# Patient Record
Sex: FEMALE | Race: ASIAN | NOT HISPANIC OR LATINO | Employment: OTHER | URBAN - METROPOLITAN AREA
[De-identification: names, ages, dates, MRNs, and addresses within clinical notes are randomized per-mention and may not be internally consistent; named-entity substitution may affect disease eponyms.]

---

## 2017-04-12 ENCOUNTER — APPOINTMENT (OUTPATIENT)
Dept: GENERAL RADIOLOGY | Facility: CLINIC | Age: 77
End: 2017-04-12
Attending: EMERGENCY MEDICINE
Payer: MEDICARE

## 2017-04-12 ENCOUNTER — HOSPITAL ENCOUNTER (OUTPATIENT)
Facility: CLINIC | Age: 77
Discharge: HOME | End: 2017-04-13
Attending: EMERGENCY MEDICINE | Admitting: INTERNAL MEDICINE
Payer: MEDICARE

## 2017-04-12 ENCOUNTER — APPOINTMENT (OUTPATIENT)
Dept: CT IMAGING | Facility: CLINIC | Age: 77
End: 2017-04-12
Attending: EMERGENCY MEDICINE
Payer: MEDICARE

## 2017-04-12 DIAGNOSIS — K56.609 SBO (SMALL BOWEL OBSTRUCTION) (H): ICD-10-CM

## 2017-04-12 LAB
ALBUMIN SERPL-MCNC: 4 G/DL (ref 3.4–5)
ALBUMIN UR-MCNC: NEGATIVE MG/DL
ALP SERPL-CCNC: 78 U/L (ref 40–150)
ALT SERPL W P-5'-P-CCNC: 31 U/L (ref 0–50)
ANION GAP SERPL CALCULATED.3IONS-SCNC: 7 MMOL/L (ref 3–14)
APPEARANCE UR: CLEAR
AST SERPL W P-5'-P-CCNC: 24 U/L (ref 0–45)
BASOPHILS # BLD AUTO: 0.1 10E9/L (ref 0–0.2)
BASOPHILS NFR BLD AUTO: 1.3 %
BILIRUB SERPL-MCNC: 0.2 MG/DL (ref 0.2–1.3)
BILIRUB UR QL STRIP: NEGATIVE
BUN SERPL-MCNC: 13 MG/DL (ref 7–30)
CALCIUM SERPL-MCNC: 9.1 MG/DL (ref 8.5–10.1)
CHLORIDE SERPL-SCNC: 102 MMOL/L (ref 94–109)
CO2 SERPL-SCNC: 30 MMOL/L (ref 20–32)
COLOR UR AUTO: ABNORMAL
CREAT SERPL-MCNC: 0.82 MG/DL (ref 0.52–1.04)
DIFFERENTIAL METHOD BLD: NORMAL
EOSINOPHIL # BLD AUTO: 0.2 10E9/L (ref 0–0.7)
EOSINOPHIL NFR BLD AUTO: 3.4 %
ERYTHROCYTE [DISTWIDTH] IN BLOOD BY AUTOMATED COUNT: 12.4 % (ref 10–15)
GFR SERPL CREATININE-BSD FRML MDRD: 67 ML/MIN/1.7M2
GLUCOSE SERPL-MCNC: 118 MG/DL (ref 70–99)
GLUCOSE UR STRIP-MCNC: NEGATIVE MG/DL
HCT VFR BLD AUTO: 38.6 % (ref 35–47)
HGB BLD-MCNC: 13.1 G/DL (ref 11.7–15.7)
HGB UR QL STRIP: ABNORMAL
IMM GRANULOCYTES # BLD: 0 10E9/L (ref 0–0.4)
IMM GRANULOCYTES NFR BLD: 0.2 %
KETONES UR STRIP-MCNC: NEGATIVE MG/DL
LACTATE BLD-SCNC: 1.2 MMOL/L (ref 0.7–2.1)
LEUKOCYTE ESTERASE UR QL STRIP: NEGATIVE
LIPASE SERPL-CCNC: 105 U/L (ref 73–393)
LYMPHOCYTES # BLD AUTO: 2.5 10E9/L (ref 0.8–5.3)
LYMPHOCYTES NFR BLD AUTO: 39.1 %
MCH RBC QN AUTO: 30.7 PG (ref 26.5–33)
MCHC RBC AUTO-ENTMCNC: 33.9 G/DL (ref 31.5–36.5)
MCV RBC AUTO: 90 FL (ref 78–100)
MONOCYTES # BLD AUTO: 0.4 10E9/L (ref 0–1.3)
MONOCYTES NFR BLD AUTO: 6.7 %
NEUTROPHILS # BLD AUTO: 3.1 10E9/L (ref 1.6–8.3)
NEUTROPHILS NFR BLD AUTO: 49.3 %
NITRATE UR QL: NEGATIVE
PH UR STRIP: 7.5 PH (ref 5–7)
PLATELET # BLD AUTO: 293 10E9/L (ref 150–450)
POTASSIUM SERPL-SCNC: 3.7 MMOL/L (ref 3.4–5.3)
PROT SERPL-MCNC: 8.5 G/DL (ref 6.8–8.8)
RBC # BLD AUTO: 4.27 10E12/L (ref 3.8–5.2)
RBC #/AREA URNS AUTO: 3 /HPF (ref 0–2)
SODIUM SERPL-SCNC: 139 MMOL/L (ref 133–144)
SP GR UR STRIP: 1 (ref 1–1.03)
URN SPEC COLLECT METH UR: ABNORMAL
UROBILINOGEN UR STRIP-MCNC: NORMAL MG/DL (ref 0–2)
WBC # BLD AUTO: 6.3 10E9/L (ref 4–11)
WBC #/AREA URNS AUTO: 2 /HPF (ref 0–2)

## 2017-04-12 PROCEDURE — 80053 COMPREHEN METABOLIC PANEL: CPT | Performed by: EMERGENCY MEDICINE

## 2017-04-12 PROCEDURE — 96374 THER/PROPH/DIAG INJ IV PUSH: CPT

## 2017-04-12 PROCEDURE — 83690 ASSAY OF LIPASE: CPT | Performed by: EMERGENCY MEDICINE

## 2017-04-12 PROCEDURE — 25000128 H RX IP 250 OP 636: Performed by: EMERGENCY MEDICINE

## 2017-04-12 PROCEDURE — 12000000 ZZH R&B MED SURG/OB

## 2017-04-12 PROCEDURE — 25000132 ZZH RX MED GY IP 250 OP 250 PS 637: Mod: GY | Performed by: EMERGENCY MEDICINE

## 2017-04-12 PROCEDURE — A9270 NON-COVERED ITEM OR SERVICE: HCPCS | Mod: GY | Performed by: EMERGENCY MEDICINE

## 2017-04-12 PROCEDURE — 74177 CT ABD & PELVIS W/CONTRAST: CPT

## 2017-04-12 PROCEDURE — 99204 OFFICE O/P NEW MOD 45 MIN: CPT | Performed by: SURGERY

## 2017-04-12 PROCEDURE — 96375 TX/PRO/DX INJ NEW DRUG ADDON: CPT

## 2017-04-12 PROCEDURE — 85025 COMPLETE CBC W/AUTO DIFF WBC: CPT | Performed by: EMERGENCY MEDICINE

## 2017-04-12 PROCEDURE — 25000125 ZZHC RX 250: Performed by: EMERGENCY MEDICINE

## 2017-04-12 PROCEDURE — 99285 EMERGENCY DEPT VISIT HI MDM: CPT | Mod: 25

## 2017-04-12 PROCEDURE — 25500064 ZZH RX 255 OP 636: Performed by: EMERGENCY MEDICINE

## 2017-04-12 PROCEDURE — 25800025 ZZH RX 258: Performed by: INTERNAL MEDICINE

## 2017-04-12 PROCEDURE — 74020 XR ABDOMEN 2 VW: CPT

## 2017-04-12 PROCEDURE — 99223 1ST HOSP IP/OBS HIGH 75: CPT | Performed by: INTERNAL MEDICINE

## 2017-04-12 PROCEDURE — 83605 ASSAY OF LACTIC ACID: CPT | Performed by: EMERGENCY MEDICINE

## 2017-04-12 PROCEDURE — 25000125 ZZHC RX 250: Performed by: INTERNAL MEDICINE

## 2017-04-12 PROCEDURE — 81001 URINALYSIS AUTO W/SCOPE: CPT | Performed by: EMERGENCY MEDICINE

## 2017-04-12 RX ORDER — NALOXONE HYDROCHLORIDE 0.4 MG/ML
.1-.4 INJECTION, SOLUTION INTRAMUSCULAR; INTRAVENOUS; SUBCUTANEOUS
Status: DISCONTINUED | OUTPATIENT
Start: 2017-04-12 | End: 2017-04-13 | Stop reason: HOSPADM

## 2017-04-12 RX ORDER — PROCHLORPERAZINE 25 MG
12.5 SUPPOSITORY, RECTAL RECTAL EVERY 12 HOURS PRN
Status: DISCONTINUED | OUTPATIENT
Start: 2017-04-12 | End: 2017-04-13 | Stop reason: HOSPADM

## 2017-04-12 RX ORDER — LABETALOL HYDROCHLORIDE 5 MG/ML
10 INJECTION, SOLUTION INTRAVENOUS EVERY 6 HOURS PRN
Status: DISCONTINUED | OUTPATIENT
Start: 2017-04-12 | End: 2017-04-13 | Stop reason: HOSPADM

## 2017-04-12 RX ORDER — ENALAPRILAT 1.25 MG/ML
1.25 INJECTION INTRAVENOUS EVERY 6 HOURS
Status: DISCONTINUED | OUTPATIENT
Start: 2017-04-12 | End: 2017-04-13

## 2017-04-12 RX ORDER — DEXTROSE MONOHYDRATE, SODIUM CHLORIDE, AND POTASSIUM CHLORIDE 50; 1.49; 4.5 G/1000ML; G/1000ML; G/1000ML
INJECTION, SOLUTION INTRAVENOUS CONTINUOUS
Status: DISCONTINUED | OUTPATIENT
Start: 2017-04-12 | End: 2017-04-13

## 2017-04-12 RX ORDER — SODIUM CHLORIDE 9 MG/ML
INJECTION, SOLUTION INTRAVENOUS ONCE
Status: COMPLETED | OUTPATIENT
Start: 2017-04-12 | End: 2017-04-12

## 2017-04-12 RX ORDER — CYANOCOBALAMIN 1000 UG/ML
1 INJECTION, SOLUTION INTRAMUSCULAR; SUBCUTANEOUS
Status: ON HOLD | COMMUNITY
End: 2017-04-12

## 2017-04-12 RX ORDER — PROCHLORPERAZINE MALEATE 5 MG
5 TABLET ORAL EVERY 6 HOURS PRN
Status: DISCONTINUED | OUTPATIENT
Start: 2017-04-12 | End: 2017-04-13 | Stop reason: HOSPADM

## 2017-04-12 RX ORDER — MORPHINE SULFATE 2 MG/ML
2-4 INJECTION, SOLUTION INTRAMUSCULAR; INTRAVENOUS
Status: DISCONTINUED | OUTPATIENT
Start: 2017-04-12 | End: 2017-04-13 | Stop reason: HOSPADM

## 2017-04-12 RX ORDER — IOPAMIDOL 755 MG/ML
68 INJECTION, SOLUTION INTRAVASCULAR ONCE
Status: COMPLETED | OUTPATIENT
Start: 2017-04-12 | End: 2017-04-12

## 2017-04-12 RX ORDER — ONDANSETRON 4 MG/1
4 TABLET, ORALLY DISINTEGRATING ORAL EVERY 6 HOURS PRN
Status: DISCONTINUED | OUTPATIENT
Start: 2017-04-12 | End: 2017-04-13 | Stop reason: HOSPADM

## 2017-04-12 RX ORDER — HYDROMORPHONE HYDROCHLORIDE 1 MG/ML
0.2 INJECTION, SOLUTION INTRAMUSCULAR; INTRAVENOUS; SUBCUTANEOUS ONCE
Status: COMPLETED | OUTPATIENT
Start: 2017-04-12 | End: 2017-04-12

## 2017-04-12 RX ORDER — HYDRALAZINE HYDROCHLORIDE 20 MG/ML
10 INJECTION INTRAMUSCULAR; INTRAVENOUS EVERY 6 HOURS PRN
Status: DISCONTINUED | OUTPATIENT
Start: 2017-04-12 | End: 2017-04-13 | Stop reason: HOSPADM

## 2017-04-12 RX ORDER — OXYCODONE HYDROCHLORIDE 5 MG/1
5 TABLET ORAL ONCE
Status: COMPLETED | OUTPATIENT
Start: 2017-04-12 | End: 2017-04-12

## 2017-04-12 RX ORDER — ONDANSETRON 2 MG/ML
4 INJECTION INTRAMUSCULAR; INTRAVENOUS EVERY 6 HOURS PRN
Status: DISCONTINUED | OUTPATIENT
Start: 2017-04-12 | End: 2017-04-13 | Stop reason: HOSPADM

## 2017-04-12 RX ORDER — MORPHINE SULFATE 2 MG/ML
2 INJECTION, SOLUTION INTRAMUSCULAR; INTRAVENOUS ONCE
Status: COMPLETED | OUTPATIENT
Start: 2017-04-12 | End: 2017-04-12

## 2017-04-12 RX ORDER — LANOLIN ALCOHOL/MO/W.PET/CERES
1000 CREAM (GRAM) TOPICAL DAILY PRN
COMMUNITY
End: 2018-08-11

## 2017-04-12 RX ADMIN — SODIUM CHLORIDE 60 ML: 9 INJECTION, SOLUTION INTRAVENOUS at 04:04

## 2017-04-12 RX ADMIN — MORPHINE SULFATE 2 MG: 2 INJECTION, SOLUTION INTRAMUSCULAR; INTRAVENOUS at 03:38

## 2017-04-12 RX ADMIN — IOPAMIDOL 68 ML: 755 INJECTION, SOLUTION INTRAVENOUS at 04:04

## 2017-04-12 RX ADMIN — ENALAPRILAT 1.25 MG: 1.25 INJECTION INTRAVENOUS at 17:07

## 2017-04-12 RX ADMIN — OXYCODONE HYDROCHLORIDE 5 MG: 5 TABLET ORAL at 02:31

## 2017-04-12 RX ADMIN — ENALAPRILAT 1.25 MG: 1.25 INJECTION INTRAVENOUS at 06:15

## 2017-04-12 RX ADMIN — ENALAPRILAT 1.25 MG: 1.25 INJECTION INTRAVENOUS at 23:51

## 2017-04-12 RX ADMIN — HYDROMORPHONE HYDROCHLORIDE 0.2 MG: 1 INJECTION, SOLUTION INTRAMUSCULAR; INTRAVENOUS; SUBCUTANEOUS at 01:03

## 2017-04-12 RX ADMIN — LIDOCAINE HYDROCHLORIDE 30 ML: 20 SOLUTION ORAL; TOPICAL at 01:58

## 2017-04-12 RX ADMIN — ENALAPRILAT 1.25 MG: 1.25 INJECTION INTRAVENOUS at 12:40

## 2017-04-12 RX ADMIN — POTASSIUM CHLORIDE, DEXTROSE MONOHYDRATE AND SODIUM CHLORIDE: 150; 5; 450 INJECTION, SOLUTION INTRAVENOUS at 06:14

## 2017-04-12 RX ADMIN — POTASSIUM CHLORIDE, DEXTROSE MONOHYDRATE AND SODIUM CHLORIDE: 150; 5; 450 INJECTION, SOLUTION INTRAVENOUS at 16:59

## 2017-04-12 RX ADMIN — SODIUM CHLORIDE 125 ML/HR: 9 INJECTION, SOLUTION INTRAVENOUS at 01:02

## 2017-04-12 ASSESSMENT — ENCOUNTER SYMPTOMS
NAUSEA: 0
ABDOMINAL PAIN: 1
DIARRHEA: 0
CHILLS: 0
VOMITING: 0
FEVER: 0

## 2017-04-12 NOTE — PLAN OF CARE
Problem: Goal Outcome Summary  Goal: Goal Outcome Summary  Outcome: No Change  VSS. A/O. Pt denies pain. Intermittent nausea, declines medicine. Abd soft/non tender. BS+ Passing gas. NPO. Surgery following. Conservative management at this point.  IVF. Up indep in room/halls. D/C pending progress.

## 2017-04-12 NOTE — ED NOTES
[facility]  ED Nurse Handoff Report    ED Chief complaint: Abdominal Pain (began having mid abdominal pain around 2100.  denies n/v)      ED Diagnosis:   Final diagnoses:   SBO (small bowel obstruction) (H)       Code Status: Full Code    Allergies: No Known Allergies    Activity level - Baseline/Home:  Independent    Activity Level - Current:   Independent     Needed?: No    Isolation: No  Infection: Not Applicable    Bariatric?: No    Vital Signs:   Vitals:    04/12/17 0315 04/12/17 0330 04/12/17 0344 04/12/17 0345   BP: (!) 171/95 (!) 188/98 161/84    Resp: 18  18    Temp:       TempSrc:       SpO2: 94%  96% 97%   Height:           Cardiac Rhythm: ,        Pain level: 2/10    Is this patient confused?: No    Patient Report: Initial Complaint: The Pt presented to the ED with c/o left sided abdominal pain. She reports that she has had a history of SBO in the past. She reports that she had pizza with cabbage last night at 1930 hrs and at 2100, began to have abdominal pain. She denies nausea or diarrhea Her pain has been intermittent and she has been treated with Dilaudid, IV, Morphine IV, Roxicodone and a GI Cocktail.   Focused Assessment: The Pt reports that se has had better control with the Morphine  Tests Performed: XRAY, Abdominal CT and lab work  Abnormal Results: small SBO  Treatments provided: pain medications and IV fluids    Family Comments:  is at bedside.    OBS brochure/video discussed/provided to patient: N/A    ED Medications:   Medications   0.9% sodium chloride infusion (125 mL/hr Intravenous New Bag 4/12/17 0102)   HYDROmorphone (PF) (DILAUDID) injection 0.2 mg (0.2 mg Intravenous Given 4/12/17 0103)   lidocaine (XYLOCAINE) 2 % 15 mL, alum & mag hydroxide-simethicone (MYLANTA ES/MAALOX  ES) 15 mL GI Cocktail (30 mLs Oral Given 4/12/17 0158)   oxyCODONE (ROXICODONE) IR tablet 5 mg (5 mg Oral Given 4/12/17 0231)   iohexol (OMNIPAQUE) solution 25 mL (25 mLs Oral  Given 4/12/17 0339)   morphine (PF) injection 2 mg (2 mg Intravenous Given 4/12/17 0338)   iopamidol (ISOVUE-370) solution 68 mL (68 mLs Intravenous Given 4/12/17 0404)   sodium chloride 0.9 % for CT scan flush dose 60 mL (60 mLs Intravenous Given 4/12/17 0404)       Drips infusing?:  Yes      ED NURSE PHONE NUMBER: [phone]

## 2017-04-12 NOTE — ED PROVIDER NOTES
"  History     Chief Complaint:  Abdominal Pain    HPI   Francois Ly is a 77 year old female with a history of hypertension, cholelithiasis, one small bowel obstruction, and pancreatic mass who presents with spouse to the emergency department today for evaluation of abdominal pain. Mr. Ly reports at 21:00, after eating meat pizza with cabbage at 19:30, she began to develop localized sharp mid-abdominal pain. Prior to onset, patient felt fine. Of note, patient states she hasn't eaten processed meat for a long time until tonight. She took chewable Maalox with no improvement in symptoms prompting visit. Here, patient rates the severity of pain as 8/10 at its worse. The patient denies any fever, chills, nausea, vomiting, diarrhea, or changes in bowel or bladder habits.    Allergies:  NKDA     Medications:    Cyanocobalamin (vitamin b12) 1000 mcg/ml injection  Krill oil po  Losartan (cozaar) 100 mg tablet  Chlorthalidone (hygroton) 25 mg tablet  Aspirin ec 81 mg tablet  Lidocaine (lidoderm) 5 % patch  Lidocaine (xylocaine) 5 % ointment  Order for dme  Omega-3 fatty acids (omega-3 fish oil po)  Multiple vitamin (multivitamins po)    Past Medical History:    Benign essential hypertension   Blunt trauma of rib, initial encounter   Gallstones   Hypertension   Pancreatic mass   Slipped intervertebral disc   Cholelithaisis  SBO    Past Surgical History:    Hysterectomy  Orthopedic surgery    Family History:    History reviewed. No pertinent family history.    Social History:  Marital Status:   [2]  Tobacco: Former Smoker  Alcohol: Negative  Presents with spouse  PCP: Luis Feliciano    Review of Systems   Constitutional: Negative for chills and fever.   Gastrointestinal: Positive for abdominal pain. Negative for diarrhea, nausea and vomiting.   All other systems reviewed and are negative.    Physical Exam   First Vitals:  BP: (!) 224/100  Heart Rate: 79  Temp: 97.8  F (36.6  C)  Height: 157.5 cm (5' 2\")  SpO2: 97 %  "   Physical Exam  Nursing note and vitals reviewed.  Constitutional:  Appears well-developed and well-nourished. At times, she appears    uncomfortale and grimaces in pain.   HENT:   Head:   No evidence of facial or scalp trauma.  Nose:    Nose normal.   Mouth/Throat:  Mucosa is moist.  Eyes:    Conjunctivae are normal.      Pupils are equal, round, and reactive to light.      Right eye exhibits no discharge. Left eye exhibits no discharge.      No scleral icterus.   Cardiovascular:  Normal rate, regular rhythm.      Normal heart sounds and intact distal pulses.       No murmur heard.  Pulmonary/Chest:  Effort normal and breath sounds normal. No respiratory distress.     No wheezes. No rales. No chest wall tenderness. No stridor.   Abdominal:   She has some moderate epigastric tenderness with palpation.      No rebound or guarding. Negative Sotomayor's sign.  No right upper    quadrant pain.   Musculoskeletal:  Normal range of motion.      No edema and no tenderness.                                       Neck supple, no midline cervical tenderness.   Neurological:   Alert and oriented to person, place, and year.      No cranial nerve deficit.      Exhibits normal muscle tone. Coordination normal.      GCS eye subscore is 4. GCS verbal subscore is 5.      GCS motor subscore is 6.   Skin:    Skin is warm and dry. No rash noted. No diaphoresis.      No erythema. No pallor.   Psychiatric:   Behavior is normal. Judgment and thought content normal.     Emergency Department Course   Imaging:  Radiographic findings were communicated with the patient and family who voiced understanding of the findings.  Abdomen XR, per radiology:     Nonspecific bowel gas pattern. Moderate stool in the right colon. No evidence of bowel obstruction. No free intraperitoneal air. Surgical changes in the lower lumbar spine.  Preliminary  reading per radiology.     CT Abdomen Pelvis with contrast:  1. Mechanical small bowel obstruction. One of the  dilated small bowel loops is thick-walled consistent with inflammation or edema.  2. Findings are probably due to small bowel obstruction with an associated edematous loop. Cause of obstruction is unclear but several of these loops appear adhesed to the anterior abdominal wall. Less likely findings could be due to ileitis with secondary obstruction.  3. Small amount of fluid and stranding in the adjacent small bowel mesentery.  4. Cholelithiasis.  5. Sigmoid diverticulosis.  Final reading per radiology.     Laboratory:  CMP: Glucose 118 o/w WNL. (Creatinine 0.82)  Lipase: 105  CBC:  WBC 6.3, HGB 13.1, , otherwise WNL  Lactic acid: 1.2    UA: Blood trace, pH 7.5. WBC/HPF 2, RBC/HPF 3.     Interventions:  01:02 Normal saline 125mL/hr IV   01:03 Dilaudid 0.2 mg IV  01:58 Xylocaine 2% 15mL, Mylanta 15mL PO  02:31 Oxycodone 5 mg Oral  03:38 Morphine, 2 mg, IV  03:39 Iohexol 140 mg/mL solution, 25 mL, PO    Emergency Department Course:  Nursing notes and vitals reviewed.    00:35  I performed an exam of the patient as documented above.     Blood drawn. This was sent to the lab for further testing, results above.    01:51 Recheck patient.    02:21 Recheck patient.     The patient was taken for x-ray, see imaging results above.     03:29 Recheck patient.     Blood drawn. This was sent to the lab for further testing, results above.    The patient received the intervention(s) above.    The patient was taken for CT scan, see imaging results above.     04:37 Recheck patient. Findings and plan explained to the Patient and spouse who consents to admission.     04:42 Discussed the patient with Dr. Cabrera, who will admit the patient to a medical bed for further monitoring, evaluation, and treatment.  Impression & Plan    Medical Decision Making:  Francois Ly is a 77 year old female comes in with periumbilical up to epigastric tenderness. She was not markedly distended. She has a history of SBO. She had eaten meat on a pizza  along with cabbage and something she hadn't eaten in a long time. She started about 3 hours after eating. Labs came back with a normal CBC with a white count of 6.3, normal CMP, and lipase. Her glucose was 118. Her urine is unremarkable. I got a flat upright abdominal x-ray and that was normal. I tried a GI cocktail, which didn't do anything. She was given 0.2 of Dilaudid and had brief temporary relief, but he pain came back. Lactic acid was obtained just to be sure that she didn't have evidence of ischemic bowel and this was normal. She is still having a lot of pain despite Dilaudid and GI cocktail so I got a CT scan of the abdomen and pelvis and it shows gall stones, which are asymptomatic, but she has a small mechanical bowel obstruction. She will be continued on IV fluids. I will admit her to Dr. Cabrera for bowel arrest. Morphine 2 mg was given and this is the best for her pain so far. She is comfortable at this point. There has been minimal nausea and vomiting. I don't fel she ends an NG tube at this time.     Diagnosis:    ICD-10-CM    1. SBO (small bowel obstruction) (H) K56.69        Disposition:  Admitted to Dr. Cabrera, bowel arrest, morphine as needed for pain and IV fluids. Sugery consultation if not improving over the next 24 hours.      Scribe Disclosure:  I, Caty Pastrana, am serving as a scribe at 12:35 AM on 4/12/2017 to document services personally performed by Antonietta Shahid MD, based on my observations and the provider's statements to me.  Caty Pastrana  4/12/2017    EMERGENCY DEPARTMENT       Antonietta Shahid MD  04/12/17 0457

## 2017-04-12 NOTE — PHARMACY-ADMISSION MEDICATION HISTORY
Admission medication history interview status for the 4/12/2017  admission is complete. See EPIC admission navigator for prior to admission medications     Medication history source reliability:Good    Actions taken by pharmacist (provider contacted, etc): interview with patient     Additional medication history information not noted on PTA med list :None    Medication reconciliation/reorder completed by provider prior to medication history? Yes    Time spent in this activity: 15 min    Prior to Admission medications    Medication Sig Last Dose Taking? Auth Provider   KRILL OIL PO Take 500 mg by mouth daily  4/11/2017 at Unknown time Yes Reported, Patient   cyanocobalamin (VITAMIN  B-12) 1000 MCG tablet Take 1,000 mcg by mouth daily as needed (leg cramps) prn Yes Unknown, Entered By History   VITAMIN D, CHOLECALCIFEROL, PO Take 1 tablet by mouth daily as needed (leg cramps) prn Yes Unknown, Entered By History   NONFORMULARY Pain cream from [employer] 30mg/g. Pt uses on legs for pain prn  Yes Unknown, Entered By History   losartan (COZAAR) 100 MG tablet Take 0.5 tablets (50 mg) by mouth 2 times daily 4/11/2017 at pm Yes Luis Feliciano MD   chlorthalidone (HYGROTON) 25 MG tablet Take 0.5 tablets (12.5 mg) by mouth daily 4/11/2017 at am Yes Luis Feliciano MD   aspirin EC 81 MG tablet Take 1 tablet (81 mg) by mouth daily 4/11/2017 at am Yes Lesli Barreto MD   Multiple Vitamin (MULTIVITAMINS PO) Take 1 tablet by mouth daily as needed  prn Yes Reported, Patient   order for DME Equipment being ordered: Luis Becerril MD

## 2017-04-12 NOTE — PROGRESS NOTES
Patient admitted this AM by Dr. Cabrera for SBO.  Pain is controlled.  Discussed management with patient.  No new concerns.    Luis Miguel Gonzalez MD  [facility] Physician

## 2017-04-12 NOTE — PLAN OF CARE
Problem: Goal Outcome Summary  Goal: Goal Outcome Summary  Outcome: No Change  Patient arrived around 0540, NPO, Alert & oriented, forgetful, independent, VSS on RA except BP elevated 157/92, scheduled Vasotec given,denies abdominal pain, lung sounds clear, IVF at 100 ml/hr.

## 2017-04-12 NOTE — CONSULTS
[facility]  General Surgery Consultation         Lalito Strange MD    Francois Ly MRN# [id number]   YOB: 1940 Age: 77 year old      Date of Admission:  4/12/2017  Date of Consult: 4/12/2017         Assessment and Plan:   Pleasant 77-year-old female with history of previous abdominal surgery and now presents with recurrent bowel traction.  Last hospitalization for bowel obstruction was approximately 10 years ago which resolved without operative management.  Patient feels considerably better than upon presentation last night.  Recommend conservative management for now and if she is still showing signs of obstruction tonight, would recommend Gastrografin challenge with abdominal x-rays in the morning.  Would continue to keep the patient n.p.o. at this point.  Surgical co-morbities include hypertension, multiple previous abdominal surgeries.            Code Status:   Full Code         Primary Care Physician:   Luis Feliciano [phone]         Requesting Physician:      Deborah         Chief Complaint:   Abdominal pain and distention    History is obtained from the patient         History of Present Illness:   Francois Ly is a 77 year old female who presented with abdominal pain and distention since eating dinner last night.  Pain was fairly sudden onset.  She had a bowel movement yesterday evening which was somewhat limited but otherwise normal.  Patient has a history of multiple abdominal surgeries originally done for what sounds like a perforated uterus related to a IUD placement.  She has had a hysterectomy.  She's been admitted for bowel obstruction before but has never required surgery.  No history of previous hernia repairs.  She presented to the emergency department or her labs were relatively unremarkable and a CT scan suggested distal small bowel obstruction with a probable transition point being a thickened loop of bowel anteriorly located in the mid ileum.            Past Medical History:   Francois Ly  has a past medical history of Benign essential hypertension (10/12/2016); Blunt trauma of rib, initial encounter (11/25/2016); Gallstones; Hypertension; Pancreatic mass (8/17/2016); and Slipped intervertebral disc.         Past Surgical History:   Francois Ly  has a past surgical history that includes GYN surgery and orthopedic surgery.         Home Medications:     Prior to Admission medications    Medication Sig Last Dose Taking? Auth Provider   KRILL OIL PO Take 500 mg by mouth daily  4/11/2017 at Unknown time Yes Reported, Patient   cyanocobalamin (VITAMIN  B-12) 1000 MCG tablet Take 1,000 mcg by mouth daily as needed (leg cramps) prn Yes Unknown, Entered By History   VITAMIN D, CHOLECALCIFEROL, PO Take 1 tablet by mouth daily as needed (leg cramps) prn Yes Unknown, Entered By History   NONFORMULARY Pain cream from [employer] 30mg/g. Pt uses on legs for pain prn  Yes Unknown, Entered By History   losartan (COZAAR) 100 MG tablet Take 0.5 tablets (50 mg) by mouth 2 times daily 4/11/2017 at pm Yes Luis Feliciano MD   chlorthalidone (HYGROTON) 25 MG tablet Take 0.5 tablets (12.5 mg) by mouth daily 4/11/2017 at am Yes Luis Feliciano MD   aspirin EC 81 MG tablet Take 1 tablet (81 mg) by mouth daily 4/11/2017 at am Yes Lesli Barreto MD   Multiple Vitamin (MULTIVITAMINS PO) Take 1 tablet by mouth daily as needed  prn Yes Reported, Patient   order for DME Equipment being ordered: nicki strap   Luis Feliciano MD            Current Medications:           enalaprilat  1.25 mg Intravenous Q6H     naloxone, ondansetron **OR** ondansetron, prochlorperazine **OR** prochlorperazine **OR** prochlorperazine, morphine, labetalol, hydrALAZINE         Allergies:   No Known Allergies         Social History:   Francois Ly  reports that she has quit smoking. She has quit using smokeless tobacco. She reports that she drinks alcohol. She reports that she does not use illicit drugs.           "Family History:   Francois Ly family history is not on file.          Review of Systems:   The 10 point Review of Systems is negative other than noted in the HPI.  Nausea but no emesis.  No fevers or chills.  No bloody stools.           Physical Exam:   Blood pressure 136/67, temperature 97.7  F (36.5  C), temperature source Oral, resp. rate 16, height 1.575 m (5' 2.01\"), weight 61.8 kg (136 lb 3.9 oz), SpO2 96 %.  136 lbs 3.91 oz    Healthy-appearing female in no distress.  Patient has a pleasant affect and communicates well.   Pupils equal round and reactive to light.   No cervical lymphadenopathy or thyromegaly.   Lung fields clear, breathing comfortably.   Heart normal sinus rhythm.  No murmurs rubs or gallops.  Abdomen soft, nontender, nondistended.  Well-healed lower midline scar.  No palpable hernias  Skin warm, dry.  No obvious rashes or lesions.           Data:   All new lab and imaging data was reviewed, including labs and personal review of the CT scan images.  Recent Labs   Lab Test  04/12/17 0030  10/01/16   2230   WBC  6.3  6.3   HGB  13.1  12.0   MCV  90  90   PLT  293  277      Recent Labs   Lab Test  04/12/17 0030  10/24/16   1045   NA  139  137   POTASSIUM  3.7  3.8   CHLORIDE  102  102   CO2  30  29   BUN  13  15   CR  0.82  0.74   ANIONGAP  7  6   ARIN  9.1  8.8   GLC  118*  99              "

## 2017-04-12 NOTE — IP AVS SNAPSHOT
[facility] [address] [facility]    [address] [place] [address]    Phone:  [phone]                                       After Visit Summary   4/12/2017    Francois Ly    MRN: [id number]           After Visit Summary Signature Page     I have received my discharge instructions, and my questions have been answered. I have discussed any challenges I see with this plan with the nurse or doctor.    ..........................................................................................................................................  Patient/Patient Representative Signature      ..........................................................................................................................................  Patient Representative Print Name and Relationship to Patient    ..................................................               ................................................  Date                                            Time    ..........................................................................................................................................  Reviewed by Signature/Title    ...................................................              ..............................................  Date                                                            Time

## 2017-04-12 NOTE — IP AVS SNAPSHOT
MRN:[id number]                      After Visit Summary   4/12/2017    Francois Ly    MRN: [id number]           Thank you!     Thank you for choosing [facility] for your care. Our goal is always to provide you with excellent care. Hearing back from our patients is one way we can continue to improve our services. Please take a few minutes to complete the written survey that you may receive in the mail after you visit with us. Thank you!        Patient Information     Date Of Birth          1940        About your hospital stay     You were admitted on:  April 12, 2017 You last received care in the:  [place] [address] [facility]    You were discharged on:  April 13, 2017        Reason for your hospital stay       Small bowel obstruction                  Who to Call     For medical emergencies, please call 911.  For non-urgent questions about your medical care, please call your primary care provider or clinic, [phone]          Attending Provider     Provider Specialty    Antonietta Shahid MD Emergency Medicine    [facility], Mick Mckeon MD Internal Medicine    [facility], Luis Miguel JACKSON MD Internal Medicine       Primary Care Provider Office Phone # Fax #    Luis Feliciano [phone]       [address] [address]        After Care Instructions     Activity       Your activity upon discharge: activity as tolerated            Diet       Follow this diet upon discharge: Orders Placed This Encounter      Mechanical/Dental Soft Diet                    Follow-up Appointments     Follow Up and recommended labs and tests       Follow up with primary care provider, Luis Feliciano, within 7 days for hospital follow- up.                  Your next 10 appointments already scheduled     Apr 18, 2017 10:00 AM CDT   Office Visit with Luis Feliciano MD   [facility]    [address] [place] [address]  "[id number] [phone]           Bring a current list of meds and any records pertaining to this visit.  For Physicals, please bring immunization records and any forms needing to be filled out.  Please arrive 10 minutes early to complete paperwork.              Pending Results     No orders found for last 3 day(s).            Statement of Approval     Ordered          17 1011  I have reviewed and agree with all the recommendations and orders detailed in this document.  EFFECTIVE NOW     Approved and electronically signed by:  Luis Miguel Gonzalez MD             Admission Information     Date & Time Provider Department Dept. Phone    2017 Luis Miguel Gonzalez MD [place] [address] [facility] [phone]      Your Vitals Were     Blood Pressure Temperature Respirations Height Weight Pulse Oximetry    156/67 (BP Location: Right arm) 97.8  F (36.6  C) (Oral) 16 1.575 m (5' 2.01\") 61.6 kg (135 lb 12.9 oz) 96%    BMI (Body Mass Index)                   24.83 kg/m2           [employer] Information     [employer] lets you send messages to your doctor, view your test results, renew your prescriptions, schedule appointments and more. To sign up, go to [email or web address] . Click on \"Log in\" on the left side of the screen, which will take you to the Welcome page. Then click on \"Sign up Now\" on the right side of the page.     You will be asked to enter the access code listed below, as well as some personal information. Please follow the directions to create your username and password.     Your access code is: [email or web address]  Expires: 2017 11:43 AM     Your access code will  in 90 days. If you need help or a new code, please call your [facility] clinic or [phone].        Care EveryWhere ID     This is your Care EveryWhere ID. This could be used by other organizations to access your [facility] medical records  [id number]           Review of your medicines      CONTINUE these medicines which have NOT " CHANGED        Dose / Directions    aspirin EC 81 MG EC tablet   Used for:  ACS (acute coronary syndrome) (H)        Dose:  81 mg   Take 1 tablet (81 mg) by mouth daily   Refills:  0       chlorthalidone 25 MG tablet   Commonly known as:  HYGROTON   Used for:  Benign essential hypertension        Dose:  12.5 mg   Take 0.5 tablets (12.5 mg) by mouth daily   Quantity:  45 tablet   Refills:  3       cyanocobalamin 1000 MCG tablet   Commonly known as:  vitamin  B-12        Dose:  1000 mcg   Take 1,000 mcg by mouth daily as needed (leg cramps)   Refills:  0       KRILL OIL PO        Dose:  500 mg   Take 500 mg by mouth daily   Refills:  0       losartan 100 MG tablet   Commonly known as:  COZAAR   Used for:  Benign essential hypertension        Dose:  50 mg   Take 0.5 tablets (50 mg) by mouth 2 times daily   Quantity:  90 tablet   Refills:  3       MULTIVITAMINS PO        Dose:  1 tablet   Take 1 tablet by mouth daily as needed   Refills:  0       NONFORMULARY        Pain cream from [employer] 30mg/g. Pt uses on legs for pain prn   Refills:  0       order for DME   Used for:  Blunt trauma of rib, initial encounter        Equipment being ordered: rib strap   Quantity:  1 Device   Refills:  0       VITAMIN D (CHOLECALCIFEROL) PO        Dose:  1 tablet   Take 1 tablet by mouth daily as needed (leg cramps)   Refills:  0                Protect others around you: Learn how to safely use, store and throw away your medicines at www.disposemymeds.org.             Medication List: This is a list of all your medications and when to take them. Check marks below indicate your daily home schedule. Keep this list as a reference.      Medications           Morning Afternoon Evening Bedtime As Needed    aspirin EC 81 MG EC tablet   Take 1 tablet (81 mg) by mouth daily   Next Dose Due:  Resume                                   chlorthalidone 25 MG tablet   Commonly known as:  HYGROTON   Take 0.5 tablets (12.5 mg) by mouth daily   Last time  this was given:  12.5 mg on 4/13/2017 11:42 AM   Next Dose Due:  Due tomorrow                                   cyanocobalamin 1000 MCG tablet   Commonly known as:  vitamin  B-12   Take 1,000 mcg by mouth daily as needed (leg cramps)                                   KRILL OIL PO   Take 500 mg by mouth daily   Next Dose Due:  Resume                                   losartan 100 MG tablet   Commonly known as:  COZAAR   Take 0.5 tablets (50 mg) by mouth 2 times daily   Next Dose Due:  Due tonight                                      MULTIVITAMINS PO   Take 1 tablet by mouth daily as needed                                   NONFORMULARY   Pain cream from [employer] 30mg/g. Pt uses on legs for pain prn   Next Dose Due:  Resume                                   order for DME   Equipment being ordered: rib strap                                VITAMIN D (CHOLECALCIFEROL) PO   Take 1 tablet by mouth daily as needed (leg cramps)

## 2017-04-12 NOTE — H&P
PRIMARY CARE PHYSICIAN:  Luis Feliciano MD      CHIEF COMPLAINT:  Abdominal pain.      HISTORY OF PRESENT ILLNESS:  Francois Ly is a 77-year-old female patient with history noted below, including hypertension, who presents with the above acute complaints.  The patient was in her usual state of health up until last evening on 04/11, when she developed her symptoms.  Earlier at dinner, she had meat pizza and cabbage.  Typically she does not eat processed meat.  Her pain was in the mid abdominal region.  It was sharp.  She has no fevers or chills.  No diarrhea or bloody stools.  No nausea or vomiting.  Given her symptomatology, she came to [facility] for further evaluation.      The patient was seen in the ER by Dr. Shahid.  Initially, vital signs showed temperature of 97.8 degrees, heart rate 70, blood pressure 224/100, respiratory rate 16, O2 saturation 97% on room air.  Laboratory evaluation was fairly unremarkable.  White count was normal.  Lactic acid was normal.  Urinalysis did not suggest infection.  She had abdominal films which did not show any acute findings, but there was moderate stool in the right colon.  She was given a GI cocktail with little effect.  She was also given Dilaudid, subsequently oxycodone and then IV morphine, which seemed to help the most.      She went on to have a CT scan of her abdomen which showed a mechanical small bowel obstruction with one of the dilated small bowel loops thick walled, consistent with inflammation or edema.  It was also noted that several loops appeared to be adhesed to the anterior abdominal wall, and less likely that findings could be due to ileitis with secondary obstruction.  There is also a small amount fluid and stranding in adjacent small bowel mesentery.  Cholelithiasis and sigmoid diverticulosis were noted as well.  Overall, given these findings, request for admission was made.      The patient denies any chest pain.  No shortness of breath.      PAST  MEDICAL HISTORY:   1.  Hypertension.   2.  Dyslipidemia.   3.  Spine/disc disease.  She had lumbar spine surgery in 2015.   4.  Cholelithiasis.   5.  Pancreatic cyst.  This was diagnosed five years ago and monitored with serial imaging at the [facility].   6.  History of type II non-ST elevation myocardial infarction in 10/2016, with stress test at that time which was negative for ischemia.   7.  History of small bowel obstruction.   8.  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      PAST SURGICAL HISTORY:   1.  Status post hysterectomy and bilateral salpingo-oophorectomy.   2.  Status post bilateral shoulder surgeries.   3.  Status post cataract surgery.   4.  Status post bunion surgery.   5.  Status post multiple abdominal surgeries in the 1960s related to IUD uterine perforation, and another surgery related to this in the 1990s.   6.  Status post lumbar spine surgery in 07/2015.      ALLERGIES:  No known drug allergies.      HOME MEDICATIONS:   1.  Aspirin 81 mg a day.   2.  Chlorthalidone 12.5 mg a day.   3.  Vitamin B12 at 1000 mcg every 30 days.   4.  Krill oil.   4.  Lidocaine 5% patch, 1-3 patches p.r.n.   5.  Lidocaine 5% ointment topically as needed.   6.  Losartan 100 mg b.i.d.   7.  Losartan 50 mg b.i.d.   8.  Multivitamin.   9.  Omega 3 fatty acids 1 tablet a day.      SOCIAL HISTORY:  The patient does not smoke.  Occasional alcohol use.  She is .  She has four children.  She is retired.  Used to have a degree in art as well as business, and did work as a  for [employer].  The patient is of [place] descent.      FAMILY HISTORY:  Reviewed and not felt to be contributory.      REVIEW OF SYSTEMS:  As noted in HPI, otherwise 10-point systems negative.      PHYSICAL EXAMINATION:   VITAL SIGNS:  Temperature 97.8 degrees, heart rate 79, blood pressure 161/84, respiratory rate 18, O2  saturation 97% on room air.   GENERAL:  Alert and oriented, pleasant 77-year-old female patient sitting in bed.  She is conversant and friendly.   HEENT:  Pupils equally round, reactive.  No scleral icterus or conjunctival injection.  Oropharynx reveals no gross erythema or exudate.   NECK:  No bruits, JVD or adenopathy.   HEART:  Regular rate and rhythm, without murmurs, rubs or gallops.   LUNGS:  Grossly clear without crackles or wheezes.   ABDOMEN:  There is mild distention.  Positive bowel sounds.  Some areas of high pitched or tympanitic type sounds.  There is tenderness, but no rebound or guarding.  No femoral bruits.   EXTREMITIES:  Trace edema.   NEUROLOGIC:  No gross focal motor or sensory deficits.      LABS:  BMP is normal.  LFTs are normal.  Lipase is normal.  CBC was normal.  Urinalysis did not suggest infection.      Abdominal x-rays showed nonspecific bowel gas pattern with moderate stool in the right colon, evidence of bowel obstruction, and no free intraperitoneal air.      CT scan of the abdomen and pelvis with contrast showed signs of a mechanical small bowel obstruction, with one of the dilated small bowel loops thick-walled, consistent with inflammation or edema; findings probably due to small bowel obstruction with associated edematous loops, with cause of obstruction unclear as to the role, as these loops appear adhesed to the anterior abdominal wall, and it is less likely that the findings could be due to ileitis with secondary obstruction.  Small amount of fluid and stranding in the adjacent small bowel mesentery, sigmoid colon.  Cholelithiasis, sigmoid diverticulosis.      ASSESSMENT AND PLAN:  This is a 77-year-old female patient with history including hypertension, dyslipidemia, as well as history of multiple abdominal surgeries, who presents with abdominal pain, and found with evidence of small bowel obstruction.     1.  Small bowel obstruction:  CT as above.  Presently the patient is  afebrile, and has normal white count and normal lactic acid.  She is not nauseated and has not vomited.  Plan is conservative management for now with bowel rest, keeping the patient n.p.o.  We will order maintenance IV fluids.  P.r.n. IV morphine and p.r.n. antiemetics will be ordered.  Encouraged ambulation.  If symptoms worsen or she starts developing nausea and vomiting, next step would be NG tube placement.  We will consult Surgery.  I appreciate their help.      2.  Hypertensive urgency:  Suspect due to pain.  Prior to admission regimen consists of chlorthalidone 12.5 mg a day, and losartan 50 mg b.i.d.  Blood pressures were in the 220s/100 initially, and have come down with pain medications.  At this time, we will order IV enalapril at 1.25 mg every 6 hours.  We will order p.r.n. IV labetalol, and p.r.n. IV hydralazine.  Hold chlorthalidone and losartan for now.    3.  Prophylaxis:  Pneumoboots, ambulation.    CODE STATUS:  FULL CODE.         STACY ROMERO JR., MD             D: 2017 05:17   T: 2017 07:01   MT: [id number]#[id number]      Name:     FRANCISCO GARCÍA   MRN:      [id number]        Account:      [id number]   :      1940           Admitted:     [id number]      Document: [id number]       cc: Luis Feliciano MD

## 2017-04-13 VITALS
SYSTOLIC BLOOD PRESSURE: 156 MMHG | HEIGHT: 62 IN | BODY MASS INDEX: 24.99 KG/M2 | TEMPERATURE: 97.8 F | RESPIRATION RATE: 16 BRPM | WEIGHT: 135.8 LBS | OXYGEN SATURATION: 96 % | DIASTOLIC BLOOD PRESSURE: 67 MMHG

## 2017-04-13 PROBLEM — K56.609 SMALL BOWEL OBSTRUCTION (H): Status: ACTIVE | Noted: 2017-04-13

## 2017-04-13 LAB
ANION GAP SERPL CALCULATED.3IONS-SCNC: 6 MMOL/L (ref 3–14)
BASOPHILS # BLD AUTO: 0 10E9/L (ref 0–0.2)
BASOPHILS NFR BLD AUTO: 0.7 %
BUN SERPL-MCNC: 6 MG/DL (ref 7–30)
CALCIUM SERPL-MCNC: 8.2 MG/DL (ref 8.5–10.1)
CHLORIDE SERPL-SCNC: 108 MMOL/L (ref 94–109)
CO2 SERPL-SCNC: 28 MMOL/L (ref 20–32)
CREAT SERPL-MCNC: 0.68 MG/DL (ref 0.52–1.04)
DIFFERENTIAL METHOD BLD: ABNORMAL
EOSINOPHIL # BLD AUTO: 0.2 10E9/L (ref 0–0.7)
EOSINOPHIL NFR BLD AUTO: 4.8 %
ERYTHROCYTE [DISTWIDTH] IN BLOOD BY AUTOMATED COUNT: 12.6 % (ref 10–15)
GFR SERPL CREATININE-BSD FRML MDRD: 84 ML/MIN/1.7M2
GLUCOSE SERPL-MCNC: 125 MG/DL (ref 70–99)
HCT VFR BLD AUTO: 33.5 % (ref 35–47)
HGB BLD-MCNC: 11.1 G/DL (ref 11.7–15.7)
IMM GRANULOCYTES # BLD: 0 10E9/L (ref 0–0.4)
IMM GRANULOCYTES NFR BLD: 0 %
LYMPHOCYTES # BLD AUTO: 1.8 10E9/L (ref 0.8–5.3)
LYMPHOCYTES NFR BLD AUTO: 40 %
MCH RBC QN AUTO: 30.6 PG (ref 26.5–33)
MCHC RBC AUTO-ENTMCNC: 33.1 G/DL (ref 31.5–36.5)
MCV RBC AUTO: 92 FL (ref 78–100)
MONOCYTES # BLD AUTO: 0.2 10E9/L (ref 0–1.3)
MONOCYTES NFR BLD AUTO: 5 %
NEUTROPHILS # BLD AUTO: 2.2 10E9/L (ref 1.6–8.3)
NEUTROPHILS NFR BLD AUTO: 49.5 %
NRBC # BLD AUTO: 0 10*3/UL
NRBC BLD AUTO-RTO: 0 /100
PLATELET # BLD AUTO: 224 10E9/L (ref 150–450)
POTASSIUM SERPL-SCNC: 3.9 MMOL/L (ref 3.4–5.3)
RBC # BLD AUTO: 3.63 10E12/L (ref 3.8–5.2)
SODIUM SERPL-SCNC: 142 MMOL/L (ref 133–144)
WBC # BLD AUTO: 4.4 10E9/L (ref 4–11)

## 2017-04-13 PROCEDURE — 99217 ZZC OBSERVATION CARE DISCHARGE: CPT | Performed by: INTERNAL MEDICINE

## 2017-04-13 PROCEDURE — 80048 BASIC METABOLIC PNL TOTAL CA: CPT | Performed by: INTERNAL MEDICINE

## 2017-04-13 PROCEDURE — A9270 NON-COVERED ITEM OR SERVICE: HCPCS | Mod: GY | Performed by: INTERNAL MEDICINE

## 2017-04-13 PROCEDURE — 36415 COLL VENOUS BLD VENIPUNCTURE: CPT | Performed by: INTERNAL MEDICINE

## 2017-04-13 PROCEDURE — 25000132 ZZH RX MED GY IP 250 OP 250 PS 637: Mod: GY | Performed by: INTERNAL MEDICINE

## 2017-04-13 PROCEDURE — G0378 HOSPITAL OBSERVATION PER HR: HCPCS

## 2017-04-13 PROCEDURE — 85025 COMPLETE CBC W/AUTO DIFF WBC: CPT | Performed by: INTERNAL MEDICINE

## 2017-04-13 PROCEDURE — 25000125 ZZHC RX 250: Performed by: INTERNAL MEDICINE

## 2017-04-13 PROCEDURE — 25800025 ZZH RX 258: Performed by: INTERNAL MEDICINE

## 2017-04-13 RX ADMIN — ENALAPRILAT 1.25 MG: 1.25 INJECTION INTRAVENOUS at 05:53

## 2017-04-13 RX ADMIN — CHLORTHALIDONE 12.5 MG: 25 TABLET ORAL at 11:42

## 2017-04-13 RX ADMIN — POTASSIUM CHLORIDE, DEXTROSE MONOHYDRATE AND SODIUM CHLORIDE: 150; 5; 450 INJECTION, SOLUTION INTRAVENOUS at 02:42

## 2017-04-13 NOTE — DISCHARGE SUMMARY
"[facility]    Discharge Summary  Hospitalist    Date of Admission:  4/12/2017  Date of Discharge:  4/13/2017  Discharging Provider: Luis Miguel Gonzalez MD  Date of Service: 04/13/17    Discharge Diagnoses   Partial SBO (small bowel obstruction)    History of Present Illness   This is a 77-year-old female patient with history including hypertension, dyslipidemia, as well as history of multiple abdominal surgeries, who presents with abdominal pain, and found with evidence of small bowel obstruction.     Please refer to [facility] for further details.    Hospital Course   The following problems were addressed during his hospitalization.     Partial small bowel obstruction   CT showing mechanical obstruction.  Primary symptoms were abdominal pain and nausea.  Fortunately her pain improvement with conservative management with bowel rest, pain control and IV fluids.  General surgery was consulted.  The evening of admission she had 2 soft normal caliber stools with resolution of her abdominal pain.  Recommended soft low fiber diet in the short term, smaller more frequent meals, and chewing her food well.    Hypertensive urgency:  Suspect due to pain.  Prior to admission regimen consists of chlorthalidone 12.5 mg a day, and losartan 50 mg b.i.d.  Blood pressures were in the 220s/100 initially, and have come down with pain medications.  Resume chlorthalidone and losartan for discharge.        Luis Miguel Gonzalez MD  [facility]    Unresulted Labs Ordered in the Past 30 Days of this Admission     No orders found for last 61 day(s).          Code Status   Full Code       Primary Care Physician   Luis Feliciano    Physical Exam   /67 (BP Location: Right arm)  Temp 97.8  F (36.6  C) (Oral)  Resp 16  Ht 1.575 m (5' 2.01\")  Wt 61.6 kg (135 lb 12.9 oz)  SpO2 96%  BMI 24.83 kg/m2  Constitutional: NAD, awake  HEENT: EOMI   Cardiovascular: S1, S2, regular rhythm  Respiratory: CTAB, no crackles  Gastrointestinal: Soft, " NT  Neurologic: No focal deficits    Discharge Disposition   Discharged to home  Condition at discharge: Stable    Consultations This Hospital Stay   SURGERY GENERAL IP CONSULT    Time Spent on this Encounter   ILuis Miguel, personally saw the patient today and spent greater than 30 minutes discharging this patient.    Discharge Orders     Reason for your hospital stay   Small bowel obstruction     Follow Up and recommended labs and tests   Follow up with primary care provider, Luis Feliciano, within 7 days for hospital follow- up.     Activity   Your activity upon discharge: activity as tolerated     Full Code     Diet   Follow this diet upon discharge: Orders Placed This Encounter     Mechanical/Dental Soft Diet         Discharge Medications   Current Discharge Medication List      CONTINUE these medications which have NOT CHANGED    Details   KRILL OIL PO Take 500 mg by mouth daily       cyanocobalamin (VITAMIN  B-12) 1000 MCG tablet Take 1,000 mcg by mouth daily as needed (leg cramps)      VITAMIN D, CHOLECALCIFEROL, PO Take 1 tablet by mouth daily as needed (leg cramps)      NONFORMULARY Pain cream from [employer] 30mg/g. Pt uses on legs for pain prn      losartan (COZAAR) 100 MG tablet Take 0.5 tablets (50 mg) by mouth 2 times daily  Qty: 90 tablet, Refills: 3    Associated Diagnoses: Benign essential hypertension      chlorthalidone (HYGROTON) 25 MG tablet Take 0.5 tablets (12.5 mg) by mouth daily  Qty: 45 tablet, Refills: 3    Associated Diagnoses: Benign essential hypertension      aspirin EC 81 MG tablet Take 1 tablet (81 mg) by mouth daily    Associated Diagnoses: ACS (acute coronary syndrome) (H)      Multiple Vitamin (MULTIVITAMINS PO) Take 1 tablet by mouth daily as needed       order for DME Equipment being ordered: rib strap  Qty: 1 Device, Refills: 0    Associated Diagnoses: Blunt trauma of rib, initial encounter           Allergies   No Known Allergies  Data   Most Recent 3 CBC's:  Recent Labs   Lab Test   04/13/17   0740  04/12/17   0030  10/01/16   2230   WBC  4.4  6.3  6.3   HGB  11.1*  13.1  12.0   MCV  92  90  90   PLT  224  293  277      Most Recent 3 BMP's:  Recent Labs   Lab Test  04/13/17   0740  04/12/17   0030  10/24/16   1045   NA  142  139  137   POTASSIUM  3.9  3.7  3.8   CHLORIDE  108  102  102   CO2  28  30  29   BUN  6*  13  15   CR  0.68  0.82  0.74   ANIONGAP  6  7  6   ARIN  8.2*  9.1  8.8   GLC  125*  118*  99     Most Recent 2 LFT's:  Recent Labs   Lab Test  04/12/17   0030  08/15/16   1642   AST  24  18   ALT  31  19   ALKPHOS  78  72   BILITOTAL  0.2  0.3     Most Recent INR's and Anticoagulation Dosing History:  Anticoagulation Dose History     There is no flowsheet data to display.        Most Recent 3 Troponin's:  Recent Labs   Lab Test  10/03/16   0602  10/03/16   0015  10/02/16   1822   TROPI  0.065*  0.061*  0.058*     Most Recent Cholesterol Panel:  Recent Labs   Lab Test  04/07/16   0335   CHOL  236*   LDL  147*   HDL  52   TRIG  183*     Most Recent 6 Bacteria Isolates From Any Culture (See EPIC Reports for Culture Details):No lab results found.  Most Recent TSH, T4 and A1c Labs:  Recent Labs   Lab Test  10/01/12   0406  08/09/09   1048   TSH  4.36   --    A1C   --   6.0     Results for orders placed or performed during the hospital encounter of 04/12/17   Abdomen XR, 2 vw, flat and upright    Narrative    XR ABDOMEN 2 VW  4/12/2017 2:38 AM     INDICATION: Abdominal pain, history of small bowel obstruction.    COMPARISON: CT dated 10/1/2016.      Impression    IMPRESSION: Nonspecific bowel gas pattern. Moderate stool in the right  colon. No evidence of bowel obstruction. No free intraperitoneal air.  Surgical changes in the lower lumbar spine.    WILLA MOROCHO MD   CT Abdomen Pelvis w Contrast    Narrative    CT ABDOMEN PELVIS W CONTRAST  4/12/2017 4:05 AM     HISTORY: Epigastric pain.    TECHNIQUE: Volumetric acquisition through abdomen and pelvis with I.V.  contrast. 68 mL  Isovue-370. Radiation dose for this scan was reduced  using automated exposure control, adjustment of the mA and/or kV  according to patient size, or iterative reconstruction technique.    COMPARISON: 10/2/2016.    FINDINGS: Stable liver cysts. Cholelithiasis. Negative pancreas. Trace  fluid adjacent to the upper aspect of the spleen. The spleen is  otherwise negative. Adrenal glands and kidneys demonstrate no  worrisome findings. Small stable left adrenal nodule. Tiny probable  cyst left midkidney, too small to characterize.    Multiple proximal and mid ileal loops are fluid filled and mildly  dilated. Mild adjacent fluid and stranding in the mesentery. At least  one of these loops is mildly thick-walled consistent with inflammation  or edema. Several loops abut the anterior abdominal wall and appear  somewhat tethered suggesting adhesions. Sigmoid diverticulosis. Uterus  is absent. No fluid collections, free air or other acute findings.  Postoperative changes lumbar spine.      Impression    IMPRESSION:  1. Mechanical small bowel obstruction. One of the dilated small bowel  loops is thick-walled consistent with inflammation or edema.  2. Findings are probably due to small bowel obstruction with an  associated edematous loop. Cause of obstruction is unclear but several  of these loops appear adhesed to the anterior abdominal wall. Less  likely findings could be due to ileitis with secondary obstruction.  3. Small amount of fluid and stranding in the adjacent small bowel  mesentery.  4. Cholelithiasis.  5. Sigmoid diverticulosis.    WILLA MOROCHO MD

## 2017-04-13 NOTE — PLAN OF CARE
Problem: Goal Outcome Summary  Goal: Goal Outcome Summary  Outcome: Improving  Pt AOx4. VSS on RA, BP slightly elevated on Vasotec q6h. Reporting slight intermittent mid-abdominal pain. Denies nausea. No PRNs required this shift. Pt reports having BM this shift. Abdomen soft, non-tender. IVF at 100 ml/hr. Up IND in room. NPO with ice chips--tolerating ice chips this shift. Will continue to monitor.

## 2017-04-13 NOTE — PLAN OF CARE
Problem: Goal Outcome Summary  Goal: Goal Outcome Summary  Outcome: Improving  vss, AOx4. Up independent. NPO. Large Bowel movement tonight. Intermittent mild pain, declines intervention. Intermittent mild nausea, declines intervention.

## 2017-04-13 NOTE — PLAN OF CARE
Problem: Goal Outcome Summary  Goal: Goal Outcome Summary  Outcome: Adequate for Discharge Date Met:  04/13/17  VSS. Pt denies pain or nausea. BS+ passing gas. Advanced to mechanical soft diet, tolerated well. Indep around nurses station. D/C to home with . D/C instructions, medications, and follow up reviewed with patient. Voiced understanding. No new medications. Pt and all belongings brought to door 6.

## 2017-04-13 NOTE — UTILIZATION REVIEW
[facility]   Admission Status; Secondary Review Determination     Admission Date: 4/12/2017 12:16 AM  Date of Review: 4/13/2017     Under the authority of the Utilization Management Committee, the utilization review process indicated a secondary review on the above patient.  The review outcome is based on review of the medical records, discussions with staff, and applying clinical experience noted on the date of the review.       (x) Observation Status Appropriate - This patient does not meet hospital inpatient criteria and is placed in observation status.    RATIONALE FOR DETERMINATION     77 year old female with history of prior small bowel obstructions presented shortly after midnight on 4/12/2017 with abdominal pain without nausea vomiting and CT scan was consistent with mechanical small bowel obstruction.  No NG tube.  The patient was significantly improved when seen later that morning by surgery.  After one midnight, the patient is improved and is being discharged.  A register to observation order has been placed by the attending physician.  Given that the patient was improving early in her course and has required only one midnight, I concur with observation care.       The information on this document is developed by the utilization review team in order for the business office to ensure compliance.  This only denotes the appropriateness of proper admission status and does not reflect the quality of care rendered.         The definitions of Inpatient Status and Observation Status used in making the determination above are those provided in the CMS Coverage Manual, Chapter 1 and Chapter 6, section 70.4.      Sincerely,     Pedro Dangelo MD    Physician Advisor - Utilization Review  [facility].

## 2017-04-13 NOTE — PROGRESS NOTES
Medicare patient with 1 midnight stay (initiation of cares on 4/12 right after midnight), with partial SBO, resolved with conservative cares, likely discharging today once diet is tolerated.  Level of service consistent with observation and order placed.  Will have [facility] contacted regarding Code 44.    Luis Miguel Gonzalez MD  [facility]

## 2017-04-14 ENCOUNTER — TELEPHONE (OUTPATIENT)
Dept: FAMILY MEDICINE | Facility: CLINIC | Age: 77
End: 2017-04-14

## 2017-04-18 ENCOUNTER — OFFICE VISIT (OUTPATIENT)
Dept: FAMILY MEDICINE | Facility: CLINIC | Age: 77
End: 2017-04-18
Payer: MEDICARE

## 2017-04-18 VITALS
TEMPERATURE: 97.9 F | HEIGHT: 62 IN | BODY MASS INDEX: 24.44 KG/M2 | RESPIRATION RATE: 16 BRPM | OXYGEN SATURATION: 99 % | HEART RATE: 56 BPM | WEIGHT: 132.8 LBS | DIASTOLIC BLOOD PRESSURE: 78 MMHG | SYSTOLIC BLOOD PRESSURE: 142 MMHG

## 2017-04-18 DIAGNOSIS — K56.609 SBO (SMALL BOWEL OBSTRUCTION) (H): Primary | ICD-10-CM

## 2017-04-18 PROCEDURE — 99213 OFFICE O/P EST LOW 20 MIN: CPT | Performed by: FAMILY MEDICINE

## 2017-04-18 NOTE — MR AVS SNAPSHOT
"              After Visit Summary   2017    Francois Ly    MRN: [id number]           Patient Information     Date Of Birth          1940        Visit Information        Provider Department      2017 10:00 AM Luis Feliciano MD [facility]        Today's Diagnoses     SBO (small bowel obstruction) (H)    -  1       Follow-ups after your visit        Who to contact     If you have questions or need follow up information about today's clinic visit or your schedule please contact [facility] directly at [phone].  Normal or non-critical lab and imaging results will be communicated to you by [employer], letter or phone within 4 business days after the clinic has received the results. If you do not hear from us within 7 days, please contact the clinic through [employer] or phone. If you have a critical or abnormal lab result, we will notify you by phone as soon as possible.  Submit refill requests through [employer] or call your pharmacy and they will forward the refill request to us. Please allow 3 business days for your refill to be completed.          Additional Information About Your Visit        MyChart Information     [employer] lets you send messages to your doctor, view your test results, renew your prescriptions, schedule appointments and more. To sign up, go to [email or web address] . Click on \"Log in\" on the left side of the screen, which will take you to the Welcome page. Then click on \"Sign up Now\" on the right side of the page.     You will be asked to enter the access code listed below, as well as some personal information. Please follow the directions to create your username and password.     Your access code is: [email or web address]  Expires: 2017 11:43 AM     Your access code will  in 90 days. If you need help or a new code, please call your [facility] or [phone].        Care EveryWhere ID     This is your Care EveryWhere ID. This could be used by " "other organizations to access your [place] medical records  [id number]        Your Vitals Were     Pulse Temperature Respirations Height Pulse Oximetry BMI (Body Mass Index)    56 97.9  F (36.6  C) (Oral) 16 5' 2\" (1.575 m) 99% 24.29 kg/m2       Blood Pressure from Last 3 Encounters:   04/18/17 142/78   04/13/17 156/67   12/06/16 130/82    Weight from Last 3 Encounters:   04/18/17 132 lb 12.8 oz (60.2 kg)   04/13/17 135 lb 12.9 oz (61.6 kg)   12/06/16 134 lb (60.8 kg)              Today, you had the following     No orders found for display       Primary Care Provider Office Phone # Fax #    Luis Feliciano [phone]       [facility] [id number] [place] [id number]        Thank you!     Thank you for choosing [facility]  for your care. Our goal is always to provide you with excellent care. Hearing back from our patients is one way we can continue to improve our services. Please take a few minutes to complete the written survey that you may receive in the mail after your visit with us. Thank you!             Your Updated Medication List - Protect others around you: Learn how to safely use, store and throw away your medicines at www.disposemymeds.org.          This list is accurate as of: 4/18/17 10:45 AM.  Always use your most recent med list.                   Brand Name Dispense Instructions for use    aspirin EC 81 MG EC tablet      Take 1 tablet (81 mg) by mouth daily       chlorthalidone 25 MG tablet    HYGROTON    45 tablet    Take 0.5 tablets (12.5 mg) by mouth daily       cyanocobalamin 1000 MCG tablet    vitamin  B-12     Take 1,000 mcg by mouth daily as needed (leg cramps)       KRILL OIL PO      Take 500 mg by mouth daily       losartan 100 MG tablet    COZAAR    90 tablet    Take 0.5 tablets (50 mg) by mouth 2 times daily       MULTIVITAMINS PO      Take 1 tablet by mouth daily as needed       NONFORMULARY      Pain cream from [place] 30mg/g. Pt uses on " legs for pain prn       order for DME     1 Device    Equipment being ordered: rib strap       VITAMIN D (CHOLECALCIFEROL) PO      Take 1 tablet by mouth daily as needed (leg cramps)

## 2017-04-18 NOTE — PROGRESS NOTES
"  SUBJECTIVE:                                                    Francois Ly is a 77 year old female who presents to clinic today for the following health issues:          Hospital Follow-up Visit:    Hospital/Nursing Home/IP Rehab Facility: [facility]  Date of Admission: 4/12/17  Date of Discharge: 4/13/17  Reason(s) for Admission: abdominal pain             Problems taking medications regularly:  None       Medication changes since discharge: None       Problems adhering to non-medication therapy:  None    Summary of hospitalization:  [facility] discharge summary reviewed  Diagnostic Tests/Treatments reviewed.  Follow up needed: none  Other Healthcare Providers Involved in Patient s Care:         None  Update since discharge: improved. Denies any pain or nausea at this time.    Post Discharge Medication Reconciliation: discharge medications reconciled, continue medications without change.  Plan of care communicated with patient     Coding guidelines for this visit:  Type of Medical   Decision Making Face-to-Face Visit       within 7 Days of discharge Face-to-Face Visit        within 14 days of discharge   Moderate Complexity [id number]   High Complexity [id number]                Problem list and histories reviewed & adjusted, as indicated.  Additional history: as documented    Patient Active Problem List   Diagnosis     Back pain     Chest pain     Pancreatic mass     Hypertensive urgency     Benign essential hypertension     Blunt trauma of rib, initial encounter     SBO (small bowel obstruction) (H)     Small bowel obstruction (H)     Past Surgical History:   Procedure Laterality Date     GYN SURGERY      hysterectomy     ORTHOPEDIC SURGERY      Slipped disc with chronic back pain       Social History   Substance Use Topics     Smoking status: Former Smoker     Smokeless tobacco: Former User      Comment: Former \"social\" smoker, quit in 1978.     Alcohol use 0.0 oz/week     0 " "Standard drinks or equivalent per week      Comment: occ wine     Family History   Problem Relation Age of Onset     Family History Negative Other          Current Outpatient Prescriptions   Medication Sig Dispense Refill     KRILL OIL PO Take 500 mg by mouth daily        cyanocobalamin (VITAMIN  B-12) 1000 MCG tablet Take 1,000 mcg by mouth daily as needed (leg cramps)       VITAMIN D, CHOLECALCIFEROL, PO Take 1 tablet by mouth daily as needed (leg cramps)       NONFORMULARY Pain cream from [place] 30mg/g. Pt uses on legs for pain prn       losartan (COZAAR) 100 MG tablet Take 0.5 tablets (50 mg) by mouth 2 times daily 90 tablet 3     order for DME Equipment being ordered: rib strap 1 Device 0     chlorthalidone (HYGROTON) 25 MG tablet Take 0.5 tablets (12.5 mg) by mouth daily 45 tablet 3     aspirin EC 81 MG tablet Take 1 tablet (81 mg) by mouth daily       Multiple Vitamin (MULTIVITAMINS PO) Take 1 tablet by mouth daily as needed        No Known Allergies    Reviewed and updated as needed this visit by clinical staff       Reviewed and updated as needed this visit by Provider         ROS:  C: NEGATIVE for fever, chills, change in weight  E/M: NEGATIVE for ear, mouth and throat problems  R: NEGATIVE for significant cough or SOB  CV: NEGATIVE for chest pain, palpitations or peripheral edema    OBJECTIVE:                                                    /78 (BP Location: Right arm, Patient Position: Chair, Cuff Size: Adult Regular)  Pulse 56  Temp 97.9  F (36.6  C) (Oral)  Resp 16  Ht 5' 2\" (1.575 m)  Wt 132 lb 12.8 oz (60.2 kg)  SpO2 99%  BMI 24.29 kg/m2  Body mass index is 24.29 kg/(m^2).  GENERAL: healthy, alert and no distress  NECK: no adenopathy, no asymmetry, masses, or scars and thyroid normal to palpation  RESP: lungs clear to auscultation - no rales, rhonchi or wheezes  CV: regular rate and rhythm, normal S1 S2, no S3 or S4, no murmur, click or rub, no peripheral edema and peripheral pulses " strong  ABDOMEN: soft, nontender, no hepatosplenomegaly, no masses and bowel sounds normal  MS: no gross musculoskeletal defects noted, no edema         ASSESSMENT/PLAN:                                                            1. SBO (small bowel obstruction) (H)  Symptoms resolved, pt to go back on regular diet gradually.      BP is well controlled. (less than 150/90 given her age)      Luis Feliciano MD  [facility]

## 2017-04-18 NOTE — NURSING NOTE
"Chief Complaint   Patient presents with     Hospital F/U       Initial /78 (BP Location: Right arm, Patient Position: Chair, Cuff Size: Adult Regular)  Pulse 56  Temp 97.9  F (36.6  C) (Oral)  Resp 16  Ht 5' 2\" (1.575 m)  Wt 132 lb 12.8 oz (60.2 kg)  SpO2 99%  BMI 24.29 kg/m2 Estimated body mass index is 24.29 kg/(m^2) as calculated from the following:    Height as of this encounter: 5' 2\" (1.575 m).    Weight as of this encounter: 132 lb 12.8 oz (60.2 kg).  Medication Reconciliation: complete   "

## 2017-05-19 ENCOUNTER — OFFICE VISIT (OUTPATIENT)
Dept: FAMILY MEDICINE | Facility: CLINIC | Age: 77
End: 2017-05-19
Payer: MEDICARE

## 2017-05-19 VITALS
HEIGHT: 62 IN | RESPIRATION RATE: 16 BRPM | BODY MASS INDEX: 24.29 KG/M2 | SYSTOLIC BLOOD PRESSURE: 136 MMHG | DIASTOLIC BLOOD PRESSURE: 76 MMHG | WEIGHT: 132 LBS | TEMPERATURE: 97.7 F | HEART RATE: 62 BPM | OXYGEN SATURATION: 97 %

## 2017-05-19 DIAGNOSIS — Z11.59 NEED FOR HEPATITIS B SCREENING TEST: ICD-10-CM

## 2017-05-19 DIAGNOSIS — Z11.59 NEED FOR HEPATITIS C SCREENING TEST: ICD-10-CM

## 2017-05-19 DIAGNOSIS — M79.661 PAIN IN BOTH LOWER LEGS: Primary | ICD-10-CM

## 2017-05-19 DIAGNOSIS — I73.9 PERIPHERAL VASCULAR DISEASE (H): ICD-10-CM

## 2017-05-19 DIAGNOSIS — M79.662 PAIN IN BOTH LOWER LEGS: Primary | ICD-10-CM

## 2017-05-19 PROCEDURE — 86704 HEP B CORE ANTIBODY TOTAL: CPT | Performed by: FAMILY MEDICINE

## 2017-05-19 PROCEDURE — 87340 HEPATITIS B SURFACE AG IA: CPT | Performed by: FAMILY MEDICINE

## 2017-05-19 PROCEDURE — 99214 OFFICE O/P EST MOD 30 MIN: CPT | Performed by: FAMILY MEDICINE

## 2017-05-19 PROCEDURE — 86706 HEP B SURFACE ANTIBODY: CPT | Performed by: FAMILY MEDICINE

## 2017-05-19 PROCEDURE — 86803 HEPATITIS C AB TEST: CPT | Performed by: FAMILY MEDICINE

## 2017-05-19 PROCEDURE — 36415 COLL VENOUS BLD VENIPUNCTURE: CPT | Performed by: FAMILY MEDICINE

## 2017-05-19 NOTE — LETTER
[facility]  [address] [place], [address]  [phone]      May 22, 2017    Francois Ly  [address] [address] [facility] [address]          Dear Francois,    All labs in regards to hepatitis are all negative.  Enclosed is a copy of the results.  It was a pleasure to see you at your last appointment.    If you have any questions or concerns, please call myself or my nurse at [phone].          Sincerely,    Luis Feliciano MD    Results for orders placed or performed in visit on 05/19/17   Hepatitis B Surface Antibody   Result Value Ref Range    Hepatitis B Surface Antibody 0.22 <8.00 m[IU]/mL   Hepatitis B surface antigen   Result Value Ref Range    Hep B Surface Agn Nonreactive NR   Hepatitis B core antibody   Result Value Ref Range    Hepatitis B Core Dianelys Nonreactive NR   **Hepatitis C Screen Reflex to RNA FUTURE anytime   Result Value Ref Range    Hepatitis C Antibody  NR     Nonreactive   Assay performance characteristics have not been established for newborns,   infants, and children       AK

## 2017-05-19 NOTE — PROGRESS NOTES
"  SUBJECTIVE:                                                    Francois Ly is a 77 year old female who presents to clinic today for the following health issues:      Joint Pain     Onset: 10 days    Description:   Location: left arm and shoulder  Character: Dull ache    Intensity: mild, moderate    Progression of Symptoms: better    Accompanying Signs & Symptoms:  Other symptoms: bruising   History:   Previous similar pain: no       Precipitating factors:   Trauma or overuse: YES- fell    Alleviating factors:  Improved by: nothing       Therapies Tried and outcome: ice.      Since her spinal stenosis in the back, she has been having pain when she walks more than 2 miles, or she is bending down.  The pain goes down to the legs on both sides, varies from side to another.  She has been using Naprosyn, with significant improvement.    Problem list and histories reviewed & adjusted, as indicated.  Additional history: as documented    Patient Active Problem List   Diagnosis     Back pain     Chest pain     Pancreatic mass     Hypertensive urgency     Benign essential hypertension     Blunt trauma of rib, initial encounter     SBO (small bowel obstruction) (H)     Small bowel obstruction (H)     Past Surgical History:   Procedure Laterality Date     GYN SURGERY      hysterectomy     ORTHOPEDIC SURGERY      Slipped disc with chronic back pain       Social History   Substance Use Topics     Smoking status: Former Smoker     Smokeless tobacco: Former User      Comment: Former \"social\" smoker, quit in 1978.     Alcohol use 0.0 oz/week     0 Standard drinks or equivalent per week      Comment: occ wine     Family History   Problem Relation Age of Onset     Family History Negative Other          Current Outpatient Prescriptions   Medication Sig Dispense Refill     KRILL OIL PO Take 500 mg by mouth daily        cyanocobalamin (VITAMIN  B-12) 1000 MCG tablet Take 1,000 mcg by mouth daily as needed (leg cramps)       VITAMIN " "D, CHOLECALCIFEROL, PO Take 1 tablet by mouth daily as needed (leg cramps)       NONFORMULARY Pain cream from [place] 30mg/g. Pt uses on legs for pain prn       losartan (COZAAR) 100 MG tablet Take 0.5 tablets (50 mg) by mouth 2 times daily 90 tablet 3     order for DME Equipment being ordered: rib strap 1 Device 0     chlorthalidone (HYGROTON) 25 MG tablet Take 0.5 tablets (12.5 mg) by mouth daily 45 tablet 3     aspirin EC 81 MG tablet Take 1 tablet (81 mg) by mouth daily       Multiple Vitamin (MULTIVITAMINS PO) Take 1 tablet by mouth daily as needed        No Known Allergies    Reviewed and updated as needed this visit by clinical staff  Tobacco  Allergies  Fam Hx  Soc Hx      Reviewed and updated as needed this visit by Provider         ROS:  C: NEGATIVE for fever, chills, change in weight  CV: NEGATIVE for chest pain, palpitations or peripheral edema    OBJECTIVE:                                                    /76 (BP Location: Right arm, Patient Position: Chair, Cuff Size: Adult Regular)  Pulse 62  Temp 97.7  F (36.5  C) (Oral)  Resp 16  Ht 5' 2\" (1.575 m)  Wt 132 lb (59.9 kg)  SpO2 97%  BMI 24.14 kg/m2  Body mass index is 24.14 kg/(m^2).  GENERAL: healthy, alert and no distress  MS: there is bruising on the Lt upper arm, and mild tenderness, normal ROM of the shoulder, normal distal pulse.  Patient appears to be in no pain, no antalgic gait noted.   Lumbosacral spine area reveals no local tenderness or mass.    ROM of the LS spine showed normal.extension nl flexionnl   Straight leg raise is negative at 60 degrees on bilateral.  L4 :toe walk nl patellar reflux nl medial foot sensation no  L5: dorsiflexion of the big toe nl,mid foot sensation nl  S1: heel walk nl, Yunier reflex nl, lateral sensation of the foot nl     Peripheral pulses are hard to  palpable.            ASSESSMENT/PLAN:                                                            1. Need for hepatitis C screening test  Pt said " that she was exposed to dirty needles that used to be used in the hospitals when she was young.  Check   - **Hepatitis C Screen Reflex to RNA FUTURE anytime; Future  - **Hepatitis C Screen Reflex to RNA FUTURE anytime    2. Pain in both lower legs  Given the absence of peripheral pulse (hard to palpate) will check below test  - US SAULO Doppler No Exercise; Future  Follow up with the restuls.    3. Peripheral vascular disease (H)   As above  - US SAULO Doppler No Exercise; Future    4. Need for hepatitis B screening test  Check.  - Hepatitis B Surface Antibody  - Hepatitis B surface antigen  - Hepatitis B core antibody        Luis Feliciano MD  [facility]

## 2017-05-19 NOTE — NURSING NOTE
"Chief Complaint   Patient presents with     Pain     left arm and shoulder     Blood Draw     hepatitis C       Initial /76 (BP Location: Right arm, Patient Position: Chair, Cuff Size: Adult Regular)  Pulse 62  Temp 97.7  F (36.5  C) (Oral)  Resp 16  Ht 5' 2\" (1.575 m)  Wt 132 lb (59.9 kg)  SpO2 97%  BMI 24.14 kg/m2 Estimated body mass index is 24.14 kg/(m^2) as calculated from the following:    Height as of this encounter: 5' 2\" (1.575 m).    Weight as of this encounter: 132 lb (59.9 kg).  Medication Reconciliation: complete   Madeline Valadez, ZAIDA      "

## 2017-05-22 LAB
HBV CORE AB SERPL QL IA: NONREACTIVE
HBV SURFACE AB SERPL IA-ACNC: 0.22 M[IU]/ML
HBV SURFACE AG SERPL QL IA: NONREACTIVE
HCV AB SERPL QL IA: NORMAL

## 2017-06-14 ENCOUNTER — TELEPHONE (OUTPATIENT)
Dept: FAMILY MEDICINE | Facility: CLINIC | Age: 77
End: 2017-06-14

## 2017-06-14 ENCOUNTER — HOSPITAL ENCOUNTER (OUTPATIENT)
Dept: ULTRASOUND IMAGING | Facility: CLINIC | Age: 77
Discharge: HOME | End: 2017-06-14
Attending: FAMILY MEDICINE | Admitting: FAMILY MEDICINE
Payer: MEDICARE

## 2017-06-14 DIAGNOSIS — I73.9 PERIPHERAL VASCULAR DISEASE (H): ICD-10-CM

## 2017-06-14 DIAGNOSIS — M79.661 PAIN IN BOTH LOWER LEGS: ICD-10-CM

## 2017-06-14 DIAGNOSIS — M79.662 PAIN IN BOTH LOWER LEGS: ICD-10-CM

## 2017-06-14 PROCEDURE — 93922 UPR/L XTREMITY ART 2 LEVELS: CPT

## 2017-06-14 NOTE — TELEPHONE ENCOUNTER
Pt needs a follow up on her US test results.  Luis Feliciano MD  [facility]  [phone]

## 2017-06-19 ENCOUNTER — OFFICE VISIT (OUTPATIENT)
Dept: FAMILY MEDICINE | Facility: CLINIC | Age: 77
End: 2017-06-19
Payer: MEDICARE

## 2017-06-19 VITALS
SYSTOLIC BLOOD PRESSURE: 124 MMHG | DIASTOLIC BLOOD PRESSURE: 70 MMHG | HEART RATE: 60 BPM | WEIGHT: 134 LBS | BODY MASS INDEX: 24.66 KG/M2 | TEMPERATURE: 98 F | RESPIRATION RATE: 12 BRPM | HEIGHT: 62 IN | OXYGEN SATURATION: 98 %

## 2017-06-19 DIAGNOSIS — Z23 NEED FOR TDAP VACCINATION: ICD-10-CM

## 2017-06-19 DIAGNOSIS — I73.9 PAD (PERIPHERAL ARTERY DISEASE) (H): Primary | ICD-10-CM

## 2017-06-19 PROCEDURE — 90471 IMMUNIZATION ADMIN: CPT | Performed by: FAMILY MEDICINE

## 2017-06-19 PROCEDURE — 90715 TDAP VACCINE 7 YRS/> IM: CPT | Performed by: FAMILY MEDICINE

## 2017-06-19 PROCEDURE — 99214 OFFICE O/P EST MOD 30 MIN: CPT | Mod: 25 | Performed by: FAMILY MEDICINE

## 2017-06-19 RX ORDER — SIMVASTATIN 40 MG
40 TABLET ORAL AT BEDTIME
Qty: 90 TABLET | Refills: 3 | Status: SHIPPED | OUTPATIENT
Start: 2017-06-19 | End: 2017-08-21

## 2017-06-19 RX ORDER — ATORVASTATIN CALCIUM 40 MG/1
40 TABLET, FILM COATED ORAL DAILY
Qty: 90 TABLET | Refills: 3 | Status: SHIPPED | OUTPATIENT
Start: 2017-06-19 | End: 2017-06-19

## 2017-06-19 NOTE — PROGRESS NOTES
"  SUBJECTIVE:                                                    Francois Ly is a 77 year old female who presents to clinic today for the following health issues:      U/S results      Vascular Disease Follow-up:  Peripheral Vascular Disease (PVD)      Chest pain or pressure, left side neck or arm pain: No    Shortness of breath/increased sweats/nausea with exertion: No    Pain in calves walking Yes about 1 mile .    Worsened or new symptoms since last visit: No    Nitroglycerin use: no    Daily aspirin use: Yes       Amount of exercise or physical activity: 6-7 days/week for an average of 45-60 minutes    Problems taking medications regularly: No    Medication side effects: none    Diet: regular (no restrictions)       Problem list and histories reviewed & adjusted, as indicated.  Additional history: as documented    Patient Active Problem List   Diagnosis     Back pain     Chest pain     Pancreatic mass     Hypertensive urgency     Benign essential hypertension     Blunt trauma of rib, initial encounter     SBO (small bowel obstruction) (H)     Small bowel obstruction (H)     PAD (peripheral artery disease) (H)     Past Surgical History:   Procedure Laterality Date     GYN SURGERY      hysterectomy     ORTHOPEDIC SURGERY      Slipped disc with chronic back pain       Social History   Substance Use Topics     Smoking status: Former Smoker     Smokeless tobacco: Former User      Comment: Former \"social\" smoker, quit in 1978.     Alcohol use 0.0 oz/week     0 Standard drinks or equivalent per week      Comment: occ wine     Family History   Problem Relation Age of Onset     Family History Negative Other          Current Outpatient Prescriptions   Medication Sig Dispense Refill     simvastatin (ZOCOR) 40 MG tablet Take 1 tablet (40 mg) by mouth At Bedtime 90 tablet 3     KRILL OIL PO Take 500 mg by mouth daily        cyanocobalamin (VITAMIN  B-12) 1000 MCG tablet Take 1,000 mcg by mouth daily as needed (leg cramps) " "      VITAMIN D, CHOLECALCIFEROL, PO Take 1 tablet by mouth daily as needed (leg cramps)       NONFORMULARY Pain cream from [place] 30mg/g. Pt uses on legs for pain prn       losartan (COZAAR) 100 MG tablet Take 0.5 tablets (50 mg) by mouth 2 times daily 90 tablet 3     order for DME Equipment being ordered: rib strap 1 Device 0     chlorthalidone (HYGROTON) 25 MG tablet Take 0.5 tablets (12.5 mg) by mouth daily 45 tablet 3     aspirin EC 81 MG tablet Take 1 tablet (81 mg) by mouth daily       Multiple Vitamin (MULTIVITAMINS PO) Take 1 tablet by mouth daily as needed        No Known Allergies    Reviewed and updated as needed this visit by clinical staff  Tobacco  Allergies  Meds  Med Hx  Surg Hx  Fam Hx  Soc Hx      Reviewed and updated as needed this visit by Provider         ROS:  C: NEGATIVE for fever, chills, change in weight  R: NEGATIVE for significant cough or SOB  CV: NEGATIVE for chest pain, palpitations or peripheral edema    OBJECTIVE:                                                    /70 (BP Location: Right arm, Patient Position: Chair, Cuff Size: Adult Regular)  Pulse 60  Temp 98  F (36.7  C) (Oral)  Resp 12  Ht 5' 2\" (1.575 m)  Wt 134 lb (60.8 kg)  SpO2 98%  BMI 24.51 kg/m2  Body mass index is 24.51 kg/(m^2).  GENERAL: healthy, alert and no distress         ASSESSMENT/PLAN:                                                            1. PAD (peripheral artery disease) (H)  Discussed the disease in details, will start on   - simvastatin (ZOCOR) 40 MG tablet; Take 1 tablet (40 mg) by mouth At Bedtime  Dispense: 90 tablet; Refill: 3  Continue on ASA, and encrouage her daily walking.  Also recommend to switch ARB to enalapril but pt declined at this time.    2. Need for Tdap vaccination    - TDAP VACCINE (ADACEL)    FUTURE APPOINTMENTS:       - Follow-up visit in 2 months.    Luis Feliciano MD  [facility]"

## 2017-06-19 NOTE — NURSING NOTE
"Chief Complaint   Patient presents with     Results     U/S results     Consult     back pain, Left side, hard to lay down will be seeing back specialist , w/leg cramps       Initial Pulse 60  Temp 98  F (36.7  C) (Oral)  Resp 12  Ht 5' 2\" (1.575 m)  Wt 134 lb (60.8 kg)  SpO2 98%  BMI 24.51 kg/m2 Estimated body mass index is 24.51 kg/(m^2) as calculated from the following:    Height as of this encounter: 5' 2\" (1.575 m).    Weight as of this encounter: 134 lb (60.8 kg).  Medication Reconciliation: complete   Antonietta Boland, ZAIDA      "

## 2017-06-19 NOTE — MR AVS SNAPSHOT
"              After Visit Summary   2017    Francois Ly    MRN: [id number]           Patient Information     Date Of Birth          1940        Visit Information        Provider Department      2017 11:00 AM Luis Feliciano MD [facility]        Today's Diagnoses     PAD (peripheral artery disease) (H)    -  1       Follow-ups after your visit        Who to contact     If you have questions or need follow up information about today's clinic visit or your schedule please contact [facility] directly at [phone].  Normal or non-critical lab and imaging results will be communicated to you by [employer], letter or phone within 4 business days after the clinic has received the results. If you do not hear from us within 7 days, please contact the clinic through [employer] or phone. If you have a critical or abnormal lab result, we will notify you by phone as soon as possible.  Submit refill requests through [employer] or call your pharmacy and they will forward the refill request to us. Please allow 3 business days for your refill to be completed.          Additional Information About Your Visit        MyChart Information     [employer]ag lets you send messages to your doctor, view your test results, renew your prescriptions, schedule appointments and more. To sign up, go to [email or web address] . Click on \"Log in\" on the left side of the screen, which will take you to the Welcome page. Then click on \"Sign up Now\" on the right side of the page.     You will be asked to enter the access code listed below, as well as some personal information. Please follow the directions to create your username and password.     Your access code is: [email or web address]  Expires: 2017 11:43 AM     Your access code will  in 90 days. If you need help or a new code, please call your [facility] or [phone].        Care EveryWhere ID     This is your Care EveryWhere ID. This could be used by " "other organizations to access your [facility] medical records  [id number]        Your Vitals Were     Pulse Temperature Respirations Height Pulse Oximetry BMI (Body Mass Index)    60 98  F (36.7  C) (Oral) 12 5' 2\" (1.575 m) 98% 24.51 kg/m2       Blood Pressure from Last 3 Encounters:   06/19/17 150/70   05/19/17 136/76   04/18/17 142/78    Weight from Last 3 Encounters:   06/19/17 134 lb (60.8 kg)   05/19/17 132 lb (59.9 kg)   04/18/17 132 lb 12.8 oz (60.2 kg)              Today, you had the following     No orders found for display         Today's Medication Changes          These changes are accurate as of: 6/19/17 11:43 AM.  If you have any questions, ask your nurse or doctor.               Start taking these medicines.        Dose/Directions    simvastatin 40 MG tablet   Commonly known as:  ZOCOR   Used for:  PAD (peripheral artery disease) (H)   Started by:  Luis Feliciano MD        Dose:  40 mg   Take 1 tablet (40 mg) by mouth At Bedtime   Quantity:  90 tablet   Refills:  3            Where to get your medicines      These medications were sent to [facility] [place] [id number]     Phone:  [phone]     simvastatin 40 MG tablet                Primary Care Provider Office Phone # Fax #    Luis Feliciano [phone]       [address] [address]        Thank you!     Thank you for choosing [facility]  for your care. Our goal is always to provide you with excellent care. Hearing back from our patients is one way we can continue to improve our services. Please take a few minutes to complete the written survey that you may receive in the mail after your visit with us. Thank you!             Your Updated Medication List - Protect others around you: Learn how to safely use, store and throw away your medicines at www.disposemymeds.org.          This list is accurate as of: " 6/19/17 11:43 AM.  Always use your most recent med list.                   Brand Name Dispense Instructions for use    aspirin EC 81 MG EC tablet      Take 1 tablet (81 mg) by mouth daily       chlorthalidone 25 MG tablet    HYGROTON    45 tablet    Take 0.5 tablets (12.5 mg) by mouth daily       cyanocobalamin 1000 MCG tablet    vitamin  B-12     Take 1,000 mcg by mouth daily as needed (leg cramps)       KRILL OIL PO      Take 500 mg by mouth daily       losartan 100 MG tablet    COZAAR    90 tablet    Take 0.5 tablets (50 mg) by mouth 2 times daily       MULTIVITAMINS PO      Take 1 tablet by mouth daily as needed       NONFORMULARY      Pain cream from [place] 30mg/g. Pt uses on legs for pain prn       order for DME     1 Device    Equipment being ordered: rib strap       simvastatin 40 MG tablet    ZOCOR    90 tablet    Take 1 tablet (40 mg) by mouth At Bedtime       VITAMIN D (CHOLECALCIFEROL) PO      Take 1 tablet by mouth daily as needed (leg cramps)

## 2017-06-23 ENCOUNTER — TELEPHONE (OUTPATIENT)
Dept: FAMILY MEDICINE | Facility: CLINIC | Age: 77
End: 2017-06-23

## 2017-06-23 NOTE — TELEPHONE ENCOUNTER
Patient walked in requesting a cd of her [place]  A cd was burned for her so the patient would not have to drive to [place].  Report and CD were given to Patient.  Antonietta Alamo  [facility]  [phone]  Fax [phone]

## 2017-07-26 ENCOUNTER — TELEPHONE (OUTPATIENT)
Dept: FAMILY MEDICINE | Facility: CLINIC | Age: 77
End: 2017-07-26

## 2017-07-27 NOTE — TELEPHONE ENCOUNTER
Spouse Sahil calls [facility] on file to report pt with pain in ankles and shins, worse in AM - wonders if statin SE?   Pt informed per Dr. Feliciano to hold rosuvastatin for 2 weeks and call with update.   Message handled by Nurse Triage with [employer] - provider name: Luis Feliciano MD.  J Carlos Valdovinos RN

## 2017-08-18 NOTE — TELEPHONE ENCOUNTER
calls with update, [facility] on file, pt feeling better off rosuvastatin, upon review pt was given SIMVASTATIN NOT ROSUVASTATIN,  THOUGHT [facility] WAS GOING TO ORDER ROSUVASTATIN, ALSO LIPIDS OVER ONE YEAR OLD, [facility] PLEASE ADVISE IF YOU WANT NEW MED AND WHEN LIPIDS NEED TO BE RECHECKED, inform  Sahil when final    Recent Labs   Lab Test  04/07/16   0335   CHOL  236*   HDL  52   LDL  147*   TRIG  183*     Lab Results   Component Value Date    AST 24 04/12/2017     Lab Results   Component Value Date    ALT 31 04/12/2017   Adia Clinton, RN, BSN  Message handled by Nurse Triage .

## 2017-08-19 NOTE — TELEPHONE ENCOUNTER
Pt out to pt  Spoke to  again to review symptoms  States they had gone over all that with Dr Feliciano and nurse which is why she was told to stop taking the medication  Has appt on Monday due to hip pain, added note on appt to review chol varun Varela RN, BS  Clinical Nurse Triage.

## 2017-08-19 NOTE — TELEPHONE ENCOUNTER
So, did she have symptoms with simvastatin? If so, what symptoms?  I recommend that she stays on cholesterol medicine frdedie with her history of Peripheral vascular diseases.  Luis Feliciano MD  [facility]  [phone]

## 2017-08-21 ENCOUNTER — OFFICE VISIT (OUTPATIENT)
Dept: FAMILY MEDICINE | Facility: CLINIC | Age: 77
End: 2017-08-21
Payer: MEDICARE

## 2017-08-21 ENCOUNTER — RADIANT APPOINTMENT (OUTPATIENT)
Dept: GENERAL RADIOLOGY | Facility: CLINIC | Age: 77
End: 2017-08-21
Attending: FAMILY MEDICINE
Payer: MEDICARE

## 2017-08-21 VITALS
RESPIRATION RATE: 16 BRPM | BODY MASS INDEX: 24.66 KG/M2 | WEIGHT: 134 LBS | HEART RATE: 60 BPM | DIASTOLIC BLOOD PRESSURE: 70 MMHG | OXYGEN SATURATION: 100 % | TEMPERATURE: 97.6 F | HEIGHT: 62 IN | SYSTOLIC BLOOD PRESSURE: 158 MMHG

## 2017-08-21 DIAGNOSIS — I73.9 PAD (PERIPHERAL ARTERY DISEASE) (H): ICD-10-CM

## 2017-08-21 DIAGNOSIS — R79.9 ABNORMAL FINDING OF BLOOD CHEMISTRY: ICD-10-CM

## 2017-08-21 DIAGNOSIS — M25.552 HIP PAIN, LEFT: ICD-10-CM

## 2017-08-21 DIAGNOSIS — M25.552 HIP PAIN, LEFT: Primary | ICD-10-CM

## 2017-08-21 LAB
CHOLEST SERPL-MCNC: 218 MG/DL
HDLC SERPL-MCNC: 54 MG/DL
LDLC SERPL CALC-MCNC: 120 MG/DL
NONHDLC SERPL-MCNC: 164 MG/DL
TRIGL SERPL-MCNC: 222 MG/DL

## 2017-08-21 PROCEDURE — 80061 LIPID PANEL: CPT | Performed by: FAMILY MEDICINE

## 2017-08-21 PROCEDURE — 99214 OFFICE O/P EST MOD 30 MIN: CPT | Performed by: FAMILY MEDICINE

## 2017-08-21 PROCEDURE — 73502 X-RAY EXAM HIP UNI 2-3 VIEWS: CPT

## 2017-08-21 PROCEDURE — 36415 COLL VENOUS BLD VENIPUNCTURE: CPT | Performed by: FAMILY MEDICINE

## 2017-08-21 RX ORDER — ATORVASTATIN CALCIUM 40 MG/1
40 TABLET, FILM COATED ORAL DAILY
Qty: 90 TABLET | Refills: 3 | Status: ON HOLD | OUTPATIENT
Start: 2017-08-21 | End: 2017-10-31 | Stop reason: SINTOL

## 2017-08-21 NOTE — PROGRESS NOTES
"  SUBJECTIVE:   Francois Ly is a 77 year old female who presents to clinic today for the following health issues:      F/u - left hip pain    Musculoskeletal problem/pain      Duration: 6 months ago.    Description  Location: Lt hip.    Intensity:  moderate, severe    Accompanying signs and symptoms: radiation of pain to upper thigh.    History  Previous similar problem: no   Previous evaluation:  none    Precipitating or alleviating factors:  Trauma or overuse: pt fell down about 6 months ago, and hurt her hip, but her pain did not improve since.  Aggravating factors include: physical activity, or riding the car (stopping while driving)    Therapies tried and outcome: rest/inactivity        Problem list and histories reviewed & adjusted, as indicated.  Additional history: also she has been having joint pain related to the use of the cholesterol medications.    Patient Active Problem List   Diagnosis     Back pain     Chest pain     Pancreatic mass     Hypertensive urgency     Benign essential hypertension     Blunt trauma of rib, initial encounter     SBO (small bowel obstruction) (H)     Small bowel obstruction (H)     PAD (peripheral artery disease) (H)     Past Surgical History:   Procedure Laterality Date     GYN SURGERY      hysterectomy     ORTHOPEDIC SURGERY      Slipped disc with chronic back pain       Social History   Substance Use Topics     Smoking status: Former Smoker     Smokeless tobacco: Former User      Comment: Former \"social\" smoker, quit in 1978.     Alcohol use 0.0 oz/week     0 Standard drinks or equivalent per week      Comment: occ wine     Family History   Problem Relation Age of Onset     Family History Negative Other          Current Outpatient Prescriptions   Medication Sig Dispense Refill     KRILL OIL PO Take 500 mg by mouth daily        cyanocobalamin (VITAMIN  B-12) 1000 MCG tablet Take 1,000 mcg by mouth daily as needed (leg cramps)       VITAMIN D, CHOLECALCIFEROL, PO Take 1 " "tablet by mouth daily as needed (leg cramps)       NONFORMULARY Pain cream from [place] 30mg/g. Pt uses on legs for pain prn       losartan (COZAAR) 100 MG tablet Take 0.5 tablets (50 mg) by mouth 2 times daily 90 tablet 3     order for DME Equipment being ordered: rib strap 1 Device 0     chlorthalidone (HYGROTON) 25 MG tablet Take 0.5 tablets (12.5 mg) by mouth daily 45 tablet 3     aspirin EC 81 MG tablet Take 1 tablet (81 mg) by mouth daily       Multiple Vitamin (MULTIVITAMINS PO) Take 1 tablet by mouth daily as needed        No Known Allergies      Reviewed and updated as needed this visit by clinical staffTobacco  Allergies  Med Hx  Surg Hx  Fam Hx  Soc Hx      Reviewed and updated as needed this visit by Provider         ROS:  C: NEGATIVE for fever, chills, change in weight  NEURO: NEGATIVE for weakness, dizziness or paresthesias    OBJECTIVE:     /70 (BP Location: Right arm, Patient Position: Chair, Cuff Size: Adult Regular)  Pulse 60  Temp 97.6  F (36.4  C) (Oral)  Resp 16  Ht 5' 2\" (1.575 m)  Wt 134 lb (60.8 kg)  SpO2 100%  BMI 24.51 kg/m2  Body mass index is 24.51 kg/(m^2).  Hip exam :   Gait :normal ( no trendelenburg gait), no leg length discrepancy   ROM : internal rotation nl external rotation nl  MARCELLA test - FADIR test -  Rolling test negative  Nael test negative  SI joint compression positive on the Lt side.  Noemi's sign (Piriformis muscle) negative  Palpation : no tenderness on the trochanteric bursa,no palpation of the SI joint, no joint tenderness.  Motor :normal muscle strength in the lower extremities.  Sensation : intact.          ASSESSMENT/PLAN:             1. Hip pain, left  I think it is mostly related to her back pain, will evaluate Xray of the hip, R/O abnormalities, showing no fracture or lesion, mild arthritic changes.  Pt is following up with [facility] for her back problem, advised to call and make a follow up appointment.  - XR Hip Right 2-3 Views; Future  - XR Hip " Left 2-3 Views; Future    2. PAD (peripheral artery disease) (H)  Restart on   - atorvastatin (LIPITOR) 40 MG tablet; Take 1 tablet (40 mg) by mouth daily  Dispense: 90 tablet; Refill: 3  Call if any side effects.  - Lipid panel reflex to direct LDL    3. Abnormal finding of blood chemistry     - Lipid panel reflex to direct LDL        Luis Feliciano MD  [facility]

## 2017-08-21 NOTE — MR AVS SNAPSHOT
After Visit Summary   8/21/2017    Francois Ly    MRN: [id number]           Patient Information     Date Of Birth          1940        Visit Information        Provider Department      8/21/2017 11:45 AM Luis Feliciano MD [facility]        Today's Diagnoses     Hip pain, left    -  1    PAD (peripheral artery disease) (H)           Follow-ups after your visit        Your next 10 appointments already scheduled     Aug 21, 2017 12:10 PM CDT   XR HIP RIGHT G/E 2 VIEWS with CRXR1   [facility])    [id number] [place] [address]   [phone]           Please bring a list of your current medicines to your exam. (Include vitamins, minerals and over-thecounter medicines.) Leave your valuables at home.  Tell your doctor if there is a chance you may be pregnant.  You do not need to do anything special for this exam.            Aug 21, 2017 12:15 PM CDT   XR HIP LEFT G/E 2 VIEWS with CRXR1   [facility]    [address] [facility] [address]   [phone]           Please bring a list of your current medicines to your exam. (Include vitamins, minerals and over-thecounter medicines.) Leave your valuables at home.  Tell your doctor if there is a chance you may be pregnant.  You do not need to do anything special for this exam.              Who to contact     If you have questions or need follow up information about today's clinic visit or your schedule please contact [facility] directly at [phone].  Normal or non-critical lab and imaging results will be communicated to you by MyChart, letter or phone within 4 business days after the clinic has received the results. If you do not hear from us within 7 days, please contact the clinic through MyChart or phone. If you have a critical or abnormal lab result, we will notify you by phone  "as soon as possible.  Submit refill requests through [employer] or call your pharmacy and they will forward the refill request to us. Please allow 3 business days for your refill to be completed.          Additional Information About Your Visit        [employer] Information     [employer] lets you send messages to your doctor, view your test results, renew your prescriptions, schedule appointments and more. To sign up, go to [email or web address] . Click on \"Log in\" on the left side of the screen, which will take you to the Welcome page. Then click on \"Sign up Now\" on the right side of the page.     You will be asked to enter the access code listed below, as well as some personal information. Please follow the directions to create your username and password.     Your access code is: [email or web address]  Expires: 2017 12:09 PM     Your access code will  in 90 days. If you need help or a new code, please call your [facility] clinic or [phone].        Care EveryWhere ID     This is your Care EveryWhere ID. This could be used by other organizations to access your [facility] medical records  [id number]        Your Vitals Were     Pulse Temperature Respirations Height Pulse Oximetry BMI (Body Mass Index)    60 97.6  F (36.4  C) (Oral) 16 5' 2\" (1.575 m) 100% 24.51 kg/m2       Blood Pressure from Last 3 Encounters:   17 158/70   17 124/70   17 136/76    Weight from Last 3 Encounters:   17 134 lb (60.8 kg)   17 134 lb (60.8 kg)   17 132 lb (59.9 kg)                 Today's Medication Changes          These changes are accurate as of: 17 12:09 PM.  If you have any questions, ask your nurse or doctor.               Start taking these medicines.        Dose/Directions    atorvastatin 40 MG tablet   Commonly known as:  LIPITOR   Used for:  PAD (peripheral artery disease) (H)   Started by:  Luis Feliciano MD        Dose:  40 mg   Take 1 tablet (40 mg) by mouth daily   Quantity:  90 tablet "   Refills:  3            Where to get your medicines      These medications were sent to [facility], [place] - [address]  [address] [facility] [address]     Phone:  [phone]     atorvastatin 40 MG tablet                Primary Care Provider Office Phone # Fax #    Luis Feliciano [phone] [address] [facility] [address]        Equal Access to Services     [facility] : Hadii aad ku hadasho Soomaali, waaxda luqadaha, qaybta kaalmada adeegyada, waxay idiin hayaan adeeg khiveth soto . So [facility] [phone].    ATENCIÓN: Si marissa burr, tiene a huertas disposición servicios gratuitos de asistencia lingüística. [facility] [phone].    We comply with applicable federal civil rights laws and [place] laws. We do not discriminate on the basis of race, color, national origin, age, disability sex, sexual orientation or gender identity.            Thank you!     Thank you for choosing [facility]  for your care. Our goal is always to provide you with excellent care. Hearing back from our patients is one way we can continue to improve our services. Please take a few minutes to complete the written survey that you may receive in the mail after your visit with us. Thank you!             Your Updated Medication List - Protect others around you: Learn how to safely use, store and throw away your medicines at www.disposemymeds.org.          This list is accurate as of: 8/21/17 12:09 PM.  Always use your most recent med list.                   Brand Name Dispense Instructions for use Diagnosis    aspirin EC 81 MG EC tablet      Take 1 tablet (81 mg) by mouth daily    ACS (acute coronary syndrome) (H)       atorvastatin 40 MG tablet    LIPITOR    90 tablet    Take 1 tablet (40 mg) by mouth daily    PAD (peripheral artery disease) (H)       chlorthalidone 25 MG tablet    HYGROTON    45 tablet    Take 0.5 tablets (12.5 mg) by mouth daily     Benign essential hypertension       cyanocobalamin 1000 MCG tablet    vitamin  B-12     Take 1,000 mcg by mouth daily as needed (leg cramps)        KRILL OIL PO      Take 500 mg by mouth daily        losartan 100 MG tablet    COZAAR    90 tablet    Take 0.5 tablets (50 mg) by mouth 2 times daily    Benign essential hypertension       MULTIVITAMINS PO      Take 1 tablet by mouth daily as needed        NONFORMULARY      Pain cream from [facility] 30mg/g. Pt uses on legs for pain prn        order for DME     1 Device    Equipment being ordered: rib strap    Blunt trauma of rib, initial encounter       VITAMIN D (CHOLECALCIFEROL) PO      Take 1 tablet by mouth daily as needed (leg cramps)

## 2017-08-21 NOTE — LETTER
Francois ALICIA Aliyah  [address] [facility] [address]        August 22, 2017          Dear Ms.Aliyah,    We are writing to inform you of your test results.    Your cholesterol is better than before, keep up the good work!  Both of your  Xrays of your  hips are within normal range for your age.      Resulted Orders   Lipid panel reflex to direct LDL   Result Value Ref Range    Cholesterol 218 (H) <200 mg/dL      Comment:      Desirable:       <200 mg/dl    Triglycerides 222 (H) <150 mg/dL      Comment:      Borderline high:  150-199 mg/dl  High:             200-499 mg/dl  Very high:       >499 mg/dl  Fasting specimen      HDL Cholesterol 54 >49 mg/dL    LDL Cholesterol Calculated 120 (H) <100 mg/dL      Comment:      Above desirable:  100-129 mg/dl  Borderline High:  130-159 mg/dL  High:             160-189 mg/dL  Very high:       >189 mg/dl      Non HDL Cholesterol 164 (H) <130 mg/dL      Comment:      Above Desirable:  130-159 mg/dl  Borderline high:  160-189 mg/dl  High:             190-219 mg/dl  Very high:       >219 mg/dl         If you have any questions or concerns, please call the clinic at the number listed above.       Sincerely,        Luis Feliciano MD

## 2017-08-22 ENCOUNTER — TELEPHONE (OUTPATIENT)
Dept: FAMILY MEDICINE | Facility: CLINIC | Age: 77
End: 2017-08-22

## 2017-08-22 NOTE — TELEPHONE ENCOUNTER
Xray of both hips are pretty normal for age.  I recommend that she contact [facility] for her back pain as I think it is related.  In regards to her cholesterol, it has gotten better, good job!  But I still recommend taking the cholesterol medicine as we discussed.  Follow up with me in 1 week if she still having problem with clearing her throat.

## 2017-08-22 NOTE — TELEPHONE ENCOUNTER
Informed patient of note below. Expressed understanding. No further questions at this time.    Gave patient [facility] number to call and schedule a follow up.    Nataliya MENSAH RN, BSN, PHN  [facility] RN

## 2017-09-13 DIAGNOSIS — I10 BENIGN ESSENTIAL HYPERTENSION: ICD-10-CM

## 2017-09-13 NOTE — TELEPHONE ENCOUNTER
chlorthalidone (HYGROTON) 25 MG tablet    Last Written Prescription Date: 10/12/2016  Last Fill Quantity: 45,06/13/2017 # refills: 3  Last Office Visit with G, P or [facility] prescribing provider: 08/21/2017-Dr Feliciano       Potassium   Date Value Ref Range Status   04/13/2017 3.9 3.4 - 5.3 mmol/L Final     Creatinine   Date Value Ref Range Status   04/13/2017 0.68 0.52 - 1.04 mg/dL Final     BP Readings from Last 3 Encounters:   08/21/17 158/70   06/19/17 124/70   05/19/17 136/76

## 2017-09-15 RX ORDER — CHLORTHALIDONE 25 MG/1
TABLET ORAL
Qty: 45 TABLET | Refills: 3 | Status: SHIPPED | OUTPATIENT
Start: 2017-09-15 | End: 2017-12-19

## 2017-09-15 NOTE — TELEPHONE ENCOUNTER
Routing refill request to provider for review/approval because:  Labs out of range:  BP  Mary Anaya RN, BSN

## 2017-10-06 ENCOUNTER — ALLIED HEALTH/NURSE VISIT (OUTPATIENT)
Dept: NURSING | Facility: CLINIC | Age: 77
End: 2017-10-06
Payer: MEDICARE

## 2017-10-06 DIAGNOSIS — Z23 NEED FOR PROPHYLACTIC VACCINATION AND INOCULATION AGAINST INFLUENZA: Primary | ICD-10-CM

## 2017-10-06 PROCEDURE — 90662 IIV NO PRSV INCREASED AG IM: CPT

## 2017-10-06 PROCEDURE — G0008 ADMIN INFLUENZA VIRUS VAC: HCPCS

## 2017-10-06 PROCEDURE — 99207 ZZC NO CHARGE NURSE ONLY: CPT

## 2017-10-06 NOTE — PROGRESS NOTES
Injectable Influenza Immunization Documentation    1.  Is the person to be vaccinated sick today?   No    2. Does the person to be vaccinated have an allergy to a component   of the vaccine?   No    3. Has the person to be vaccinated ever had a serious reaction   to influenza vaccine in the past?   No    4. Has the person to be vaccinated ever had Guillain-Barré syndrome?   No    Form completed by Ernesto Nunn MA

## 2017-10-06 NOTE — MR AVS SNAPSHOT
"              After Visit Summary   10/6/2017    Francois Ly    MRN: [id number]           Patient Information     Date Of Birth          1940        Visit Information        Provider Department      10/6/2017 3:45 PM CR FLU CLINIC [facility]        Today's Diagnoses     Need for prophylactic vaccination and inoculation against influenza    -  1       Follow-ups after your visit        Who to contact     If you have questions or need follow up information about today's clinic visit or your schedule please contact [facility] directly at [phone].  Normal or non-critical lab and imaging results will be communicated to you by Eons[employer], letter or phone within 4 business days after the clinic has received the results. If you do not hear from us within 7 days, please contact the clinic through [facility] [employer] or phone. If you have a critical or abnormal lab result, we will notify you by phone as soon as possible.  Submit refill requests through [employer] or call your pharmacy and they will forward the refill request to us. Please allow 3 business days for your refill to be completed.          Additional Information About Your Visit        MyChart Information     [employer] lets you send messages to your doctor, view your test results, renew your prescriptions, schedule appointments and more. To sign up, go to [email or web address] . Click on \"Log in\" on the left side of the screen, which will take you to the Welcome page. Then click on \"Sign up Now\" on the right side of the page.     You will be asked to enter the access code listed below, as well as some personal information. Please follow the directions to create your username and password.     Your access code is: [email or web address]  Expires: 2017 12:09 PM     Your access code will  in 90 days. If you need help or a new code, please call your [facility] or [phone].        Care EveryWhere ID     This is your Care " EveryWhere ID. This could be used by other organizations to access your [facility] medical records  [id number]         Blood Pressure from Last 3 Encounters:   08/21/17 158/70   06/19/17 124/70   05/19/17 136/76    Weight from Last 3 Encounters:   08/21/17 134 lb (60.8 kg)   06/19/17 134 lb (60.8 kg)   05/19/17 132 lb (59.9 kg)              We Performed the Following     FLU VACCINE, INCREASED ANTIGEN, PRESV FREE, AGE 65+ [90665]     Vaccine Administration, Initial [76867]        Primary Care Provider Office Phone # Fax #    Luis Feliciano [phone] [address] [facility] [address]        Equal Access to Services     IAM YOON : April capellan Sohumera, waaxda luqadaha, qaybta kaalmada adeegyashayna, giovanni soto . So [facility] [phone].    ATENCIÓN: Si habla español, tiene a huertas disposición servicios gratuitos de asistencia lingüística. Llame al [phone].    We comply with applicable federal civil rights laws and [place] laws. We do not discriminate on the basis of race, color, national origin, age, disability, sex, sexual orientation, or gender identity.            Thank you!     Thank you for choosing [facility]  for your care. Our goal is always to provide you with excellent care. Hearing back from our patients is one way we can continue to improve our services. Please take a few minutes to complete the written survey that you may receive in the mail after your visit with us. Thank you!             Your Updated Medication List - Protect others around you: Learn how to safely use, store and throw away your medicines at www.disposemymeds.org.          This list is accurate as of: 10/6/17  3:49 PM.  Always use your most recent med list.                   Brand Name Dispense Instructions for use Diagnosis    aspirin EC 81 MG EC tablet      Take 1 tablet (81 mg) by mouth daily    ACS (acute coronary syndrome) (H)       atorvastatin 40  MG tablet    LIPITOR    90 tablet    Take 1 tablet (40 mg) by mouth daily    PAD (peripheral artery disease) (H)       chlorthalidone 25 MG tablet    HYGROTON    45 tablet    TAKE ONE-HALF TABLET BY MOUTH ONCE DAILY    Benign essential hypertension       cyanocobalamin 1000 MCG tablet    vitamin  B-12     Take 1,000 mcg by mouth daily as needed (leg cramps)        KRILL OIL PO      Take 500 mg by mouth daily        losartan 100 MG tablet    COZAAR    90 tablet    Take 0.5 tablets (50 mg) by mouth 2 times daily    Benign essential hypertension       MULTIVITAMINS PO      Take 1 tablet by mouth daily as needed        NONFORMULARY      Pain cream from [place] 30mg/g. Pt uses on legs for pain prn        order for DME     1 Device    Equipment being ordered: rib strap    Blunt trauma of rib, initial encounter       VITAMIN D (CHOLECALCIFEROL) PO      Take 1 tablet by mouth daily as needed (leg cramps)

## 2017-10-11 ENCOUNTER — TELEPHONE (OUTPATIENT)
Dept: FAMILY MEDICINE | Facility: CLINIC | Age: 77
End: 2017-10-11

## 2017-10-11 NOTE — TELEPHONE ENCOUNTER
"Clinic Action Needed:  Yes, please contact pt/spouse    Presenting Problem: Spouse calling to find out if patient can have stool studies done without seeing the physician.  Triage declined.  \"She has had intermittent abdominal pain under her diaphragm x 2 weeks.  No fever.  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  Please advise.     Routed to:  ARMAAN Diaz RN/FNA        "

## 2017-10-12 PROCEDURE — 82274 ASSAY TEST FOR BLOOD FECAL: CPT | Performed by: FAMILY MEDICINE

## 2017-10-13 ENCOUNTER — OFFICE VISIT (OUTPATIENT)
Dept: FAMILY MEDICINE | Facility: CLINIC | Age: 77
End: 2017-10-13
Payer: MEDICARE

## 2017-10-13 VITALS
HEIGHT: 62 IN | SYSTOLIC BLOOD PRESSURE: 138 MMHG | OXYGEN SATURATION: 99 % | TEMPERATURE: 98.1 F | RESPIRATION RATE: 16 BRPM | WEIGHT: 133 LBS | BODY MASS INDEX: 24.48 KG/M2 | DIASTOLIC BLOOD PRESSURE: 70 MMHG | HEART RATE: 63 BPM

## 2017-10-13 DIAGNOSIS — K92.1 BLOOD IN STOOL: Primary | ICD-10-CM

## 2017-10-13 DIAGNOSIS — Z12.11 ENCOUNTER FOR SCREENING FOR MALIGNANT NEOPLASM OF COLON: ICD-10-CM

## 2017-10-13 DIAGNOSIS — K62.9 RECTAL LESION: ICD-10-CM

## 2017-10-13 PROCEDURE — 99214 OFFICE O/P EST MOD 30 MIN: CPT | Performed by: FAMILY MEDICINE

## 2017-10-13 NOTE — PROGRESS NOTES
"  SUBJECTIVE:   Francois Ly is a 77 year old female who presents to clinic today for the following health issues:      ABDOMINAL   PAIN     Onset: off and on for a while    Description:   Character: Cramping  Location: left lower quadrant  Radiation: None    Intensity: severe    Progression of Symptoms:  worsening    Accompanying Signs & Symptoms:  Fever/Chills?: no   Gas/Bloating: no   Nausea: no   Vomitting: no   Diarrhea?: no   Constipation:YES, she gets hard stool sometimes.  Dysuria or Hematuria: no    History:   Trauma: no   Previous similar pain: YES   Previous tests done: none    Precipitating factors:   Does the pain change with:     Food: no      BM: no     Urination: no     Alleviating factors:  none    Therapies Tried and outcome: none.    LMP:  not applicable         She gets constipations and it get associated with blood in the stool.  Last colonoscopy was 10 years ago, and was negative.  Pt also was diagnosed with H.pylori, and was treated, and she want to get tested for it.    Problem list and histories reviewed & adjusted, as indicated.  Additional history: as documented    Patient Active Problem List   Diagnosis     Back pain     Chest pain     Pancreatic mass     Hypertensive urgency     Benign essential hypertension     Blunt trauma of rib, initial encounter     SBO (small bowel obstruction)     Small bowel obstruction     PAD (peripheral artery disease) (H)     Past Surgical History:   Procedure Laterality Date     GYN SURGERY      hysterectomy     ORTHOPEDIC SURGERY      Slipped disc with chronic back pain       Social History   Substance Use Topics     Smoking status: Former Smoker     Smokeless tobacco: Former User      Comment: Former \"social\" smoker, quit in 1978.     Alcohol use 0.0 oz/week     0 Standard drinks or equivalent per week      Comment: occ wine     Family History   Problem Relation Age of Onset     Family History Negative Other          Current Outpatient Prescriptions " "  Medication Sig Dispense Refill     chlorthalidone (HYGROTON) 25 MG tablet TAKE ONE-HALF TABLET BY MOUTH ONCE DAILY 45 tablet 3     atorvastatin (LIPITOR) 40 MG tablet Take 1 tablet (40 mg) by mouth daily 90 tablet 3     KRILL OIL PO Take 500 mg by mouth daily        cyanocobalamin (VITAMIN  B-12) 1000 MCG tablet Take 1,000 mcg by mouth daily as needed (leg cramps)       VITAMIN D, CHOLECALCIFEROL, PO Take 1 tablet by mouth daily as needed (leg cramps)       NONFORMULARY Pain cream from [place] 30mg/g. Pt uses on legs for pain prn       losartan (COZAAR) 100 MG tablet Take 0.5 tablets (50 mg) by mouth 2 times daily 90 tablet 3     order for DME Equipment being ordered: rib strap 1 Device 0     aspirin EC 81 MG tablet Take 1 tablet (81 mg) by mouth daily       Multiple Vitamin (MULTIVITAMINS PO) Take 1 tablet by mouth daily as needed        No Known Allergies      Reviewed and updated as needed this visit by clinical staffTobacco  Allergies  Med Hx  Surg Hx  Fam Hx  Soc Hx      Reviewed and updated as needed this visit by Provider         ROS:  C: NEGATIVE for fever, chills, change in weight  CV: NEGATIVE for chest pain, palpitations or peripheral edema    OBJECTIVE:     /70 (BP Location: Right arm, Patient Position: Chair, Cuff Size: Adult Regular)  Pulse 63  Temp 98.1  F (36.7  C) (Oral)  Resp 16  Ht 5' 2\" (1.575 m)  Wt 133 lb (60.3 kg)  SpO2 99%  BMI 24.33 kg/m2  Body mass index is 24.33 kg/(m^2).  GENERAL: healthy, alert and no distress  RESP: lungs clear to auscultation - no rales, rhonchi or wheezes  CV: regular rate and rhythm, normal S1 S2, no S3 or S4, no murmur, click or rub, no peripheral edema and peripheral pulses strong  ABDOMEN: soft, nontender, no hepatosplenomegaly, no masses and bowel sounds normal  RECTAL (female): rectal mass on the 6 to 9 o'clock, soft, fleshy and easily bleeding.        ASSESSMENT/PLAN:             1. Blood in stool  Check for gthe below test.- Fecal colorectal " cancer screen (FIT); Future  - H Pylori antigen, stool; Future    2. Encounter for screening for malignant neoplasm of colon   - Fecal colorectal cancer screen (FIT); Future    3. Rectal lesion  Refer to rectal surgery within 1 week.  - COLORECTAL SURGERY REFERRAL  - COLORECTAL SURGERY REFERRAL        Luis Feliciano MD  [facility]

## 2017-10-13 NOTE — NURSING NOTE
"Chief Complaint   Patient presents with     Abdominal Pain     LLQ pain     Constipation       Initial /70 (BP Location: Right arm, Patient Position: Chair, Cuff Size: Adult Regular)  Pulse 63  Temp 98.1  F (36.7  C) (Oral)  Resp 16  Ht 5' 2\" (1.575 m)  Wt 133 lb (60.3 kg)  SpO2 99%  BMI 24.33 kg/m2 Estimated body mass index is 24.33 kg/(m^2) as calculated from the following:    Height as of this encounter: 5' 2\" (1.575 m).    Weight as of this encounter: 133 lb (60.3 kg).  Medication Reconciliation: complete   Madeline Valadez, ZAIDA      "

## 2017-10-14 DIAGNOSIS — K92.1 BLOOD IN STOOL: ICD-10-CM

## 2017-10-14 PROCEDURE — 87338 HPYLORI STOOL AG IA: CPT | Performed by: FAMILY MEDICINE

## 2017-10-16 DIAGNOSIS — K92.1 BLOOD IN STOOL: ICD-10-CM

## 2017-10-16 DIAGNOSIS — Z12.11 ENCOUNTER FOR SCREENING FOR MALIGNANT NEOPLASM OF COLON: ICD-10-CM

## 2017-10-16 LAB — HEMOCCULT STL QL IA: NEGATIVE

## 2017-10-17 LAB
H PYLORI AG STL QL IA: NORMAL
SPECIMEN SOURCE: NORMAL

## 2017-10-19 ENCOUNTER — TRANSFERRED RECORDS (OUTPATIENT)
Dept: HEALTH INFORMATION MANAGEMENT | Facility: CLINIC | Age: 77
End: 2017-10-19

## 2017-10-31 ENCOUNTER — HOSPITAL ENCOUNTER (OUTPATIENT)
Facility: CLINIC | Age: 77
Discharge: HOME | End: 2017-10-31
Attending: COLON & RECTAL SURGERY | Admitting: COLON & RECTAL SURGERY
Payer: MEDICARE

## 2017-10-31 ENCOUNTER — SURGERY (OUTPATIENT)
Age: 77
End: 2017-10-31

## 2017-10-31 VITALS
OXYGEN SATURATION: 90 % | SYSTOLIC BLOOD PRESSURE: 117 MMHG | DIASTOLIC BLOOD PRESSURE: 67 MMHG | RESPIRATION RATE: 39 BRPM

## 2017-10-31 LAB — COLONOSCOPY: NORMAL

## 2017-10-31 PROCEDURE — 45398 COLONOSCOPY W/BAND LIGATION: CPT | Performed by: COLON & RECTAL SURGERY

## 2017-10-31 PROCEDURE — 88305 TISSUE EXAM BY PATHOLOGIST: CPT | Performed by: COLON & RECTAL SURGERY

## 2017-10-31 PROCEDURE — 45385 COLONOSCOPY W/LESION REMOVAL: CPT | Performed by: COLON & RECTAL SURGERY

## 2017-10-31 PROCEDURE — 88305 TISSUE EXAM BY PATHOLOGIST: CPT | Mod: 26 | Performed by: COLON & RECTAL SURGERY

## 2017-10-31 PROCEDURE — G0500 MOD SEDAT ENDO SERVICE >5YRS: HCPCS | Performed by: COLON & RECTAL SURGERY

## 2017-10-31 PROCEDURE — 25000128 H RX IP 250 OP 636: Performed by: COLON & RECTAL SURGERY

## 2017-10-31 RX ORDER — DIPHENHYDRAMINE HYDROCHLORIDE 50 MG/ML
25 INJECTION INTRAMUSCULAR; INTRAVENOUS EVERY 4 HOURS PRN
Status: DISCONTINUED | OUTPATIENT
Start: 2017-10-31 | End: 2017-10-31 | Stop reason: HOSPADM

## 2017-10-31 RX ORDER — LIDOCAINE 40 MG/G
CREAM TOPICAL
Status: DISCONTINUED | OUTPATIENT
Start: 2017-10-31 | End: 2017-10-31 | Stop reason: HOSPADM

## 2017-10-31 RX ORDER — FLUMAZENIL 0.1 MG/ML
0.2 INJECTION, SOLUTION INTRAVENOUS
Status: DISCONTINUED | OUTPATIENT
Start: 2017-10-31 | End: 2017-10-31 | Stop reason: HOSPADM

## 2017-10-31 RX ORDER — PROCHLORPERAZINE MALEATE 5 MG
5 TABLET ORAL EVERY 6 HOURS PRN
Status: DISCONTINUED | OUTPATIENT
Start: 2017-10-31 | End: 2017-10-31 | Stop reason: HOSPADM

## 2017-10-31 RX ORDER — ONDANSETRON 2 MG/ML
4 INJECTION INTRAMUSCULAR; INTRAVENOUS
Status: DISCONTINUED | OUTPATIENT
Start: 2017-10-31 | End: 2017-10-31 | Stop reason: HOSPADM

## 2017-10-31 RX ORDER — DIPHENHYDRAMINE HCL 25 MG
25 CAPSULE ORAL EVERY 4 HOURS PRN
Status: DISCONTINUED | OUTPATIENT
Start: 2017-10-31 | End: 2017-10-31 | Stop reason: HOSPADM

## 2017-10-31 RX ORDER — FENTANYL CITRATE 50 UG/ML
INJECTION, SOLUTION INTRAMUSCULAR; INTRAVENOUS PRN
Status: DISCONTINUED | OUTPATIENT
Start: 2017-10-31 | End: 2017-10-31 | Stop reason: HOSPADM

## 2017-10-31 RX ORDER — NALOXONE HYDROCHLORIDE 0.4 MG/ML
.1-.4 INJECTION, SOLUTION INTRAMUSCULAR; INTRAVENOUS; SUBCUTANEOUS
Status: DISCONTINUED | OUTPATIENT
Start: 2017-10-31 | End: 2017-10-31 | Stop reason: HOSPADM

## 2017-10-31 RX ORDER — ONDANSETRON 4 MG/1
4 TABLET, ORALLY DISINTEGRATING ORAL EVERY 6 HOURS PRN
Status: DISCONTINUED | OUTPATIENT
Start: 2017-10-31 | End: 2017-10-31 | Stop reason: HOSPADM

## 2017-10-31 RX ORDER — ONDANSETRON 2 MG/ML
4 INJECTION INTRAMUSCULAR; INTRAVENOUS EVERY 6 HOURS PRN
Status: DISCONTINUED | OUTPATIENT
Start: 2017-10-31 | End: 2017-10-31 | Stop reason: HOSPADM

## 2017-10-31 RX ADMIN — MIDAZOLAM HYDROCHLORIDE 1 MG: 1 INJECTION, SOLUTION INTRAMUSCULAR; INTRAVENOUS at 09:35

## 2017-10-31 RX ADMIN — FENTANYL CITRATE 100 MCG: 50 INJECTION, SOLUTION INTRAMUSCULAR; INTRAVENOUS at 09:35

## 2017-10-31 RX ADMIN — FENTANYL CITRATE 50 MCG: 50 INJECTION, SOLUTION INTRAMUSCULAR; INTRAVENOUS at 09:41

## 2017-10-31 RX ADMIN — MIDAZOLAM HYDROCHLORIDE 1 MG: 1 INJECTION, SOLUTION INTRAMUSCULAR; INTRAVENOUS at 09:39

## 2017-10-31 NOTE — BRIEF OP NOTE
[facility] Brief Operative Note    Pre-operative diagnosis: RECTAL BLEEDING   Post-operative diagnosis Diverticulosis, hemorrhoids, colon polyp   Procedure: Procedure(s):  COLONOSCOPY WITH HEMORRHOID BANDING (CONSCIOUS SEDATION) - Wound Class: II-Clean Contaminated   - Wound Class: II-Clean Contaminated   Surgeon(s): Surgeon(s) and Role:     * Harshal Alcantar MD - Primary   Estimated blood loss: * No values recorded between 10/31/2017 12:00 AM and 10/31/2017 10:08 AM *    Specimens:   ID Type Source Tests Collected by Time Destination   A :  Polyp Large Intestine, Sigmoid SURGICAL PATHOLOGY EXAM Harshal Alcantar MD 10/31/2017  9:57 AM       Findings: Banded hemorrhoids.  See note in provation for details

## 2017-10-31 NOTE — H&P
Pre-Endoscopy History and Physical     Francois Ly MRN# [id number]   YOB: 1940 Age: 77 year old     Date of Procedure: 10/31/2017  Primary care provider: Luis Feliciano  Type of Endoscopy: colonoscopy  Reason for Procedure: screening  Type of Anesthesia Anticipated: Moderate Sedation    HPI:    Francois is a 77 year old female who will be undergoing the above procedure.      A history and physical has been performed. The patient's medications and allergies have been reviewed. The risks and benefits of the procedure including the risk of bleeding, perforation, and missed lesions as well as the sedation options and risks were discussed with the patient.  All questions were answered and informed consent was obtained.      No Known Allergies     Current Facility-Administered Medications   Medication     lidocaine 1 % 1 mL     lidocaine (LMX4) cream     sodium chloride (PF) 0.9% PF flush 3 mL     sodium chloride (PF) 0.9% PF flush 3 mL     sodium chloride (PF) 0.9% PF flush 3 mL     ondansetron (ZOFRAN) injection 4 mg       Prescriptions Prior to Admission   Medication Sig Dispense Refill Last Dose     losartan (COZAAR) 100 MG tablet Take 0.5 tablets (50 mg) by mouth 2 times daily 90 tablet 3 10/31/2017     chlorthalidone (HYGROTON) 25 MG tablet TAKE ONE-HALF TABLET BY MOUTH ONCE DAILY 45 tablet 3      KRILL OIL PO Take 500 mg by mouth daily    Taking     cyanocobalamin (VITAMIN  B-12) 1000 MCG tablet Take 1,000 mcg by mouth daily as needed (leg cramps)   Taking     VITAMIN D, CHOLECALCIFEROL, PO Take 1 tablet by mouth daily as needed (leg cramps)   Taking     NONFORMULARY Pain cream from [place] 30mg/g. Pt uses on legs for pain prn   Taking     order for DME Equipment being ordered: rib strap 1 Device 0 Taking     aspirin EC 81 MG tablet Take 1 tablet (81 mg) by mouth daily   10/28/2017     Multiple Vitamin (MULTIVITAMINS PO) Take 1 tablet by mouth daily as needed    Taking       Patient Active Problem List  "  Diagnosis     Back pain     Chest pain     Pancreatic mass     Hypertensive urgency     Benign essential hypertension     Blunt trauma of rib, initial encounter     SBO (small bowel obstruction)     Small bowel obstruction     PAD (peripheral artery disease) (H)        Past Medical History:   Diagnosis Date     Benign essential hypertension 10/12/2016     Blunt trauma of rib, initial encounter 11/25/2016     Gallstones      Hypertension      PAD (peripheral artery disease) (H) 6/19/2017     Pancreatic mass 8/17/2016     Slipped intervertebral disc         Past Surgical History:   Procedure Laterality Date     GYN SURGERY      hysterectomy     ORTHOPEDIC SURGERY      Slipped disc with chronic back pain       Social History   Substance Use Topics     Smoking status: Former Smoker     Smokeless tobacco: Former User      Comment: Former \"social\" smoker, quit in 1978.     Alcohol use 0.0 oz/week     0 Standard drinks or equivalent per week      Comment: occ wine       Family History   Problem Relation Age of Onset     Family History Negative Other          PHYSICAL EXAM:   There were no vitals taken for this visit. Estimated body mass index is 24.33 kg/(m^2) as calculated from the following:    Height as of 10/13/17: 1.575 m (5' 2\").    Weight as of 10/13/17: 60.3 kg (133 lb).   Mental status - alert and oriented  RESP: lungs clear  CV: RRR  AIRWAY EXAM: Mallampatti Class II (visualization of the soft palate, fauces, and uvula)    IMPRESSION   ASA Class 2 - Mild systemic disease      Signed Electronically by: Harshal Alcantar  October 31, 2017    Colorectal Surgery  [phone] (office)  [phone] (pager)  [email or web address]          "

## 2017-11-02 LAB — COPATH REPORT: NORMAL

## 2017-11-07 ENCOUNTER — RECORDS - HEALTHEAST (OUTPATIENT)
Dept: ADMINISTRATIVE | Facility: OTHER | Age: 77
End: 2017-11-07

## 2017-12-17 DIAGNOSIS — I10 BENIGN ESSENTIAL HYPERTENSION: ICD-10-CM

## 2017-12-18 NOTE — TELEPHONE ENCOUNTER
Requested Prescriptions   Pending Prescriptions Disp Refills     losartan (COZAAR) 100 MG tablet [Pharmacy Med Name: LOSARTAN POTASSIUM 100MG TABS] 90 tablet 3    Last Written Prescription Date:  12/19/16  Last Fill Quantity: 90,  # refills: 3   Last Office Visit with [facility], [facility] or [facility] prescribing provider:  10/13/2017   Future Office Visit:    Next 5 appointments (look out 90 days)     Dec 19, 2017  9:45 AM CST   SHORT with Luis Feliciano MD   [facility]    [address] [place] [id number] [phone]                Sig: TAKE ONE-HALF TABLET BY MOUTH TWO TIMES A DAY    Angiotensin-II Receptors Passed    12/17/2017  8:12 AM       Passed - Blood pressure under 140/90 in past 12 months.    BP Readings from Last 3 Encounters:   10/31/17 117/67   10/13/17 138/70   08/21/17 158/70          Passed - Recent or future visit with authorizing provider's specialty    Patient had office visit in the last year or has a visit in the next 30 days with authorizing provider.  See chart review.          Passed - Patient is age 18 or older       Passed - No active pregnancy on record       Passed - Normal serum creatinine on file in past 12 months    Recent Labs   Lab Test  04/13/17   0740   CR  0.68          Passed - Normal serum potassium on file in past 12 months    Recent Labs   Lab Test  04/13/17   0740   POTASSIUM  3.9           Passed - No positive pregnancy test in past 12 months

## 2017-12-19 ENCOUNTER — OFFICE VISIT (OUTPATIENT)
Dept: FAMILY MEDICINE | Facility: CLINIC | Age: 77
End: 2017-12-19
Payer: MEDICARE

## 2017-12-19 VITALS
SYSTOLIC BLOOD PRESSURE: 124 MMHG | DIASTOLIC BLOOD PRESSURE: 62 MMHG | TEMPERATURE: 98.1 F | OXYGEN SATURATION: 99 % | WEIGHT: 135 LBS | RESPIRATION RATE: 16 BRPM | HEIGHT: 62 IN | BODY MASS INDEX: 24.84 KG/M2 | HEART RATE: 58 BPM

## 2017-12-19 DIAGNOSIS — M54.50 BILATERAL LOW BACK PAIN WITHOUT SCIATICA, UNSPECIFIED CHRONICITY: Primary | ICD-10-CM

## 2017-12-19 DIAGNOSIS — I73.9 PAD (PERIPHERAL ARTERY DISEASE) (H): ICD-10-CM

## 2017-12-19 DIAGNOSIS — I10 BENIGN ESSENTIAL HYPERTENSION: ICD-10-CM

## 2017-12-19 PROCEDURE — 80048 BASIC METABOLIC PNL TOTAL CA: CPT | Performed by: FAMILY MEDICINE

## 2017-12-19 PROCEDURE — 36415 COLL VENOUS BLD VENIPUNCTURE: CPT | Performed by: FAMILY MEDICINE

## 2017-12-19 PROCEDURE — 80061 LIPID PANEL: CPT | Performed by: FAMILY MEDICINE

## 2017-12-19 PROCEDURE — 99214 OFFICE O/P EST MOD 30 MIN: CPT | Performed by: FAMILY MEDICINE

## 2017-12-19 RX ORDER — LOSARTAN POTASSIUM 50 MG/1
50 TABLET ORAL DAILY
Qty: 90 TABLET | Refills: 3 | Status: SHIPPED | OUTPATIENT
Start: 2017-12-19 | End: 2019-01-01

## 2017-12-19 RX ORDER — CHLORTHALIDONE 25 MG/1
12.5 TABLET ORAL DAILY
Qty: 45 TABLET | Refills: 3 | Status: SHIPPED | OUTPATIENT
Start: 2017-12-19 | End: 2018-09-24

## 2017-12-19 RX ORDER — LOSARTAN POTASSIUM 50 MG/1
50 TABLET ORAL DAILY
COMMUNITY
End: 2017-12-19

## 2017-12-19 NOTE — MR AVS SNAPSHOT
After Visit Summary   12/19/2017    Francois Ly    MRN: [id number]           Patient Information     Date Of Birth          1940        Visit Information        Provider Department      12/19/2017 9:45 AM Luis Feliciano MD [facility]        Today's Diagnoses     Bilateral low back pain without sciatica, unspecified chronicity    -  1    Benign essential hypertension        PAD (peripheral artery disease) (H)           Follow-ups after your visit        Additional Services     [facility] PT, HAND, AND CHIROPRACTIC REFERRAL       **This order will print in the [facility] Scheduling Office**    Physical Therapy, Hand Therapy and Chiropractic Care are available through:    *[facility]  *[facility]  *[facility]    Call one number to schedule at any of the above locations: [phone].    Your provider has referred you to: Physical Therapy at [facility] or [facility]    Indication/Reason for Referral: low back pain  Onset of Illness: 2 years ago.  Therapy Orders: Evaluate and Treat  Special Programs: None  Special Request: None    Toro Engel      Additional Comments for the Therapist or Chiropractor:     Please be aware that coverage of these services is subject to the terms and limitations of your health insurance plan.  Call member services at your health plan with any benefit or coverage questions.      Please bring the following to your appointment:    *Your personal calendar for scheduling future appointments  *Comfortable clothing                  Who to contact     If you have questions or need follow up information about today's clinic visit or your schedule please contact [facility] directly at [phone].  Normal or non-critical lab and imaging results will be communicated to you by MyChart, letter or phone within 4 business days after the clinic has received the results. If you do not hear from us within 7 days, please  "contact the clinic through [employer] or phone. If you have a critical or abnormal lab result, we will notify you by phone as soon as possible.  Submit refill requests through [employer] or call your pharmacy and they will forward the refill request to us. Please allow 3 business days for your refill to be completed.          Additional Information About Your Visit        [employer] Information     [employer] lets you send messages to your doctor, view your test results, renew your prescriptions, schedule appointments and more. To sign up, go to [email or web address] . Click on \"Log in\" on the left side of the screen, which will take you to the Welcome page. Then click on \"Sign up Now\" on the right side of the page.     You will be asked to enter the access code listed below, as well as some personal information. Please follow the directions to create your username and password.     Your access code is: [email or web address]  Expires: 3/19/2018 10:23 AM     Your access code will  in 90 days. If you need help or a new code, please call your [place] clinic or [phone].        Care EveryWhere ID     This is your Care EveryWhere ID. This could be used by other organizations to access your [facility] [place] medical records  [id number]        Your Vitals Were     Pulse Temperature Respirations Height Pulse Oximetry BMI (Body Mass Index)    58 98.1  F (36.7  C) (Oral) 16 5' 2\" (1.575 m) 99% 24.69 kg/m2       Blood Pressure from Last 3 Encounters:   17 124/62   10/31/17 117/67   10/13/17 138/70    Weight from Last 3 Encounters:   17 135 lb (61.2 kg)   10/13/17 133 lb (60.3 kg)   17 134 lb (60.8 kg)              We Performed the Following     Basic metabolic panel     [facility] PT, HAND, AND CHIROPRACTIC REFERRAL     Lipid panel reflex to direct LDL Fasting          Today's Medication Changes          These changes are accurate as of: 17 10:23 AM.  If you have any questions, ask your nurse or doctor.               These " medicines have changed or have updated prescriptions.        Dose/Directions    chlorthalidone 25 MG tablet   Commonly known as:  HYGROTON   This may have changed:  See the new instructions.   Used for:  Benign essential hypertension   Changed by:  Luis Feliciano MD        Dose:  12.5 mg   Take 0.5 tablets (12.5 mg) by mouth daily   Quantity:  45 tablet   Refills:  3       losartan 50 MG tablet   Commonly known as:  COZAAR   This may have changed:  Another medication with the same name was removed. Continue taking this medication, and follow the directions you see here.   Used for:  Benign essential hypertension   Changed by:  Luis Feliciano MD        Dose:  50 mg   Take 1 tablet (50 mg) by mouth daily   Quantity:  90 tablet   Refills:  3            Where to get your medicines      These medications were sent to [facility] [id number] [place] [address]  [id number] [place] [address]     Phone:  [phone]     chlorthalidone 25 MG tablet    losartan 50 MG tablet                Primary Care Provider Office Phone # Fax #    Luis Feliciano [phone] [address] [facility] [address]        Equal Access to Services     [facility] CenterDAYNA : Hadii viv voss hadasho Sohumera, waaxda luqadaha, qaybta kaalmada adezackary, giovanni soto . So [facility] [place] [phone].    ATENCIÓN: Si habla español, tiene a huertas disposición servicios gratuitos de asistencia lingüística. LlAshtabula General Hospital [phone].    We comply with applicable federal civil rights laws and [place] laws. We do not discriminate on the basis of race, color, national origin, age, disability, sex, sexual orientation, or gender identity.            Thank you!     Thank you for choosing [facility]  for your care. Our goal is always to provide you with excellent care. Hearing back from our patients is one way we can continue to improve our services. Please take a few minutes to complete the  written survey that you may receive in the mail after your visit with us. Thank you!             Your Updated Medication List - Protect others around you: Learn how to safely use, store and throw away your medicines at www.disposemymeds.org.          This list is accurate as of: 12/19/17 10:23 AM.  Always use your most recent med list.                   Brand Name Dispense Instructions for use Diagnosis    aspirin EC 81 MG EC tablet      Take 1 tablet (81 mg) by mouth daily    ACS (acute coronary syndrome) (H)       chlorthalidone 25 MG tablet    HYGROTON    45 tablet    Take 0.5 tablets (12.5 mg) by mouth daily    Benign essential hypertension       cyanocobalamin 1000 MCG tablet    vitamin  B-12     Take 1,000 mcg by mouth daily as needed (leg cramps)        KRILL OIL PO      Take 500 mg by mouth daily        losartan 50 MG tablet    COZAAR    90 tablet    Take 1 tablet (50 mg) by mouth daily    Benign essential hypertension       MULTIVITAMINS PO      Take 1 tablet by mouth daily as needed        NONFORMULARY      Pain cream from [place] 30mg/g. Pt uses on legs for pain prn        order for DME     1 Device    Equipment being ordered: rib strap    Blunt trauma of rib, initial encounter       VITAMIN D (CHOLECALCIFEROL) PO      Take 1 tablet by mouth daily as needed (leg cramps)

## 2017-12-19 NOTE — PROGRESS NOTES
SUBJECTIVE:   Francois Ly is a 77 year old female who presents to clinic today for the following health issues:      Hypertension Follow-up      Outpatient blood pressures are being checked at home.  Results are wnl.    Low Salt Diet: no added salt        Amount of exercise or physical activity: 4-5 days/week for an average of greater than 60 minutes    Problems taking medications regularly: No    Medication side effects: none    Diet: regular (no restrictions)        Back Pain       Duration: few years ago.        Specific cause: none    Description:   Location of pain: low back bilateral  Character of pain: dull ache  Pain radiation:none  New numbness or weakness in legs, not attributed to pain:  no     Intensity: Currently 4/10    History:   Pain interferes with job: Not applicable  History of back problems: previous spinal surgery - 2015  Any previous MRI or X-rays: None  Sees a specialist for back pain:  Pt went o [facility] about 1 year ago, at [facility] she has Xray, but no fracture.  Therapies tried without relief: doing some PT.    Alleviating factors:   Improved by: while inactivity, or sitting or lying down.      Precipitating factors:  Worsened by: walking or standing for long time.    Functional and Psychosocial Screen (Toro STarT Back):                Accompanying Signs & Symptoms:  Risk of Fracture:  Age >64  Risk of Cauda Equina:  None  Risk of Infection:  None  Risk of Cancer:  None  Risk of Ankylosing Spondylitis:  Onset at age <35, male, AND morning back stiffness. no                   Problem list and histories reviewed & adjusted, as indicated.  Additional history: as documented    Patient Active Problem List   Diagnosis     Back pain     Chest pain     Pancreatic mass     Hypertensive urgency     Benign essential hypertension     Blunt trauma of rib, initial encounter     SBO (small bowel obstruction)     Small bowel obstruction     PAD (peripheral artery disease) (H)     Bilateral low back pain  "without sciatica, unspecified chronicity     Past Surgical History:   Procedure Laterality Date     GYN SURGERY      hysterectomy     ORTHOPEDIC SURGERY      Slipped disc with chronic back pain       Social History   Substance Use Topics     Smoking status: Former Smoker     Smokeless tobacco: Former User      Comment: Former \"social\" smoker, quit in 1978.     Alcohol use 0.0 oz/week     0 Standard drinks or equivalent per week      Comment: occ wine     Family History   Problem Relation Age of Onset     Family History Negative Other          Current Outpatient Prescriptions   Medication Sig Dispense Refill     losartan (COZAAR) 50 MG tablet Take 1 tablet (50 mg) by mouth daily 90 tablet 3     chlorthalidone (HYGROTON) 25 MG tablet Take 0.5 tablets (12.5 mg) by mouth daily 45 tablet 3     KRILL OIL PO Take 500 mg by mouth daily        cyanocobalamin (VITAMIN  B-12) 1000 MCG tablet Take 1,000 mcg by mouth daily as needed (leg cramps)       VITAMIN D, CHOLECALCIFEROL, PO Take 1 tablet by mouth daily as needed (leg cramps)       NONFORMULARY Pain cream from [employer] 30mg/g. Pt uses on legs for pain prn       order for DME Equipment being ordered: rib strap 1 Device 0     aspirin EC 81 MG tablet Take 1 tablet (81 mg) by mouth daily       Multiple Vitamin (MULTIVITAMINS PO) Take 1 tablet by mouth daily as needed        [DISCONTINUED] losartan (COZAAR) 50 MG tablet Take 50 mg by mouth daily       [DISCONTINUED] chlorthalidone (HYGROTON) 25 MG tablet TAKE ONE-HALF TABLET BY MOUTH ONCE DAILY 45 tablet 3     [DISCONTINUED] losartan (COZAAR) 100 MG tablet Take 0.5 tablets (50 mg) by mouth 2 times daily 90 tablet 3     No Known Allergies      Reviewed and updated as needed this visit by clinical staffTobacco  Allergies  Meds  Med Hx  Surg Hx  Fam Hx  Soc Hx      Reviewed and updated as needed this visit by Provider         ROS:  C: NEGATIVE for fever, chills, change in weight  R: NEGATIVE for significant cough or SOB  CV: " "NEGATIVE for chest pain, palpitations or peripheral edema    OBJECTIVE:     /62 (BP Location: Right arm, Patient Position: Chair, Cuff Size: Adult Regular)  Pulse 58  Temp 98.1  F (36.7  C) (Oral)  Resp 16  Ht 5' 2\" (1.575 m)  Wt 135 lb (61.2 kg)  SpO2 99%  BMI 24.69 kg/m2  Body mass index is 24.69 kg/(m^2).  GENERAL: healthy, alert and no distress  NECK: no adenopathy, no asymmetry, masses, or scars and thyroid normal to palpation  RESP: lungs clear to auscultation - no rales, rhonchi or wheezes  CV: regular rate and rhythm, normal S1 S2, no S3 or S4, no murmur, click or rub, no peripheral edema  Patient appears to be in no pain, no antalgic gait noted.   Lumbosacral spine area reveals no local tenderness or mass.    ROM of the LS spine showed limited.extension  Limited  Flexion painful   Straight leg raise is negative at 60 degrees on bilateral.  L4 :toe walk nl patellar reflux nl medial foot sensation nl  L5: dorsiflexion of the big toe nl,mid foot sensation nl  S1: heel walk nl, Whitelaw reflex not done, lateral sensation of the foot nl     Peripheral pulses are palpable.           ASSESSMENT/PLAN:             1. Benign essential hypertension  Controlled, renew mediations.   - losartan (COZAAR) 50 MG tablet; Take 1 tablet (50 mg) by mouth daily  Dispense: 90 tablet; Refill: 3  - chlorthalidone (HYGROTON) 25 MG tablet; Take 0.5 tablets (12.5 mg) by mouth daily  Dispense: 45 tablet; Refill: 3  - Basic metabolic panel  - Lipid panel reflex to direct LDL Fasting    2. Bilateral low back pain without sciatica, unspecified chronicity  Her symptoms are consistent with lumbar stenosis, pt had Xray done with [facility] about 1 year ago, no change in symptoms since, will start on   - [facility] REFERRAL    3. PAD (peripheral artery disease) (H)  Pt is off statins at this time due to side effects.   Continue on ASA and walking.           Luis Feliciano MD  [facility]"

## 2017-12-19 NOTE — LETTER
December 20, 2017      Francois Ly  [address]  [facility] [address]        Dear ,    Your cholesterol is worse than previous, and if she wishes to start on cholesterol medicine please let me know.  The rest of the labs are all normal.      Luis Feliciano MD  [facility]  [phone]    Resulted Orders   Basic metabolic panel   Result Value Ref Range    Sodium 139 133 - 144 mmol/L    Potassium 3.6 3.4 - 5.3 mmol/L    Chloride 104 94 - 109 mmol/L    Carbon Dioxide 26 20 - 32 mmol/L    Anion Gap 9 3 - 14 mmol/L    Glucose 105 (H) 70 - 99 mg/dL      Comment:      Fasting specimen    Urea Nitrogen 15 7 - 30 mg/dL    Creatinine 0.64 0.52 - 1.04 mg/dL    GFR Estimate 90 >60 mL/min/1.7m2      Comment:      Non  GFR Calc    GFR Estimate If Black >90 >60 mL/min/1.7m2      Comment:       GFR Calc    Calcium 9.1 8.5 - 10.1 mg/dL   Lipid panel reflex to direct LDL Fasting   Result Value Ref Range    Cholesterol 253 (H) <200 mg/dL      Comment:      Desirable:       <200 mg/dl    Triglycerides 319 (H) <150 mg/dL      Comment:      Borderline high:  150-199 mg/dl  High:             200-499 mg/dl  Very high:       >499 mg/dl  Fasting specimen      HDL Cholesterol 54 >49 mg/dL    LDL Cholesterol Calculated 135 (H) <100 mg/dL      Comment:      Above desirable:  100-129 mg/dl  Borderline High:  130-159 mg/dL  High:             160-189 mg/dL  Very high:       >189 mg/dl      Non HDL Cholesterol 199 (H) <130 mg/dL      Comment:      Above Desirable:  130-159 mg/dl  Borderline high:  160-189 mg/dl  High:             190-219 mg/dl  Very high:       >219 mg/dl         If you have any questions or concerns, please call the clinic at the number listed above.

## 2017-12-20 LAB
ANION GAP SERPL CALCULATED.3IONS-SCNC: 9 MMOL/L (ref 3–14)
BUN SERPL-MCNC: 15 MG/DL (ref 7–30)
CALCIUM SERPL-MCNC: 9.1 MG/DL (ref 8.5–10.1)
CHLORIDE SERPL-SCNC: 104 MMOL/L (ref 94–109)
CHOLEST SERPL-MCNC: 253 MG/DL
CO2 SERPL-SCNC: 26 MMOL/L (ref 20–32)
CREAT SERPL-MCNC: 0.64 MG/DL (ref 0.52–1.04)
GFR SERPL CREATININE-BSD FRML MDRD: 90 ML/MIN/1.7M2
GLUCOSE SERPL-MCNC: 105 MG/DL (ref 70–99)
HDLC SERPL-MCNC: 54 MG/DL
LDLC SERPL CALC-MCNC: 135 MG/DL
NONHDLC SERPL-MCNC: 199 MG/DL
POTASSIUM SERPL-SCNC: 3.6 MMOL/L (ref 3.4–5.3)
SODIUM SERPL-SCNC: 139 MMOL/L (ref 133–144)
TRIGL SERPL-MCNC: 319 MG/DL

## 2017-12-20 RX ORDER — LOSARTAN POTASSIUM 100 MG/1
TABLET ORAL
Qty: 90 TABLET | Refills: 3
Start: 2017-12-20

## 2017-12-20 NOTE — TELEPHONE ENCOUNTER
BP Readings from Last 3 Encounters:   12/19/17 124/62   10/31/17 117/67   10/13/17 138/70     Rx filled yesterday    Taylor Varela RN, BS  Clinical Nurse Triage.

## 2018-01-02 ENCOUNTER — THERAPY VISIT (OUTPATIENT)
Dept: PHYSICAL THERAPY | Facility: CLINIC | Age: 78
End: 2018-01-02
Payer: MEDICARE

## 2018-01-02 DIAGNOSIS — G89.29 CHRONIC BILATERAL LOW BACK PAIN WITHOUT SCIATICA: Primary | ICD-10-CM

## 2018-01-02 DIAGNOSIS — M54.50 CHRONIC BILATERAL LOW BACK PAIN WITHOUT SCIATICA: Primary | ICD-10-CM

## 2018-01-02 PROCEDURE — 97110 THERAPEUTIC EXERCISES: CPT | Mod: GP | Performed by: PHYSICAL THERAPIST

## 2018-01-02 PROCEDURE — G8981 BODY POS CURRENT STATUS: HCPCS | Mod: GP | Performed by: PHYSICAL THERAPIST

## 2018-01-02 PROCEDURE — G8982 BODY POS GOAL STATUS: HCPCS | Mod: GP | Performed by: PHYSICAL THERAPIST

## 2018-01-02 PROCEDURE — 97161 PT EVAL LOW COMPLEX 20 MIN: CPT | Mod: GP | Performed by: PHYSICAL THERAPIST

## 2018-01-02 NOTE — MR AVS SNAPSHOT
"              After Visit Summary   1/2/2018    Francois Ly    MRN: [id number]           Patient Information     Date Of Birth          1940        Visit Information        Provider Department      1/2/2018 3:40 PM Alverto Love, PT [facility]        Today's Diagnoses     Chronic bilateral low back pain without sciatica    -  1       Follow-ups after your visit        Your next 10 appointments already scheduled     Jan 09, 2018  1:00 PM CST   [facility] with Alverto Love PT   [facility] ([facility] [place])    [address]  [facility] [id number] [phone]              Who to contact     If you have questions or need follow up information about today's clinic visit or your schedule please contact [facility] PH[facility] THERAP[facility] directly at [phone].  Normal or non-critical lab and imaging results will be communicated to you by [employer], letter or phone within 4 business days after the clinic has received the results. If you do not hear from us within 7 days, please contact the clinic through [employer] or phone. If you have a critical or abnormal lab result, we will notify you by phone as soon as possible.  Submit refill requests through [employer] or call your pharmacy and they will forward the refill request to us. Please allow 3 business days for your refill to be completed.          Additional Information About Your Visit        MyChart Information     [employer] lets you send messages to your doctor, view your test results, renew your prescriptions, schedule appointments and more. To sign up, go to [email or web address] . Click on \"Log in\" on the left side of the screen, which will take you to the Welcome page. Then click on \"Sign up Now\" on the right side of the page.     You will be asked to enter the access code listed below, as well as some " personal information. Please follow the directions to create your username and password.     Your access code is: [email or web address]  Expires: 3/19/2018 10:23 AM     Your access code will  in 90 days. If you need help or a new code, please call your [facility] clinic or [phone].        Care EveryWhere ID     This is your Care EveryWhere ID. This could be used by other organizations to access your [facility] medical records  [id number]         Blood Pressure from Last 3 Encounters:   17 124/62   10/31/17 117/67   10/13/17 138/70    Weight from Last 3 Encounters:   17 61.2 kg (135 lb)   10/13/17 60.3 kg (133 lb)   17 60.8 kg (134 lb)              We Performed the Following     HC PT EVAL, LOW COMPLEXITY     [facility] CERT REPORT     [facility] INITIAL EVAL REPORT     THERAPEUTIC EXERCISES        Primary Care Provider Office Phone # Fax #    Luis Feliciano [phone] [address] [facility] [address]        Equal Access to Services     [facility] CenterDAY[facility] : Hadii viv ku hadrobby Schmitz, waaxda lumiguelangel, qaybta kaalbettina crespo, giovanni soto . So [facility] [phone].    ATENCIÓN: Si habla español, tiene a huertas disposición servicios gratuitos de asistencia lingüística. LlSelect [facility] [phone].    We comply with applicable federal civil rights laws and [place] laws. We do not discriminate on the basis of race, color, national origin, age, disability, sex, sexual orientation, or gender identity.            Thank you!     Thank you for choosing [facility]  for your care. Our goal is always to provide you with excellent care. Hearing back from our patients is one way we can continue to improve our services. Please take a few minutes to complete the written survey that you may receive in the mail after your visit with us. Thank you!             Your Updated Medication List - Protect others around you: Learn how to safely  use, store and throw away your medicines at www.disposemymeds.org.          This list is accurate as of: 1/2/18  5:35 PM.  Always use your most recent med list.                   Brand Name Dispense Instructions for use Diagnosis    aspirin EC 81 MG EC tablet      Take 1 tablet (81 mg) by mouth daily    ACS (acute coronary syndrome) (H)       chlorthalidone 25 MG tablet    HYGROTON    45 tablet    Take 0.5 tablets (12.5 mg) by mouth daily    Benign essential hypertension       cyanocobalamin 1000 MCG tablet    vitamin  B-12     Take 1,000 mcg by mouth daily as needed (leg cramps)        KRILL OIL PO      Take 500 mg by mouth daily        losartan 50 MG tablet    COZAAR    90 tablet    Take 1 tablet (50 mg) by mouth daily    Benign essential hypertension       MULTIVITAMINS PO      Take 1 tablet by mouth daily as needed        NONFORMULARY      Pain cream from [place] 30mg/g. Pt uses on legs for pain prn        order for DME     1 Device    Equipment being ordered: rib strap    Blunt trauma of rib, initial encounter       STATIN NOT PRESCRIBED (INTENTIONAL)      Please choose reason not prescribed, below    PAD (peripheral artery disease) (H)       VITAMIN D (CHOLECALCIFEROL) PO      Take 1 tablet by mouth daily as needed (leg cramps)

## 2018-01-02 NOTE — PROGRESS NOTES
[facility] Initial Evaluation  Subjective:  Patient is a 77 year old female presenting with rehab back hpi. The history is provided by the patient. No  was used.   Francois Ly is a 77 year old female with a lumbar condition.  Condition occurred with:  Degenerative joint disease.  Condition occurred: for unknown reasons.  This is a chronic condition  PT reports having bilateral LBP with walking that has been present for greater than 2 years after falling on ice.  MS appt on 12/29/17..    Patient reports pain:  Lumbar spine left, lumbar spine right and central lumbar spine.    Pain is described as aching and is intermittent   Associated symptoms:  Loss of strength and loss of motion/stiffness. Pain is worse during the day.  Symptoms are exacerbated by walking and standing and relieved by activity/movement.  Since onset symptoms are unchanged.  Special tests:  X-ray.  Previous treatment includes physical therapy and surgery (Lumbar surgery??? per patient).  There was mild improvement following previous treatment.  General health as reported by patient is fair.  Pertinent medical history includes:  None.  Medical allergies: no.  Other surgeries include:  Orthopedic surgery (LB fusion).  Current medications:  None as reported by the patient.  Current occupation is Retired  .                                    Objective:  Standing Alignment:        Lumbar:  Lordosis decr                           Lumbar/SI Evaluation  ROM:    AROM Lumbar:   Flexion:          100%  Ext:                    25% with ERP   Side Bend:        Left:  50% with ERP    Right:  50% wiht ERP  Rotation:           Left:     Right:   Side Glide:        Left:     Right:           Lumbar Myotomes:  normal                Lumbar Dermtomes:  normal                Neural Tension/Mobility:  Lumbar:  Normal        Lumbar Palpation:  normal          Spinal Segmental Conclusions:     Level: Hypo noted at S1, L5, L4, L3,  L2, L1 and T12                                                   General     ROS    Assessment/Plan:    Patient is a 77 year old female with lumbar complaints.    Patient has the following significant findings with corresponding treatment plan.                Diagnosis 1:  LBP  Pain -  self management, education, directional preference exercise and home program  Decreased ROM/flexibility - manual therapy and therapeutic exercise  Decreased strength - therapeutic exercise and therapeutic activities  Impaired muscle performance - neuro re-education  Decreased function - therapeutic activities    Therapy Evaluation Codes:   1) History comprised of:   Personal factors that impact the plan of care:      None.    Comorbidity factors that impact the plan of care are:      High blood pressure.     Medications impacting care: None.  2) Examination of Body Systems comprised of:   Body structures and functions that impact the plan of care:      Lumbar spine.   Activity limitations that impact the plan of care are:      Lifting and Walking.  3) Clinical presentation characteristics are:   Stable/Uncomplicated.  4) Decision-Making    Low complexity using standardized patient assessment instrument and/or measureable assessment of functional outcome.  Cumulative Therapy Evaluation is: Low complexity.    Previous and current functional limitations:  (See Goal Flow Sheet for this information)    Short term and Long term goals: (See Goal Flow Sheet for this information)     Communication ability:  Patient appears to be able to clearly communicate and understand verbal and written communication and follow directions correctly.  Treatment Explanation - The following has been discussed with the patient:   RX ordered/plan of care  Anticipated outcomes  Possible risks and side effects  This patient would benefit from PT intervention to resume normal activities.   Rehab potential is fair.    Frequency:  1 X week, once daily  Duration:  for  6 weeks  Discharge Plan:  Achieve all LTG.  Independent in home treatment program.  Reach maximal therapeutic benefit.    Please refer to the daily flowsheet for treatment today, total treatment time and time spent performing 1:1 timed codes.

## 2018-01-02 NOTE — LETTER
DEPARTMENT OF HEALTH AND HUMAN SERVICES  CENTERS FOR MEDICARE & MEDICAID SERVICES    PLAN/UPDATED PLAN OF PROGRESS FOR OUTPATIENT REHABILITATION    PATIENTS NAME:  Francois Ly   : 1940  PROVIDER NUMBER:    [id number]  HICN:  [id number]  PROVIDER NAME: [facility] [place] [facility]  MEDICAL RECORD NUMBER: [id number]   START OF CARE DATE:  18   TYPE:  PT  PRIMARY/TREATMENT DIAGNOSIS: Chronic bilateral low back pain without sciatica  VISITS FROM START OF CARE:  Rxs Used: 1     [facility] Initial Evaluation  Subjective:  Patient is a 77 year old female presenting with rehab back hpi. The history is provided by the patient. No  was used.   Francois Ly is a 77 year old female with a lumbar condition.  Condition occurred with:  Degenerative joint disease.  Condition occurred: for unknown reasons.  This is a chronic condition  PT reports having bilateral LBP with walking that has been present for greater than 2 years after falling on ice.  MS appt on 17.    Patient reports pain:  Lumbar spine left, lumbar spine right and central lumbar spine.    Pain is described as aching and is intermittent   Associated symptoms:  Loss of strength and loss of motion/stiffness. Pain is worse during the day.  Symptoms are exacerbated by walking and standing and relieved by activity/movement.  Since onset symptoms are unchanged.  Special tests:  X-ray.  Previous treatment includes physical therapy and surgery (Lumbar surgery??? per patient).  There was mild improvement following previous treatment.  General health as reported by patient is fair.  Pertinent medical history includes:  None.  Medical allergies: no.  Other surgeries include:  Orthopedic surgery (LB fusion).  Current medications:  None as reported by the patient.  Current occupation is Retired.                     Objective:  Standing Alignment:  Lumbar:  Lordosis decr       Lumbar/SI Evaluation  ROM:    AROM Lumbar:   Flexion:          100%  Ext:                    25% with ERP   Side Bend:        Left:  50% with ERP    Right:  50% wiht ERP  Rotation:           Left:     Right:   Side Glide:        Left:     Right:   Lumbar Myotomes:  normal  Lumbar Dermtomes:  normal  Neural Tension/Mobility:  Lumbar:  Normal    Lumbar Palpation:  normal  Spinal Segmental Conclusions: Level: Hypo noted at S1, L5, L4, L3, L2, L1 and T12    Assessment/Plan:    Patient is a 77 year old female with lumbar complaints.    Patient has the following significant findings with corresponding treatment plan.                Diagnosis 1:  LBP  Pain -  self management, education, directional preference exercise and home program  Decreased ROM/flexibility - manual therapy and therapeutic exercise  Decreased strength - therapeutic exercise and therapeutic activities  PATIENTS NAME:  Francois Ly   : 1940        Impaired muscle performance - neuro re-education  Decreased function - therapeutic activities    Therapy Evaluation Codes:   1) History comprised of:   Personal factors that impact the plan of care:      None.    Comorbidity factors that impact the plan of care are:      High blood pressure.     Medications impacting care: None.  2) Examination of Body Systems comprised of:   Body structures and functions that impact the plan of care:      Lumbar spine.   Activity limitations that impact the plan of care are:      Lifting and Walking.  3) Clinical presentation characteristics are:   Stable/Uncomplicated.  4) Decision-Making    Low complexity using standardized patient assessment instrument and/or measureable assessment of functional outcome.  Cumulative Therapy Evaluation is: Low complexity.    Previous and current functional limitations:  (See Goal Flow Sheet for this information)    Short term and Long term goals: (See Goal Flow Sheet for this information)   Communication ability:  Patient appears  "to be able to clearly communicate and understand verbal and written communication and follow directions correctly.  Treatment Explanation - The following has been discussed with the patient:   RX ordered/plan of care  This patient would benefit from PT intervention to resume normal activities.   Rehab potential is fair.    Frequency:  1 X week, once daily  Duration:  for 6 weeks  Discharge Plan:  Achieve all LTG.  Independent in home treatment program.  Reach maximal therapeutic benefit.    Caregiver Signature/Credentials _____________________________ Date ________       Treating Provider:  Alverto Love, PT    I have reviewed and certified the need for these services and plan of treatment while under my care.      PHYSICIAN'S SIGNATURE:   _________________________________________  Date___________   Luis Feliciano MD    Certification period:  Beginning of Cert date period: 01/02/18 to  End of Cert period date: 04/01/18   Functional Level Progress Report: Please see attached \"Goal Flow sheet for Functional level.\"    ____X____ Continue Services or       ________ DC Services                Service dates: From  SOC Date: 01/02/18 date to present                "

## 2018-01-09 ENCOUNTER — THERAPY VISIT (OUTPATIENT)
Dept: PHYSICAL THERAPY | Facility: CLINIC | Age: 78
End: 2018-01-09
Payer: MEDICARE

## 2018-01-09 DIAGNOSIS — G89.29 CHRONIC BILATERAL LOW BACK PAIN WITHOUT SCIATICA: ICD-10-CM

## 2018-01-09 DIAGNOSIS — M54.50 CHRONIC BILATERAL LOW BACK PAIN WITHOUT SCIATICA: ICD-10-CM

## 2018-01-09 PROCEDURE — 97110 THERAPEUTIC EXERCISES: CPT | Mod: GP | Performed by: PHYSICAL THERAPIST

## 2018-01-18 ENCOUNTER — TELEPHONE (OUTPATIENT)
Dept: FAMILY MEDICINE | Facility: CLINIC | Age: 78
End: 2018-01-18

## 2018-01-30 ENCOUNTER — THERAPY VISIT (OUTPATIENT)
Dept: PHYSICAL THERAPY | Facility: CLINIC | Age: 78
End: 2018-01-30
Payer: MEDICARE

## 2018-01-30 DIAGNOSIS — G89.29 CHRONIC BILATERAL LOW BACK PAIN WITHOUT SCIATICA: ICD-10-CM

## 2018-01-30 DIAGNOSIS — M54.50 CHRONIC BILATERAL LOW BACK PAIN WITHOUT SCIATICA: ICD-10-CM

## 2018-01-30 PROCEDURE — 97110 THERAPEUTIC EXERCISES: CPT | Mod: GP | Performed by: PHYSICAL THERAPIST

## 2018-01-30 NOTE — MR AVS SNAPSHOT
"              After Visit Summary   1/30/2018    Francois Ly    MRN: [id number]           Patient Information     Date Of Birth          1940        Visit Information        Provider Department      1/30/2018 11:40 AM Alverto Love, [facility]        Today's Diagnoses     Chronic bilateral low back pain without sciatica           Follow-ups after your visit        Your next 10 appointments already scheduled     Feb 20, 2018 11:40 AM CST   [facility] with Alverto Love PT   [facility] ([place])    [address]  [facility] [id number] [phone]              Who to contact     If you have questions or need follow up information about today's clinic visit or your schedule please contact [facility]ER[facility] directly at [phone].  Normal or non-critical lab and imaging results will be communicated to you by FundersClubhart, letter or phone within 4 business days after the clinic has received the results. If you do not hear from us within 7 days, please contact the clinic through FundersClubhart or phone. If you have a critical or abnormal lab result, we will notify you by phone as soon as possible.  Submit refill requests through Soundflavor or call your pharmacy and they will forward the refill request to us. Please allow 3 business days for your refill to be completed.          Additional Information About Your Visit        MyChart Information     Soundflavor lets you send messages to your doctor, view your test results, renew your prescriptions, schedule appointments and more. To sign up, go to [email or web address] . Click on \"Log in\" on the left side of the screen, which will take you to the Welcome page. Then click on \"Sign up Now\" on the right side of the page.     You will be asked to enter the access code listed below, as well as some " personal information. Please follow the directions to create your username and password.     Your access code is: [email or web address]  Expires: 3/19/2018 10:23 AM     Your access code will  in 90 days. If you need help or a new code, please call your [facility] clinic or [phone].        Care EveryWhere ID     This is your Care EveryWhere ID. This could be used by other organizations to access your [facility] medical records  [id number]         Blood Pressure from Last 3 Encounters:   17 124/62   10/31/17 117/67   10/13/17 138/70    Weight from Last 3 Encounters:   17 61.2 kg (135 lb)   10/13/17 60.3 kg (133 lb)   17 60.8 kg (134 lb)              We Performed the Following     THERAPEUTIC EXERCISES        Primary Care Provider Office Phone # Fax #    Luis Feliciano [phone] [address] [facility] [address]        Equal Access to Services     [facility]: Hadii viv ku hadasho Sohumera, waaxda luqadaha, qaybta kaalmada ademaritayashayna, giovanni soto . So [facility] [phone].    ATENCIÓN: Si habla español, tiene a huertas disposición servicios gratuitos de asistencia lingüística. Ll[facility] [phone].    We comply with applicable federal civil rights laws and [place] laws. We do not discriminate on the basis of race, color, national origin, age, disability, sex, sexual orientation, or gender identity.            Thank you!     Thank you for choosing [facility]  for your care. Our goal is always to provide you with excellent care. Hearing back from our patients is one way we can continue to improve our services. Please take a few minutes to complete the written survey that you may receive in the mail after your visit with us. Thank you!             Your Updated Medication List - Protect others around you: Learn how to safely use, store and throw away your medicines at www.disposemymeds.org.          This  list is accurate as of 1/30/18 12:10 PM.  Always use your most recent med list.                   Brand Name Dispense Instructions for use Diagnosis    aspirin EC 81 MG EC tablet      Take 1 tablet (81 mg) by mouth daily    ACS (acute coronary syndrome) (H)       chlorthalidone 25 MG tablet    HYGROTON    45 tablet    Take 0.5 tablets (12.5 mg) by mouth daily    Benign essential hypertension       cyanocobalamin 1000 MCG tablet    vitamin  B-12     Take 1,000 mcg by mouth daily as needed (leg cramps)        KRILL OIL PO      Take 500 mg by mouth daily        losartan 50 MG tablet    COZAAR    90 tablet    Take 1 tablet (50 mg) by mouth daily    Benign essential hypertension       MULTIVITAMINS PO      Take 1 tablet by mouth daily as needed        NONFORMULARY      Pain cream from [place] 30mg/g. Pt uses on legs for pain prn        order for DME     1 Device    Equipment being ordered: rib strap    Blunt trauma of rib, initial encounter       STATIN NOT PRESCRIBED (INTENTIONAL)      Please choose reason not prescribed, below    PAD (peripheral artery disease) (H)       VITAMIN D (CHOLECALCIFEROL) PO      Take 1 tablet by mouth daily as needed (leg cramps)

## 2018-02-22 ENCOUNTER — NURSE TRIAGE (OUTPATIENT)
Dept: NURSING | Facility: CLINIC | Age: 78
End: 2018-02-22

## 2018-02-23 NOTE — TELEPHONE ENCOUNTER
Sahil,  called asking if eating grapefruit while taking losartan and/or chlorathalidone is something Atsuko should be concerned about.  Apparently a friend told her it could be a problem.  Per UpToDate there are no interactions of Risk Level A or greater identified with taking either medication and eating grapefruit.  Rebeca BRAVO RN [facility]

## 2018-03-05 PROBLEM — M54.50 CHRONIC BILATERAL LOW BACK PAIN WITHOUT SCIATICA: Status: RESOLVED | Noted: 2018-01-02 | Resolved: 2018-03-05

## 2018-03-05 PROBLEM — G89.29 CHRONIC BILATERAL LOW BACK PAIN WITHOUT SCIATICA: Status: RESOLVED | Noted: 2018-01-02 | Resolved: 2018-03-05

## 2018-03-05 NOTE — PROGRESS NOTES
Discharge Note    Progress reporting period is from initial eval to Jan 30, 2018.     Francois failed to return for next follow up visit and current status is unknown.  Please see information below for last relevant information on current status.  Patient seen for Rxs Used: 3 visits.  SUBJECTIVE  Subjective changes noted by patient:  Subjective: HEP very helpful.  Functining at 75% of where she wants to be.    .  Current pain level is Current Pain level: 1/10.     Previous pain level was  Initial Pain level: 2/10.   Changes in function:  Yes (See Goal flowsheet attached for changes in current functional level)  Adverse reaction to treatment or activity: None    OBJECTIVE  Changes noted in objective findings: Objective: Lumbar ROM flex and Ext 75% with ERP,  R and L SBing 50% with ER tightness     ASSESSMENT/PLAN  Diagnosis: LBP   DIAGP:  The encounter diagnosis was Chronic bilateral low back pain without sciatica.  Updated problem list and treatment plan:   Decreased ROM/flexibility - HEP  Decreased strength - HEP  STG/LTGs have been met or progress has been made towards goals:  Yes, please see goal flowsheet for most current information  Assessment of Progress: current status is unknown.  Last current status: Assessment of progress: Pt is progressing well, Pt is progressing as expected   Self Management Plans:  HEP  I have re-evaluated this patient and find that the nature, scope, duration and intensity of the therapy is appropriate for the medical condition of the patient.  Francois continues to require the following intervention to meet STG and LTG's:  HEP.    Recommendations:  Discharge with current home program.  Patient to follow up with MD as needed.    Please refer to the daily flowsheet for treatment today, total treatment time and time spent performing 1:1 timed codes.

## 2018-03-09 ENCOUNTER — OFFICE VISIT (OUTPATIENT)
Dept: FAMILY MEDICINE | Facility: CLINIC | Age: 78
End: 2018-03-09
Payer: MEDICARE

## 2018-03-09 VITALS
OXYGEN SATURATION: 100 % | TEMPERATURE: 97.8 F | RESPIRATION RATE: 16 BRPM | WEIGHT: 136 LBS | DIASTOLIC BLOOD PRESSURE: 74 MMHG | HEART RATE: 57 BPM | BODY MASS INDEX: 25.03 KG/M2 | HEIGHT: 62 IN | SYSTOLIC BLOOD PRESSURE: 144 MMHG

## 2018-03-09 DIAGNOSIS — I73.9 PAD (PERIPHERAL ARTERY DISEASE) (H): Primary | ICD-10-CM

## 2018-03-09 DIAGNOSIS — R25.2 CRAMP OF LIMB: ICD-10-CM

## 2018-03-09 PROCEDURE — 99213 OFFICE O/P EST LOW 20 MIN: CPT | Performed by: FAMILY MEDICINE

## 2018-03-09 NOTE — MR AVS SNAPSHOT
"              After Visit Summary   3/9/2018    Francois Ly    MRN: [id number]           Patient Information     Date Of Birth          1940        Visit Information        Provider Department      3/9/2018 10:30 AM Luis Feliciano MD [facility]         Follow-ups after your visit        Who to contact     If you have questions or need follow up information about today's clinic visit or your schedule please contact [facility] directly at [phone].  Normal or non-critical lab and imaging results will be communicated to you by MyChart, letter or phone within 4 business days after the clinic has received the results. If you do not hear from us within 7 days, please contact the clinic through lingoking GmbHhart or phone. If you have a critical or abnormal lab result, we will notify you by phone as soon as possible.  Submit refill requests through SpinNote or call your pharmacy and they will forward the refill request to us. Please allow 3 business days for your refill to be completed.          Additional Information About Your Visit        lingoking [facility] Hospitalt Information     SpinNote lets you send messages to your doctor, view your test results, renew your prescriptions, schedule appointments and more. To sign up, go to [email or web address] . Click on \"Log in\" on the left side of the screen, which will take you to the Welcome page. Then click on \"Sign up Now\" on the right side of the page.     You will be asked to enter the access code listed below, as well as some personal information. Please follow the directions to create your username and password.     Your access code is: [email or web address]  Expires: 3/19/2018 10:23 AM     Your access code will  in 90 days. If you need help or a new code, please call your [facility] or [phone].        Care EveryWhere ID     This is your Care EveryWhere ID. This could be used by other organizations to access your [facility] medical " "records  [id number]        Your Vitals Were     Pulse Temperature Respirations Height Pulse Oximetry BMI (Body Mass Index)    57 97.8  F (36.6  C) (Oral) 16 5' 2\" (1.575 m) 100% 24.87 kg/m2       Blood Pressure from Last 3 Encounters:   03/09/18 144/74   12/19/17 124/62   10/31/17 117/67    Weight from Last 3 Encounters:   03/09/18 136 lb (61.7 kg)   12/19/17 135 lb (61.2 kg)   10/13/17 133 lb (60.3 kg)              Today, you had the following     No orders found for display       Primary Care Provider Office Phone # Fax #    Luis Feliciano [phone] [address] [facility] [address]        Equal Access to Services     [facility] : Hadii viv capellan Sohumera, waaxda luqadaha, qaybta kaalmada cherie, giovanni soto . So [facility] [phone].    ATENCIÓN: Si habla español, tiene a huertas disposición servicios gratuitos de asistencia lingüística. Helen al [phone].    We comply with applicable federal civil rights laws and [place] laws. We do not discriminate on the basis of race, color, national origin, age, disability, sex, sexual orientation, or gender identity.            Thank you!     Thank you for choosing [facility]  for your care. Our goal is always to provide you with excellent care. Hearing back from our patients is one way we can continue to improve our services. Please take a few minutes to complete the written survey that you may receive in the mail after your visit with us. Thank you!             Your Updated Medication List - Protect others around you: Learn how to safely use, store and throw away your medicines at www.disposemymeds.org.          This list is accurate as of 3/9/18 10:58 AM.  Always use your most recent med list.                   Brand Name Dispense Instructions for use Diagnosis    aspirin EC 81 MG EC tablet      Take 1 tablet (81 mg) by mouth daily    ACS (acute coronary syndrome) (H)       chlorthalidone " 25 MG tablet    HYGROTON    45 tablet    Take 0.5 tablets (12.5 mg) by mouth daily    Benign essential hypertension       cyanocobalamin 1000 MCG tablet    vitamin  B-12     Take 1,000 mcg by mouth daily as needed (leg cramps)        KRILL OIL PO      Take 500 mg by mouth daily        losartan 50 MG tablet    COZAAR    90 tablet    Take 1 tablet (50 mg) by mouth daily    Benign essential hypertension       MULTIVITAMINS PO      Take 1 tablet by mouth daily as needed        NONFORMULARY      Pain cream from [place] 30mg/g. Pt uses on legs for pain prn        STATIN NOT PRESCRIBED (INTENTIONAL)      Please choose reason not prescribed, below    PAD (peripheral artery disease) (H)       VITAMIN D (CHOLECALCIFEROL) PO      Take 1 tablet by mouth daily as needed (leg cramps)

## 2018-03-09 NOTE — PROGRESS NOTES
"  SUBJECTIVE:   Francois Ly is a 77 year old female who presents to clinic today for the following health issues:      Joint Pain    Onset: x2 days    Description:   Location: right leg on the lower part of the Rt leg.  Character: Dull ache    Intensity: moderate    Progression of Symptoms: the pain was sever for 20 minutes then the pain went away.    B    Accompanying Signs & Symptoms:  Other symptoms: swelling in that area that went away.  Also she has been having mild pain in the legs bilaterally that she gets up and stands up.   Feels like cramps in the feet.    History:   Previous similar pain: no       Precipitating factors:   Trauma or overuse: no     Alleviating factors:  Improved by: putting the legs down and walking on them, then the pain and cramps will go away.    Therapies Tried and outcome: as above.            Problem list and histories reviewed & adjusted, as indicated.  Additional history: as documented    Patient Active Problem List   Diagnosis     Back pain     Chest pain     Pancreatic mass     Hypertensive urgency     Benign essential hypertension     Blunt trauma of rib, initial encounter     SBO (small bowel obstruction)     Small bowel obstruction     PAD (peripheral artery disease) (H)     Bilateral low back pain without sciatica, unspecified chronicity     Past Surgical History:   Procedure Laterality Date     GYN SURGERY      hysterectomy     ORTHOPEDIC SURGERY      Slipped disc with chronic back pain       Social History   Substance Use Topics     Smoking status: Former Smoker     Smokeless tobacco: Former User      Comment: Former \"social\" smoker, quit in 1978.     Alcohol use 0.0 oz/week     0 Standard drinks or equivalent per week      Comment: occ wine     Family History   Problem Relation Age of Onset     Family History Negative Other            Reviewed and updated as needed this visit by clinical staff  Tobacco  Allergies  Meds  Med Hx  Surg Hx  Fam Hx  Soc Hx    " "  Reviewed and updated as needed this visit by Provider         ROS:      OBJECTIVE:     /74 (BP Location: Right arm, Patient Position: Chair, Cuff Size: Adult Large)  Pulse 57  Temp 97.8  F (36.6  C) (Oral)  Resp 16  Ht 5' 2\" (1.575 m)  Wt 136 lb (61.7 kg)  SpO2 100%  BMI 24.87 kg/m2  Body mass index is 24.87 kg/(m^2).  GENERAL: healthy, alert and no distress  CV: regular rate and rhythm, normal S1 S2, no S3 or S4, no murmur, click or rub, no peripheral edema and peripheral pulses  Were unable to detect in both legs.  MS: no gross musculoskeletal defects noted, no edema  MS: no local tenderness.         ASSESSMENT/PLAN:             1. PAD (peripheral artery disease) (H)  Continue on ASA and walking regularly.    2. Cramp of limb  Encourage walking, heat compresses, stretching.  Pt does not wish to start on any meds at this time.    Luis Feliciano MD  [facility]    "

## 2018-05-22 ENCOUNTER — TRANSFERRED RECORDS (OUTPATIENT)
Dept: HEALTH INFORMATION MANAGEMENT | Facility: CLINIC | Age: 78
End: 2018-05-22

## 2018-06-19 ENCOUNTER — TRANSFERRED RECORDS (OUTPATIENT)
Dept: HEALTH INFORMATION MANAGEMENT | Facility: CLINIC | Age: 78
End: 2018-06-19

## 2018-08-11 ENCOUNTER — APPOINTMENT (OUTPATIENT)
Dept: CT IMAGING | Facility: CLINIC | Age: 78
DRG: 305 | End: 2018-08-11
Attending: EMERGENCY MEDICINE
Payer: MEDICARE

## 2018-08-11 ENCOUNTER — HOSPITAL ENCOUNTER (INPATIENT)
Facility: CLINIC | Age: 78
LOS: 1 days | Discharge: HOME | DRG: 305 | End: 2018-08-12
Attending: EMERGENCY MEDICINE | Admitting: INTERNAL MEDICINE
Payer: MEDICARE

## 2018-08-11 DIAGNOSIS — K56.609 SBO (SMALL BOWEL OBSTRUCTION) (H): ICD-10-CM

## 2018-08-11 LAB
ALBUMIN SERPL-MCNC: 3.6 G/DL (ref 3.4–5)
ALBUMIN UR-MCNC: NEGATIVE MG/DL
ALP SERPL-CCNC: 69 U/L (ref 40–150)
ALT SERPL W P-5'-P-CCNC: 24 U/L (ref 0–50)
ANION GAP SERPL CALCULATED.3IONS-SCNC: 8 MMOL/L (ref 3–14)
APPEARANCE UR: CLEAR
AST SERPL W P-5'-P-CCNC: 22 U/L (ref 0–45)
BASOPHILS # BLD AUTO: 0 10E9/L (ref 0–0.2)
BASOPHILS NFR BLD AUTO: 0.5 %
BILIRUB SERPL-MCNC: 0.3 MG/DL (ref 0.2–1.3)
BILIRUB UR QL STRIP: NEGATIVE
BUN SERPL-MCNC: 14 MG/DL (ref 7–30)
CALCIUM SERPL-MCNC: 9.2 MG/DL (ref 8.5–10.1)
CHLORIDE SERPL-SCNC: 102 MMOL/L (ref 94–109)
CO2 SERPL-SCNC: 28 MMOL/L (ref 20–32)
COLOR UR AUTO: NORMAL
CREAT SERPL-MCNC: 0.69 MG/DL (ref 0.52–1.04)
DIFFERENTIAL METHOD BLD: NORMAL
EOSINOPHIL # BLD AUTO: 0.2 10E9/L (ref 0–0.7)
EOSINOPHIL NFR BLD AUTO: 2.5 %
ERYTHROCYTE [DISTWIDTH] IN BLOOD BY AUTOMATED COUNT: 12.7 % (ref 10–15)
GFR SERPL CREATININE-BSD FRML MDRD: 82 ML/MIN/1.7M2
GLUCOSE SERPL-MCNC: 128 MG/DL (ref 70–99)
GLUCOSE UR STRIP-MCNC: NEGATIVE MG/DL
HCT VFR BLD AUTO: 37.5 % (ref 35–47)
HGB BLD-MCNC: 12.7 G/DL (ref 11.7–15.7)
HGB UR QL STRIP: NEGATIVE
IMM GRANULOCYTES # BLD: 0 10E9/L (ref 0–0.4)
IMM GRANULOCYTES NFR BLD: 0.2 %
INTERPRETATION ECG - MUSE: NORMAL
KETONES UR STRIP-MCNC: NEGATIVE MG/DL
LEUKOCYTE ESTERASE UR QL STRIP: NEGATIVE
LIPASE SERPL-CCNC: 72 U/L (ref 73–393)
LYMPHOCYTES # BLD AUTO: 2.5 10E9/L (ref 0.8–5.3)
LYMPHOCYTES NFR BLD AUTO: 29.8 %
MCH RBC QN AUTO: 30.7 PG (ref 26.5–33)
MCHC RBC AUTO-ENTMCNC: 33.9 G/DL (ref 31.5–36.5)
MCV RBC AUTO: 91 FL (ref 78–100)
MONOCYTES # BLD AUTO: 0.3 10E9/L (ref 0–1.3)
MONOCYTES NFR BLD AUTO: 4.1 %
NEUTROPHILS # BLD AUTO: 5.3 10E9/L (ref 1.6–8.3)
NEUTROPHILS NFR BLD AUTO: 62.9 %
NITRATE UR QL: NEGATIVE
NRBC # BLD AUTO: 0 10*3/UL
NRBC BLD AUTO-RTO: 0 /100
PH UR STRIP: 7 PH (ref 5–7)
PLATELET # BLD AUTO: 262 10E9/L (ref 150–450)
POTASSIUM SERPL-SCNC: 3.8 MMOL/L (ref 3.4–5.3)
PROT SERPL-MCNC: 8 G/DL (ref 6.8–8.8)
RBC # BLD AUTO: 4.14 10E12/L (ref 3.8–5.2)
RBC #/AREA URNS AUTO: <1 /HPF (ref 0–2)
SODIUM SERPL-SCNC: 138 MMOL/L (ref 133–144)
SOURCE: NORMAL
SP GR UR STRIP: 1.01 (ref 1–1.03)
UROBILINOGEN UR STRIP-MCNC: NORMAL MG/DL (ref 0–2)
WBC # BLD AUTO: 8.4 10E9/L (ref 4–11)
WBC #/AREA URNS AUTO: 0 /HPF (ref 0–5)

## 2018-08-11 PROCEDURE — 80053 COMPREHEN METABOLIC PANEL: CPT | Performed by: EMERGENCY MEDICINE

## 2018-08-11 PROCEDURE — 25000125 ZZHC RX 250: Performed by: EMERGENCY MEDICINE

## 2018-08-11 PROCEDURE — 96376 TX/PRO/DX INJ SAME DRUG ADON: CPT

## 2018-08-11 PROCEDURE — 99222 1ST HOSP IP/OBS MODERATE 55: CPT | Mod: AI | Performed by: INTERNAL MEDICINE

## 2018-08-11 PROCEDURE — 25000125 ZZHC RX 250: Performed by: INTERNAL MEDICINE

## 2018-08-11 PROCEDURE — 96374 THER/PROPH/DIAG INJ IV PUSH: CPT

## 2018-08-11 PROCEDURE — 25000128 H RX IP 250 OP 636: Performed by: EMERGENCY MEDICINE

## 2018-08-11 PROCEDURE — 83690 ASSAY OF LIPASE: CPT | Performed by: EMERGENCY MEDICINE

## 2018-08-11 PROCEDURE — 93005 ELECTROCARDIOGRAM TRACING: CPT

## 2018-08-11 PROCEDURE — A9270 NON-COVERED ITEM OR SERVICE: HCPCS | Mod: GY | Performed by: EMERGENCY MEDICINE

## 2018-08-11 PROCEDURE — 85025 COMPLETE CBC W/AUTO DIFF WBC: CPT | Performed by: EMERGENCY MEDICINE

## 2018-08-11 PROCEDURE — 99285 EMERGENCY DEPT VISIT HI MDM: CPT | Mod: 25

## 2018-08-11 PROCEDURE — 74177 CT ABD & PELVIS W/CONTRAST: CPT

## 2018-08-11 PROCEDURE — 12000007 ZZH R&B INTERMEDIATE

## 2018-08-11 PROCEDURE — 25000132 ZZH RX MED GY IP 250 OP 250 PS 637: Mod: GY | Performed by: EMERGENCY MEDICINE

## 2018-08-11 PROCEDURE — 81001 URINALYSIS AUTO W/SCOPE: CPT | Performed by: EMERGENCY MEDICINE

## 2018-08-11 PROCEDURE — 96361 HYDRATE IV INFUSION ADD-ON: CPT

## 2018-08-11 PROCEDURE — 25800025 ZZH RX 258: Performed by: INTERNAL MEDICINE

## 2018-08-11 RX ORDER — HYDROMORPHONE HYDROCHLORIDE 1 MG/ML
0.2 INJECTION, SOLUTION INTRAMUSCULAR; INTRAVENOUS; SUBCUTANEOUS
Status: DISCONTINUED | OUTPATIENT
Start: 2018-08-11 | End: 2018-08-12 | Stop reason: HOSPADM

## 2018-08-11 RX ORDER — BISACODYL 10 MG
10 SUPPOSITORY, RECTAL RECTAL DAILY PRN
Status: DISCONTINUED | OUTPATIENT
Start: 2018-08-11 | End: 2018-08-12 | Stop reason: HOSPADM

## 2018-08-11 RX ORDER — MINOXIDIL 2 %
SOLUTION, NON-ORAL TOPICAL
COMMUNITY
Start: 2010-12-15 | End: 2020-09-15

## 2018-08-11 RX ORDER — ACETAMINOPHEN 325 MG/1
650 TABLET ORAL ONCE
Status: COMPLETED | OUTPATIENT
Start: 2018-08-11 | End: 2018-08-11

## 2018-08-11 RX ORDER — HYDRALAZINE HYDROCHLORIDE 20 MG/ML
10 INJECTION INTRAMUSCULAR; INTRAVENOUS EVERY 4 HOURS PRN
Status: DISCONTINUED | OUTPATIENT
Start: 2018-08-11 | End: 2018-08-12 | Stop reason: HOSPADM

## 2018-08-11 RX ORDER — ONDANSETRON 2 MG/ML
4 INJECTION INTRAMUSCULAR; INTRAVENOUS EVERY 6 HOURS PRN
Status: DISCONTINUED | OUTPATIENT
Start: 2018-08-11 | End: 2018-08-12 | Stop reason: HOSPADM

## 2018-08-11 RX ORDER — PROCHLORPERAZINE 25 MG
12.5 SUPPOSITORY, RECTAL RECTAL EVERY 12 HOURS PRN
Status: DISCONTINUED | OUTPATIENT
Start: 2018-08-11 | End: 2018-08-12 | Stop reason: HOSPADM

## 2018-08-11 RX ORDER — ONDANSETRON 4 MG/1
4 TABLET, ORALLY DISINTEGRATING ORAL EVERY 6 HOURS PRN
Status: DISCONTINUED | OUTPATIENT
Start: 2018-08-11 | End: 2018-08-12 | Stop reason: HOSPADM

## 2018-08-11 RX ORDER — SIMVASTATIN 40 MG
40 TABLET ORAL
COMMUNITY
Start: 2017-06-19 | End: 2018-08-11

## 2018-08-11 RX ORDER — NALOXONE HYDROCHLORIDE 0.4 MG/ML
.1-.4 INJECTION, SOLUTION INTRAMUSCULAR; INTRAVENOUS; SUBCUTANEOUS
Status: DISCONTINUED | OUTPATIENT
Start: 2018-08-11 | End: 2018-08-12 | Stop reason: HOSPADM

## 2018-08-11 RX ORDER — DEXTROSE MONOHYDRATE, SODIUM CHLORIDE, AND POTASSIUM CHLORIDE 50; 1.49; 4.5 G/1000ML; G/1000ML; G/1000ML
INJECTION, SOLUTION INTRAVENOUS CONTINUOUS
Status: DISCONTINUED | OUTPATIENT
Start: 2018-08-11 | End: 2018-08-11 | Stop reason: ALTCHOICE

## 2018-08-11 RX ORDER — LABETALOL HYDROCHLORIDE 5 MG/ML
10 INJECTION, SOLUTION INTRAVENOUS
Status: DISCONTINUED | OUTPATIENT
Start: 2018-08-11 | End: 2018-08-12 | Stop reason: HOSPADM

## 2018-08-11 RX ORDER — CALCIUM CARBONATE 500 MG/1
500 TABLET, CHEWABLE ORAL DAILY PRN
Status: DISCONTINUED | OUTPATIENT
Start: 2018-08-11 | End: 2018-08-12 | Stop reason: HOSPADM

## 2018-08-11 RX ORDER — METOPROLOL TARTRATE 1 MG/ML
2.5 INJECTION, SOLUTION INTRAVENOUS EVERY 6 HOURS
Status: DISCONTINUED | OUTPATIENT
Start: 2018-08-11 | End: 2018-08-12 | Stop reason: HOSPADM

## 2018-08-11 RX ORDER — MORPHINE SULFATE 4 MG/ML
4 INJECTION, SOLUTION INTRAMUSCULAR; INTRAVENOUS EVERY 30 MIN PRN
Status: DISCONTINUED | OUTPATIENT
Start: 2018-08-11 | End: 2018-08-11

## 2018-08-11 RX ORDER — LOSARTAN POTASSIUM 50 MG/1
50 TABLET ORAL ONCE
Status: COMPLETED | OUTPATIENT
Start: 2018-08-11 | End: 2018-08-11

## 2018-08-11 RX ORDER — PROCHLORPERAZINE MALEATE 5 MG
5 TABLET ORAL EVERY 6 HOURS PRN
Status: DISCONTINUED | OUTPATIENT
Start: 2018-08-11 | End: 2018-08-12 | Stop reason: HOSPADM

## 2018-08-11 RX ORDER — IOPAMIDOL 755 MG/ML
65 INJECTION, SOLUTION INTRAVASCULAR ONCE
Status: COMPLETED | OUTPATIENT
Start: 2018-08-11 | End: 2018-08-11

## 2018-08-11 RX ADMIN — SODIUM CHLORIDE, PRESERVATIVE FREE 60 ML: 5 INJECTION INTRAVENOUS at 07:12

## 2018-08-11 RX ADMIN — IOPAMIDOL 65 ML: 755 INJECTION, SOLUTION INTRAVENOUS at 07:12

## 2018-08-11 RX ADMIN — FAMOTIDINE 20 MG: 10 INJECTION INTRAVENOUS at 20:51

## 2018-08-11 RX ADMIN — LOSARTAN POTASSIUM 50 MG: 50 TABLET ORAL at 08:14

## 2018-08-11 RX ADMIN — ACETAMINOPHEN 650 MG: 325 TABLET ORAL at 09:19

## 2018-08-11 RX ADMIN — DEXTROSE AND SODIUM CHLORIDE: 5; 450 INJECTION, SOLUTION INTRAVENOUS at 10:50

## 2018-08-11 RX ADMIN — MORPHINE SULFATE 4 MG: 4 INJECTION, SOLUTION INTRAMUSCULAR; INTRAVENOUS at 08:10

## 2018-08-11 RX ADMIN — SODIUM CHLORIDE 1000 ML: 9 INJECTION, SOLUTION INTRAVENOUS at 08:08

## 2018-08-11 RX ADMIN — SODIUM CHLORIDE 1000 ML: 9 INJECTION, SOLUTION INTRAVENOUS at 06:43

## 2018-08-11 RX ADMIN — MORPHINE SULFATE 4 MG: 4 INJECTION, SOLUTION INTRAMUSCULAR; INTRAVENOUS at 06:43

## 2018-08-11 RX ADMIN — DEXTROSE AND SODIUM CHLORIDE: 5; 450 INJECTION, SOLUTION INTRAVENOUS at 20:35

## 2018-08-11 RX ADMIN — METOPROLOL TARTRATE 2.5 MG: 5 INJECTION, SOLUTION INTRAVENOUS at 20:46

## 2018-08-11 RX ADMIN — METOPROLOL TARTRATE 2.5 MG: 5 INJECTION, SOLUTION INTRAVENOUS at 13:46

## 2018-08-11 RX ADMIN — CHLORTHALIDONE 12.5 MG: 25 TABLET ORAL at 08:14

## 2018-08-11 ASSESSMENT — ACTIVITIES OF DAILY LIVING (ADL)
ADLS_ACUITY_SCORE: 9

## 2018-08-11 ASSESSMENT — ENCOUNTER SYMPTOMS
COUGH: 0
VOMITING: 0
FEVER: 0
DIFFICULTY URINATING: 0
ABDOMINAL PAIN: 1

## 2018-08-11 NOTE — IP AVS SNAPSHOT
MRN:[id number]                      After Visit Summary   8/11/2018    Francois Ly    MRN: [id number]           Thank you!     Thank you for choosing [facility] for your care. Our goal is always to provide you with excellent care. Hearing back from our patients is one way we can continue to improve our services. Please take a few minutes to complete the written survey that you may receive in the mail after you visit with us. Thank you!        Patient Information     Date Of Birth          1940        Designated Caregiver       Most Recent Value    Caregiver    Will someone help with your care after discharge? yes    Name of designated caregiver Sahil    Phone number of caregiver [phone]    Caregiver address [address]      About your hospital stay     You were admitted on:  August 11, 2018 You last received care in the:  [address] Special[facility] Unit    You were discharged on:  August 12, 2018        Reason for your hospital stay       You were hospitalized for a recurrent small bowel obstruction.                  Who to Call     For medical emergencies, please call 911.  For non-urgent questions about your medical care, please call your primary care provider or clinic, [phone]          Attending Provider     Provider Specialty    Theodore Carrillo MD Emergency Medicine    Radha Cole MD Emergency Medicine    Ovidio Lopez MD Internal Medicine       Primary Care Provider Office Phone # Fax #    Luis Feliciano [phone]      After Care Instructions     Activity       Your activity upon discharge: activity as tolerated            Diet       Follow this diet upon discharge: Orders Placed This Encounter      Advance Diet as Tolerated: Regular Diet Adult                  Follow-up Appointments     Follow-up and recommended labs and tests        Consider scheduling an outpatient appointment with surgery (consult placed).                 "  Additional Services     General Surg Adult Referral       Recurrent SBO                  Pending Results     No orders found for last 3 day(s).            Statement of Approval     Ordered          18 2219  I have reviewed and agree with all the recommendations and orders detailed in this document.  EFFECTIVE NOW     Approved and electronically signed by:  Ovidio Lopez MD             Admission Information     Date & Time Provider Department Dept. Phone    2018 Ovidio Lopez MD [place] [address] Ortho Specialty Unit [phone]      Your Vitals Were     Blood Pressure Temperature Respirations Height Weight Pulse Oximetry    166/67 (BP Location: Left arm) 97.5  F (36.4  C) (Oral) 16 1.575 m (5' 2\") 59 kg (130 lb) 98%    BMI (Body Mass Index)                   23.78 kg/m2           DataMentorsharTourRadar Information     [email or web address] lets you send messages to your doctor, view your test results, renew your prescriptions, schedule appointments and more. To sign up, go to [email or web address] . Click on \"Log in\" on the left side of the screen, which will take you to the Welcome page. Then click on \"Sign up Now\" on the right side of the page.     You will be asked to enter the access code listed below, as well as some personal information. Please follow the directions to create your username and password.     Your access code is: [email or web address]  Expires: 11/10/2018  1:54 PM     Your access code will  in 90 days. If you need help or a new code, please call your [facility] clinic or [phone].        Care EveryWhere ID     This is your Care EveryWhere ID. This could be used by other organizations to access your [facility] medical records  [id number]        Equal Access to Services     [facility]: April Schmitz, danielle quick, giovanni gomez. So [facility] [phone].    ATENCIÓN: Si habla español, tiene a huertas disposición servicios " shelley de asistencia lingüística. Helen watson [phone].    We comply with applicable federal civil rights laws and [place] laws. We do not discriminate on the basis of race, color, national origin, age, disability, sex, sexual orientation, or gender identity.               Review of your medicines      CONTINUE these medicines which have NOT CHANGED        Dose / Directions    aspirin 81 MG EC tablet   Used for:  ACS (acute coronary syndrome) (H)        Dose:  81 mg   Take 1 tablet (81 mg) by mouth daily   Refills:  0       chlorthalidone 25 MG tablet   Commonly known as:  HYGROTON   Used for:  Benign essential hypertension        Dose:  12.5 mg   Take 0.5 tablets (12.5 mg) by mouth daily   Quantity:  45 tablet   Refills:  3       [facility]S FISH OIL 1200 MG Caps        Take 1 capsule by mouth daily   Refills:  0       KRILL OIL PO        Dose:  500 mg   Take 500 mg by mouth daily   Refills:  0       losartan 50 MG tablet   Commonly known as:  COZAAR   Used for:  Benign essential hypertension        Dose:  50 mg   Take 1 tablet (50 mg) by mouth daily   Quantity:  90 tablet   Refills:  3       NONFORMULARY        Pain cream from [place] 30mg/g. Pt uses on legs for pain prn   Refills:  0       STATIN NOT PRESCRIBED (INTENTIONAL)   Used for:  PAD (peripheral artery disease) (H)        Please choose reason not prescribed, below   Refills:  0       vitamin D3 1000 units Caps        Dose:  1000 Units   Take 1,000 Units by mouth daily   Refills:  0                Protect others around you: Learn how to safely use, store and throw away your medicines at www.disposemymeds.org.             Medication List: This is a list of all your medications and when to take them. Check marks below indicate your daily home schedule. Keep this list as a reference.      Medications           Morning Afternoon Evening Bedtime As Needed    aspirin 81 MG EC tablet   Take 1 tablet (81 mg) by mouth daily                                chlorthalidone  25 MG tablet   Commonly known as:  HYGROTON   Take 0.5 tablets (12.5 mg) by mouth daily   Last time this was given:  12.5 mg on 8/11/2018  8:14 AM                                [employer]S FISH OIL 1200 MG Caps   Take 1 capsule by mouth daily                                KRILL OIL PO   Take 500 mg by mouth daily                                losartan 50 MG tablet   Commonly known as:  COZAAR   Take 1 tablet (50 mg) by mouth daily   Last time this was given:  50 mg on 8/11/2018  8:14 AM                                NONFORMULARY   Pain cream from [place] 30mg/g. Pt uses on legs for pain prn                                STATIN NOT PRESCRIBED (INTENTIONAL)   Please choose reason not prescribed, below                                vitamin D3 1000 units Caps   Take 1,000 Units by mouth daily

## 2018-08-11 NOTE — IP AVS SNAPSHOT
[address] Specialty Unit    [address] [place] [address]    Phone:  [phone]                                       After Visit Summary   8/11/2018    Francois Ly    MRN: [id number]           After Visit Summary Signature Page     I have received my discharge instructions, and my questions have been answered. I have discussed any challenges I see with this plan with the nurse or doctor.    ..........................................................................................................................................  Patient/Patient Representative Signature      ..........................................................................................................................................  Patient Representative Print Name and Relationship to Patient    ..................................................               ................................................  Date                                            Time    ..........................................................................................................................................  Reviewed by Signature/Title    ...................................................              ..............................................  Date                                                            Time

## 2018-08-11 NOTE — PLAN OF CARE
Problem: Patient Care Overview  Goal: Plan of Care/Patient Progress Review  Outcome: No Change  Pt is A/O. VSS except BP is elevated, decreased with scheduled Metoprolol. C/o abdominal pain, cramping and nausea. Emesis x3. Denies intervention. Ambulation encouraged, up with SBA and . NPO except ice chips. Vdg adequately. IV infusing. -flatus, +bowel sounds, -BM. Plan for conservative management of SBO. Sahil, attentive at bedside.

## 2018-08-11 NOTE — ED NOTES
[facility]  ED Nurse Handoff Report    ED Chief complaint: Abdominal Pain (started at 11pm)      ED Diagnosis:   Final diagnoses:   SBO (small bowel obstruction)       Code Status: Full Code    Allergies: No Known Allergies    Activity level - Baseline/Home:  Independent    Activity Level - Current:   Independent     Needed?: No    Isolation: No  Infection: Not Applicable  Bariatric?: No    Vital Signs:   Vitals:    08/11/18 0726 08/11/18 0727 08/11/18 0730 08/11/18 0800   BP: 189/88  195/88 182/84   Resp:       Temp:       TempSrc:       SpO2:  98% 98%    Weight:       Height:           Cardiac Rhythm: ,        Pain level: 0-10 Pain Scale: 5    Is this patient confused?: No   [place] - Suicide Severity Rating Scale Completed?  Yes  If yes, what color did the patient score?  White    Patient Report: Initial Complaint: Pt C/O diffused abdominal pain starting at 1100 yesterday  Focused Assessment: Upper abdominal pain to palpation. Denies N/V  Tests Performed: CT, labs  Abnormal Results: Small bowel obstruction  Treatments provided: Morphine for pain and IV fluids    Family Comments:  at the bedside    OBS brochure/video discussed/provided to patient: N/A    ED Medications:   Medications   morphine (PF) injection 4 mg (4 mg Intravenous Given 8/11/18 0810)   0.9% sodium chloride BOLUS (1,000 mLs Intravenous New Bag 8/11/18 0808)   iohexol (OMNIPAQUE) solution 25 mL (25 mLs Oral Given 8/11/18 0701)   0.9% sodium chloride BOLUS (1,000 mLs Intravenous New Bag 8/11/18 0643)   iopamidol (ISOVUE-370) solution 65 mL (65 mLs Intravenous Given 8/11/18 0712)   Saline flush (60 mLs Intravenous Given 8/11/18 0712)   losartan (COZAAR) tablet 50 mg (50 mg Oral Given 8/11/18 0814)   chlorthalidone (HYGROTON) half-tab 12.5 mg (12.5 mg Oral Given 8/11/18 0814)       Drips infusing?:  No    For the majority of the shift this patient was Green.   Interventions performed were none.    Severe Sepsis OR  Septic Shock Diagnosis Present: No      ED NURSE PHONE NUMBER: [phone]

## 2018-08-11 NOTE — ED PROVIDER NOTES
"  History     Chief Complaint:  Abdominal Pain      HPI   Francois Ly is a 78 year old female, with a history of hypertension, gallstones, and small bowel obstruction, who presents with abdominal pain. Patient states she began having pain under her left breast at 2300 last night. She noted the pain was intermittent and radiated to her entire abdomin after awhile. She denies any fever, cough, vomiting, or changes in urination. Her last bowel movement was yesterday morning. She states after her failed hysterectomy in 1994 she experienced a bowel movements and reports the pain is similar to what she is experiencing now. Patient reports she did not take her blood pressure medications this morning.       Allergies:  No Known Drug Allergies    Medications:    Aspirin  Hygroton  Krill  Cozaar    Past Medical History:    Benign essential hypertension   Bilateral low back pain without sciatica  Blunt trauma of rib, initial encounter  Gallstones  PAD  Pancreatic mass  Slipped intervertebral disc  Hypertensive urgency   SBO    Past Surgical History:    GYN surgery  Orthopedic surgery     Family History:   The patient denies any relevant family medical history.    Social History:  The patient was accompanied to the ED by .  Smoking Status: Former smoker  Smokeless Tobacco: Former user  Alcohol Use: No  Marital Status:   [2]      Review of Systems   Constitutional: Negative for fever.   Respiratory: Negative for cough.    Gastrointestinal: Positive for abdominal pain. Negative for vomiting.   Genitourinary: Negative for difficulty urinating.   All other systems reviewed and are negative.    Physical Exam   Vitals:  Patient Vitals for the past 24 hrs:   BP Temp Temp src Heart Rate Resp SpO2 Height Weight   08/11/18 0616 (!) 216/90 98.2  F (36.8  C) Oral 78 18 98 % 1.575 m (5' 2\") 59 kg (130 lb)         Physical Exam  Nursing note and vitals reviewed.  Constitutional:  Appears well-developed and well-nourished. "   HENT:   Head:    Atraumatic.   Mouth/Throat:   Oropharynx is clear and moist. No oropharyngeal exudate.   Eyes:    Pupils are equal, round, and reactive to light.   Neck:    Normal range of motion. Neck supple.      No tracheal deviation present. No thyromegaly present.   Cardiovascular:  Normal rate, regular rhythm, no murmur   Pulmonary/Chest: Breath sounds are clear and equal without wheezes or crackles.  Abdominal:   Soft. Bowel sounds are hypoactive. Exhibits no distension and      no mass. Tenderness across the upper abdomen with guarding but no    Rebound.   Musculoskeletal:  Exhibits no edema.   Lymphadenopathy:  No cervical adenopathy.   Neurological:   Alert and oriented to person, place, and time.   Skin:    Skin is warm and dry. No rash noted. No pallor.     Emergency Department Course     ECG:  ECG taken at 0628, ECG read at 0633  Sinus rhythm with premature supraventricular complexes  Septal infarct, age undetermined   Abnormal ECG  Rate 67 bpm. WY interval 206. QRS duration 100. QT/QTc 432/456. P-R-T axes 30, -21, 96.      Imaging:  Radiology findings were communicated with the patient who voiced understanding of the findings.  CT abdomen pelvis w contrast  1. Mildly dilated loops of small bowel with fluid and fecal material,  suggestive of partial or early bowel obstruction.  2. The remainder of the examination is otherwise unchanged, with  stable cholelithiasis and diverticulosis, along with a stable small  left adrenal nodule and hepatic cysts.  Reading per radiology.    Laboratory:  Laboratory findings were communicated with the patient who voiced understanding of the findings.  CBC: AWNL (WBC 8.4, HGB 12.7, )  CMP: Glucose 128 (H) O/W WNL (Creatinine 0.69)  Lipase: 72 (L)     Interventions:  0643 Normal Saline 1000 mL IV  0643 Morphine 4 mg IV  0701 Omnipaque 25 mL Oral  Cozaar 50 mg PO  Chlorthalidone 12.5 mg PO    Emergency Department Course:  Nursing notes and vitals reviewed.  I  performed an exam of the patient as documented above.   IV was inserted and blood was drawn for laboratory testing, results above.  The patient was sent for a CT abdomen pelvis w contrast while in the emergency department, results above.     0755 check in with the patient.     0805 phone call with hospitalist.     I personally reviewed the laboratory results with the patient and answered all related questions prior to admission.    I discussed the treatment plan with the patient. They expressed understanding of this plan and consented to admission. I discussed the patient with Dr. Lopez, who will admit the patient to a monitored bed for further evaluation and treatment.    Impression & Plan      Medical Decision Making:  Francois Ly is a 78 year old female. I found the patient to have a small bowel obstruction as the cause for her abdominal pain. There was no sign of infection or sepsis. She was admitted to the medical floor in the care of the hospitalist Dr. Lopez. She was given her blood pressure medications here since her blood pressure was elevated and she did not take her blood pressure medications this morning. There was no sign of cardiac problems or stroke.       Diagnosis:    ICD-10-CM    1. SBO (small bowel obstruction) K56.609 UA with Microscopic       Disposition:   Admission    CMS Diagnoses: None     Scribe Disclosure:  Angela VERGARA, am serving as a scribe at 6:25 AM on 8/11/2018 to document services personally performed by Radha Cole MD, based on my observations and the provider's statements to me.   EMERGENCY DEPARTMENT       Radha Cole MD  08/11/18 4935

## 2018-08-11 NOTE — H&P
"[facility]    History and Physical  Hospitalist       Date of Admission:  8/11/2018    Assessment & Plan   Francois Ly is a 78 year old female with PMH of HTN, gallstones, SBO, low back pain, who presents with small bowel obstruction.    Small bowel obstruction: Patient presents with one day of abdominal pain, no nausea or vomiting. CT shows \"mildly dilated loops of small bowel.. Suggestive of partial or early bowel obstruction.\" Patient is hypertensive but does not have any electrolyte abnormalities. Abdomen nontender. Patient reports this is her third or fourth bowel obstruction, last one was in 2017. Her SBO are likely related to adhesions from hysterectomy in 1994--she has never had lysis of adhesions before.  - NPO, ice chips  - No NGT needed at this time  - Awaiting return bowel function  - D5 1/2NS @ 100ml/hr  - Zofran and compazine PRN  - Consider surgical consult as inpatient if patient worsens, otherwise consider outpatient consult (may benefit from ESTRELLA)    HTN: BP elevated 182/84 in the ED. Patient did not take her usual antihypertensives this morning.  - Hold antihypertensives, ASA  - IV metoprolol 2.5mg q6h, hold for SBP < 120  - Hydralazine and labetalol for SBP > 180    DVT Prophylaxis: Pneumatic Compression Devices  Code Status: Full Code    Disposition: Expected discharge ~2 days pending improvement in bowel function    Ovidio Lopez MD    Primary Care Physician   Luis Feliciano    Chief Complaint   Abdominal pain    History is obtained from the patient    History of Present Illness   Francois Ly is a 78 year old female who presents with abdominal pain. She reports onset of abdominal pain last night at 11PM, under her left breast. This pain then migrated to her left abdomen, then epigastric abdomen, now lower abdomen. She feels this is similar to her previous episodes of SBO which she's had 3 times prior. She denies any nausea or vomiting. Last stool was yesterday morning. " She last ate for dinner. She slept poorly. She denies fevers, chills, nausea, vomiting, chest pain, SOB, headaches. Not passing gas currently.    Past Medical History    I have reviewed this patient's medical history and updated it with pertinent information if needed.   Past Medical History:   Diagnosis Date     Benign essential hypertension 10/12/2016     Bilateral low back pain without sciatica, unspecified chronicity 12/19/2017     Blunt trauma of rib, initial encounter 11/25/2016     Gallstones      Hypertension      PAD (peripheral artery disease) (H) 6/19/2017     Pancreatic mass 8/17/2016     Slipped intervertebral disc        # Pain Assessment:  Current Pain Score 8/11/2018   Patient currently in pain? -   Pain score (0-10) 3   Pain location -   Pain descriptors -   - Francois is experiencing pain due to small bowel obstruction. Pain management was discussed and the plan was created in a collaborative fashion.  Francois's response to the current recommendations: compliant  - Opioid regimen: dilaudid IV  - Response to opioid medications: Reduction of symptoms   - Bowel regimen: not needed          Past Surgical History   I have reviewed this patient's surgical history and updated it with pertinent information if needed.  Past Surgical History:   Procedure Laterality Date     GYN SURGERY      hysterectomy     ORTHOPEDIC SURGERY      Slipped disc with chronic back pain       Prior to Admission Medications   Prior to Admission Medications   Prescriptions Last Dose Informant Patient Reported? Taking?   Cholecalciferol (VITAMIN D3) 1000 units CAPS 8/10/2018 at Unknown time Self Yes Yes   Sig: Take 1,000 Units by mouth daily    KRILL OIL PO 8/10/2018 at Unknown time Self Yes Yes   Sig: Take 500 mg by mouth daily    NONFORMULARY 8/10/2018 at prn Self Yes Yes   Sig: Pain cream from [place] 30mg/g. Pt uses on legs for pain prn   Omega-3 Fatty Acids ([employer] FISH OIL) 1200 MG CAPS 8/10/2018 at Unknown time Self Yes Yes   Sig:  Take 1 capsule by mouth daily   STATIN NOT PRESCRIBED, INTENTIONAL,  Self No No   Sig: Please choose reason not prescribed, below   aspirin EC 81 MG tablet 8/10/2018 at Unknown time Self No Yes   Sig: Take 1 tablet (81 mg) by mouth daily   chlorthalidone (HYGROTON) 25 MG tablet 8/10/2018 at Unknown time Self No Yes   Sig: Take 0.5 tablets (12.5 mg) by mouth daily   losartan (COZAAR) 50 MG tablet 8/10/2018 at Unknown time Self No Yes   Sig: Take 1 tablet (50 mg) by mouth daily      Facility-Administered Medications: None     Allergies   No Known Allergies    Social History   I have reviewed this patient's social history and updated it with pertinent information if needed. Francois Ly  reports that she has quit smoking. She has quit using smokeless tobacco. She reports that she drinks alcohol. She reports that she does not use illicit drugs. Retired.    Family History   I have reviewed this patient's family history and updated it with pertinent information if needed.   Family History   Problem Relation Age of Onset     Family History Negative Other    Multiple family members  from TB, except for her mother. Denies fam hx of cancer, heart disease. Patient denies hx of TB.    Review of Systems   The 10 point Review of Systems is negative other than noted in the HPI or here.     Physical Exam   Temp: 98.2  F (36.8  C) Temp src: Oral BP: (!) 197/94   Heart Rate: 78 Resp: 18 SpO2: 97 % O2 Device: None (Room air)    Vital Signs with Ranges  Temp:  [98.2  F (36.8  C)] 98.2  F (36.8  C)  Heart Rate:  [78] 78  Resp:  [18] 18  BP: (182-216)/() 197/94  SpO2:  [97 %-98 %] 97 %  130 lbs 0 oz    Constitutional: Female in NAD  Eyes: PERRL, nonicteric, normal ocular movements  HEENT: Normocephalic, atraumatic, oral mucosa dry  Respiratory: CTAB, no wheezing or crackles  Cardiovascular: RRR with premature beats, normal S1/2, no m/r/g  GI: No organomegaly, noted bowel sounds, mildly tender throughout abdomen,  nondistended  Vascular: Palpable peripheral pulses in upper and lower extremities, no lower extremity pitting edema  Skin: No rashes or scars  Musculoskeletal: Normal strength in UE and LE, moves all extremities  Neurologic: A&Ox3  Psychiatric: Appropriate affect and mood    Data   Data reviewed today:  I personally reviewed CT, EKG.    Recent Labs  Lab 08/11/18  0628   WBC 8.4   HGB 12.7   MCV 91         POTASSIUM 3.8   CHLORIDE 102   CO2 28   BUN 14   CR 0.69   ANIONGAP 8   ARIN 9.2   *   ALBUMIN 3.6   PROTTOTAL 8.0   BILITOTAL 0.3   ALKPHOS 69   ALT 24   AST 22   LIPASE 72*       Imaging:  Recent Results (from the past 24 hour(s))   CT Abdomen Pelvis w Contrast    Narrative    CT ABDOMEN PELVIS W CONTRAST 8/11/2018 7:24 AM    HISTORY: Abdominal pain.    TECHNIQUE: CT of the abdomen and pelvis is performed with 65 mL  Isovue-370 intravenously. Radiation dose for this scan was reduced  using automated exposure control, adjustment of the mA and/or kV  according to patient size, or iterative reconstruction technique.    COMPARISON: 4/12/17.    FINDINGS:    Liver: 2 liver cysts in the left and right lobe of the liver as  before.  Gallbladder: Cholelithiasis.  Pancreas:Normal.  Spleen:Normal.  Adrenals: Stable small left adrenal nodule.  Ascites:None.    Kidneys: Tiny bilateral renal cysts as before. No hydronephrosis.  Bladder:Normal.  Pelvic free fluid:None.    Bowels: Colonic diverticulosis without evidence of diverticulitis.  There are multiple mildly dilated loops of small bowel, with fluid and  fecal material.  Appendix:Normal.    Abdominal or pelvic lymphadenopathy:None.    Miscellaneous findings:None.      Impression    IMPRESSION:    1. Mildly dilated loops of small bowel with fluid and fecal material,  suggestive of partial or early bowel obstruction.  2. The remainder of the examination is otherwise unchanged, with  stable cholelithiasis and diverticulosis, along with a stable small  left  adrenal nodule and hepatic cysts.    ROXANN HUNTER MD

## 2018-08-11 NOTE — PHARMACY-ADMISSION MEDICATION HISTORY
Admission medication history interview status for the 8/11/2018  admission is complete. See EPIC admission navigator for prior to admission medications     Medication history source reliability:Good    Actions taken by pharmacist (provider contacted, etc):Called  Pharmacy in [facility]     Additional medication history information not noted on PTA med list :    -Pt states she is taking half of the losartan tablet (25 mg) twice daily. She was only doing that because she didn't realize the tablet strength was reduced from 100 mg to 50 mg. She was in the habit of cutting the 100 mg tab in half and taking it twice daily. She is okay with taking the 50 mg once daily as prescribed (rather than cutting it in half and taking it BID)     -Removed vit B12, MVM, and simvastatin     Medication reconciliation/reorder completed by provider prior to medication history? No    Time spent in this activity: 30 mins     Prior to Admission medications    Medication Sig Last Dose Taking? Auth Provider   aspirin EC 81 MG tablet Take 1 tablet (81 mg) by mouth daily 8/10/2018 at Unknown time Yes Lesli Barreto MD   chlorthalidone (HYGROTON) 25 MG tablet Take 0.5 tablets (12.5 mg) by mouth daily 8/10/2018 at Unknown time Yes Luis Feliciano MD   Cholecalciferol (VITAMIN D3) 1000 units CAPS Take 1,000 Units by mouth daily  8/10/2018 at Unknown time Yes Reported, Patient   KRILL OIL PO Take 500 mg by mouth daily  8/10/2018 at Unknown time Yes Reported, Patient   losartan (COZAAR) 50 MG tablet Take 1 tablet (50 mg) by mouth daily 8/10/2018 at Unknown time Yes Luis Feliciano MD   NONFORMULARY Pain cream from [place] 30mg/g. Pt uses on legs for pain prn 8/10/2018 at prn Yes Unknown, Entered By History   Omega-3 Fatty Acids ([employer] FISH OIL) 1200 MG CAPS Take 1 capsule by mouth daily 8/10/2018 at Unknown time Yes Reported, Patient   STATIN NOT PRESCRIBED, INTENTIONAL, Please choose reason not prescribed, below   Luis Feliciano MD Joanna Saleh,  PharmD

## 2018-08-11 NOTE — PROGRESS NOTES
RECEIVING UNIT ED HANDOFF REVIEW    ED Nurse Handoff Report was reviewed by: Natalee White on August 11, 2018 at 8:56 AM

## 2018-08-11 NOTE — PROGRESS NOTES
Called regarding c/o dyspepsia in the setting of SBO. IV zantac ordered      Kathryn Stephnes PA-C  Hospitalist Service  [phone]

## 2018-08-12 VITALS
DIASTOLIC BLOOD PRESSURE: 67 MMHG | TEMPERATURE: 97.5 F | OXYGEN SATURATION: 98 % | HEIGHT: 62 IN | RESPIRATION RATE: 16 BRPM | WEIGHT: 130 LBS | BODY MASS INDEX: 23.92 KG/M2 | SYSTOLIC BLOOD PRESSURE: 166 MMHG

## 2018-08-12 LAB
ANION GAP SERPL CALCULATED.3IONS-SCNC: 5 MMOL/L (ref 3–14)
BUN SERPL-MCNC: 7 MG/DL (ref 7–30)
CALCIUM SERPL-MCNC: 8 MG/DL (ref 8.5–10.1)
CHLORIDE SERPL-SCNC: 106 MMOL/L (ref 94–109)
CO2 SERPL-SCNC: 29 MMOL/L (ref 20–32)
CREAT SERPL-MCNC: 0.64 MG/DL (ref 0.52–1.04)
ERYTHROCYTE [DISTWIDTH] IN BLOOD BY AUTOMATED COUNT: 12.7 % (ref 10–15)
GFR SERPL CREATININE-BSD FRML MDRD: >90 ML/MIN/1.7M2
GLUCOSE SERPL-MCNC: 122 MG/DL (ref 70–99)
HCT VFR BLD AUTO: 34.4 % (ref 35–47)
HGB BLD-MCNC: 11.6 G/DL (ref 11.7–15.7)
MCH RBC QN AUTO: 30.5 PG (ref 26.5–33)
MCHC RBC AUTO-ENTMCNC: 33.7 G/DL (ref 31.5–36.5)
MCV RBC AUTO: 91 FL (ref 78–100)
PLATELET # BLD AUTO: 226 10E9/L (ref 150–450)
POTASSIUM SERPL-SCNC: 3 MMOL/L (ref 3.4–5.3)
POTASSIUM SERPL-SCNC: 3.8 MMOL/L (ref 3.4–5.3)
RBC # BLD AUTO: 3.8 10E12/L (ref 3.8–5.2)
SODIUM SERPL-SCNC: 140 MMOL/L (ref 133–144)
WBC # BLD AUTO: 6.9 10E9/L (ref 4–11)

## 2018-08-12 PROCEDURE — 80048 BASIC METABOLIC PNL TOTAL CA: CPT | Performed by: INTERNAL MEDICINE

## 2018-08-12 PROCEDURE — 25000132 ZZH RX MED GY IP 250 OP 250 PS 637: Mod: GY | Performed by: INTERNAL MEDICINE

## 2018-08-12 PROCEDURE — 99238 HOSP IP/OBS DSCHRG MGMT 30/<: CPT | Performed by: INTERNAL MEDICINE

## 2018-08-12 PROCEDURE — 25800025 ZZH RX 258: Performed by: INTERNAL MEDICINE

## 2018-08-12 PROCEDURE — 85027 COMPLETE CBC AUTOMATED: CPT | Performed by: INTERNAL MEDICINE

## 2018-08-12 PROCEDURE — 25000125 ZZHC RX 250: Performed by: INTERNAL MEDICINE

## 2018-08-12 PROCEDURE — 84132 ASSAY OF SERUM POTASSIUM: CPT | Performed by: INTERNAL MEDICINE

## 2018-08-12 PROCEDURE — A9270 NON-COVERED ITEM OR SERVICE: HCPCS | Mod: GY | Performed by: INTERNAL MEDICINE

## 2018-08-12 PROCEDURE — 36415 COLL VENOUS BLD VENIPUNCTURE: CPT | Performed by: INTERNAL MEDICINE

## 2018-08-12 RX ORDER — LOSARTAN POTASSIUM 50 MG/1
50 TABLET ORAL DAILY
Status: DISCONTINUED | OUTPATIENT
Start: 2018-08-12 | End: 2018-08-12 | Stop reason: HOSPADM

## 2018-08-12 RX ORDER — POTASSIUM CHLORIDE 29.8 MG/ML
20 INJECTION INTRAVENOUS
Status: DISCONTINUED | OUTPATIENT
Start: 2018-08-12 | End: 2018-08-12 | Stop reason: HOSPADM

## 2018-08-12 RX ORDER — POTASSIUM CHLORIDE 1.5 G/1.58G
20-40 POWDER, FOR SOLUTION ORAL
Status: DISCONTINUED | OUTPATIENT
Start: 2018-08-12 | End: 2018-08-12 | Stop reason: HOSPADM

## 2018-08-12 RX ORDER — POTASSIUM CL/LIDO/0.9 % NACL 10MEQ/0.1L
10 INTRAVENOUS SOLUTION, PIGGYBACK (ML) INTRAVENOUS
Status: DISCONTINUED | OUTPATIENT
Start: 2018-08-12 | End: 2018-08-12 | Stop reason: HOSPADM

## 2018-08-12 RX ORDER — POTASSIUM CHLORIDE 1500 MG/1
20-40 TABLET, EXTENDED RELEASE ORAL
Status: DISCONTINUED | OUTPATIENT
Start: 2018-08-12 | End: 2018-08-12 | Stop reason: HOSPADM

## 2018-08-12 RX ORDER — POTASSIUM CHLORIDE 7.45 MG/ML
10 INJECTION INTRAVENOUS
Status: DISCONTINUED | OUTPATIENT
Start: 2018-08-12 | End: 2018-08-12 | Stop reason: HOSPADM

## 2018-08-12 RX ADMIN — LOSARTAN POTASSIUM 50 MG: 50 TABLET ORAL at 14:19

## 2018-08-12 RX ADMIN — Medication 12.5 MG: at 14:19

## 2018-08-12 RX ADMIN — FAMOTIDINE 20 MG: 10 INJECTION INTRAVENOUS at 06:37

## 2018-08-12 RX ADMIN — POTASSIUM CHLORIDE 40 MEQ: 1500 TABLET, EXTENDED RELEASE ORAL at 07:45

## 2018-08-12 RX ADMIN — DEXTROSE AND SODIUM CHLORIDE: 5; 450 INJECTION, SOLUTION INTRAVENOUS at 06:35

## 2018-08-12 RX ADMIN — POTASSIUM CHLORIDE 20 MEQ: 1500 TABLET, EXTENDED RELEASE ORAL at 09:42

## 2018-08-12 ASSESSMENT — ACTIVITIES OF DAILY LIVING (ADL)
ADLS_ACUITY_SCORE: 9

## 2018-08-12 NOTE — DISCHARGE SUMMARY
"[facility]    Discharge Summary  Hospitalist    Date of Admission:  8/11/2018  Date of Discharge:  8/12/2018  Discharging Provider: Ovidio Lopez MD    Discharge Diagnoses   Small bowel obstruction    History of Present Illness   Francois Ly is a 78 year old female with PMH of HTN, gallstones, SBO, low back pain, who was admitted with abdominal pain for one day, without nausea or vomiting. CT shows \"mildly dilated loops of small bowel... Suggestive of partial or early bowel obstruction.\" Patient was given bowel rest and IVF, and she had resumption of bowel function with normal stools on HD #2. Patient reports this is her third or fourth bowel obstruction, last one was in 2017. Her SBO are likely related to adhesions from hysterectomy in 1994. I placed a surgical referral at discharge--may benefit from lysis of adhesions.    Hospital Course   Francois Ly was admitted on 8/11/2018.  The following problems were addressed during her hospitalization:    Active Problems:    SBO (small bowel obstruction)      Ovidio Lopez MD    Significant Results and Procedures   CT abd 8/11/2018    Pending Results   None    Code Status   Full Code       Primary Care Physician   Luis Feliciano    # Discharge Pain Plan:   - Patient currently has NO PAIN and is not being prescribed pain medications on discharge.      Physical Exam   Temp: 97.5  F (36.4  C) Temp src: Oral BP: 166/67   Heart Rate: 48 Resp: 16 SpO2: 98 % O2 Device: None (Room air)    Vitals:    08/11/18 0616   Weight: 59 kg (130 lb)     Vital Signs with Ranges  Temp:  [97.3  F (36.3  C)-97.7  F (36.5  C)] 97.5  F (36.4  C)  Heart Rate:  [24-59] 48  Resp:  [16] 16  BP: (149-177)/(66-77) 166/67  SpO2:  [94 %-100 %] 98 %  I/O last 3 completed shifts:  In: 3991 [P.O.:30; I.V.:1961; IV Piggyback:2000]  Out: 340 [Emesis/NG output:340]    Constitutional: Female in NAD  Respiratory: CTAB, no wheezing or crackles  Cardiovascular: RRR, normal S1/2, no " m/r/g  GI: No organomegaly, normoactive bowel sounds, nontender, nondistended  Skin: No rashes or scars  Musculoskeletal: Normal strength in UE and LE, moves all extremities  Neurologic: A&Ox3  Psychiatric: Appropriate affect and mood    Discharge Disposition   Discharged to home  Condition at discharge: Stable    Consultations This Hospital Stay   None    Time Spent on this Encounter   IOvidio, personally saw the patient today and spent less than or equal to 30 minutes discharging this patient.    Discharge Orders     General Surg Adult Referral     Reason for your hospital stay   You were hospitalized for a recurrent small bowel obstruction.     Follow-up and recommended labs and tests    Consider scheduling an outpatient appointment with surgery (consult placed).     Activity   Your activity upon discharge: activity as tolerated     Full Code     Diet   Follow this diet upon discharge: Orders Placed This Encounter     Advance Diet as Tolerated: Regular Diet Adult       Discharge Medications   Current Discharge Medication List      CONTINUE these medications which have NOT CHANGED    Details   aspirin EC 81 MG tablet Take 1 tablet (81 mg) by mouth daily    Associated Diagnoses: ACS (acute coronary syndrome) (H)      chlorthalidone (HYGROTON) 25 MG tablet Take 0.5 tablets (12.5 mg) by mouth daily  Qty: 45 tablet, Refills: 3    Associated Diagnoses: Benign essential hypertension      Cholecalciferol (VITAMIN D3) 1000 units CAPS Take 1,000 Units by mouth daily       KRILL OIL PO Take 500 mg by mouth daily       losartan (COZAAR) 50 MG tablet Take 1 tablet (50 mg) by mouth daily  Qty: 90 tablet, Refills: 3    Associated Diagnoses: Benign essential hypertension      NONFORMULARY Pain cream from [place] 30mg/g. Pt uses on legs for pain prn      Omega-3 Fatty Acids ([employer] FISH OIL) 1200 MG CAPS Take 1 capsule by mouth daily      STATIN NOT PRESCRIBED, INTENTIONAL, Please choose reason not prescribed, below     Associated Diagnoses: PAD (peripheral artery disease) (H)           Allergies   No Known Allergies  Data   Most Recent 3 CBC's:  Recent Labs   Lab Test  08/12/18   0606  08/11/18   0628  04/13/17   0740   WBC  6.9  8.4  4.4   HGB  11.6*  12.7  11.1*   MCV  91  91  92   PLT  226  262  224      Most Recent 3 BMP's:  Recent Labs   Lab Test  08/12/18   1245  08/12/18   0606  08/11/18   0628  12/19/17   1026   NA   --   140  138  139   POTASSIUM  3.8  3.0*  3.8  3.6   CHLORIDE   --   106  102  104   CO2   --   29  28  26   BUN   --   7  14  15   CR   --   0.64  0.69  0.64   ANIONGAP   --   5  8  9   ARIN   --   8.0*  9.2  9.1   GLC   --   122*  128*  105*     Most Recent 2 LFT's:  Recent Labs   Lab Test  08/11/18   0628  04/12/17   0030   AST  22  24   ALT  24  31   ALKPHOS  69  78   BILITOTAL  0.3  0.2     Most Recent INR's and Anticoagulation Dosing History:  Anticoagulation Dose History     There is no flowsheet data to display.        Most Recent 3 Troponin's:  Recent Labs   Lab Test  10/03/16   0602  10/03/16   0015  10/02/16   1822   TROPI  0.065*  0.061*  0.058*     Most Recent Cholesterol Panel:  Recent Labs   Lab Test  12/19/17   1026   CHOL  253*   LDL  135*   HDL  54   TRIG  319*     Most Recent 6 Bacteria Isolates From Any Culture (See EPIC Reports for Culture Details):No lab results found.  Most Recent TSH, T4 and A1c Labs:  Recent Labs   Lab Test  10/01/12   0406   TSH  4.36     Results for orders placed or performed during the hospital encounter of 08/11/18   CT Abdomen Pelvis w Contrast    Narrative    CT ABDOMEN PELVIS W CONTRAST 8/11/2018 7:24 AM    HISTORY: Abdominal pain.    TECHNIQUE: CT of the abdomen and pelvis is performed with 65 mL  Isovue-370 intravenously. Radiation dose for this scan was reduced  using automated exposure control, adjustment of the mA and/or kV  according to patient size, or iterative reconstruction technique.    COMPARISON: 4/12/17.    FINDINGS:    Liver: 2 liver cysts in the  left and right lobe of the liver as  before.  Gallbladder: Cholelithiasis.  Pancreas:Normal.  Spleen:Normal.  Adrenals: Stable small left adrenal nodule.  Ascites:None.    Kidneys: Tiny bilateral renal cysts as before. No hydronephrosis.  Bladder:Normal.  Pelvic free fluid:None.    Bowels: Colonic diverticulosis without evidence of diverticulitis.  There are multiple mildly dilated loops of small bowel, with fluid and  fecal material.  Appendix:Normal.    Abdominal or pelvic lymphadenopathy:None.    Miscellaneous findings:None.      Impression    IMPRESSION:    1. Mildly dilated loops of small bowel with fluid and fecal material,  suggestive of partial or early bowel obstruction.  2. The remainder of the examination is otherwise unchanged, with  stable cholelithiasis and diverticulosis, along with a stable small  left adrenal nodule and hepatic cysts.    ROXANN HUNTER MD

## 2018-08-12 NOTE — PLAN OF CARE
Problem: Patient Care Overview  Goal: Plan of Care/Patient Progress Review  Outcome: Adequate for Discharge Date Met: 08/12/18  Reviewed discharge instructions and medications with patient and spouse. Questions answered. Patient discharged to home with spouse, discharge instructions, and belongings at this time.

## 2018-08-12 NOTE — PLAN OF CARE
Problem: Bowel Obstruction (Adult)  Goal: Signs and Symptoms of Listed Potential Problems Will be Absent, Minimized or Managed (Bowel Obstruction)  Signs and symptoms of listed potential problems will be absent, minimized or managed by discharge/transition of care (reference Bowel Obstruction (Adult) CPG).   Outcome: Improving  Patient A&Ox4. VSS on room air, except elevated BP s improved with scheduled metoprolol. Improved abdominal discomfort. Denies nausea and vomiting. Up stand-by assist to the bathroom. Passing gas. BM x2. Remains NPO. Patient slept between cares. Will continue to monitor.

## 2018-08-13 ENCOUNTER — TELEPHONE (OUTPATIENT)
Dept: FAMILY MEDICINE | Facility: CLINIC | Age: 78
End: 2018-08-13

## 2018-08-13 ENCOUNTER — TRANSFERRED RECORDS (OUTPATIENT)
Dept: HEALTH INFORMATION MANAGEMENT | Facility: CLINIC | Age: 78
End: 2018-08-13

## 2018-08-13 NOTE — TELEPHONE ENCOUNTER
ED / Discharge Outreach Protocol    Patient Contact    Attempt # 1    Was call answered?  No.   J Carlos Valdovinos RN

## 2018-08-14 ENCOUNTER — TRANSFERRED RECORDS (OUTPATIENT)
Dept: HEALTH INFORMATION MANAGEMENT | Facility: CLINIC | Age: 78
End: 2018-08-14

## 2018-08-14 NOTE — TELEPHONE ENCOUNTER
ED / Discharge Outreach Protocol    Patient Contact    Attempt # 2 (home # today)    Was call answered?  No.  J Carlos Valdovinos RN

## 2018-08-25 ENCOUNTER — APPOINTMENT (OUTPATIENT)
Dept: ULTRASOUND IMAGING | Facility: CLINIC | Age: 78
End: 2018-08-25
Attending: PHYSICIAN ASSISTANT
Payer: MEDICARE

## 2018-08-25 ENCOUNTER — HOSPITAL ENCOUNTER (EMERGENCY)
Facility: CLINIC | Age: 78
Discharge: HOME | End: 2018-08-25
Attending: EMERGENCY MEDICINE | Admitting: EMERGENCY MEDICINE
Payer: MEDICARE

## 2018-08-25 ENCOUNTER — APPOINTMENT (OUTPATIENT)
Dept: GENERAL RADIOLOGY | Facility: CLINIC | Age: 78
End: 2018-08-25
Attending: PHYSICIAN ASSISTANT
Payer: MEDICARE

## 2018-08-25 VITALS
WEIGHT: 130 LBS | SYSTOLIC BLOOD PRESSURE: 163 MMHG | TEMPERATURE: 97.8 F | DIASTOLIC BLOOD PRESSURE: 92 MMHG | HEART RATE: 65 BPM | OXYGEN SATURATION: 95 % | BODY MASS INDEX: 23.92 KG/M2 | HEIGHT: 62 IN | RESPIRATION RATE: 14 BRPM

## 2018-08-25 DIAGNOSIS — R10.13 ABDOMINAL PAIN, EPIGASTRIC: ICD-10-CM

## 2018-08-25 DIAGNOSIS — K80.20 CALCULUS OF GALLBLADDER WITHOUT CHOLECYSTITIS WITHOUT OBSTRUCTION: ICD-10-CM

## 2018-08-25 DIAGNOSIS — K59.00 CONSTIPATION, UNSPECIFIED CONSTIPATION TYPE: ICD-10-CM

## 2018-08-25 LAB
ALBUMIN SERPL-MCNC: 3.8 G/DL (ref 3.4–5)
ALBUMIN UR-MCNC: NEGATIVE MG/DL
ALP SERPL-CCNC: 66 U/L (ref 40–150)
ALT SERPL W P-5'-P-CCNC: 25 U/L (ref 0–50)
ANION GAP SERPL CALCULATED.3IONS-SCNC: 6 MMOL/L (ref 3–14)
APPEARANCE UR: CLEAR
AST SERPL W P-5'-P-CCNC: 22 U/L (ref 0–45)
BASOPHILS # BLD AUTO: 0 10E9/L (ref 0–0.2)
BASOPHILS NFR BLD AUTO: 0.4 %
BILIRUB SERPL-MCNC: 0.3 MG/DL (ref 0.2–1.3)
BILIRUB UR QL STRIP: NEGATIVE
BUN SERPL-MCNC: 16 MG/DL (ref 7–30)
CALCIUM SERPL-MCNC: 9.3 MG/DL (ref 8.5–10.1)
CHLORIDE SERPL-SCNC: 103 MMOL/L (ref 94–109)
CO2 SERPL-SCNC: 31 MMOL/L (ref 20–32)
COLOR UR AUTO: ABNORMAL
CREAT SERPL-MCNC: 0.71 MG/DL (ref 0.52–1.04)
DIFFERENTIAL METHOD BLD: NORMAL
EOSINOPHIL # BLD AUTO: 0.1 10E9/L (ref 0–0.7)
EOSINOPHIL NFR BLD AUTO: 1.3 %
ERYTHROCYTE [DISTWIDTH] IN BLOOD BY AUTOMATED COUNT: 12.6 % (ref 10–15)
GFR SERPL CREATININE-BSD FRML MDRD: 79 ML/MIN/1.7M2
GLUCOSE SERPL-MCNC: 135 MG/DL (ref 70–99)
GLUCOSE UR STRIP-MCNC: NEGATIVE MG/DL
HCT VFR BLD AUTO: 39.7 % (ref 35–47)
HGB BLD-MCNC: 13.4 G/DL (ref 11.7–15.7)
HGB UR QL STRIP: ABNORMAL
IMM GRANULOCYTES # BLD: 0 10E9/L (ref 0–0.4)
IMM GRANULOCYTES NFR BLD: 0.2 %
KETONES UR STRIP-MCNC: NEGATIVE MG/DL
LEUKOCYTE ESTERASE UR QL STRIP: NEGATIVE
LIPASE SERPL-CCNC: 91 U/L (ref 73–393)
LYMPHOCYTES # BLD AUTO: 2.1 10E9/L (ref 0.8–5.3)
LYMPHOCYTES NFR BLD AUTO: 21.3 %
MCH RBC QN AUTO: 30.7 PG (ref 26.5–33)
MCHC RBC AUTO-ENTMCNC: 33.8 G/DL (ref 31.5–36.5)
MCV RBC AUTO: 91 FL (ref 78–100)
MONOCYTES # BLD AUTO: 0.4 10E9/L (ref 0–1.3)
MONOCYTES NFR BLD AUTO: 3.7 %
MUCOUS THREADS #/AREA URNS LPF: PRESENT /LPF
NEUTROPHILS # BLD AUTO: 7.1 10E9/L (ref 1.6–8.3)
NEUTROPHILS NFR BLD AUTO: 73.1 %
NITRATE UR QL: NEGATIVE
NRBC # BLD AUTO: 0 10*3/UL
NRBC BLD AUTO-RTO: 0 /100
PH UR STRIP: 7.5 PH (ref 5–7)
PLATELET # BLD AUTO: 277 10E9/L (ref 150–450)
POTASSIUM SERPL-SCNC: 3.9 MMOL/L (ref 3.4–5.3)
PROT SERPL-MCNC: 7.8 G/DL (ref 6.8–8.8)
RBC # BLD AUTO: 4.36 10E12/L (ref 3.8–5.2)
RBC #/AREA URNS AUTO: 1 /HPF (ref 0–2)
SODIUM SERPL-SCNC: 140 MMOL/L (ref 133–144)
SOURCE: ABNORMAL
SP GR UR STRIP: 1.01 (ref 1–1.03)
UROBILINOGEN UR STRIP-MCNC: NORMAL MG/DL (ref 0–2)
WBC # BLD AUTO: 9.7 10E9/L (ref 4–11)
WBC #/AREA URNS AUTO: 1 /HPF (ref 0–5)

## 2018-08-25 PROCEDURE — 74019 RADEX ABDOMEN 2 VIEWS: CPT

## 2018-08-25 PROCEDURE — 80053 COMPREHEN METABOLIC PANEL: CPT | Performed by: PHYSICIAN ASSISTANT

## 2018-08-25 PROCEDURE — 25000125 ZZHC RX 250: Performed by: PHYSICIAN ASSISTANT

## 2018-08-25 PROCEDURE — A9270 NON-COVERED ITEM OR SERVICE: HCPCS | Mod: GY | Performed by: PHYSICIAN ASSISTANT

## 2018-08-25 PROCEDURE — 85025 COMPLETE CBC W/AUTO DIFF WBC: CPT | Performed by: PHYSICIAN ASSISTANT

## 2018-08-25 PROCEDURE — 76705 ECHO EXAM OF ABDOMEN: CPT

## 2018-08-25 PROCEDURE — 96374 THER/PROPH/DIAG INJ IV PUSH: CPT

## 2018-08-25 PROCEDURE — A9270 NON-COVERED ITEM OR SERVICE: HCPCS | Mod: GY | Performed by: EMERGENCY MEDICINE

## 2018-08-25 PROCEDURE — 25000132 ZZH RX MED GY IP 250 OP 250 PS 637: Mod: GY | Performed by: EMERGENCY MEDICINE

## 2018-08-25 PROCEDURE — 81001 URINALYSIS AUTO W/SCOPE: CPT | Performed by: EMERGENCY MEDICINE

## 2018-08-25 PROCEDURE — 96361 HYDRATE IV INFUSION ADD-ON: CPT

## 2018-08-25 PROCEDURE — 25000128 H RX IP 250 OP 636: Performed by: PHYSICIAN ASSISTANT

## 2018-08-25 PROCEDURE — 96375 TX/PRO/DX INJ NEW DRUG ADDON: CPT

## 2018-08-25 PROCEDURE — 25000132 ZZH RX MED GY IP 250 OP 250 PS 637: Mod: GY | Performed by: PHYSICIAN ASSISTANT

## 2018-08-25 PROCEDURE — 25000128 H RX IP 250 OP 636: Performed by: EMERGENCY MEDICINE

## 2018-08-25 PROCEDURE — 83690 ASSAY OF LIPASE: CPT | Performed by: PHYSICIAN ASSISTANT

## 2018-08-25 PROCEDURE — 99285 EMERGENCY DEPT VISIT HI MDM: CPT | Mod: 25

## 2018-08-25 RX ORDER — MORPHINE SULFATE 4 MG/ML
4 INJECTION, SOLUTION INTRAMUSCULAR; INTRAVENOUS
Status: DISCONTINUED | OUTPATIENT
Start: 2018-08-25 | End: 2018-08-25

## 2018-08-25 RX ORDER — HYDROCODONE BITARTRATE AND ACETAMINOPHEN 5; 325 MG/1; MG/1
1 TABLET ORAL EVERY 4 HOURS PRN
Qty: 10 TABLET | Refills: 0 | Status: SHIPPED | OUTPATIENT
Start: 2018-08-25 | End: 2018-09-24

## 2018-08-25 RX ORDER — ONDANSETRON 2 MG/ML
4 INJECTION INTRAMUSCULAR; INTRAVENOUS EVERY 30 MIN PRN
Status: DISCONTINUED | OUTPATIENT
Start: 2018-08-25 | End: 2018-08-25 | Stop reason: HOSPADM

## 2018-08-25 RX ORDER — ONDANSETRON 4 MG/1
4 TABLET, FILM COATED ORAL EVERY 8 HOURS PRN
Qty: 18 TABLET | Refills: 0 | Status: SHIPPED | OUTPATIENT
Start: 2018-08-25 | End: 2018-09-24

## 2018-08-25 RX ORDER — HYDROMORPHONE HYDROCHLORIDE 1 MG/ML
0.5 INJECTION, SOLUTION INTRAMUSCULAR; INTRAVENOUS; SUBCUTANEOUS
Status: DISCONTINUED | OUTPATIENT
Start: 2018-08-25 | End: 2018-08-25 | Stop reason: HOSPADM

## 2018-08-25 RX ORDER — SUCRALFATE ORAL 1 G/10ML
1 SUSPENSION ORAL 4 TIMES DAILY
Qty: 420 ML | Refills: 1 | Status: SHIPPED | OUTPATIENT
Start: 2018-08-25 | End: 2018-09-24

## 2018-08-25 RX ADMIN — ONDANSETRON 4 MG: 2 INJECTION INTRAMUSCULAR; INTRAVENOUS at 09:49

## 2018-08-25 RX ADMIN — LIDOCAINE HYDROCHLORIDE 30 ML: 20 SOLUTION ORAL; TOPICAL at 12:23

## 2018-08-25 RX ADMIN — DOCUSATE SODIUM 286 ML: 10 LIQUID ORAL at 11:42

## 2018-08-25 RX ADMIN — SODIUM CHLORIDE 1000 ML: 9 INJECTION, SOLUTION INTRAVENOUS at 09:50

## 2018-08-25 RX ADMIN — Medication 0.5 MG: at 10:29

## 2018-08-25 ASSESSMENT — ENCOUNTER SYMPTOMS
ABDOMINAL PAIN: 1
NAUSEA: 1
DIARRHEA: 0
FEVER: 0
FREQUENCY: 0
VOMITING: 0
CHILLS: 0
DYSURIA: 0
CONSTIPATION: 0
SHORTNESS OF BREATH: 0

## 2018-08-25 NOTE — DISCHARGE INSTRUCTIONS
Begin increasing her fluids at home to help with symptoms of constipation.  You may also use prune juice and foods high in fiber.  Begin taking 1 scoop full of MiraLAX daily.  If this does not begin to relieve your symptoms, further discuss this issue with your primary care provider next week.  Take the Zofran at home as needed for nausea control.  Begin taking the omeprazole as prescribed for possible gastric reflux and gastritis.  He may also use over-the-counter medications such as Gas-X or Mylanta to help with your symptoms.  If your pain is unable to be controlled at home, you may use the Norco for pain control.  Do not take Tylenol with this medication as it contains Tylenol.  See the attached handout sheet and how much you can take per day of Tylenol.  Do not take more than 4 g/day. Be aware that the norco can make you more constipated.  Return to the ED for changing or worsening abdominal pain, uncontrolled nausea or vomiting, fevers >101F, new concerns.  Follow-up with your primary care provider early next week for recheck of symptoms.    Discharge Instructions  Abdominal Pain    Abdominal pain can be caused by many things. Your evaluation today does not show the exact cause for your pain. Your doctor today has decided that it is unlikely your pain is due to a life threatening problem, or a problem requiring surgery or hospital admission. Sometimes those problems cannot be found right away, so it is very important that you follow up as directed.  Sometimes only the changes which occur over time allow the cause of your pain to be found.    Return to the Emergency Department for a recheck in 8-12 hours if your pain continues.  If your pain gets worse, changes in location, or feels different, return to the Emergency Department right away.    ADULTS:  Return to the Emergency Department right away if:      You get an oral temperature above 102oF or as directed by your doctor.    You have blood in your stools  (bright red or black, tarry stools).    You keep throwing up or can t drink liquids.    You see blood when you throw up.    You can t have a bowel movement or you can t pass gas.    Your stomach gets bloated or bigger.    Your skin or the whites of your eyes look yellow.    You faint.    You have bloody, frequent or painful urination.    You have new symptoms or anything that worries you.    CHILDREN:  Return to the Emergency Department right away if your child has any of the above-listed symptoms or the following:      Pushes your hand away or screams/cries when his/her belly is touched.    You notice your child is very fussy or weak.    Your child is very tired and is too tired to eat or drink.    Your child is dehydrated.  Signs of dehydration can be:  o Your infant has had no wet diapers in 4-5 hours.  o Your older child has not passed urine in 6-8 hours.  o Your infant or child starts to have dry mouth and lips, or no saliva or tears.    PREGNANT WOMEN:  Return to the Emergency Department right away if you have any of the above-listed symptoms or the following:      You have bleeding, leaking fluid or passing tissue from the vagina.    You have worse pain or cramping, or pain in your shoulder or back.    You have vomiting that will not stop.    You have painful or bloody urination.    You have a temperature of 100oF or more.    Your baby is not moving as much as usual.    You faint.    You get a bad headache with or without eye problems and abdominal pain.    You have a convulsion or seizure.    You have unusual discharge from your vagina and abdominal pain.    Abdominal pain is pretty common during pregnancy.  Your pain may or may not be related to your pregnancy. You should follow-up closely with your OB doctor so they can evaluate you and your baby.  Until you follow-up with your regular doctor, do the following:       Avoid sex and do not put anything in your vagina.    Drink clear fluids.    Only take  "medications approved by your doctor.    MORE INFORMATION:    Appendicitis:  A possible cause of abdominal pain in any person who still has their appendix is acute appendicitis. Appendicitis is often hard to diagnose.  Testing does not always rule out early appendicitis or other causes of abdominal pain. Close follow-up with your doctor and re-evaluations may be needed to figure out the reason for your abdominal pain.    Follow-up:  It is very important that you make an appointment with your clinic and go to the appointment.  If you do not follow-up with your primary doctor, it may result in missing an important development which could result in permanent injury or disability and/or lasting pain.  If there is any problem keeping your appointment, call your doctor or return to the Emergency Department.    Medications:  Take your medications as directed by your doctor today.  Before using over-the-counter medications, ask your doctor and make sure to take the medications as directed.  If you have any questions about medications, ask your doctor.    Diet:  Resume your normal diet as much as possible, but do not eat fried, fatty or spicy foods while you have pain.  Do not drink alcohol or have caffeine.  Do not smoke tobacco.    Probiotics: If you have been given an antibiotic, you may want to also take a probiotic pill or eat yogurt with live cultures. Probiotics have \"good bacteria\" to help your intestines stay healthy. Studies have shown that probiotics help prevent diarrhea and other intestine problems (including C. diff infection) when you take antibiotics. You can buy these without a prescription in the pharmacy section of the store.     If you were given a prescription for medicine here today, be sure to read all of the information (including the package insert) that comes with your prescription.  This will include important information about the medicine, its side effects, and any warnings that you need to know " about.  The pharmacist who fills the prescription can provide more information and answer questions you may have about the medicine.  If you have questions or concerns that the pharmacist cannot address, please call or return to the Emergency Department.         Opioid Medication Information    Pain medications are among the most commonly prescribed medicines, so we are including this information for all our patients. If you did not receive pain medication or get a prescription for pain medicine, you can ignore it.     You may have been given a prescription for an opioid (narcotic) pain medicine and/or have received a pain medicine while here in the Emergency Department. These medicines can make you drowsy or impaired. You must not drive, operate dangerous equipment, or engage in any other dangerous activities while taking these medications. If you drive while taking these medications, you could be arrested for DUI, or driving under the influence. Do not drink any alcohol while you are taking these medications.     Opioid pain medications can cause addiction. If you have a history of chemical dependency of any type, you are at a higher risk of becoming addicted to pain medications.  Only take these prescribed medications to treat your pain when all other options have been tried. Take it for as short a time and as few doses as possible. Store your pain pills in a secure place, as they are frequently stolen and provide a dangerous opportunity for children or visitors in your house to start abusing these powerful medications. We will not replace any lost or stolen medicine.  As soon as your pain is better, you should flush all your remaining medication.     Many prescription pain medications contain Tylenol  (acetaminophen), including Vicodin , Tylenol #3 , Norco , Lortab , and Percocet .  You should not take any extra pills of Tylenol  if you are using these prescription medications or you can get very sick.  Do not  ever take more than 3000 mg of acetaminophen in any 24 hour period.    All opioids tend to cause constipation. Drink plenty of water and eat foods that have a lot of fiber, such as fruits, vegetables, prune juice, apple juice and high fiber cereal.  Take a laxative if you don t move your bowels at least every other day. Miralax , Milk of Magnesia, Colace , or Senna  can be used to keep you regular.      Remember that you can always come back to the Emergency Department if you are not able to see your regular doctor in the amount of time listed above, if you get any new symptoms, or if there is anything that worries you.    Discharge Instructions  Constipation  Your doctor has diagnosed you with constipation. Constipation can cause severe cramping pain and your physician thinks this is the cause of your abdominal pain today.  People usually recognize that they are constipated because they have difficulty having bowel movements, are not having bowel movements frequently enough, or are not having large enough bowel movements. Sometimes, especially in children or older people, you do not recognize that you are constipated until it becomes severe. The most common cause of constipation is a combination of lack of exercise and not eating enough fruits, vegetables and whole grains. Constipation can also be a side effect of medications, such as narcotics, or may be caused by a disease of the digestive system.    Return to the Emergency Department if:    Your abdominal pain worsens or does not improve after a bowel movement.    You become very weak.    You get an oral temperature above 102oF or as directed by your doctor.    You have blood in your stools (bright red or black, tarry stools).    You keep throwing up or can t drink liquids.    Your see blood when you throw up.    Your stomach gets bloated or bigger.    You have new symptoms or anything that worries you.    What can I do to help myself?    If your doctor gave you  a cathartic medication, like magnesium citrate or GoLytely  (polyethylene glycol), you can expect to have cramps and gas pains after taking it. You can expect to have a number of bowel movements and even diarrhea in the course of clearing your bowels.  You will know your bowels have been cleaned out after you pass clear liquid. The cramps and gas should let up after you have emptied your bowels. You may want to wait until morning to take this type of medication so you aren t up in the night.     Sometimes instead of cathartics, we recommend laxatives like milk of magnesia to move your bowels more slowly, or an enema to help the bowels to move. Read and follow the package directions, or follow your physician s instructions.    Once you have become very constipated, it takes time for your bowels to return to normal and you need to be very careful to prevent becoming constipated again. Take a laxative if you don t move your bowels at least every two days.       Eat foods that have a lot of fiber. Good choices are fruits, vegetables, prune juice, apple juice and high fiber cereal. Limit dairy products such as milk and cheese, since these can make constipation worse.     Drink plenty of water and other fluids.     When you feel the need to go to the bathroom, go to the bathroom. Don t hold it.    Miralax , Metamucil , Colace , Senna or fiber supplements can be used daily.  Miralax  daily is often the best choice for children.    FOLLOW UP WITH YOUR REGULAR DOCTOR IF YOUR CONSTIPATION IS NOT IMPROVING.  Sometimes, chronic constipation requires further testing to determine the cause. If you are over 50 years old, you may need a colonoscopy if you have not had one before.   If you were given a prescription for medicine here today, be sure to read all of the information (including the package insert) that comes with your prescription.  This will include important information about the medicine, its side effects, and any  warnings that you need to know about.  The pharmacist who fills the prescription can provide more information and answer questions you may have about the medicine.  If you have questions or concerns that the pharmacist cannot address, please call or return to the Emergency Department.

## 2018-08-25 NOTE — ED AVS SNAPSHOT
Emergency Department    [address] [place] [id number]    Phone:  [phone]    Fax:  [phone]                                       Francois Ly   MRN: [id number]    Department:   Emergency Department   Date of Visit:  8/25/2018           After Visit Summary Signature Page     I have received my discharge instructions, and my questions have been answered. I have discussed any challenges I see with this plan with the nurse or doctor.    ..........................................................................................................................................  Patient/Patient Representative Signature      ..........................................................................................................................................  Patient Representative Print Name and Relationship to Patient    ..................................................               ................................................  Date                                            Time    ..........................................................................................................................................  Reviewed by Signature/Title    ...................................................              ..............................................  Date                                                            Time          22EPIC Rev 08/18

## 2018-08-25 NOTE — ED NOTES
Bed: ED13  Expected date: 8/25/18  Expected time: 9:11 AM  Means of arrival:   Comments:  Triage

## 2018-08-25 NOTE — ED AVS SNAPSHOT
Emergency Department    [address] [facility] [address]    Phone:  [phone]    Fax:  [phone]                                       Francois Ly   MRN: [id number]    Department:   Emergency Department   Date of Visit:  8/25/2018           Patient Information     Date Of Birth          1940        Your diagnoses for this visit were:     Abdominal pain, epigastric     Constipation, unspecified constipation type     Calculus of gallbladder without cholecystitis without obstruction        You were seen by Phuc Andersen MD.      Follow-up Information     Go to  Emergency Department.    Specialty:  EMERGENCY MEDICINE    Why:  changing or worsening abdominal pain, uncontrolled nausea or vomiting, fevers >101F, new concerns.    Contact information:    [address] [facility] [address] [phone]        Follow up with Luis Feliciano MD. Go in 2 days.    Specialty:  Family Practice    Why:  For recheck of symptoms    Contact information:    [address] [facility] [address] [phone]          Discharge Instructions       Begin increasing her fluids at home to help with symptoms of constipation.  You may also use prune juice and foods high in fiber.  Begin taking 1 scoop full of MiraLAX daily.  If this does not begin to relieve your symptoms, further discuss this issue with your primary care provider next week.  Take the Zofran at home as needed for nausea control.  Begin taking the omeprazole as prescribed for possible gastric reflux and gastritis.  He may also use over-the-counter medications such as Gas-X or Mylanta to help with your symptoms.  If your pain is unable to be controlled at home, you may use the Norco for pain control.  Do not take Tylenol with this medication as it contains Tylenol.  See the attached handout sheet and how much you can take per day of Tylenol.  Do not take more than 4 g/day. Be aware that the norco can make you more  constipated.  Return to the ED for changing or worsening abdominal pain, uncontrolled nausea or vomiting, fevers >101F, new concerns.  Follow-up with your primary care provider early next week for recheck of symptoms.    Discharge Instructions  Abdominal Pain    Abdominal pain can be caused by many things. Your evaluation today does not show the exact cause for your pain. Your doctor today has decided that it is unlikely your pain is due to a life threatening problem, or a problem requiring surgery or hospital admission. Sometimes those problems cannot be found right away, so it is very important that you follow up as directed.  Sometimes only the changes which occur over time allow the cause of your pain to be found.    Return to the Emergency Department for a recheck in 8-12 hours if your pain continues.  If your pain gets worse, changes in location, or feels different, return to the Emergency Department right away.    ADULTS:  Return to the Emergency Department right away if:      You get an oral temperature above 102oF or as directed by your doctor.    You have blood in your stools (bright red or black, tarry stools).    You keep throwing up or can t drink liquids.    You see blood when you throw up.    You can t have a bowel movement or you can t pass gas.    Your stomach gets bloated or bigger.    Your skin or the whites of your eyes look yellow.    You faint.    You have bloody, frequent or painful urination.    You have new symptoms or anything that worries you.    CHILDREN:  Return to the Emergency Department right away if your child has any of the above-listed symptoms or the following:      Pushes your hand away or screams/cries when his/her belly is touched.    You notice your child is very fussy or weak.    Your child is very tired and is too tired to eat or drink.    Your child is dehydrated.  Signs of dehydration can be:  o Your infant has had no wet diapers in 4-5 hours.  o Your older child has not  passed urine in 6-8 hours.  o Your infant or child starts to have dry mouth and lips, or no saliva or tears.    PREGNANT WOMEN:  Return to the Emergency Department right away if you have any of the above-listed symptoms or the following:      You have bleeding, leaking fluid or passing tissue from the vagina.    You have worse pain or cramping, or pain in your shoulder or back.    You have vomiting that will not stop.    You have painful or bloody urination.    You have a temperature of 100oF or more.    Your baby is not moving as much as usual.    You faint.    You get a bad headache with or without eye problems and abdominal pain.    You have a convulsion or seizure.    You have unusual discharge from your vagina and abdominal pain.    Abdominal pain is pretty common during pregnancy.  Your pain may or may not be related to your pregnancy. You should follow-up closely with your OB doctor so they can evaluate you and your baby.  Until you follow-up with your regular doctor, do the following:       Avoid sex and do not put anything in your vagina.    Drink clear fluids.    Only take medications approved by your doctor.    MORE INFORMATION:    Appendicitis:  A possible cause of abdominal pain in any person who still has their appendix is acute appendicitis. Appendicitis is often hard to diagnose.  Testing does not always rule out early appendicitis or other causes of abdominal pain. Close follow-up with your doctor and re-evaluations may be needed to figure out the reason for your abdominal pain.    Follow-up:  It is very important that you make an appointment with your clinic and go to the appointment.  If you do not follow-up with your primary doctor, it may result in missing an important development which could result in permanent injury or disability and/or lasting pain.  If there is any problem keeping your appointment, call your doctor or return to the Emergency Department.    Medications:  Take your  "medications as directed by your doctor today.  Before using over-the-counter medications, ask your doctor and make sure to take the medications as directed.  If you have any questions about medications, ask your doctor.    Diet:  Resume your normal diet as much as possible, but do not eat fried, fatty or spicy foods while you have pain.  Do not drink alcohol or have caffeine.  Do not smoke tobacco.    Probiotics: If you have been given an antibiotic, you may want to also take a probiotic pill or eat yogurt with live cultures. Probiotics have \"good bacteria\" to help your intestines stay healthy. Studies have shown that probiotics help prevent diarrhea and other intestine problems (including C. diff infection) when you take antibiotics. You can buy these without a prescription in the pharmacy section of the store.     If you were given a prescription for medicine here today, be sure to read all of the information (including the package insert) that comes with your prescription.  This will include important information about the medicine, its side effects, and any warnings that you need to know about.  The pharmacist who fills the prescription can provide more information and answer questions you may have about the medicine.  If you have questions or concerns that the pharmacist cannot address, please call or return to the Emergency Department.         Opioid Medication Information    Pain medications are among the most commonly prescribed medicines, so we are including this information for all our patients. If you did not receive pain medication or get a prescription for pain medicine, you can ignore it.     You may have been given a prescription for an opioid (narcotic) pain medicine and/or have received a pain medicine while here in the Emergency Department. These medicines can make you drowsy or impaired. You must not drive, operate dangerous equipment, or engage in any other dangerous activities while taking these " medications. If you drive while taking these medications, you could be arrested for DUI, or driving under the influence. Do not drink any alcohol while you are taking these medications.     Opioid pain medications can cause addiction. If you have a history of chemical dependency of any type, you are at a higher risk of becoming addicted to pain medications.  Only take these prescribed medications to treat your pain when all other options have been tried. Take it for as short a time and as few doses as possible. Store your pain pills in a secure place, as they are frequently stolen and provide a dangerous opportunity for children or visitors in your house to start abusing these powerful medications. We will not replace any lost or stolen medicine.  As soon as your pain is better, you should flush all your remaining medication.     Many prescription pain medications contain Tylenol  (acetaminophen), including Vicodin , Tylenol #3 , Norco , Lortab , and Percocet .  You should not take any extra pills of Tylenol  if you are using these prescription medications or you can get very sick.  Do not ever take more than 3000 mg of acetaminophen in any 24 hour period.    All opioids tend to cause constipation. Drink plenty of water and eat foods that have a lot of fiber, such as fruits, vegetables, prune juice, apple juice and high fiber cereal.  Take a laxative if you don t move your bowels at least every other day. Miralax , Milk of Magnesia, Colace , or Senna  can be used to keep you regular.      Remember that you can always come back to the Emergency Department if you are not able to see your regular doctor in the amount of time listed above, if you get any new symptoms, or if there is anything that worries you.    Discharge Instructions  Constipation  Your doctor has diagnosed you with constipation. Constipation can cause severe cramping pain and your physician thinks this is the cause of your abdominal pain today.   People usually recognize that they are constipated because they have difficulty having bowel movements, are not having bowel movements frequently enough, or are not having large enough bowel movements. Sometimes, especially in children or older people, you do not recognize that you are constipated until it becomes severe. The most common cause of constipation is a combination of lack of exercise and not eating enough fruits, vegetables and whole grains. Constipation can also be a side effect of medications, such as narcotics, or may be caused by a disease of the digestive system.    Return to the Emergency Department if:    Your abdominal pain worsens or does not improve after a bowel movement.    You become very weak.    You get an oral temperature above 102oF or as directed by your doctor.    You have blood in your stools (bright red or black, tarry stools).    You keep throwing up or can t drink liquids.    Your see blood when you throw up.    Your stomach gets bloated or bigger.    You have new symptoms or anything that worries you.    What can I do to help myself?    If your doctor gave you a cathartic medication, like magnesium citrate or GoLytely  (polyethylene glycol), you can expect to have cramps and gas pains after taking it. You can expect to have a number of bowel movements and even diarrhea in the course of clearing your bowels.  You will know your bowels have been cleaned out after you pass clear liquid. The cramps and gas should let up after you have emptied your bowels. You may want to wait until morning to take this type of medication so you aren t up in the night.     Sometimes instead of cathartics, we recommend laxatives like milk of magnesia to move your bowels more slowly, or an enema to help the bowels to move. Read and follow the package directions, or follow your physician s instructions.    Once you have become very constipated, it takes time for your bowels to return to normal and you  need to be very careful to prevent becoming constipated again. Take a laxative if you don t move your bowels at least every two days.       Eat foods that have a lot of fiber. Good choices are fruits, vegetables, prune juice, apple juice and high fiber cereal. Limit dairy products such as milk and cheese, since these can make constipation worse.     Drink plenty of water and other fluids.     When you feel the need to go to the bathroom, go to the bathroom. Don t hold it.    Miralax , Metamucil , Colace , Senna or fiber supplements can be used daily.  Miralax  daily is often the best choice for children.    FOLLOW UP WITH YOUR REGULAR DOCTOR IF YOUR CONSTIPATION IS NOT IMPROVING.  Sometimes, chronic constipation requires further testing to determine the cause. If you are over 50 years old, you may need a colonoscopy if you have not had one before.   If you were given a prescription for medicine here today, be sure to read all of the information (including the package insert) that comes with your prescription.  This will include important information about the medicine, its side effects, and any warnings that you need to know about.  The pharmacist who fills the prescription can provide more information and answer questions you may have about the medicine.  If you have questions or concerns that the pharmacist cannot address, please call or return to the Emergency Department.     Discharge References/Attachments     (S) WHAT YOU NEED TO KNOW ABOUT ACETAMINOPHEN (ENGLISH)      24 Hour Appointment Hotline       To make an appointment at any [facility], call [phone] ([phone]). If you don't have a family doctor or clinic, we will help you find one. [facility] are conveniently located to serve the needs of you and your family.             Review of your medicines      START taking        Dose / Directions Last dose taken    HYDROcodone-acetaminophen 5-325 MG per tablet   Commonly known as:  NORCO    Dose:  1 tablet   Quantity:  10 tablet        Take 1 tablet by mouth every 4 hours as needed for pain   Refills:  0        omeprazole 20 MG CR capsule   Commonly known as:  priLOSEC   Dose:  20 mg   Quantity:  30 capsule        Take 1 capsule (20 mg) by mouth daily   Refills:  0        ondansetron 4 MG tablet   Commonly known as:  ZOFRAN   Dose:  4 mg   Quantity:  18 tablet        Take 1 tablet (4 mg) by mouth every 8 hours as needed for nausea   Refills:  0        sucralfate 1 GM/10ML suspension   Commonly known as:  CARAFATE   Dose:  1 g   Quantity:  420 mL        Take 10 mLs (1 g) by mouth 4 times daily   Refills:  1          Our records show that you are taking the medicines listed below. If these are incorrect, please call your family doctor or clinic.        Dose / Directions Last dose taken    aspirin 81 MG EC tablet   Dose:  81 mg        Take 1 tablet (81 mg) by mouth daily   Refills:  0        chlorthalidone 25 MG tablet   Commonly known as:  HYGROTON   Dose:  12.5 mg   Quantity:  45 tablet        Take 0.5 tablets (12.5 mg) by mouth daily   Refills:  3        [facility] FISH OIL 1200 MG Caps        Take 1 capsule by mouth daily   Refills:  0        KRILL OIL PO   Dose:  500 mg        Take 500 mg by mouth daily   Refills:  0        losartan 50 MG tablet   Commonly known as:  COZAAR   Dose:  50 mg   Quantity:  90 tablet        Take 1 tablet (50 mg) by mouth daily   Refills:  3        NONFORMULARY        Pain cream from [place] 30mg/g. Pt uses on legs for pain prn   Refills:  0        STATIN NOT PRESCRIBED (INTENTIONAL)        Please choose reason not prescribed, below   Refills:  0        vitamin D3 1000 units Caps   Dose:  1000 Units        Take 1,000 Units by mouth daily   Refills:  0                Information about OPIOIDS     PRESCRIPTION OPIOIDS: WHAT YOU NEED TO KNOW   We gave you an opioid (narcotic) pain medicine. It is important to manage your pain, but opioids are not always the best choice. You should  first try all the other options your care team gave you. Take this medicine for as short a time (and as few doses) as possible.    Some activities can increase your pain, such as bandage changes or therapy sessions. It may help to take your pain medicine 30 to 60 minutes before these activities. Reduce your stress by getting enough sleep, working on hobbies you enjoy and practicing relaxation or meditation. Talk to your care team about ways to manage your pain beyond prescription opioids.    These medicines have risks:    DO NOT drive when on new or higher doses of pain medicine. These medicines can affect your alertness and reaction times, and you could be arrested for driving under the influence (DUI). If you need to use opioids long-term, talk to your care team about driving.    DO NOT operate heavy machinery    DO NOT do any other dangerous activities while taking these medicines.    DO NOT drink any alcohol while taking these medicines.     If the opioid prescribed includes acetaminophen, DO NOT take with any other medicines that contain acetaminophen. Read all labels carefully. Look for the word  acetaminophen  or  Tylenol.  Ask your pharmacist if you have questions or are unsure.    You can get addicted to pain medicines, especially if you have a history of addiction (chemical, alcohol or substance dependence). Talk to your care team about ways to reduce this risk.    All opioids tend to cause constipation. Drink plenty of water and eat foods that have a lot of fiber, such as fruits, vegetables, prune juice, apple juice and high-fiber cereal. Take a laxative (Miralax, milk of magnesia, Colace, Senna) if you don t move your bowels at least every other day. Other side effects include upset stomach, sleepiness, dizziness, throwing up, tolerance (needing more of the medicine to have the same effect), physical dependence and slowed breathing.    Store your pills in a secure place, locked if possible. We will not  replace any lost or stolen medicine. If you don t finish your medicine, please throw away (dispose) as directed by your pharmacist. The [place] [employer] has more information about safe disposal: https://www.pca.state.[place]/living-green/managing-unwanted-medications        Prescriptions were sent or printed at these locations (4 Prescriptions)                   Other Prescriptions                Printed at Department/Unit printer (4 of 4)         HYDROcodone-acetaminophen (NORCO) 5-325 MG per tablet               omeprazole (PRILOSEC) 20 MG CR capsule               ondansetron (ZOFRAN) 4 MG tablet               sucralfate (CARAFATE) 1 GM/10ML suspension                Procedures and tests performed during your visit     CBC with platelets differential    Comprehensive metabolic panel    Lipase    UA with Microscopic    US Abdomen Limited    XR Abdomen 2 Views      Orders Needing Specimen Collection     None      Pending Results     Date and Time Order Name Status Description    8/25/2018 1219 US Abdomen Limited Preliminary             Pending Culture Results     No orders found from 8/23/2018 to 8/26/2018.            Pending Results Instructions     If you had any lab results that were not finalized at the time of your Discharge, you can call the ED Lab Result RN at [phone]. You will be contacted by this team for any positive Lab results or changes in treatment. The nurses are available 7 days a week from 10A to 6:30P.  You can leave a message 24 hours per day and they will return your call.        Test Results From Your Hospital Stay        8/25/2018  9:55 AM      Component Results     Component Value Ref Range & Units Status    WBC 9.7 4.0 - 11.0 10e9/L Final    RBC Count 4.36 3.8 - 5.2 10e12/L Final    Hemoglobin 13.4 11.7 - 15.7 g/dL Final    Hematocrit 39.7 35.0 - 47.0 % Final    MCV 91 78 - 100 fl Final    MCH 30.7 26.5 - 33.0 pg Final    MCHC 33.8 31.5 - 36.5 g/dL Final    RDW 12.6 10.0  - 15.0 % Final    Platelet Count 277 150 - 450 10e9/L Final    Diff Method Automated Method  Final    % Neutrophils 73.1 % Final    % Lymphocytes 21.3 % Final    % Monocytes 3.7 % Final    % Eosinophils 1.3 % Final    % Basophils 0.4 % Final    % Immature Granulocytes 0.2 % Final    Nucleated RBCs 0 0 /100 Final    Absolute Neutrophil 7.1 1.6 - 8.3 10e9/L Final    Absolute Lymphocytes 2.1 0.8 - 5.3 10e9/L Final    Absolute Monocytes 0.4 0.0 - 1.3 10e9/L Final    Absolute Eosinophils 0.1 0.0 - 0.7 10e9/L Final    Absolute Basophils 0.0 0.0 - 0.2 10e9/L Final    Abs Immature Granulocytes 0.0 0 - 0.4 10e9/L Final    Absolute Nucleated RBC 0.0  Final         8/25/2018 10:12 AM      Component Results     Component Value Ref Range & Units Status    Sodium 140 133 - 144 mmol/L Final    Potassium 3.9 3.4 - 5.3 mmol/L Final    Chloride 103 94 - 109 mmol/L Final    Carbon Dioxide 31 20 - 32 mmol/L Final    Anion Gap 6 3 - 14 mmol/L Final    Glucose 135 (H) 70 - 99 mg/dL Final    Urea Nitrogen 16 7 - 30 mg/dL Final    Creatinine 0.71 0.52 - 1.04 mg/dL Final    GFR Estimate 79 >60 mL/min/1.7m2 Final    Non  GFR Calc    GFR Estimate If Black >90 >60 mL/min/1.7m2 Final    African American GFR Calc    Calcium 9.3 8.5 - 10.1 mg/dL Final    Bilirubin Total 0.3 0.2 - 1.3 mg/dL Final    Albumin 3.8 3.4 - 5.0 g/dL Final    Protein Total 7.8 6.8 - 8.8 g/dL Final    Alkaline Phosphatase 66 40 - 150 U/L Final    ALT 25 0 - 50 U/L Final    AST 22 0 - 45 U/L Final         8/25/2018 10:10 AM      Component Results     Component Value Ref Range & Units Status    Lipase 91 73 - 393 U/L Final         8/25/2018  1:38 PM      Narrative     ABDOMEN TWO VIEWS  8/25/2018 10:16 AM     COMPARISON: Abdomen and pelvis CT 8/11/2018.    HISTORY: History of SBO, abdominal pain, multiple CT's.     FINDINGS: Moderate stool in the right colon again noted. Abdominal  bowel gas pattern is nonspecific. There is no evidence for  free  intraperitoneal air.        Impression     IMPRESSION: No evidence for bowel obstruction or free air.    HENRY ABEL MD         8/25/2018 11:53 AM      Component Results     Component Value Ref Range & Units Status    Color Urine Straw  Final    Appearance Urine Clear  Final    Glucose Urine Negative NEG^Negative mg/dL Final    Bilirubin Urine Negative NEG^Negative Final    Ketones Urine Negative NEG^Negative mg/dL Final    Specific Gravity Urine 1.007 1.003 - 1.035 Final    Blood Urine Trace (A) NEG^Negative Final    pH Urine 7.5 (H) 5.0 - 7.0 pH Final    Protein Albumin Urine Negative NEG^Negative mg/dL Final    Urobilinogen mg/dL Normal 0.0 - 2.0 mg/dL Final    Nitrite Urine Negative NEG^Negative Final    Leukocyte Esterase Urine Negative NEG^Negative Final    Source Midstream Urine  Final    WBC Urine 1 0 - 5 /HPF Final    RBC Urine 1 0 - 2 /HPF Final    Mucous Urine Present (A) NEG^Negative /LPF Final         8/25/2018  2:12 PM      Narrative     ULTRASOUND ABDOMEN LIMITED 8/25/2018 1:55 PM    HISTORY: Right upper quadrant pain.    COMPARISON: CT exam 8/11/2018.    FINDINGS:  Gallbladder: Multiple mobile gallstones are seen within the  gallbladder lumen correlating with the CT study. No gallbladder wall  thickening or pericholecystic fluid.    Common bile duct: Normal at 0.4 cm.    Liver: Two benign cysts are seen correlating with the CT exam. One of  these in the anterior aspect of the lateral segment left lobe of the  liver measures 5.3 x 4.4 x 2.8 cm. The other is located posteriorly in  the right lobe measuring 3.8 x 3.5 x 4.2 cm. The remainder the liver  shows no significant abnormality.    Pancreas: Completely obscured by overlying bowel gas.    Right kidney: Normal.          Impression     IMPRESSION:   1. Cholelithiasis.  2. Two benign-appearing liver cysts.  3. Pancreas not visualized.                  Clinical Quality Measure: Blood Pressure Screening     Your blood pressure was checked while  you were in the emergency department today. The last reading we obtained was  BP: (!) 163/92 . Please read the guidelines below about what these numbers mean and what you should do about them.  If your systolic blood pressure (the top number) is less than 120 and your diastolic blood pressure (the bottom number) is less than 80, then your blood pressure is normal. There is nothing more that you need to do about it.  If your systolic blood pressure (the top number) is 120-139 or your diastolic blood pressure (the bottom number) is 80-89, your blood pressure may be higher than it should be. You should have your blood pressure rechecked within a year by a primary care provider.  If your systolic blood pressure (the top number) is 140 or greater or your diastolic blood pressure (the bottom number) is 90 or greater, you may have high blood pressure. High blood pressure is treatable, but if left untreated over time it can put you at risk for heart attack, stroke, or kidney failure. You should have your blood pressure rechecked by a primary care provider within the next 4 weeks.  If your provider in the emergency department today gave you specific instructions to follow-up with your doctor or provider even sooner than that, you should follow that instruction and not wait for up to 4 weeks for your follow-up visit.        Thank you for choosing [facility]       Thank you for choosing [facility] for your care. Our goal is always to provide you with excellent care. Hearing back from our patients is one way we can continue to improve our services. Please take a few minutes to complete the written survey that you may receive in the mail after you visit with us. Thank you!        [employer] Information     [employer] gives you secure access to your electronic health record. If you see a primary care provider, you can also send messages to your care team and make appointments. If you have questions, please call your primary care clinic.   If you do not have a primary care provider, please call [phone] and they will assist you.        Care EveryWhere ID     This is your Care EveryWhere ID. This could be used by other organizations to access your [place] medical records  [id number]        Equal Access to Services     IAM YOON : April Schmitz, wamaryuri rhodesadaha, qaalannah kaalmashayna crespo, giovanni smith. So [facility] [phone].    ATENCIÓN: Si habla español, tiene a huertas disposición servicios gratuitos de asistencia lingüística. Llame al [phone].    We comply with applicable federal civil rights laws and [place] laws. We do not discriminate on the basis of race, color, national origin, age, disability, sex, sexual orientation, or gender identity.            After Visit Summary       This is your record. Keep this with you and show to your community pharmacist(s) and doctor(s) at your next visit.

## 2018-08-25 NOTE — ED PROVIDER NOTES
Emergency Department Attending Supervision Note  8/25/2018  10:35 AM      I evaluated this patient in conjunction with Krystal Alejandro PA-C      Briefly, the patient presented with abdominal pain stating it felt similar to her previous small bowel obstruction.       On my exam,   GENERAL: well developed, pleasant  HEAD: atraumatic  EYES: pupils reactive, extraocular muscles intact, conjunctivae normal  ENT:  mucus membranes moist  NECK:  trachea midline, normal range of motion  RESPIRATORY: no tachypnea, breath sounds clear to auscultation   CVS: normal S1/S2, no murmurs, intact distal pulses  ABDOMEN: Mild epigastric, slight right upper quadrant, and central abdominal pain, with hyperactive bowel sounds, no high pitch bowel sounds. nondistention  MUSCULOSKELETAL: no deformities  SKIN: warm and dry, no acute rashes or ulceration  NEURO: GCS 15, cranial nerves intact, alert and oriented x3  PSYCH:  Mood/affect normal       Results:    Imaging:  Radiology findings were communicated with the patient who voiced understanding of the findings.  XR Abdomen 2 Views  IMPRESSION:  No evidence for bowel obstruction or free air.   Reading per radiology.     US Abdomen Limited   IMPRESSION:  1. Cholelithiasis.  2. Two benign-appearing liver cysts.  3. Pancreas not visualized.  Reading per radiology.      Laboratory:  Laboratory findings were communicated with the patient who voiced understanding of the findings.  CBC: AWNL (WBC 9.7, HGB 13.4, )  CMP: Glucose: 135(H) o/w WNL (Creatinine 0.71)  Lipase: 91    ED course:  1047 I evaluated the patient.   Patient was sent for XR and US    My impression:  The patient presents with abdominal pain coming in waves, similar to prior bowel obstructions. The patient was seen and evaluated, reviewed her old imaging. Patient did not want another CT given the radiation and her recent CTs. X-ray is negative for obstruction, does show stool on the right side. Labs were fairly  reassuring. Patient was given fluids and pain control as well as an enema. She did have some slight improvement. I discussed admission verses going home, and she would like to go home. Ultrasound was obtained as well, showing cholelithiasis, but LFT's were normal and no right upper quadrant pain. I felt this to be an acute cholecystitis. Will try supportive care at home, if not better she can return for possible admission for ileus or further evaluation.     Diagnosis    ICD-10-CM    1. Abdominal pain, epigastric R10.13 UA with Microscopic   2. Constipation, unspecified constipation type K59.00    3. Calculus of gallbladder without cholecystitis without obstruction K80.20        I, Araceli Parker, am serving as a scribe on 8/25/2018 at 10:36 AM to personally document services performed by Phuc Andersen MD  based on my observations and the provider's statements to me.          Phuc Andersen MD  09/11/18 2758

## 2018-08-25 NOTE — ED PROVIDER NOTES
"  History     Chief Complaint:  Abdominal Pain    HPI   Francois Ly is a 78 year old female, with history of hypertension, gallstones, pancreatic mass, and small bowel obstruction, who presents with abdominal pain. She was admitted on 8/10/2018 for a small bowel obstruction, and discharged on 8/11/18 as she was passing gas, had a bowel movement, was tolerating PO, and her pain was gone. She has been feeling fine since discharge. Today at 0400 she developed abdominal pain which she states is \"exactly\" like her pain with her small bowel obstruction. Her pain comes on every two minutes, and goes away, but when it comes on she rates the pain at a 10/10. She has been nauseous, but has not vomited. She did have a bowel movement at 0400 this morning. Denies fevers. She states she has had about 4 small bowel obstructions in the past, but has not needed surgical interventions. She has history of hysterectomy in 1994 and she states this is when the SBO's began occurring. She took her blood pressure medication this morning, but otherwise has not had anything to eat or drink.     Allergies:   No Known Drug Allergies      Medications:    Aspirin, 81 mg  Hygroton  Krill oil  Losartan    Past Medical History:    Benign essential hypertension  Gallstones  Peripheral artery disease  Pancreatic mass  Slipped intervertebral disc    Past Surgical History:    Hysterectomy  Orthopedic surgery - slipped disc with chronic back pain    Family History:    No past pertinent family history.     Social History:  Relationship status:   Tobacco use: Former, quit date 1978  Alcohol use: Yes, occasional wine.  The patient presents with her .    Marital Status:   [2]     Review of Systems   Constitutional: Negative for chills and fever.   Respiratory: Negative for shortness of breath.    Cardiovascular: Negative for chest pain.   Gastrointestinal: Positive for abdominal pain and nausea. Negative for constipation, diarrhea and " "vomiting.   Genitourinary: Negative for dysuria, frequency and urgency.   All other systems reviewed and are negative.    Physical Exam   Vitals:  Patient Vitals for the past 24 hrs:   BP Temp Pulse Heart Rate Resp SpO2 Height Weight   08/25/18 1100 (!) 163/92 - 65 - 14 95 % - -   08/25/18 1040 153/86 - - 57 14 95 % - -   08/25/18 1030 193/81 - - - - 98 % - -   08/25/18 0917 (!) 193/102 97.8  F (36.6  C) 70 - 18 94 % 1.575 m (5' 2\") 59 kg (130 lb)        Physical Exam  General: Appears uncomfortable in bed.  Periodically grabs abdomen with the wave of discomfort.  Skin: Good turgor, no rash, no unusual bruising or prominent lesions.  HEENT: Head: Normocephalic, atraumatic, no visible masses.   Eyes: Conjunctiva clear, sclera non-icteric, EOM intact, PERRL.   Throat/pharynx: Mucous membranes moist, no mucosal lesions. Mucosa non-inflamed, no tonsillar hypertrophy or exudate.   Cardiac: Normal rate and regular rhythm, no murmur or gallop.   Lungs: Clear to auscultation.  Abdomen: Tenderness to palpation in the epigastric and left lower quadrant regions of the abdomen.  No organomegaly, masses, or hernia. No guarding or rebound tenderness.   Musculoskeletal: Normal gait and station. No calf tenderness or swelling.   Neurologic: Oriented x 3.   Psychiatric: Intact recent and remote memory, judgment and insight, normal mood and affect.     Emergency Department Course   Imaging:  Radiology findings were communicated with the patient who voiced understanding of the findings.  XR Abdomen 2 Views  IMPRESSION:  No evidence for bowel obstruction or free air.   Reading per radiology.     US Abdomen Limited  IMPRESSION:  1. Cholelithiasis.  2. Two benign-appearing liver cysts.  3. Pancreas not visualized.  Reading per radiology.     Laboratory:  Laboratory findings were communicated with the patient who voiced understanding of the findings.  CBC: AWNL (WBC 9.7, HGB 13.4, )  CMP: Glucose: 135(H) o/w WNL (Creatinine " 0.71)  Lipase: 91  UA with Microscopic: urine blood: trace(A), pH urine: 7.5(H), mucous urine: present o/w WNL    Procedures:  Procedure: Fecal disimpaction  Performed by Krystal Alejandro PA-C  Indication: fecal impaction  The rectal vault was swept repeatedly with no removal of hard stool.    Complications: none    Interventions:  0949 Zofran 4 mg IV   0949 Normal Saline 1000 mL IV   1029 Dilaudid 0.5mg IV injection   1142 Pink lady enema, 286 mLs Rectal  1223 GI cocktail, 30 mLs, Oral    Emergency Department Course:  Nursing notes and vitals reviewed.  I performed an exam of the patient as documented above.   IV was inserted and blood was drawn for laboratory testing, results above.  The patient was sent for a XR while in the emergency department, results above.      1122 I rechecked the patient.  1207 I performed a fecal disimpaction.  1230 I rechecked the patient.  1415 I reevaluated the patient and updated her as to the results of her workup. Treatment plan was discussed.     I personally reviewed the laboratory results with the Patient and answered all related questions prior to discharge.    Impression & Plan      Medical Decision Making:  Abhay Ly is a 78-year-old female who presented the ED today for evaluation of upper abdominal pain differential diagnosis here today included gastric reflux, cholelithiasis, cholecystitis, gastritis, peptic ulcer disease, small bowel obstruction, gastroenteritis, pancreatitis, ACS, constipation, amongst others.  Due to the patient's history of small bowel obstruction, upright and flat abdominal x-rays were obtained.  These returned without any obvious signs of obstruction.  Upon further conversation, the patient also wished to not proceed with CT for further imaging as she has had multiple CTs in the past few weeks.  I also have low suspicion for small bowel obstruction at this time as the patient has been able to pass bowel movements.  Additionally, during her  hospital stay, the small bowel obstruction was likely to be early in its course and resolved without any major interventions.    Laboratory analysis was also completed.  There is no leukocytosis, electrolyte abnormalities, or LFT elevation.  UA returned without signs of infection.  The x-ray had noted that the patient had a great deal of stool in her colon.  The patient was provided an enema here and able to pass a few small hard stools.  I also performed a fecal disimpaction but found little stool left in the rectum.  I believe that the patient's constipation may be contributing to her symptoms.  Additional interventions here in the ED included IV fluids, Dilaudid, Zofran, GI cocktail.  Upon reevaluation, the patient felt mild improvement in her symptoms.  She continued to some epigastric discomfort.  An abdominal ultrasound was then completed to evaluate for pathology of the gallbladder.  Results indicated cholelithiasis without obvious signs of cholecystitis.  This is reassuring in combination with the lack of liver enzyme elevation and no leukocytosis seen on laboratory analysis.    It is my suspicion that the patient's symptoms are due to mild constipation as well as underlying gastric reflux/gastritis.  The patient wished to be discharged with outpatient follow-up rather than being admitted for pain control.  I agreed with this plan.  She was provided a prescription for omeprazole and carafate to initiate treatment for possible gastric reflux/gastritis/peptic ulcer disease.  She is also provided Zofran at home for nausea control.  Due to her concern for possible lack of pain control once discharged, she is also prescribed Norco.  Side effects of narcotic medication was discussed in great detail.  I also warned her that this medication can make her further constipated.  I told her to use this only as needed for uncontrolled pain.  She will also start taking MiraLAX at home daily to help with bowel movement  regulation.  I also encouraged increasing fluids, prune juice, increasing foods with fiber. The patient was told to avoid anti-inflammatory medications such as ibuprofen to reduce gastric upset. They were also advised to avoid eating before bed, consuming smaller more frequent meals, and avoid spicy/acidic foods. They will return to the ED for worsening abdominal pain, uncontrolled nausea/vomiting, fevers >101, inability to have a bowel movement or if new concerns arise.  She will follow-up with her primary care provider on Monday.  All questions were answered prior to the patient's discharge. She was in agreement with the plan stated above.     Diagnosis:    ICD-10-CM    1. Abdominal pain, epigastric R10.13 UA with Microscopic   2. Constipation, unspecified constipation type K59.00    3. Calculus of gallbladder without cholecystitis without obstruction K80.20      Disposition:   Discharge     Discharge Medications:  Discharge Medication List as of 8/25/2018  2:42 PM      START taking these medications    Details   HYDROcodone-acetaminophen (NORCO) 5-325 MG per tablet Take 1 tablet by mouth every 4 hours as needed for pain, Disp-10 tablet, R-0, Local Print      omeprazole (PRILOSEC) 20 MG CR capsule Take 1 capsule (20 mg) by mouth daily, Disp-30 capsule, R-0, Local Print      ondansetron (ZOFRAN) 4 MG tablet Take 1 tablet (4 mg) by mouth every 8 hours as needed for nausea, Disp-18 tablet, R-0, Local Print      sucralfate (CARAFATE) 1 GM/10ML suspension Take 10 mLs (1 g) by mouth 4 times daily, Disp-420 mL, R-1, Local Print           Scribe Disclosure:  I, Araceli Parker, am serving as a scribe at 9:15 AM on 8/25/2018 to document services personally performed by Krystal Alejandro PA-C, based on my observations and the provider's statements to me.    Araceli Parker  8/25/2018    EMERGENCY DEPARTMENT       Krystal Alejandro PA  08/25/18 [id number]

## 2018-09-07 NOTE — MR AVS SNAPSHOT
After Visit Summary   10/13/2017    Francois Ly    MRN: [id number]           Patient Information     Date Of Birth          1940        Visit Information        Provider Department      10/13/2017 1:30 PM Luis Feliciano MD [facility]        Today's Diagnoses     Blood in stool    -  1    Encounter for screening for malignant neoplasm of colon         Rectal lesion           Follow-ups after your visit        Additional Services     COLORECTAL SURGERY REFERRAL       Your provider has referred you to: FM[facility]: [facility] [phone]   [email or web address]    Referral Reason(s): Consult  Special Concerns: None  This referral is: Urgent (24 - 72 hours)  It is OK to leave a message on patient's voicemail.    Please be aware that coverage of these services is subject to the terms and limitations of your health insurance plan.  Call member services at your health plan with any benefit or coverage questions.      Please bring the following with you to your appointment:    (1) Any X-Rays, CTs or MRIs which have been performed.  Contact the facility where they were done to arrange for  prior to your scheduled appointment.    (2) List of current medications  (3) This referral request   (4) Any documents/labs given to you for this referral                  Future tests that were ordered for you today     Open Future Orders        Priority Expected Expires Ordered    H Pylori antigen, stool Routine  11/12/2017 10/13/2017    Fecal colorectal cancer screen (FIT) Routine 11/3/2017 1/5/2018 10/13/2017            Who to contact     If you have questions or need follow up information about today's clinic visit or your schedule please contact [facility] directly at [phone].  Normal or non-critical lab and imaging results will be communicated to you by MyChart, letter or phone within 4 business days  Nutrition Assessment    Type and Reason for Visit: Reassess    Nutrition Recommendations: Jevity 1.5 with goal rate of 42 mL/hr. Flush tube with 1 bottle of Proteinex daily. Malnutrition Assessment:  · Malnutrition Status: At risk for malnutrition  · Context: Acute illness or injury  · Findings of the 6 clinical characteristics of malnutrition (Minimum of 2 out of 6 clinical characteristics is required to make the diagnosis of moderate or severe Protein Calorie Malnutrition based on AND/ASPEN Guidelines):  1. Energy Intake-Less than or equal to 50%, greater than 7 days    2. Weight Loss-No significant weight loss,    3. Fat Loss-Unable to assess,    4. Muscle Loss-Unable to assess,    5. Fluid Accumulation-No significant fluid accumulation,    6.  Strength-Not measured    Nutrition Diagnosis:   · Problem: Inadequate oral intake  · Etiology: related to Acute injury/trauma     Signs and symptoms:  as evidenced by NPO status due to medical condition    Nutrition Assessment:  · Subjective Assessment: Bedside Swallow Evaluation complete. SLP recommends NPO with DENYS. Will make recommendations for TF.    · Nutrition-Focused Physical Findings: extubated  · Wound Type: Skin Tears, Surgical Wound  · Current Nutrition Therapies:  · Oral Diet Orders: NPO   · Oral Diet intake: NPO  · Oral Nutrition Supplement (ONS) Orders: None  · ONS intake: NPO  · Anthropometric Measures:  · Ht: 5' 3\" (160 cm)   · Current Body Wt: 159 lb 8 oz (72.3 kg)  · Admission Body Wt: 141 lb (64 kg)  · Ideal Body Wt: 115 lb (52.2 kg), % Ideal Body 138%  · BMI Classification: BMI 25.0 - 29.9 Overweight  · Comparative Standards (Estimated Nutrition Needs):  · Estimated Daily Total Kcal: 6732-0357  · Estimated Daily Protein (g):     Estimated Intake vs Estimated Needs: Intake Less Than Needs    Nutrition Risk Level: High    Nutrition Interventions:   Continue NPO, Start Tube Feeding  Continued Inpatient Monitoring    Nutrition Evaluation: "after the clinic has received the results. If you do not hear from us within 7 days, please contact the clinic through [employer] or phone. If you have a critical or abnormal lab result, we will notify you by phone as soon as possible.  Submit refill requests through [employer] or call your pharmacy and they will forward the refill request to us. Please allow 3 business days for your refill to be completed.          Additional Information About Your Visit        [employer] Information     [employer] lets you send messages to your doctor, view your test results, renew your prescriptions, schedule appointments and more. To sign up, go to [email or web address] . Click on \"Log in\" on the left side of the screen, which will take you to the Welcome page. Then click on \"Sign up Now\" on the right side of the page.     You will be asked to enter the access code listed below, as well as some personal information. Please follow the directions to create your username and password.     Your access code is: [email or web address]  Expires: 2017 12:09 PM     Your access code will  in 90 days. If you need help or a new code, please call your [place] clinic or [phone].        Care EveryWhere ID     This is your Care EveryWhere ID. This could be used by other organizations to access your [place] medical records  [id number]        Your Vitals Were     Pulse Temperature Respirations Height Pulse Oximetry BMI (Body Mass Index)    63 98.1  F (36.7  C) (Oral) 16 5' 2\" (1.575 m) 99% 24.33 kg/m2       Blood Pressure from Last 3 Encounters:   10/13/17 138/70   17 158/70   17 124/70    Weight from Last 3 Encounters:   10/13/17 133 lb (60.3 kg)   17 134 lb (60.8 kg)   17 134 lb (60.8 kg)              We Performed the Following     COLORECTAL SURGERY REFERRAL        Primary Care Provider Office Phone # Fax #    Luis Feliciano [phone] [address] [facility] [address]        Equal Access to " Services     [facility]: Hadii aad ku hadduanemeenakshi Schmitz, wanikolayda luqadaha, qaybta kaalmashayna crespo, giovanni soto . So [facility] [phone].    ATENCIÓN: Si jaysonla leno, tiene a huertas disposición servicios gratuitos de asistencia lingüística. Llame al [phone].    We comply with applicable federal civil rights laws and [place] laws. We do not discriminate on the basis of race, color, national origin, age, disability, sex, sexual orientation, or gender identity.            Thank you!     Thank you for choosing [facility]  for your care. Our goal is always to provide you with excellent care. Hearing back from our patients is one way we can continue to improve our services. Please take a few minutes to complete the written survey that you may receive in the mail after your visit with us. Thank you!             Your Updated Medication List - Protect others around you: Learn how to safely use, store and throw away your medicines at www.disposemymeds.org.          This list is accurate as of: 10/13/17  2:00 PM.  Always use your most recent med list.                   Brand Name Dispense Instructions for use Diagnosis    aspirin EC 81 MG EC tablet      Take 1 tablet (81 mg) by mouth daily    ACS (acute coronary syndrome) (H)       atorvastatin 40 MG tablet    LIPITOR    90 tablet    Take 1 tablet (40 mg) by mouth daily    PAD (peripheral artery disease) (H)       chlorthalidone 25 MG tablet    HYGROTON    45 tablet    TAKE ONE-HALF TABLET BY MOUTH ONCE DAILY    Benign essential hypertension       cyanocobalamin 1000 MCG tablet    vitamin  B-12     Take 1,000 mcg by mouth daily as needed (leg cramps)        KRILL OIL PO      Take 500 mg by mouth daily        losartan 100 MG tablet    COZAAR    90 tablet    Take 0.5 tablets (50 mg) by mouth 2 times daily    Benign essential hypertension       MULTIVITAMINS PO      Take 1 tablet by mouth daily as needed        NONFORMULARY       Pain cream from [place] 30mg/g. Pt uses on legs for pain prn        order for DME     1 Device    Equipment being ordered: rib strap    Blunt trauma of rib, initial encounter       VITAMIN D (CHOLECALCIFEROL) PO      Take 1 tablet by mouth daily as needed (leg cramps)

## 2018-09-17 DIAGNOSIS — I10 BENIGN ESSENTIAL HYPERTENSION: ICD-10-CM

## 2018-09-18 RX ORDER — CHLORTHALIDONE 25 MG/1
TABLET ORAL
Qty: 15 TABLET | Refills: 0 | Status: SHIPPED | OUTPATIENT
Start: 2018-09-18 | End: 2018-09-24

## 2018-09-18 NOTE — TELEPHONE ENCOUNTER
1 month given. Need OV with Dr. Feliciano upon his return. Has been in hospital and ER within the last month.     -Lan Lundy, PaC

## 2018-09-18 NOTE — TELEPHONE ENCOUNTER
"Last Written Prescription Date:  12/19/17  Last Fill Quantity: 45 tablet,  # refills: 3   Last office visit: 3/9/2018 with prescribing provider:  Braden   Future Office Visit:      Requested Prescriptions   Pending Prescriptions Disp Refills     chlorthalidone (HYGROTON) 25 MG tablet [Pharmacy Med Name: CHLORTHALIDONE 25MG TABS] 45 tablet 3     Sig: TAKE ONE-HALF TABLET BY MOUTH ONCE DAILY    Diuretics (Including Combos) Protocol Failed    9/17/2018 11:41 PM       Failed - Blood pressure under 140/90 in past 12 months    BP Readings from Last 3 Encounters:   08/25/18 (!) 163/92   08/12/18 166/67   03/09/18 144/74                Passed - Recent (12 mo) or future (30 days) visit within the authorizing provider's specialty    Patient had office visit in the last 12 months or has a visit in the next 30 days with authorizing provider or within the authorizing provider's specialty.  See \"Patient Info\" tab in inbasket, or \"Choose Columns\" in Meds & Orders section of the refill encounter.           Passed - Patient is age 18 or older       Passed - No active pregancy on record       Passed - Normal serum creatinine on file in past 12 months    Recent Labs   Lab Test  08/25/18   0925   CR  0.71             Passed - Normal serum potassium on file in past 12 months    Recent Labs   Lab Test  08/25/18   0925   POTASSIUM  3.9                   Passed - Normal serum sodium on file in past 12 months    Recent Labs   Lab Test  08/25/18   0925   NA  140             Passed - No positive pregnancy test in past 12 months          "

## 2018-09-24 ENCOUNTER — OFFICE VISIT (OUTPATIENT)
Dept: FAMILY MEDICINE | Facility: CLINIC | Age: 78
End: 2018-09-24
Payer: MEDICARE

## 2018-09-24 VITALS
TEMPERATURE: 97.9 F | RESPIRATION RATE: 16 BRPM | WEIGHT: 130 LBS | DIASTOLIC BLOOD PRESSURE: 78 MMHG | HEART RATE: 63 BPM | BODY MASS INDEX: 23.92 KG/M2 | SYSTOLIC BLOOD PRESSURE: 138 MMHG | HEIGHT: 62 IN | OXYGEN SATURATION: 99 %

## 2018-09-24 DIAGNOSIS — J06.9 UPPER RESPIRATORY TRACT INFECTION, UNSPECIFIED TYPE: Primary | ICD-10-CM

## 2018-09-24 DIAGNOSIS — I10 BENIGN ESSENTIAL HYPERTENSION: ICD-10-CM

## 2018-09-24 PROCEDURE — 99214 OFFICE O/P EST MOD 30 MIN: CPT | Performed by: FAMILY MEDICINE

## 2018-09-24 RX ORDER — AMOXICILLIN 500 MG/1
500 CAPSULE ORAL 3 TIMES DAILY
Qty: 30 CAPSULE | Refills: 0 | Status: SHIPPED | OUTPATIENT
Start: 2018-09-24 | End: 2018-10-29

## 2018-09-24 RX ORDER — CHLORTHALIDONE 25 MG/1
12.5 TABLET ORAL DAILY
Qty: 45 TABLET | Refills: 3 | Status: SHIPPED | OUTPATIENT
Start: 2018-09-24 | End: 2019-11-26

## 2018-09-24 NOTE — PROGRESS NOTES
"  SUBJECTIVE:   Francois Ly is a 78 year old female who presents to clinic today for the following health issues:      Acute Illness   Acute illness concerns: URI  Onset: 2 weeks    Fever: no    Chills/Sweats: no    Headache (location?): YES    Sinus Pressure:YES- post-nasal drainage and facial pain    Conjunctivitis:  no    Ear Pain: YES: bilateral    Rhinorrhea: YES    Congestion: no    Sore Throat: no     Cough: no    Wheeze: no    Decreased Appetite: YES    Nausea: no    Vomiting: no    Diarrhea:  no    Dysuria/Freq.: no    Fatigue/Achiness: YES    Sick/Strep Exposure: no     Therapies Tried and outcome: OTC medicine.      p0t is traveling to [place] tomorrow, and she is worrying as her cold symptoms are not improving.      Problem list and histories reviewed & adjusted, as indicated.  Additional history: as documented    Patient Active Problem List   Diagnosis     Back pain     Chest pain     Pancreatic mass     Hypertensive urgency     Benign essential hypertension     Blunt trauma of rib, initial encounter     SBO (small bowel obstruction)     Small bowel obstruction     PAD (peripheral artery disease) (H)     Bilateral low back pain without sciatica, unspecified chronicity     Past Surgical History:   Procedure Laterality Date     GYN SURGERY      hysterectomy     ORTHOPEDIC SURGERY      Slipped disc with chronic back pain       Social History   Substance Use Topics     Smoking status: Former Smoker     Smokeless tobacco: Former User      Comment: Former \"social\" smoker, quit in 1978.     Alcohol use 0.0 oz/week     0 Standard drinks or equivalent per week      Comment: occ wine     Family History   Problem Relation Age of Onset     Family History Negative Other            Reviewed and updated as needed this visit by clinical staff  Tobacco  Allergies  Meds  Med Hx  Surg Hx  Fam Hx  Soc Hx      Reviewed and updated as needed this visit by Provider         ROS:      OBJECTIVE:     /78 (BP " "Location: Right arm, Patient Position: Chair, Cuff Size: Adult Regular)  Pulse 63  Temp 97.9  F (36.6  C) (Oral)  Resp 16  Ht 5' 2\" (1.575 m)  Wt 130 lb (59 kg)  SpO2 99%  BMI 23.78 kg/m2  Body mass index is 23.78 kg/(m^2).  Head: Normocephalic, atraumatic.  Eyes: Conjunctiva clear, non icteric. PERRLA.  Ears: External ears nl, TM is nl   Nose: Septum midline, nasal mucosa congested. No discharge.  There is no tenderness over the maxillary sinuses, there is no tenderness over the frontal sinuses  Mouth / Throat: Normal dentition.  No oral lesions. Pharynx no erythematous, tonsils no exudate/hypertrophy.  Neck: Supple, no enlarged LN, trachea midline.  LUNGS:  CTA B/L, no wheezing or crackles.  CVS : RRR, no murmur, no rub.              ASSESSMENT/PLAN:           1. Benign essential hypertension  Refill given for   - chlorthalidone (HYGROTON) 25 MG tablet; Take 0.5 tablets (12.5 mg) by mouth daily  Dispense: 45 tablet; Refill: 3  BP is controlled.     2. Upper respiratory tract infection, unspecified type  Mostly viral, symptoms are going away gradually, but given that the patient is traveling over seas, will give a prescription for   - amoxicillin (AMOXIL) 500 MG capsule; Take 1 capsule (500 mg) by mouth 3 times daily  Dispense: 30 capsule; Refill: 0  To take only if her symptoms worsen or complications occurred.    Follow up in 7 to 10 days if symptoms persist, sooner if symptoms worsen or new ones develops, pt may contact us over the phone for any questions or concerns.      Luis Feliciano MD  [facility]"

## 2018-10-29 ENCOUNTER — OFFICE VISIT (OUTPATIENT)
Dept: FAMILY MEDICINE | Facility: CLINIC | Age: 78
End: 2018-10-29
Payer: MEDICARE

## 2018-10-29 VITALS
BODY MASS INDEX: 24.48 KG/M2 | HEIGHT: 62 IN | RESPIRATION RATE: 16 BRPM | WEIGHT: 133 LBS | SYSTOLIC BLOOD PRESSURE: 152 MMHG | TEMPERATURE: 98.1 F | DIASTOLIC BLOOD PRESSURE: 72 MMHG | OXYGEN SATURATION: 98 % | HEART RATE: 70 BPM

## 2018-10-29 DIAGNOSIS — M26.609 TMJ (TEMPOROMANDIBULAR JOINT SYNDROME): Primary | ICD-10-CM

## 2018-10-29 DIAGNOSIS — K14.6 TONGUE PAIN: ICD-10-CM

## 2018-10-29 PROCEDURE — 99213 OFFICE O/P EST LOW 20 MIN: CPT | Performed by: FAMILY MEDICINE

## 2018-10-29 NOTE — PROGRESS NOTES
"  SUBJECTIVE:   Francois Ly is a 78 year old female who presents to clinic today for the following health issues:      Concern - pain  Onset: 10 days    Description:   Jaw and tongue pain    Intensity: moderate    Progression of Symptoms:  Improving    She went to see the dentist 10 days ago to remove her caps, and since then she has pain in the Lt jaw, and unable to open the jaw.    Since then she felt pain under the tongue on the Left, and she is having hard time opening the jaw although it is getting better, also with pain in the Lt jaw that radiate to the ear.        Problem list and histories reviewed & adjusted, as indicated.  Additional history: as documented    Patient Active Problem List   Diagnosis     Back pain     Chest pain     Pancreatic mass     Hypertensive urgency     Benign essential hypertension     Blunt trauma of rib, initial encounter     SBO (small bowel obstruction) (H)     Small bowel obstruction (H)     PAD (peripheral artery disease) (H)     Bilateral low back pain without sciatica, unspecified chronicity     Past Surgical History:   Procedure Laterality Date     GYN SURGERY      hysterectomy     ORTHOPEDIC SURGERY      Slipped disc with chronic back pain       Social History   Substance Use Topics     Smoking status: Former Smoker     Smokeless tobacco: Former User      Comment: Former \"social\" smoker, quit in 1978.     Alcohol use 0.0 oz/week     0 Standard drinks or equivalent per week      Comment: occ wine     Family History   Problem Relation Age of Onset     Family History Negative Other            Reviewed and updated as needed this visit by clinical staff  Tobacco  Allergies  Meds  Med Hx  Surg Hx  Fam Hx  Soc Hx      Reviewed and updated as needed this visit by Provider         ROS:  CONSTITUTIONAL: NEGATIVE for fever, chills, change in weight    OBJECTIVE:     /72 (BP Location: Right arm, Patient Position: Chair, Cuff Size: Adult Regular)  Pulse 70  Temp 98.1  F " "(36.7  C) (Oral)  Resp 16  Ht 5' 2\" (1.575 m)  Wt 133 lb (60.3 kg)  SpO2 98%  BMI 24.33 kg/m2  Body mass index is 24.33 kg/(m^2).  GENERAL: healthy, alert and no distress  HENT: nose and mouth without ulcers or lesions, oropharynx clear and oral mucous membranes moist  Tongue is normal, but slightly tender to touch on the Lt lower part, no lesions.  TM joint is painful to full opening, no crepitus or asymmetry         ASSESSMENT/PLAN:             1. TMJ (temporomandibular joint syndrome)  Related to long dental procedure, advised to avoid chewing on hard food, opening the mouth too wide, and may take NSAID's as needed.    2. Tongue pain  Traumatic related to dental procedure. Supportive management.      Follow up in 7 days if symptoms persist, sooner if symptoms worsen or new ones develops, pt may contact us over the phone for any questions or concerns.      Luis Feliciano MD  [facility]"

## 2018-10-29 NOTE — MR AVS SNAPSHOT
"              After Visit Summary   10/29/2018    Francois Ly    MRN: [id number]           Patient Information     Date Of Birth          1940        Visit Information        Provider Department      10/29/2018 10:30 AM Luis Feliciano MD [facility]         Follow-ups after your visit        Follow-up notes from your care team     Return in about 6 months (around 4/29/2019).      Who to contact     If you have questions or need follow up information about today's clinic visit or your schedule please contact [facility] directly at [phone].  Normal or non-critical lab and imaging results will be communicated to you by MyChart, letter or phone within 4 business days after the clinic has received the results. If you do not hear from us within 7 days, please contact the clinic through [employer] or phone. If you have a critical or abnormal lab result, we will notify you by phone as soon as possible.  Submit refill requests through "[employer] or call your pharmacy and they will forward the refill request to us. Please allow 3 business days for your refill to be completed.          Additional Information About Your Visit        MyChart Information     "[employer] gives you secure access to your electronic health record. If you see a primary care provider, you can also send messages to your care team and make appointments. If you have questions, please call your primary care clinic.  If you do not have a primary care provider, please call [phone] and they will assist you.        Care EveryWhere ID     This is your Care EveryWhere ID. This could be used by other organizations to access your [facility] medical records  [id number]        Your Vitals Were     Pulse Temperature Respirations Height Pulse Oximetry BMI (Body Mass Index)    70 98.1  F (36.7  C) (Oral) 16 5' 2\" (1.575 m) 98% 24.33 kg/m2       Blood Pressure from Last 3 Encounters:   10/29/18 152/72   09/24/18 138/78   08/25/18 " (!) 163/92    Weight from Last 3 Encounters:   10/29/18 133 lb (60.3 kg)   09/24/18 130 lb (59 kg)   08/25/18 130 lb (59 kg)              Today, you had the following     No orders found for display       Primary Care Provider Office Phone # Fax #    Luis Feliciano [phone] [address] [facility] [address]        Equal Access to Services     JADON YOON : Hadii aad ku hadasho Soomaali, waaxda luqadaha, qaybta kaalmada adeegyada, waxay idiin hayaan adeeg kharatoro lataya . So [facility] [phone].    ATENCIÓN: Si marissa burr, tiene a huertas disposición servicios gratuitos de asistencia lingüística. Llame al [phone].    We comply with applicable federal civil rights laws and [place] laws. We do not discriminate on the basis of race, color, national origin, age, disability, sex, sexual orientation, or gender identity.            Thank you!     Thank you for choosing [facility]  for your care. Our goal is always to provide you with excellent care. Hearing back from our patients is one way we can continue to improve our services. Please take a few minutes to complete the written survey that you may receive in the mail after your visit with us. Thank you!             Your Updated Medication List - Protect others around you: Learn how to safely use, store and throw away your medicines at www.disposemymeds.org.          This list is accurate as of 10/29/18 11:09 AM.  Always use your most recent med list.                   Brand Name Dispense Instructions for use Diagnosis    chlorthalidone 25 MG tablet    HYGROTON    45 tablet    Take 0.5 tablets (12.5 mg) by mouth daily    Benign essential hypertension       [facility] FISH OIL 1200 MG Caps      Take 1 capsule by mouth daily        losartan 50 MG tablet    COZAAR    90 tablet    Take 1 tablet (50 mg) by mouth daily    Benign essential hypertension       STATIN NOT PRESCRIBED (INTENTIONAL)      Please choose reason not prescribed,  below    PAD (peripheral artery disease) (H)       vitamin D3 1000 units Caps      Take 1,000 Units by mouth daily

## 2018-10-31 ENCOUNTER — ALLIED HEALTH/NURSE VISIT (OUTPATIENT)
Dept: NURSING | Facility: CLINIC | Age: 78
End: 2018-10-31
Payer: MEDICARE

## 2018-10-31 DIAGNOSIS — Z23 NEED FOR PROPHYLACTIC VACCINATION AND INOCULATION AGAINST INFLUENZA: Primary | ICD-10-CM

## 2018-10-31 PROCEDURE — 90662 IIV NO PRSV INCREASED AG IM: CPT

## 2018-10-31 PROCEDURE — G0008 ADMIN INFLUENZA VIRUS VAC: HCPCS

## 2018-10-31 NOTE — MR AVS SNAPSHOT
After Visit Summary   10/31/2018    Francois Ly    MRN: [id number]           Patient Information     Date Of Birth          1940        Visit Information        Provider Department      10/31/2018 10:30 AM YOSI ALVARADO/LPN [facility]        Today's Diagnoses     Need for prophylactic vaccination and inoculation against influenza    -  1       Follow-ups after your visit        Who to contact     If you have questions or need follow up information about today's clinic visit or your schedule please contact [facility] directly at [phone].  Normal or non-critical lab and imaging results will be communicated to you by [employer], letter or phone within 4 business days after the clinic has received the results. If you do not hear from us within 7 days, please contact the clinic through [employer] or phone. If you have a critical or abnormal lab result, we will notify you by phone as soon as possible.  Submit refill requests through [employer] or call your pharmacy and they will forward the refill request to us. Please allow 3 business days for your refill to be completed.          Additional Information About Your Visit        MyChart Information     [employer] gives you secure access to your electronic health record. If you see a primary care provider, you can also send messages to your care team and make appointments. If you have questions, please call your primary care clinic.  If you do not have a primary care provider, please call [phone] and they will assist you.        Care EveryWhere ID     This is your Care EveryWhere ID. This could be used by other organizations to access your [facility] medical records  [id number]         Blood Pressure from Last 3 Encounters:   10/29/18 152/72   09/24/18 138/78   08/25/18 (!) 163/92    Weight from Last 3 Encounters:   10/29/18 133 lb (60.3 kg)   09/24/18 130 lb (59 kg)   08/25/18 130 lb (59 kg)              We Performed the  Following     FLU VACCINE, INCREASED ANTIGEN, PRESV FREE, AGE 65+ [46606]     Vaccine Administration, Initial [[id number]]        Primary Care Provider Office Phone # Fax #    Luis Feliciano [phone] [address]  [facility] [address]        Equal Access to Services     IAM YOON : Hadii aad ku hadasho Soomaali, waaxda luqadaha, qaybta kaalmada ademaritayada, giovanni bauerjosetoro smith. So [facility] [phone].    ATENCIÓN: Si habla español, tiene a huertas disposición servicios gratuitos de asistencia lingüística. Llame al [phone].    We comply with applicable federal civil rights laws and [place] laws. We do not discriminate on the basis of race, color, national origin, age, disability, sex, sexual orientation, or gender identity.            Thank you!     Thank you for choosing [facility]  for your care. Our goal is always to provide you with excellent care. Hearing back from our patients is one way we can continue to improve our services. Please take a few minutes to complete the written survey that you may receive in the mail after your visit with us. Thank you!             Your Updated Medication List - Protect others around you: Learn how to safely use, store and throw away your medicines at www.disposemymeds.org.          This list is accurate as of 10/31/18 10:35 AM.  Always use your most recent med list.                   Brand Name Dispense Instructions for use Diagnosis    chlorthalidone 25 MG tablet    HYGROTON    45 tablet    Take 0.5 tablets (12.5 mg) by mouth daily    Benign essential hypertension       CVS FISH OIL 1200 MG Caps      Take 1 capsule by mouth daily        losartan 50 MG tablet    COZAAR    90 tablet    Take 1 tablet (50 mg) by mouth daily    Benign essential hypertension       STATIN NOT PRESCRIBED (INTENTIONAL)      Please choose reason not prescribed, below    PAD (peripheral artery disease) (H)       vitamin D3 1000 units Caps       Take 1,000 Units by mouth daily

## 2018-12-28 ENCOUNTER — OFFICE VISIT (OUTPATIENT)
Dept: FAMILY MEDICINE | Facility: CLINIC | Age: 78
End: 2018-12-28
Payer: MEDICARE

## 2018-12-28 VITALS
TEMPERATURE: 97.7 F | WEIGHT: 133 LBS | BODY MASS INDEX: 24.48 KG/M2 | HEIGHT: 62 IN | DIASTOLIC BLOOD PRESSURE: 70 MMHG | HEART RATE: 61 BPM | SYSTOLIC BLOOD PRESSURE: 130 MMHG | RESPIRATION RATE: 16 BRPM

## 2018-12-28 DIAGNOSIS — E55.9 VITAMIN D DEFICIENCY: ICD-10-CM

## 2018-12-28 DIAGNOSIS — M62.838 MUSCLE SPASM: ICD-10-CM

## 2018-12-28 DIAGNOSIS — E78.5 HYPERLIPIDEMIA LDL GOAL <160: Primary | ICD-10-CM

## 2018-12-28 DIAGNOSIS — R68.89 OTHER GENERAL SYMPTOMS AND SIGNS: ICD-10-CM

## 2018-12-28 LAB
CHOLEST SERPL-MCNC: 285 MG/DL
DEPRECATED CALCIDIOL+CALCIFEROL SERPL-MC: 23 UG/L (ref 20–75)
HDLC SERPL-MCNC: 57 MG/DL
LDLC SERPL CALC-MCNC: 175 MG/DL
NONHDLC SERPL-MCNC: 228 MG/DL
TRIGL SERPL-MCNC: 264 MG/DL
TSH SERPL DL<=0.005 MIU/L-ACNC: 2.51 MU/L (ref 0.4–4)

## 2018-12-28 PROCEDURE — 99214 OFFICE O/P EST MOD 30 MIN: CPT | Performed by: FAMILY MEDICINE

## 2018-12-28 PROCEDURE — 80061 LIPID PANEL: CPT | Performed by: FAMILY MEDICINE

## 2018-12-28 PROCEDURE — 84443 ASSAY THYROID STIM HORMONE: CPT | Performed by: FAMILY MEDICINE

## 2018-12-28 PROCEDURE — 82306 VITAMIN D 25 HYDROXY: CPT | Performed by: FAMILY MEDICINE

## 2018-12-28 PROCEDURE — 36415 COLL VENOUS BLD VENIPUNCTURE: CPT | Performed by: FAMILY MEDICINE

## 2018-12-28 ASSESSMENT — MIFFLIN-ST. JEOR: SCORE: 1036.53

## 2018-12-28 NOTE — PROGRESS NOTES
"  SUBJECTIVE:   Francois Ly is a 78 year old female who presents to clinic today for the following health issues:      HPI  Thyroid concerns, been having muscle cramps on and off for years in legs and feet    PT HAS BEEN HAVING CHRONIC pain in the legs, and usually occur when she walks for 2 to 3 miles.  Sometimes she gets sever pain in the legs, that she wonders if her thyroid is normal..      Problem list and histories reviewed & adjusted, as indicated.  Additional history: none        Patient Active Problem List   Diagnosis     Back pain     Chest pain     Pancreatic mass     Hypertensive urgency     Benign essential hypertension     Blunt trauma of rib, initial encounter     SBO (small bowel obstruction) (H)     Small bowel obstruction (H)     PAD (peripheral artery disease) (H)     Bilateral low back pain without sciatica, unspecified chronicity     Past Surgical History:   Procedure Laterality Date     GYN SURGERY      hysterectomy     ORTHOPEDIC SURGERY      Slipped disc with chronic back pain       Social History     Tobacco Use     Smoking status: Former Smoker     Smokeless tobacco: Former User     Tobacco comment: Former \"social\" smoker, quit in 1978.   Substance Use Topics     Alcohol use: Yes     Alcohol/week: 0.0 oz     Comment: occ wine     Family History   Problem Relation Age of Onset     Family History Negative Other          Current Outpatient Medications   Medication Sig Dispense Refill     chlorthalidone (HYGROTON) 25 MG tablet Take 0.5 tablets (12.5 mg) by mouth daily 45 tablet 3     Cholecalciferol (VITAMIN D3) 1000 units CAPS Take 1,000 Units by mouth daily        losartan (COZAAR) 50 MG tablet Take 1 tablet (50 mg) by mouth daily 90 tablet 3     Omega-3 Fatty Acids (CVS FISH OIL) 1200 MG CAPS Take 1 capsule by mouth daily       STATIN NOT PRESCRIBED, INTENTIONAL, Please choose reason not prescribed, below         ROS:  CONSTITUTIONAL: NEGATIVE for fever, chills, change in weight  CV: " "NEGATIVE for chest pain, palpitations or peripheral edema    OBJECTIVE:     /70 (BP Location: Right arm, Patient Position: Chair, Cuff Size: Adult Large)   Pulse 61   Temp 97.7  F (36.5  C) (Oral)   Resp 16   Ht 1.575 m (5' 2\")   Wt 60.3 kg (133 lb)   BMI 24.33 kg/m    Body mass index is 24.33 kg/m .  GENERAL: healthy, alert and no distress  CV: regular rates and rhythm, normal S1 S2, no S3 or S4, no murmur, click or rub and decreased pulse both legs.  MS: no gross musculoskeletal defects noted, no edema        ASSESSMENT/PLAN:             1. Hyperlipidemia LDL goal <160  Pt has stopped her statins, will check.  - Lipid panel reflex to direct LDL Fasting    2. Muscle spasm  Will check both   - TSH with free T4 reflex  - Vitamin D Deficiency  talkeda bout stretching exercise and maybe using Magnesium supplements.  3. Other general symptoms and signs     - TSH with free T4 reflex    4. Vitamin D deficiency     - Vitamin D Deficiency        Luis Feliciano MD  [facility]"

## 2019-01-08 ENCOUNTER — TELEPHONE (OUTPATIENT)
Dept: FAMILY MEDICINE | Facility: CLINIC | Age: 79
End: 2019-01-08

## 2019-01-08 DIAGNOSIS — E78.5 HYPERLIPIDEMIA LDL GOAL <130: ICD-10-CM

## 2019-01-08 NOTE — TELEPHONE ENCOUNTER
Panel Management Review      Patient has the following on her problem list:   Hypertension   Last three blood pressure readings:  BP Readings from Last 3 Encounters:   12/28/18 130/70   10/29/18 152/72   09/24/18 138/78     Blood pressure: Passed    HTN Guidelines:  Age 18-59 BP range:  Less than 140/90  Age 60-85 with Diabetes:  Less than 140/90  Age 60-85 without Diabetes:  less than 150/90      Composite cancer screening  Chart review shows that this patient is due/due soon for the following None  Summary:    Patient is due/failing the following:   Update Problem List    Action needed:   Update Problem List    Type of outreach:    Not needed    Questions for provider review:    None                                                                                                                                    Madeline Valadez CMA       Chart routed to Care Team .

## 2019-04-24 ASSESSMENT — ENCOUNTER SYMPTOMS
TASTE DISTURBANCE: 0
INSOMNIA: 1
HEARTBURN: 1
DECREASED CONCENTRATION: 0
SINUS CONGESTION: 0
NECK MASS: 0
NERVOUS/ANXIOUS: 1
BLOATING: 1
SORE THROAT: 0
STIFFNESS: 1
RECTAL PAIN: 0
JOINT SWELLING: 1
TROUBLE SWALLOWING: 0
DEPRESSION: 1
BACK PAIN: 1
MYALGIAS: 1
NAUSEA: 0
ARTHRALGIAS: 1
MUSCLE CRAMPS: 1
SMELL DISTURBANCE: 0
BOWEL INCONTINENCE: 0
PANIC: 0
NECK PAIN: 1
JAUNDICE: 0
BLOOD IN STOOL: 0
DIARRHEA: 1
VOMITING: 0
HOARSE VOICE: 0
MUSCLE WEAKNESS: 0
CONSTIPATION: 0
ABDOMINAL PAIN: 1

## 2019-04-24 ASSESSMENT — ACTIVITIES OF DAILY LIVING (ADL)
IN_THE_PAST_7_DAYS,_DID_YOU_NEED_HELP_FROM_OTHERS_TO_PERFORM_EVERYDAY_ACTIVITIES_SUCH_AS_EATING,_GETTING_DRESSED,_GROOMING,_BATHING,_WALKING,_OR_USING_THE_TOILET: N
IN_THE_PAST_7_DAYS,_DID_YOU_NEED_HELP_FROM_OTHERS_TO_TAKE_CARE_OF_THINGS_SUCH_AS_LAUNDRY_AND_HOUSEKEEPING,_BANKING,_SHOPPING,_USING_THE_TELEPHONE,_FOOD_PREPARATION,_TRANSPORTATION,_OR_TAKING_YOUR_OWN_MEDICATIONS?: N

## 2019-05-03 ENCOUNTER — OFFICE VISIT (OUTPATIENT)
Dept: INTERNAL MEDICINE | Facility: CLINIC | Age: 79
End: 2019-05-03
Payer: MEDICARE

## 2019-05-03 VITALS
HEART RATE: 60 BPM | WEIGHT: 131 LBS | HEIGHT: 62 IN | DIASTOLIC BLOOD PRESSURE: 83 MMHG | OXYGEN SATURATION: 98 % | BODY MASS INDEX: 24.11 KG/M2 | SYSTOLIC BLOOD PRESSURE: 156 MMHG

## 2019-05-03 DIAGNOSIS — M48.062 SPINAL STENOSIS OF LUMBAR REGION WITH NEUROGENIC CLAUDICATION: Primary | ICD-10-CM

## 2019-05-03 DIAGNOSIS — I73.9 PERIPHERAL ARTERY DISEASE (H): ICD-10-CM

## 2019-05-03 RX ORDER — GABAPENTIN 100 MG/1
100-300 CAPSULE ORAL AT BEDTIME
Qty: 90 CAPSULE | Refills: 0 | Status: SHIPPED | OUTPATIENT
Start: 2019-05-03 | End: 2019-05-28

## 2019-05-03 RX ORDER — ASPIRIN 81 MG/1
81 TABLET, CHEWABLE ORAL DAILY
COMMUNITY
End: 2021-09-30

## 2019-05-03 ASSESSMENT — MIFFLIN-ST. JEOR: SCORE: 1022.59

## 2019-05-03 ASSESSMENT — PAIN SCALES - GENERAL: PAINLEVEL: NO PAIN (0)

## 2019-05-03 NOTE — NURSING NOTE
Chief Complaint   Patient presents with     Establish Care     Patient is here to establish care with dr rachel Roman, EMT at 7:45 AM on 5/3/2019.

## 2019-05-03 NOTE — PROGRESS NOTES
"CC:  Consultation, 3rd opinion, not transfer of care    HPI:  C/O chronic back pain and severe, intermittent leg cramps.  Not all of her outside records are available at this time.    Hx mm cramps since 2003 in legs and feet  Sometimes severe, improved with putting legs down  Walking can increase low back pain and leg pains and LLE numbness  Bending over to do ADL's causes back to hurt,  When walking can develop LLE pain and numbness, goes away with rest, then can continuing  Like someone grabbing her, on occasion also feels like has to go to urinate and have bowel movement at night when she has an episode of pain, urinating and having bowel movements help at those times  Also can have left leg numbness  Uses Aleve prn, heat pad, topical cream (from [place]) all help, magnesium helps  Is taking Vit D  Worse with when sleeping and when gets up to go to toilet (positional), also worse with prolonged sitting  Was told to take Aleve bid, but does not do this because of stomach upset  Pain frequency--can occur 3 times a night for 2-3 nights, then can go away for a month, then may return.  Not consistent  May have been triggered by slip and fall approximately 2003-4  Work up has included TSH, Vit D. Lipids, electrolytes, CBC, A1C   US SAULO Doppler no exercise (findings mild to mod RLE arterial insufficiency and mod LLE insuff at rest)      Has been seen at [facility] and [facility] along with her primary care clinic  Not all records available at this time. Per patient report:  No change in leg pains  Spinal surgery (sounds like spinal fusion) for spinal stenosis 2015, had severe radicular pain in both legs at [facility] evaluation for PAD found by US with primary care MD, was told the severity of the was not severe enough for intervention  States [facility] offered cortisone for spinal stenosis, but patient declined. Went to [place] for second opinion, and they recommended the same.  Patient chose surgery to \"cure\" her " problem.    Patient is wondering if having a stent would make a difference and is interested in additional consultations here at the .    Answers for HPI/ROS submitted by the patient on 4/24/2019   General Symptoms: No  Skin Symptoms: No  HENT Symptoms: Yes  EYE SYMPTOMS: No  HEART SYMPTOMS: No  LUNG SYMPTOMS: No  INTESTINAL SYMPTOMS: Yes  URINARY SYMPTOMS: No  GYNECOLOGIC SYMPTOMS: No  BREAST SYMPTOMS: No  SKELETAL SYMPTOMS: Yes  BLOOD SYMPTOMS: No  NERVOUS SYSTEM SYMPTOMS: No  MENTAL HEALTH SYMPTOMS: Yes  Ear pain: Yes  Ear discharge: No  Hearing loss: No  Tinnitus: No  Nosebleeds: No  Congestion: No  Trouble swallowing: No   Voice hoarseness: No  Mouth sores: No  Sore throat: No  Tooth pain: No  Gum tenderness: No  Bleeding gums: No  Change in taste: No  Change in sense of smell: No  Dry mouth: Yes  Hearing aid used: No  Neck lump: No  Heart burn or indigestion: Yes  Nausea: No  Vomiting: No  Abdominal pain: Yes  Bloating: Yes  Constipation: No  Diarrhea: Yes  Blood in stool: No  Black stools: No  Rectal or Anal pain: No  Fecal incontinence: No  Yellowing of skin or eyes: No  Vomit with blood: No  Change in stools: No  Back pain: Yes  Muscle aches: Yes  Neck pain: Yes  Swollen joints: Yes  Joint pain: Yes  Muscle cramps: Yes  Muscle weakness: No  Joint stiffness: Yes  Bone fracture: No  Nervous or Anxious: Yes  Depression: Yes  Trouble sleeping: Yes  Trouble thinking or concentrating: No  Mood changes: No  Panic attacks: No    Patient Active Problem List   Diagnosis     Back pain     Chest pain     Pancreatic mass     Hypertensive urgency     Benign essential hypertension     Blunt trauma of rib, initial encounter     SBO (small bowel obstruction) (H)     Small bowel obstruction (H)     PAD (peripheral artery disease) (H)     Bilateral low back pain without sciatica, unspecified chronicity     Hyperlipidemia LDL goal <130   SBO 8/2/18    Past Medical History:   Diagnosis Date     Benign essential hypertension  "10/12/2016     Bilateral low back pain without sciatica, unspecified chronicity 12/19/2017     Blunt trauma of rib, initial encounter 11/25/2016     Gallstones      Hypertension      PAD (peripheral artery disease) (H) 6/19/2017     Pancreatic mass 8/17/2016     Slipped intervertebral disc      Past Surgical History:   Procedure Laterality Date     GYN SURGERY      hysterectomy     ORTHOPEDIC SURGERY      Slipped disc with chronic back pain     Family History   Problem Relation Age of Onset     Family History Negative Other      Social History     Socioeconomic History     Marital status:      Spouse name: Not on file     Number of children: Not on file     Years of education: Not on file     Highest education level: Not on file   Occupational History     Not on file   Social Needs     Financial resource strain: Not on file     Food insecurity:     Worry: Not on file     Inability: Not on file     Transportation needs:     Medical: Not on file     Non-medical: Not on file   Tobacco Use     Smoking status: Former Smoker     Smokeless tobacco: Former User     Tobacco comment: Former \"social\" smoker, quit in 1978.   Substance and Sexual Activity     Alcohol use: Yes     Alcohol/week: 0.0 oz     Comment: occ wine     Drug use: No     Sexual activity: Not Currently   Lifestyle     Physical activity:     Days per week: Not on file     Minutes per session: Not on file     Stress: Not on file   Relationships     Social connections:     Talks on phone: Not on file     Gets together: Not on file     Attends Jainism service: Not on file     Active member of club or organization: Not on file     Attends meetings of clubs or organizations: Not on file     Relationship status: Not on file     Intimate partner violence:     Fear of current or ex partner: Not on file     Emotionally abused: Not on file     Physically abused: Not on file     Forced sexual activity: Not on file   Other Topics Concern     Parent/sibling w/ " "CABG, MI or angioplasty before 65F 55M? Not Asked   Social History Narrative     Not on file     Current Outpatient Medications   Medication Sig Dispense Refill     aspirin (ASA) 81 MG chewable tablet Take 81 mg by mouth daily       chlorthalidone (HYGROTON) 25 MG tablet Take 0.5 tablets (12.5 mg) by mouth daily 45 tablet 3     Cholecalciferol (VITAMIN D3) 1000 units CAPS Take 1,000 Units by mouth daily        Cyanocobalamin (VITAMIN B-12 PO) Take 10,000 mg by mouth daily       losartan (COZAAR) 50 MG tablet TAKE ONE TABLET BY MOUTH EVERY DAY 90 tablet 3     Omega-3 Fatty Acids ([employer] FISH OIL) 1200 MG CAPS Take 1 capsule by mouth daily       Allergies   Allergen Reactions     Lactose GI Disturbance     Reduced fat dairy- Diarrhea     Sorbitan Trioleate      Vancomycin      /83 (BP Location: Right arm, Patient Position: Sitting, Cuff Size: Adult Regular)   Pulse 60   Ht 1.575 m (5' 2.01\")   Wt 59.4 kg (131 lb)   LMP  (LMP Unknown)   SpO2 98%   Breastfeeding? No   BMI 23.95 kg/m    BP Readings from Last 6 Encounters:   05/03/19 156/83   12/28/18 130/70   10/29/18 152/72   09/24/18 138/78   08/25/18 (!) 163/92   08/12/18 166/67     Gen:  NAD  Back:  Points to bilateral SI joint areas, posterior thighs wrapping around lateral legs to tops of feet where she gets pain.  Has excellent AROM back, hips, knees  No spinal, SI tenderness  Gait normal, when I asked her to walk on her toes her right foot would collapse, however, she can stand on her toes and heels and her strength testing of foot flexion and extension is 5/5.  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  Discussed potential side effects and I advised her to take it nightly.    Peripheral artery disease (H)  -     VASCULAR MEDICINE REFERRAL; Future    Patient wishes to continue seeing her primary physician at an outside clinic.  I advised f/u in 2 months there.    Total time spent 45  minutes.  More than 50% of the time spent with Ms. Ly on counseling / coordinating her care      Jammie Wild M.D.  Internal Medicine  [facility] Center   pager [phone]

## 2019-05-03 NOTE — PATIENT INSTRUCTIONS
[facility] Medication Refill Request Information:  * Please contact your pharmacy regarding ANY request for medication refills.  ** [facility] Prescription Fax = [phone]  * Please allow 3 business days for routine medication refills.  * Please allow 5 business days for controlled substance medication refills.     [facility] Test Result notification information:  *You will be notified with in 7-10 days of your appointment day regarding the results of your test.  If you are on MyChart you will be notified as soon as the provider has reviewed the results and signed off on them.    [facility]: [phone]     Vascular [phone]

## 2019-05-07 ENCOUNTER — DOCUMENTATION ONLY (OUTPATIENT)
Dept: CARE COORDINATION | Facility: CLINIC | Age: 79
End: 2019-05-07

## 2019-05-07 DIAGNOSIS — I73.9 CLAUDICATION (H): Primary | ICD-10-CM

## 2019-05-07 NOTE — TELEPHONE ENCOUNTER
RECORDS RECEIVED FROM: Internal Referral from Jammie Wild MD for Peripheral artery disease - Schedule Per PT's     DATE RECEIVED: 5/8/19   NOTES STATUS DETAILS   OFFICE NOTE from referring provider Internal 5/3/19 [place]   OFFICE NOTE from other specialist Care Everywhere 8/14/18 Vascular    OPERATIVE REPORT Care Everywhere 8/20/18 Dr. Armenta   MEDICATION LIST Internal    PERTINENT LABS Internal    CTA (CT ANGIOGRAPHY) N/A    CT Internal/ Care Everywhere 8/11/18, 8/14/18   MRI Internal/ Care Everywhere 8/11/18, 8/14/18   ULTRASOUND Internal/Care Everywhere 8/13/18,8/25/18     Images resolved 5/7/19

## 2019-05-08 ENCOUNTER — PRE VISIT (OUTPATIENT)
Dept: VASCULAR SURGERY | Facility: CLINIC | Age: 79
End: 2019-05-08

## 2019-05-17 NOTE — TELEPHONE ENCOUNTER
RECORDS RECEIVED FROM: Internal/Care Everywhere   DATE RECEIVED: 5-28-19   NOTES STATUS DETAILS   OFFICE NOTE from referring provider  Internal    OFFICE NOTE from other vascular specilists Care Everywhere 8-14-18   DISCHARGE SUMMARY from hospital  N/A    DISCHARGE REPORT from the ER N/A    OPERATIVE REPORT  N/A    MEDICATION LIST Internal    LABS     Most recent labs within 3 months            (most recent result of each lab test) N/A    IMAGING (DISC & REPORT)      Duplex Ultrasounds N/A    MRI/MRA N/A    Cath N/A    CT/CTA Scan  Received

## 2019-05-20 ENCOUNTER — DOCUMENTATION ONLY (OUTPATIENT)
Dept: CARE COORDINATION | Facility: CLINIC | Age: 79
End: 2019-05-20

## 2019-05-28 ENCOUNTER — OFFICE VISIT (OUTPATIENT)
Dept: CARDIOLOGY | Facility: CLINIC | Age: 79
End: 2019-05-28
Attending: INTERNAL MEDICINE
Payer: MEDICARE

## 2019-05-28 ENCOUNTER — ANCILLARY PROCEDURE (OUTPATIENT)
Dept: ULTRASOUND IMAGING | Facility: CLINIC | Age: 79
End: 2019-05-28
Attending: SURGERY
Payer: MEDICARE

## 2019-05-28 ENCOUNTER — PRE VISIT (OUTPATIENT)
Dept: CARDIOLOGY | Facility: CLINIC | Age: 79
End: 2019-05-28

## 2019-05-28 VITALS
SYSTOLIC BLOOD PRESSURE: 146 MMHG | DIASTOLIC BLOOD PRESSURE: 69 MMHG | HEIGHT: 62 IN | OXYGEN SATURATION: 96 % | WEIGHT: 133 LBS | HEART RATE: 58 BPM | BODY MASS INDEX: 24.48 KG/M2

## 2019-05-28 DIAGNOSIS — I70.90 ATHEROSCLEROSIS: ICD-10-CM

## 2019-05-28 DIAGNOSIS — I73.9 CLAUDICATION (H): ICD-10-CM

## 2019-05-28 DIAGNOSIS — I73.9 PERIPHERAL ARTERY DISEASE (H): ICD-10-CM

## 2019-05-28 PROCEDURE — G0463 HOSPITAL OUTPT CLINIC VISIT: HCPCS | Mod: ZF

## 2019-05-28 PROCEDURE — 99205 OFFICE O/P NEW HI 60 MIN: CPT | Mod: GC | Performed by: INTERNAL MEDICINE

## 2019-05-28 RX ORDER — ATORVASTATIN CALCIUM 20 MG/1
10 TABLET, FILM COATED ORAL DAILY
Qty: 90 TABLET | Refills: 3 | Status: SHIPPED | OUTPATIENT
Start: 2019-05-28 | End: 2020-02-14 | Stop reason: SINTOL

## 2019-05-28 ASSESSMENT — MIFFLIN-ST. JEOR: SCORE: 1031.53

## 2019-05-28 ASSESSMENT — PAIN SCALES - GENERAL: PAINLEVEL: NO PAIN (0)

## 2019-05-28 NOTE — TELEPHONE ENCOUNTER
RECORDS RECEIVED FROM: Spinal Stenosis of Lumbar Region with Neurogenic Claudication, per Pt's , imaging done at Home in October 2018   DATE RECEIVED: Jun 4, 2019    NOTES STATUS DETAILS   OFFICE NOTE from referring provider Internal Dr. Wild 5/3/19   OFFICE NOTE from other specialist Care Everywhere [facility] Dr. Armenta 8/20/18  Dr. Weldon 8/14/18  Dr. Singleton 6/27/17   DISCHARGE SUMMARY from hospital Received    DISCHARGE REPORT from the ER N/A    OPERATIVE REPORT Received 2015 L3-5 fusion    MEDICATION LIST Internal    IMPLANT RECORD/STICKER Received    LABS     CBC/DIFF N/A    CULTURES N/A    INJECTIONS DONE IN RADIOLOGY N/A    MRI Received 8/14/18   CT SCAN Received 8/14/18   XRAYS (IMAGES & REPORTS) Received 8/13/18   TUMOR     PATHOLOGY  Slides & report N/A      05/28/19   4:55 PM  Faxed request to [facility] for images, operative note and implant records  Faxed request to [facility]

## 2019-05-28 NOTE — LETTER
5/28/2019      RE: Francois Ly  [address] [facility] [address]       Dear Colleague,    Thank you for the opportunity to participate in the care of your patient, Francois Ly, at the [facility]. Please see a copy of my visit note below.    Vascular Cardiology Consultation Note   Attending: .   Reason for consultation: Abnormal SAULO bilaterally.   Date of Service: 5/30/2019      HPI:   This is a 80 YO F with history of spinal stenosis s/p surgery in 2015 with minimal relief, essential hypertension, hyperlipidemia, no prior documented coronary artery disease, is here for establishing care and discussing results from her abnormal ABIs. Patient has been experiencing claudication pain walking for a few blocks. Gets better after resting. Pain is mostly in her calf bilaterally. She was seen at the [facility] and had an SAULO in the past that was mildly abnormal and so no intervention was done. She has not been taking asa or statin. Denies any chest pain, palpitations, SOB, leg swelling. She has extensive deformities in her foot and knee joints bilaterally related to arthritis.     Past Medical History:   Past Medical History:   Diagnosis Date     Benign essential hypertension 10/12/2016     Bilateral low back pain without sciatica, unspecified chronicity 12/19/2017     Blunt trauma of rib, initial encounter 11/25/2016     Gallstones      Hypertension      PAD (peripheral artery disease) (H) 6/19/2017     Pancreatic mass 8/17/2016     Slipped intervertebral disc      Past Surgical History:   Past Surgical History:   Procedure Laterality Date     GYN SURGERY      hysterectomy     ORTHOPEDIC SURGERY      Slipped disc with chronic back pain     Allergies: Per MAR     Allergies   Allergen Reactions     Lactose GI Disturbance     Reduced fat dairy- Diarrhea     Sorbitan Trioleate      Vancomycin      Medications:   Per MAR current outpatient  "cardiovascular medications include:   (Not in a hospital admission)  Current Outpatient Medications   Medication Sig Dispense Refill     atorvastatin (LIPITOR) 20 MG tablet Take 0.5 tablets (10 mg) by mouth daily 90 tablet 3     chlorthalidone (HYGROTON) 25 MG tablet Take 0.5 tablets (12.5 mg) by mouth daily 45 tablet 3     Cholecalciferol (VITAMIN D3) 1000 units CAPS Take 1,000 Units by mouth daily        Cyanocobalamin (VITAMIN B-12 PO) Take 10,000 mg by mouth daily       losartan (COZAAR) 50 MG tablet TAKE ONE TABLET BY MOUTH EVERY DAY 90 tablet 3     MAGNESIUM OXIDE 400 PO        Omega-3 Fatty Acids ([employer] FISH OIL) 1200 MG CAPS Take 1 capsule by mouth daily       UNABLE TO FIND MEDICATION NAME: EGOMA OIL       aspirin (ASA) 81 MG chewable tablet Take 81 mg by mouth daily       [unfilled]  Family History:   Family History   Problem Relation Age of Onset     Family History Negative Other      Social History:   Social History     Tobacco Use     Smoking status: Former Smoker     Smokeless tobacco: Former User     Tobacco comment: Former \"social\" smoker, quit in 1978.   Substance Use Topics     Alcohol use: Yes     Alcohol/week: 0.0 oz     Comment: occ wine       ROS:   A comprehensive 10 point ROS was neg other than as mentioned in HPI.    Physical Examination:   VITALS: /69 (BP Location: Left arm, Patient Position: Chair, Cuff Size: Adult Regular)   Pulse 58   Ht 1.575 m (5' 2\")   Wt 60.3 kg (133 lb)   LMP  (LMP Unknown)   SpO2 96%   BMI 24.33 kg/m       GENERAL APPEARANCE: AxO, NAD   HEENT: NCAT, EOMI, MMM.   NECK: Supple. No JVD or bruit. Good carotid upstroke.   CHEST: CTAB   CARDIOVASCULAR: S1S2, Reg, No m/r/g.   ABDOMEN: BS+, soft, No pulsatile masses or bruits.   EXTREMITIES: No pedal edema. Distal pulses intact.   NEURO: Grossly nonfocal.   PSYCH: Normal affect.  SKIN: Warm and dry.   Data:   Labs:  SAULO bilaterally    Right:       Arm: 185 mmHg   PT at ankle: 124 mmHg   DP at foot: 130 " mmHg      SAULO: 0.70     Right pulse volume recordings or VPR:       High thigh: Normal   Lower thigh: Normal   Proximal calf: Normal   Ankle: Normal     Left:      Arm: 135 mmHg   PT at ankle: 119 mmHg   DP at foot: 106 mmHg      SAULO: 0.64     Left pulse volume recordings or VPR:       High thigh: Normal   Lower thigh: Normal   Proximal calf: Normal   Ankle: Normal                                                                      Impression:      Right leg: Resting SAULO is 0.70. Mild to moderately abnormal,  0.41-0.90.     Left leg: Resting SAULO is 0.64. Mild to moderately abnormal, 0.41-0.90.     Elevated brachial systolic blood pressure of 185 mmHg.  No results found for: CKTOTAL, CKMB, TROPN  Cholesterol (mg/dL)   Date Value   12/28/2018 285 (H)   12/19/2017 253 (H)   08/21/2017 218 (H)   04/07/2016 236 (H)     HDL Cholesterol (mg/dL)   Date Value   12/28/2018 57   12/19/2017 54   08/21/2017 54   04/07/2016 52     LDL Cholesterol Calculated (mg/dL)   Date Value   12/28/2018 175 (H)   12/19/2017 135 (H)   08/21/2017 120 (H)   04/07/2016 147 (H)     ECHO: 4/16  There is borderline concentric left ventricular hypertrophy.  The visual ejection fraction is estimated at 60-65%.  The left atrium is mildly dilated.  There is mild mitral annular calcification.  There is trace aortic regurgitation.  The ascending aorta is Mildly dilated.  Trivial pericardial effusion    NM lexiscan 4/6/2016    Impression  1.  Myocardial perfusion imaging using single isotope technique  demonstrated a small predominantly fixed anteroseptal apical defect  consistent with prior nontransmural infarct or breast attenuation  artifact in the setting of normal wall motion.   2. Gated images demonstrated normal wall motion and thickening.  The  left ventricular systolic function is 80% at rest and 78% post stress.  3. Compared to the prior study from is no prior study for comparison .    Assessment:   This is a 80 YO F with history of spinal  stenosis s/p surgery in 2015 with minimal relief, essential hypertension, hyperlipidemia, no prior documented coronary artery disease, is here for establishing care and discussing results from her abnormal ABIs.   Recommendations:  - Patient does have symptomatic PAD with abnormal ABIs. To understand the level of stenosis, we would obtain arterial dopplers bilaterally with PPGs.   - Aortoiliac duplex  - Lipitor 20 mg once per day  - asa 81 mg per day  - We would refer patient to supervised exercise therapy at [facility] in [place].  -  If patient requires intervention for above knee arterial stenosis, we will refer to vascular colleagues in the future depending on severity of PAD and SET progress.     The patient states understanding and is agreeable with plan.   Thank you for allowing us to participate in the care of this patient.     The patient was discussed w/ Dr. Dumont.  The above note reflects our joint plan.    Mike Cueto MD  Cardiology Fellow    ATTENDING ATTESTATION    Patient was seen and evaluated in clinic today with the cardiology fellow. The note has been edited to reflect the history taking performed by me, my personal review of imaging, EKGs, labs and procedures, and our joint assessment and plan.     Total time spent 60 minutes, of which >50% was spent in face-to-face patient evaluation, reviewing data with patient, prolonged counseling of lifestyle modifications, any necessary genetic testing and screening of family members, and coordination of care.       Aysha Dumont MD MSc    Division of Cardiology  Vascular Section  [facility]

## 2019-05-28 NOTE — PATIENT INSTRUCTIONS
You were seen today in the Cardiovascular Clinic at the [facility].      Cardiology Providers you saw during your visit:   Dr. Dumont    Diagnosis:   (I73.9) Peripheral artery disease (H)    Results:  None today    Recommendations:    -ABIs reviewed  -Aortoiliac and LE arterial duplex with PPGs  - atorvastatin 20 mg once per day  - Lipid profile in 3 months prior to appointment.  Nothing to eat for 6 hours prior.  -Supervised Exercise Therapy Referral.  They will call you in 3 business days.  If they haven't, call the number on the referral. Can to at [facility] in [place].  -if intervention required will refer to [facility] surgery     Follow-up:  3 months with Dr. Dumont      For emergencies call [phone].    For any scheduling needs, please call [phone].     Thank you for your visit today!     Please call if you have any questions or concerns.  Pradeep Herrera RN

## 2019-05-28 NOTE — NURSING NOTE
Cardiac/Vascular Testing: Patient given instructions regarding aorta/iliac/IVC duplex, BLE arterial duplex with PPGs. Discussed purpose, preparation, procedure and when to expect results reported back to the patient. Patient demonstrated understanding of this information and agreed to call with further questions or concerns.  Labs:  Patient was instructed to return for the next laboratory testing in 3 months. Patient demonstrated understanding of this information and agreed to call with further questions or concerns.   Med Reconcile: Reviewed and verified all current medications with the patient. The updated medication list was printed and given to the patient.  New Medication: Atorvastatin 20 mg po every day. Patient was educated regarding newly prescribed medication, including discussion of  the indication, administration, side effects, and when to report to MD or RN. Patient demonstrated understanding of this information and agreed to call with further questions or concerns.  Return Appointment: 3 months with Dr. Dumont.  Patient given instructions regarding scheduling next clinic visit. Patient demonstrated understanding of this information and agreed to call with further questions or concerns.  Patient stated she understood all health information given and agreed to call with further questions or concerns.

## 2019-05-28 NOTE — NURSING NOTE
Chief Complaint   Patient presents with     New Patient     reason for visit: 80 y/o for evaluation of PAD     Vitals were taken and medications were reconciled.   HORTENCIA Arzate  2:14 PM

## 2019-05-30 NOTE — PROGRESS NOTES
Vascular Cardiology Consultation Note   Attending: .   Reason for consultation: Abnormal SAULO bilaterally.   Date of Service: 5/30/2019      HPI:   This is a 80 YO F with history of spinal stenosis s/p surgery in 2015 with minimal relief, essential hypertension, hyperlipidemia, no prior documented coronary artery disease, is here for establishing care and discussing results from her abnormal ABIs. Patient has been experiencing claudication pain walking for a few blocks. Gets better after resting. Pain is mostly in her calf bilaterally. She was seen at the [facility] and had an SAULO in the past that was mildly abnormal and so no intervention was done. She has not been taking asa or statin. Denies any chest pain, palpitations, SOB, leg swelling. She has extensive deformities in her foot and knee joints bilaterally related to arthritis.     Past Medical History:   Past Medical History:   Diagnosis Date     Benign essential hypertension 10/12/2016     Bilateral low back pain without sciatica, unspecified chronicity 12/19/2017     Blunt trauma of rib, initial encounter 11/25/2016     Gallstones      Hypertension      PAD (peripheral artery disease) (H) 6/19/2017     Pancreatic mass 8/17/2016     Slipped intervertebral disc      Past Surgical History:   Past Surgical History:   Procedure Laterality Date     GYN SURGERY      hysterectomy     ORTHOPEDIC SURGERY      Slipped disc with chronic back pain     Allergies: Per MAR     Allergies   Allergen Reactions     Lactose GI Disturbance     Reduced fat dairy- Diarrhea     Sorbitan Trioleate      Vancomycin      Medications:   Per MAR current outpatient cardiovascular medications include:   (Not in a hospital admission)  Current Outpatient Medications   Medication Sig Dispense Refill     atorvastatin (LIPITOR) 20 MG tablet Take 0.5 tablets (10 mg) by mouth daily 90 tablet 3     chlorthalidone (HYGROTON) 25 MG tablet Take 0.5 tablets (12.5 mg) by mouth daily 45 tablet  "3     Cholecalciferol (VITAMIN D3) 1000 units CAPS Take 1,000 Units by mouth daily        Cyanocobalamin (VITAMIN B-12 PO) Take 10,000 mg by mouth daily       losartan (COZAAR) 50 MG tablet TAKE ONE TABLET BY MOUTH EVERY DAY 90 tablet 3     MAGNESIUM OXIDE 400 PO        Omega-3 Fatty Acids ([employer] FISH OIL) 1200 MG CAPS Take 1 capsule by mouth daily       UNABLE TO FIND MEDICATION NAME: EGOMA OIL       aspirin (ASA) 81 MG chewable tablet Take 81 mg by mouth daily       [unfilled]  Family History:   Family History   Problem Relation Age of Onset     Family History Negative Other      Social History:   Social History     Tobacco Use     Smoking status: Former Smoker     Smokeless tobacco: Former User     Tobacco comment: Former \"social\" smoker, quit in 1978.   Substance Use Topics     Alcohol use: Yes     Alcohol/week: 0.0 oz     Comment: occ wine       ROS:   A comprehensive 10 point ROS was neg other than as mentioned in HPI.    Physical Examination:   VITALS: /69 (BP Location: Left arm, Patient Position: Chair, Cuff Size: Adult Regular)   Pulse 58   Ht 1.575 m (5' 2\")   Wt 60.3 kg (133 lb)   LMP  (LMP Unknown)   SpO2 96%   BMI 24.33 kg/m      GENERAL APPEARANCE: AxO, NAD   HEENT: NCAT, EOMI, MMM.   NECK: Supple. No JVD or bruit. Good carotid upstroke.   CHEST: CTAB   CARDIOVASCULAR: S1S2, Reg, No m/r/g.   ABDOMEN: BS+, soft, No pulsatile masses or bruits.   EXTREMITIES: No pedal edema. Distal pulses intact.   NEURO: Grossly nonfocal.   PSYCH: Normal affect.  SKIN: Warm and dry.   Data:   Labs:  SAULO bilaterally    Right:       Arm: 185 mmHg   PT at ankle: 124 mmHg   DP at foot: 130 mmHg      SAULO: 0.70     Right pulse volume recordings or VPR:       High thigh: Normal   Lower thigh: Normal   Proximal calf: Normal   Ankle: Normal     Left:      Arm: 135 mmHg   PT at ankle: 119 mmHg   DP at foot: 106 mmHg      SAULO: 0.64     Left pulse volume recordings or VPR:       High thigh: Normal   Lower thigh: " Normal   Proximal calf: Normal   Ankle: Normal                                                                      Impression:      Right leg: Resting SAULO is 0.70. Mild to moderately abnormal,  0.41-0.90.     Left leg: Resting SAULO is 0.64. Mild to moderately abnormal, 0.41-0.90.     Elevated brachial systolic blood pressure of 185 mmHg.  No results found for: CKTOTAL, CKMB, TROPN  Cholesterol (mg/dL)   Date Value   12/28/2018 285 (H)   12/19/2017 253 (H)   08/21/2017 218 (H)   04/07/2016 236 (H)     HDL Cholesterol (mg/dL)   Date Value   12/28/2018 57   12/19/2017 54   08/21/2017 54   04/07/2016 52     LDL Cholesterol Calculated (mg/dL)   Date Value   12/28/2018 175 (H)   12/19/2017 135 (H)   08/21/2017 120 (H)   04/07/2016 147 (H)     ECHO: 4/16  There is borderline concentric left ventricular hypertrophy.  The visual ejection fraction is estimated at 60-65%.  The left atrium is mildly dilated.  There is mild mitral annular calcification.  There is trace aortic regurgitation.  The ascending aorta is Mildly dilated.  Trivial pericardial effusion    NM lexiscan 4/6/2016    Impression  1.  Myocardial perfusion imaging using single isotope technique  demonstrated a small predominantly fixed anteroseptal apical defect  consistent with prior nontransmural infarct or breast attenuation  artifact in the setting of normal wall motion.   2. Gated images demonstrated normal wall motion and thickening.  The  left ventricular systolic function is 80% at rest and 78% post stress.  3. Compared to the prior study from is no prior study for comparison .    Assessment:   This is a 80 YO F with history of spinal stenosis s/p surgery in 2015 with minimal relief, essential hypertension, hyperlipidemia, no prior documented coronary artery disease, is here for establishing care and discussing results from her abnormal ABIs.   Recommendations:  - Patient does have symptomatic PAD with abnormal ABIs. To understand the level of stenosis,  we would obtain arterial dopplers bilaterally with PPGs.   - Aortoiliac duplex  - Lipitor 20 mg once per day  - asa 81 mg per day  - We would refer patient to supervised exercise therapy at [facility] in [place].  -  If patient requires intervention for above knee arterial stenosis, we will refer to vascular colleagues in the future depending on severity of PAD and SET progress.     The patient states understanding and is agreeable with plan.   Thank you for allowing us to participate in the care of this patient.     The patient was discussed w/ Dr. Dumont.  The above note reflects our joint plan.    Mike Cueto MD  Cardiology Fellow    ATTENDING ATTESTATION    Patient was seen and evaluated in clinic today with the cardiology fellow. The note has been edited to reflect the history taking performed by me, my personal review of imaging, EKGs, labs and procedures, and our joint assessment and plan.     Total time spent 60 minutes, of which >50% was spent in face-to-face patient evaluation, reviewing data with patient, prolonged counseling of lifestyle modifications, any necessary genetic testing and screening of family members, and coordination of care.       Aysha Dumont MD MSc    Division of Cardiology  Vascular Section  [facility]

## 2019-06-04 ENCOUNTER — PRE VISIT (OUTPATIENT)
Dept: ORTHOPEDICS | Facility: CLINIC | Age: 79
End: 2019-06-04

## 2019-06-11 ENCOUNTER — OFFICE VISIT (OUTPATIENT)
Dept: FAMILY MEDICINE | Facility: CLINIC | Age: 79
End: 2019-06-11
Payer: MEDICARE

## 2019-06-11 VITALS
RESPIRATION RATE: 12 BRPM | DIASTOLIC BLOOD PRESSURE: 72 MMHG | TEMPERATURE: 97.6 F | OXYGEN SATURATION: 98 % | BODY MASS INDEX: 24.12 KG/M2 | WEIGHT: 131.9 LBS | HEART RATE: 58 BPM | SYSTOLIC BLOOD PRESSURE: 152 MMHG

## 2019-06-11 DIAGNOSIS — I10 BENIGN ESSENTIAL HYPERTENSION: ICD-10-CM

## 2019-06-11 DIAGNOSIS — M25.511 RIGHT SHOULDER PAIN, UNSPECIFIED CHRONICITY: ICD-10-CM

## 2019-06-11 DIAGNOSIS — R60.0 PERIORBITAL EDEMA OF RIGHT EYE: Primary | ICD-10-CM

## 2019-06-11 PROCEDURE — 99214 OFFICE O/P EST MOD 30 MIN: CPT | Performed by: NURSE PRACTITIONER

## 2019-06-11 RX ORDER — CEPHALEXIN 500 MG/1
500 CAPSULE ORAL 3 TIMES DAILY
Qty: 21 CAPSULE | Refills: 0 | Status: SHIPPED | OUTPATIENT
Start: 2019-06-11 | End: 2019-11-22

## 2019-06-11 NOTE — PROGRESS NOTES
"Subjective     Francois Ly is a 79 year old female who presents to clinic today for the following health issues:    HPI   Eye(s) Problem  Onset: yesterday    Description:   Location: right  Pain: no   Redness: YES    Accompanying Signs & Symptoms:  Discharge/mattering: no   Swelling: YES  Visual changes: no   Fever: no   Nasal Congestion: no   Bothered by bright lights: no     History:   Trauma: YES- bit by insect  Foreign body exposure: no     Precipitating factors:   Wearing contacts: no     Alleviating factors:  Improved by: ice pack  Was bit by a bug last night, Increased swelling below the right eye this morning, last time ice was applied was last night. Slight pain. Denies vision disturbance or eye drainage.      Therapies Tried and outcome: ice pack  Joint or Musculoskeletal Pain  Duration of complaint: right arm pain extends down into hand.  Tingles in the fingers. X 6 months.   Fell on right arm about 10 years ago, has occasional pain of the right arm that extends down the arm to the wrist area.  Is right hand dominate.       Hypertension;  /70, is taking chlorthalidone 12.5 mg, losartan 50 mg as prescribed, checks BP at home and BP's are normal.      Patient Active Problem List   Diagnosis     Back pain     Chest pain     Pancreatic mass     Hypertensive urgency     Benign essential hypertension     Blunt trauma of rib, initial encounter     SBO (small bowel obstruction) (H)     Small bowel obstruction (H)     PAD (peripheral artery disease) (H)     Bilateral low back pain without sciatica, unspecified chronicity     Hyperlipidemia LDL goal <130     Past Surgical History:   Procedure Laterality Date     GYN SURGERY      hysterectomy     ORTHOPEDIC SURGERY      Slipped disc with chronic back pain       Social History     Tobacco Use     Smoking status: Former Smoker     Smokeless tobacco: Former User     Tobacco comment: Former \"social\" smoker, quit in 1978.   Substance Use Topics     Alcohol use: " Yes     Alcohol/week: 0.0 oz     Comment: occ wine     Family History   Problem Relation Age of Onset     Family History Negative Other          Current Outpatient Medications   Medication Sig Dispense Refill     aspirin (ASA) 81 MG chewable tablet Take 81 mg by mouth daily       atorvastatin (LIPITOR) 20 MG tablet Take 0.5 tablets (10 mg) by mouth daily 90 tablet 3     chlorthalidone (HYGROTON) 25 MG tablet Take 0.5 tablets (12.5 mg) by mouth daily 45 tablet 3     Cholecalciferol (VITAMIN D3) 1000 units CAPS Take 1,000 Units by mouth daily        Cyanocobalamin (VITAMIN B-12 PO) Take 10,000 mg by mouth daily       losartan (COZAAR) 50 MG tablet TAKE ONE TABLET BY MOUTH EVERY DAY 90 tablet 3     MAGNESIUM OXIDE 400 PO        Omega-3 Fatty Acids ([employer] FISH OIL) 1200 MG CAPS Take 1 capsule by mouth daily       UNABLE TO FIND MEDICATION NAME: EGOMA OIL       Allergies   Allergen Reactions     Lactose GI Disturbance     Reduced fat dairy- Diarrhea     Sorbitan Trioleate      Vancomycin      BP Readings from Last 3 Encounters:   06/11/19 152/70   05/28/19 146/69   05/03/19 156/83    Wt Readings from Last 3 Encounters:   06/11/19 59.8 kg (131 lb 14.4 oz)   05/28/19 60.3 kg (133 lb)   05/03/19 59.4 kg (131 lb)        Reviewed and updated as needed this visit by Provider         Review of Systems   ROS COMP: CONSTITUTIONAL: NEGATIVE for fever, chills, change in weight  EYES: NEGATIVE for vision changes see HPI  ENT/MOUTH: NEGATIVE for ear, mouth and throat problems  RESP: NEGATIVE for significant cough or SOB  CV: NEGATIVE for chest pain, palpitations or peripheral edema  MUSCULOSKELETAL: see HPI  NEURO: NEGATIVE for weakness, dizziness or paresthesias  PSYCHIATRIC: NEGATIVE for changes in mood or affect      Objective    /70 (BP Location: Left arm, Patient Position: Chair, Cuff Size: Adult Regular)   Pulse 58   Temp 97.6  F (36.4  C) (Oral)   Resp 12   Wt 59.8 kg (131 lb 14.4 oz)   LMP  (LMP Unknown)   SpO2 98%    Breastfeeding? No   BMI 24.12 kg/m    Body mass index is 24.12 kg/m .  Physical Exam   GENERAL: healthy, alert and no distress  EYE:  PERRL, right eye is not injected, conjunctiva normal.  Area below right eye with slight edema and redness, soft to touch, can fully open right eye, no tenderness to touch.  NECK: no adenopathy, no asymmetry, masses, or scars and thyroid normal to palpation  RESP: lungs clear to auscultation - no rales, rhonchi or wheezes  CV: regular rate and rhythm, normal S1 S2, no S3 or S4, no murmur, click or rub, no peripheral edema   MS: Right anterior shoulder tender to touch. Full adduction and abduction.  No gross musculoskeletal defects noted, no edema  NEURO: BUE with normal strength and tone, mentation intact and speech normal  PSYCH: mentation appears normal, affect normal/bright          Francois was seen today for eye problem and musculoskeletal problem.    Diagnoses and all orders for this visit:    Periorbital edema of right eye:  Swelling likely due to bug bite since happened when out working in her garden yesterday, area soft with slight edema, will treat with keflex, instructed to monitor closely, if increased warmth/firmness/swelling discussed need to go to the ED for further evaluation. Instructed to follow up tomorrow for recheck.    -     cephALEXin (KEFLEX) 500 MG capsule; Take 1 capsule (500 mg) by mouth 3 times daily for 7 days    Right shoulder pain, unspecified chronicity:  Full ROM, likely due to overuse, discussed addressing with PT    Hypertension:  152/72, Will recheck tomorrow.   Follow up tomorrow.

## 2019-06-14 ENCOUNTER — HOSPITAL ENCOUNTER (OUTPATIENT)
Dept: CARDIAC REHAB | Facility: CLINIC | Age: 79
End: 2019-06-14
Attending: INTERNAL MEDICINE
Payer: MEDICARE

## 2019-06-14 DIAGNOSIS — I73.9 PERIPHERAL ARTERY DISEASE (H): ICD-10-CM

## 2019-06-14 PROCEDURE — 40001023 ZZH STATISTIC PAD/SET VISIT

## 2019-06-14 PROCEDURE — 93668 PERIPHERAL VASCULAR REHAB: CPT

## 2019-06-17 ENCOUNTER — HOSPITAL ENCOUNTER (OUTPATIENT)
Dept: CARDIAC REHAB | Facility: CLINIC | Age: 79
End: 2019-06-17
Attending: INTERNAL MEDICINE
Payer: MEDICARE

## 2019-06-17 PROCEDURE — 93668 PERIPHERAL VASCULAR REHAB: CPT

## 2019-06-17 PROCEDURE — 40001023 ZZH STATISTIC PAD/SET VISIT

## 2019-06-19 ENCOUNTER — HOSPITAL ENCOUNTER (OUTPATIENT)
Dept: CARDIAC REHAB | Facility: CLINIC | Age: 79
End: 2019-06-19
Attending: INTERNAL MEDICINE
Payer: MEDICARE

## 2019-06-19 PROCEDURE — 93668 PERIPHERAL VASCULAR REHAB: CPT

## 2019-06-19 PROCEDURE — 40001023 ZZH STATISTIC PAD/SET VISIT

## 2019-06-21 ENCOUNTER — HOSPITAL ENCOUNTER (OUTPATIENT)
Dept: CARDIAC REHAB | Facility: CLINIC | Age: 79
End: 2019-06-21
Attending: INTERNAL MEDICINE
Payer: MEDICARE

## 2019-06-21 PROCEDURE — 40001023 ZZH STATISTIC PAD/SET VISIT: Performed by: OCCUPATIONAL THERAPIST

## 2019-06-21 PROCEDURE — 93668 PERIPHERAL VASCULAR REHAB: CPT | Performed by: OCCUPATIONAL THERAPIST

## 2019-06-24 ENCOUNTER — HOSPITAL ENCOUNTER (OUTPATIENT)
Dept: CARDIAC REHAB | Facility: CLINIC | Age: 79
End: 2019-06-24
Attending: INTERNAL MEDICINE
Payer: MEDICARE

## 2019-06-24 PROCEDURE — 40001023 ZZH STATISTIC PAD/SET VISIT: Performed by: OCCUPATIONAL THERAPIST

## 2019-06-24 PROCEDURE — 93668 PERIPHERAL VASCULAR REHAB: CPT | Performed by: OCCUPATIONAL THERAPIST

## 2019-06-26 ENCOUNTER — HOSPITAL ENCOUNTER (OUTPATIENT)
Dept: CARDIAC REHAB | Facility: CLINIC | Age: 79
End: 2019-06-26
Attending: INTERNAL MEDICINE
Payer: MEDICARE

## 2019-06-26 PROCEDURE — 40001023 ZZH STATISTIC PAD/SET VISIT

## 2019-06-26 PROCEDURE — 93668 PERIPHERAL VASCULAR REHAB: CPT

## 2019-07-01 ENCOUNTER — HOSPITAL ENCOUNTER (OUTPATIENT)
Dept: CARDIAC REHAB | Facility: CLINIC | Age: 79
End: 2019-07-01
Attending: INTERNAL MEDICINE
Payer: MEDICARE

## 2019-07-01 PROCEDURE — 93668 PERIPHERAL VASCULAR REHAB: CPT

## 2019-07-01 PROCEDURE — 40001023 ZZH STATISTIC PAD/SET VISIT

## 2019-07-02 ENCOUNTER — HOSPITAL ENCOUNTER (EMERGENCY)
Facility: CLINIC | Age: 79
Discharge: HOME | End: 2019-07-02
Attending: EMERGENCY MEDICINE | Admitting: EMERGENCY MEDICINE
Payer: MEDICARE

## 2019-07-02 ENCOUNTER — APPOINTMENT (OUTPATIENT)
Dept: CT IMAGING | Facility: CLINIC | Age: 79
End: 2019-07-02
Attending: EMERGENCY MEDICINE
Payer: MEDICARE

## 2019-07-02 VITALS
RESPIRATION RATE: 18 BRPM | HEART RATE: 61 BPM | HEIGHT: 62 IN | DIASTOLIC BLOOD PRESSURE: 83 MMHG | BODY MASS INDEX: 23.92 KG/M2 | OXYGEN SATURATION: 98 % | WEIGHT: 130 LBS | SYSTOLIC BLOOD PRESSURE: 156 MMHG | TEMPERATURE: 98.2 F

## 2019-07-02 DIAGNOSIS — J06.9 VIRAL URI: ICD-10-CM

## 2019-07-02 LAB
ANION GAP SERPL CALCULATED.3IONS-SCNC: 2 MMOL/L (ref 3–14)
BASOPHILS # BLD AUTO: 0 10E9/L (ref 0–0.2)
BASOPHILS NFR BLD AUTO: 0.3 %
BUN SERPL-MCNC: 18 MG/DL (ref 7–30)
CALCIUM SERPL-MCNC: 8.9 MG/DL (ref 8.5–10.1)
CHLORIDE SERPL-SCNC: 106 MMOL/L (ref 94–109)
CO2 BLDCOV-SCNC: 25 MMOL/L (ref 21–28)
CO2 SERPL-SCNC: 30 MMOL/L (ref 20–32)
CREAT SERPL-MCNC: 0.71 MG/DL (ref 0.52–1.04)
DIFFERENTIAL METHOD BLD: NORMAL
EOSINOPHIL # BLD AUTO: 0.2 10E9/L (ref 0–0.7)
EOSINOPHIL NFR BLD AUTO: 3.1 %
ERYTHROCYTE [DISTWIDTH] IN BLOOD BY AUTOMATED COUNT: 12.2 % (ref 10–15)
GFR SERPL CREATININE-BSD FRML MDRD: 81 ML/MIN/{1.73_M2}
GLUCOSE SERPL-MCNC: 107 MG/DL (ref 70–99)
HCT VFR BLD AUTO: 35.8 % (ref 35–47)
HGB BLD-MCNC: 12.1 G/DL (ref 11.7–15.7)
IMM GRANULOCYTES # BLD: 0 10E9/L (ref 0–0.4)
IMM GRANULOCYTES NFR BLD: 0.2 %
LACTATE BLD-SCNC: 0.5 MMOL/L (ref 0.7–2.1)
LYMPHOCYTES # BLD AUTO: 2.1 10E9/L (ref 0.8–5.3)
LYMPHOCYTES NFR BLD AUTO: 36.2 %
MCH RBC QN AUTO: 30.6 PG (ref 26.5–33)
MCHC RBC AUTO-ENTMCNC: 33.8 G/DL (ref 31.5–36.5)
MCV RBC AUTO: 91 FL (ref 78–100)
MONOCYTES # BLD AUTO: 0.3 10E9/L (ref 0–1.3)
MONOCYTES NFR BLD AUTO: 5.4 %
NEUTROPHILS # BLD AUTO: 3.2 10E9/L (ref 1.6–8.3)
NEUTROPHILS NFR BLD AUTO: 54.8 %
NRBC # BLD AUTO: 0 10*3/UL
NRBC BLD AUTO-RTO: 0 /100
PCO2 BLDV: 40 MM HG (ref 40–50)
PH BLDV: 7.4 PH (ref 7.32–7.43)
PLATELET # BLD AUTO: 249 10E9/L (ref 150–450)
PO2 BLDV: 30 MM HG (ref 25–47)
POTASSIUM SERPL-SCNC: 3.5 MMOL/L (ref 3.4–5.3)
RBC # BLD AUTO: 3.95 10E12/L (ref 3.8–5.2)
SAO2 % BLDV FROM PO2: 58 %
SODIUM SERPL-SCNC: 138 MMOL/L (ref 133–144)
WBC # BLD AUTO: 5.8 10E9/L (ref 4–11)

## 2019-07-02 PROCEDURE — 96361 HYDRATE IV INFUSION ADD-ON: CPT

## 2019-07-02 PROCEDURE — 82803 BLOOD GASES ANY COMBINATION: CPT

## 2019-07-02 PROCEDURE — 83605 ASSAY OF LACTIC ACID: CPT

## 2019-07-02 PROCEDURE — 85025 COMPLETE CBC W/AUTO DIFF WBC: CPT | Performed by: EMERGENCY MEDICINE

## 2019-07-02 PROCEDURE — 70491 CT SOFT TISSUE NECK W/DYE: CPT

## 2019-07-02 PROCEDURE — 99285 EMERGENCY DEPT VISIT HI MDM: CPT | Mod: 25

## 2019-07-02 PROCEDURE — 25000125 ZZHC RX 250: Performed by: EMERGENCY MEDICINE

## 2019-07-02 PROCEDURE — 25000128 H RX IP 250 OP 636: Performed by: EMERGENCY MEDICINE

## 2019-07-02 PROCEDURE — 80048 BASIC METABOLIC PNL TOTAL CA: CPT | Performed by: EMERGENCY MEDICINE

## 2019-07-02 PROCEDURE — 96360 HYDRATION IV INFUSION INIT: CPT | Mod: 59

## 2019-07-02 RX ORDER — SODIUM CHLORIDE 9 MG/ML
1000 INJECTION, SOLUTION INTRAVENOUS CONTINUOUS
Status: DISCONTINUED | OUTPATIENT
Start: 2019-07-02 | End: 2019-07-02 | Stop reason: HOSPADM

## 2019-07-02 RX ORDER — IOPAMIDOL 755 MG/ML
80 INJECTION, SOLUTION INTRAVASCULAR ONCE
Status: COMPLETED | OUTPATIENT
Start: 2019-07-02 | End: 2019-07-02

## 2019-07-02 RX ADMIN — SODIUM CHLORIDE 60 ML: 9 INJECTION, SOLUTION INTRAVENOUS at 15:32

## 2019-07-02 RX ADMIN — IOPAMIDOL 80 ML: 755 INJECTION, SOLUTION INTRAVENOUS at 15:32

## 2019-07-02 RX ADMIN — SODIUM CHLORIDE 1000 ML: 9 INJECTION, SOLUTION INTRAVENOUS at 14:47

## 2019-07-02 ASSESSMENT — ENCOUNTER SYMPTOMS
SORE THROAT: 0
FEVER: 0
COUGH: 1
VOMITING: 0
TROUBLE SWALLOWING: 1
DIARRHEA: 0

## 2019-07-02 ASSESSMENT — MIFFLIN-ST. JEOR: SCORE: 1017.93

## 2019-07-02 NOTE — ED NOTES
Pt drank cup of water.  states she swallowed just fine. Pt says she had no difficulty in swallowing.

## 2019-07-02 NOTE — ED PROVIDER NOTES
History     Chief Complaint:  Dysphagia    HPI   Francois Ly is a 79 year old female who presents with dysphagia. The patient reports onset of chills 5 days ago, 6/27, after exercising/walking around the mall. When she got home she felt a cold coming on, so she rested. Two days thereafter she started to get a runny nose and tried to self medicate, but when she tried to clear her nose, she could not get anything out. She also notes that she felt as if there was something in the back of her throat and when she tried to take cold medication with water, fluid was not running down, but was instead coming back up and sometimes out through her nose. She notes that her difficulty in swallowing worsened today, prompting her presentation in the emergency department. Here, when she asked her  if something appears wrong with her throat, he stated that it appeared like the patient's tonsils were swollen. The patient reports that she did not have anything to eat or drink today, noting that they just came from another doctor appointment regarding scar tissue on her left hip area. The patient denies any associated fever, sore throat, vomiting, diarrhea, or any other acute related symptoms, but endorses a small cough. The patient denies any history of cancer.   nilam worthy     Allergies:  Sorbitan trioleate  Vancomycin  Penicillins    Medications:    81 mg Aspirin  Atorvastatin  Chlorthalidone  Losartan  Hytrin  Hygroten    Past Medical History:    Benign essential hypertension  Bilateral low back pain without sciatica  Blunt trauma of rib  Gallstones  Hypertension  Peripheral artery disease  Pancreatic mass  Slipped intervertebral disc    Past Surgical History:    Hysterectomy  Orthopedic surgery    Family History:    History reviewed. No pertinent family history.     Social History:  Smoking status: Former smoker, quit 1978  Alcohol use: No  Drug use: No  PCP: Luis Feliciano  Presents to the ED with her spouse,  "Sahil  Marital Status:    Housing: House, independent living     Review of Systems   Constitutional: Negative for fever.   HENT: Positive for trouble swallowing. Negative for sore throat.    Respiratory: Positive for cough.    Gastrointestinal: Negative for diarrhea and vomiting.   All other systems reviewed and are negative.        Physical Exam     Patient Vitals for the past 24 hrs:   BP Temp Temp src Heart Rate Resp SpO2 Height Weight   07/02/19 1217 185/67 98.2  F (36.8  C) Oral 98 18 98 % 1.575 m (5' 2\") 59 kg (130 lb)       Physical Exam  Constitutional:  Oriented to person, place, and time.      Appears well-developed and well-nourished.   HENT:   Head:    Normocephalic and atraumatic.   Right Ear:   Tympanic membrane and external ear normal.   Left Ear:   Tympanic membrane and external ear normal.   Mouth/Throat:   Feeling of discomfort in her throat. Oropharynx is clear and moist and mucous membranes are normal. NO swelling seen.   Eyes:    Conjunctivae normal and EOM are normal.      Pupils are equal, round, and reactive to light.   Neck:    Normal range of motion. Neck supple.   Cardiovascular:  Normal rate, S1 normal, S2 normal and normal heart sounds.      No gallop. No murmur heard.  Pulmonary/Chest:  Effort normal and breath sounds normal.      No wheezes. No rhonchi. No rales.   Abdominal:   Soft. Normal appearance. No hepatosplenomegaly.      No tenderness. No rigidity, no rebound, no guarding.      No CVA tenderness.   Musculoskeletal:  Normal range of motion.   Neurological:   Alert and oriented to person, place, and time.      Normal strength and normal reflexes.      No cranial nerve deficit or sensory deficit.      GCS eye subscore is 4. GCS verbal subscore is 5.      GCS motor subscore is 6. Finger to nose intact bilaterally.    Emergency Department Course   Imaging:  Radiographic findings were communicated with the patient who voiced understanding of the findings.    Soft Tissue Neck " CT w Contrast  Normal CT scan of the neck.  As read by Radiology.    Laboratory:  ISTAT VBG: pH: 7.40, PCO2: 40, PO2: 30, Bicarbonate: 25, O2 Sat: 58, Lactic Acid: 0.5 (L)  CBC: WNL (WBC 5.8, HGB 12.1, )  BMP: Anion gap 2 (L), Glucose 107 (H) o/w WNL (Creatinine 0.71)    Interventions:  1447: NS 1L IV Bolus    Emergency Department Course:  Past medical records, nursing notes, and vitals reviewed.  1411: I performed an exam of the patient and obtained history, as documented above.    IV inserted and blood drawn.    The patient was sent for a soft tissue neck CT while in the emergency department, findings above.    1635: I rechecked the patient. Findings and plan explained to the Patient. Patient discharged home with instructions regarding supportive care, medications, and reasons to return. The importance of close follow-up was reviewed.    Impression & Plan    Medical Decision Making:  Francois Ly is a 79-year female who comes in with feeling of chills runny nose and drainage but now comes in with feeling that she is unable to swallow.  Her  thought that her tonsils look large.  On my examination appeared normal.  However she feels like it is difficult for her to swallow.  Given age I decided to do CT scan to make sure she did not have any pathology as she said she had a week prior some feeling of discomfort in her neck.  CT scan was thankfully unremarkable.  Patient does clarify that when her mucus is thick it is harder to swallow.  Of advised to mucolytic such as guaifenesin.  She was able to drink water.  She will be discharged home to follow-up if her symptoms worsen or persist.  I suspect this is viral URI.      Diagnosis:   ICD-10-CM    Viral URI J06.9        Disposition: Discharged to home    Discharge Medications: None    I, Renata Loaiza, am serving as a scribe at 2:11 PM on 7/2/2019 to document services personally performed by Jia Monsivais MD based on my observations and the  provider's statements to me.       Renata Loaiza  7/2/2019    EMERGENCY DEPARTMENT       Jia Monsivais MD  07/03/19 6446

## 2019-07-02 NOTE — ED TRIAGE NOTES
Pt c/o difficulty swallowing since yesterday.  states tonsils appear swollen. Denies pain or difficulty breathing. Swallowing saliva but unable to swallow pills.

## 2019-07-02 NOTE — ED AVS SNAPSHOT
Emergency Department  [address] [place] [id number]  Phone:  [phone]  Fax:  [phone]                                    Francois Ly   MRN: [id number]    Department:   Emergency Department   Date of Visit:  7/2/2019           After Visit Summary Signature Page    I have received my discharge instructions, and my questions have been answered. I have discussed any challenges I see with this plan with the nurse or doctor.    ..........................................................................................................................................  Patient/Patient Representative Signature      ..........................................................................................................................................  Patient Representative Print Name and Relationship to Patient    ..................................................               ................................................  Date                                   Time    ..........................................................................................................................................  Reviewed by Signature/Title    ...................................................              ..............................................  Date                                               Time          22EPIC Rev 08/18

## 2019-07-03 ENCOUNTER — HOSPITAL ENCOUNTER (OUTPATIENT)
Dept: CARDIAC REHAB | Facility: CLINIC | Age: 79
End: 2019-07-03
Attending: INTERNAL MEDICINE
Payer: MEDICARE

## 2019-07-03 PROCEDURE — 93668 PERIPHERAL VASCULAR REHAB: CPT | Performed by: REHABILITATION PRACTITIONER

## 2019-07-03 PROCEDURE — 40001023 ZZH STATISTIC PAD/SET VISIT: Performed by: REHABILITATION PRACTITIONER

## 2019-07-05 ENCOUNTER — HOSPITAL ENCOUNTER (OUTPATIENT)
Dept: CARDIAC REHAB | Facility: CLINIC | Age: 79
End: 2019-07-05
Attending: INTERNAL MEDICINE
Payer: MEDICARE

## 2019-07-05 PROCEDURE — 40001023 ZZH STATISTIC PAD/SET VISIT

## 2019-07-05 PROCEDURE — 93668 PERIPHERAL VASCULAR REHAB: CPT

## 2019-07-05 NOTE — TELEPHONE ENCOUNTER
Action    Action Taken 07/05/19   1:52 PM  See Dr. Traore previsit from 6/4/19 for records details. SE.

## 2019-07-08 ENCOUNTER — HOSPITAL ENCOUNTER (OUTPATIENT)
Dept: CARDIAC REHAB | Facility: CLINIC | Age: 79
End: 2019-07-08
Attending: INTERNAL MEDICINE
Payer: MEDICARE

## 2019-07-08 PROCEDURE — 93668 PERIPHERAL VASCULAR REHAB: CPT

## 2019-07-08 PROCEDURE — 40001023 ZZH STATISTIC PAD/SET VISIT

## 2019-07-10 ENCOUNTER — HOSPITAL ENCOUNTER (OUTPATIENT)
Dept: CARDIAC REHAB | Facility: CLINIC | Age: 79
End: 2019-07-10
Attending: INTERNAL MEDICINE
Payer: MEDICARE

## 2019-07-10 PROCEDURE — 93668 PERIPHERAL VASCULAR REHAB: CPT | Performed by: REHABILITATION PRACTITIONER

## 2019-07-10 PROCEDURE — 40001023 ZZH STATISTIC PAD/SET VISIT: Performed by: REHABILITATION PRACTITIONER

## 2019-07-12 ENCOUNTER — HOSPITAL ENCOUNTER (OUTPATIENT)
Dept: CARDIAC REHAB | Facility: CLINIC | Age: 79
End: 2019-07-12
Attending: INTERNAL MEDICINE
Payer: MEDICARE

## 2019-07-12 PROCEDURE — 93668 PERIPHERAL VASCULAR REHAB: CPT | Performed by: REHABILITATION PRACTITIONER

## 2019-07-12 PROCEDURE — 40001023 ZZH STATISTIC PAD/SET VISIT: Performed by: REHABILITATION PRACTITIONER

## 2019-07-15 ENCOUNTER — HOSPITAL ENCOUNTER (OUTPATIENT)
Dept: CARDIAC REHAB | Facility: CLINIC | Age: 79
End: 2019-07-15
Attending: INTERNAL MEDICINE
Payer: MEDICARE

## 2019-07-15 DIAGNOSIS — M54.50 LUMBAR PAIN: Primary | ICD-10-CM

## 2019-07-15 PROCEDURE — 40001023 ZZH STATISTIC PAD/SET VISIT: Performed by: REHABILITATION PRACTITIONER

## 2019-07-15 PROCEDURE — 93668 PERIPHERAL VASCULAR REHAB: CPT | Performed by: REHABILITATION PRACTITIONER

## 2019-07-16 ENCOUNTER — PRE VISIT (OUTPATIENT)
Dept: ORTHOPEDICS | Facility: CLINIC | Age: 79
End: 2019-07-16

## 2019-07-17 ENCOUNTER — HOSPITAL ENCOUNTER (OUTPATIENT)
Dept: CARDIAC REHAB | Facility: CLINIC | Age: 79
End: 2019-07-17
Attending: INTERNAL MEDICINE
Payer: MEDICARE

## 2019-07-17 PROCEDURE — 93668 PERIPHERAL VASCULAR REHAB: CPT | Performed by: REHABILITATION PRACTITIONER

## 2019-07-17 PROCEDURE — 40001023 ZZH STATISTIC PAD/SET VISIT: Performed by: REHABILITATION PRACTITIONER

## 2019-07-19 ENCOUNTER — HOSPITAL ENCOUNTER (OUTPATIENT)
Dept: CARDIAC REHAB | Facility: CLINIC | Age: 79
End: 2019-07-19
Attending: INTERNAL MEDICINE
Payer: MEDICARE

## 2019-07-19 PROCEDURE — 40001023 ZZH STATISTIC PAD/SET VISIT: Performed by: CLINICAL EXERCISE PHYSIOLOGIST

## 2019-07-19 PROCEDURE — 93668 PERIPHERAL VASCULAR REHAB: CPT | Performed by: CLINICAL EXERCISE PHYSIOLOGIST

## 2019-07-22 ENCOUNTER — HOSPITAL ENCOUNTER (OUTPATIENT)
Dept: CARDIAC REHAB | Facility: CLINIC | Age: 79
End: 2019-07-22
Attending: INTERNAL MEDICINE
Payer: MEDICARE

## 2019-07-22 PROCEDURE — 40001023 ZZH STATISTIC PAD/SET VISIT

## 2019-07-22 PROCEDURE — 93668 PERIPHERAL VASCULAR REHAB: CPT

## 2019-07-23 ENCOUNTER — ANCILLARY PROCEDURE (OUTPATIENT)
Dept: ULTRASOUND IMAGING | Facility: CLINIC | Age: 79
End: 2019-07-23
Attending: INTERNAL MEDICINE
Payer: MEDICARE

## 2019-07-23 DIAGNOSIS — I73.9 PERIPHERAL ARTERY DISEASE (H): ICD-10-CM

## 2019-07-24 ENCOUNTER — HOSPITAL ENCOUNTER (OUTPATIENT)
Dept: CARDIAC REHAB | Facility: CLINIC | Age: 79
End: 2019-07-24
Attending: INTERNAL MEDICINE
Payer: MEDICARE

## 2019-07-24 PROCEDURE — 40001023 ZZH STATISTIC PAD/SET VISIT: Performed by: OCCUPATIONAL THERAPIST

## 2019-07-24 PROCEDURE — 93668 PERIPHERAL VASCULAR REHAB: CPT | Performed by: OCCUPATIONAL THERAPIST

## 2019-07-26 ENCOUNTER — HOSPITAL ENCOUNTER (OUTPATIENT)
Dept: CARDIAC REHAB | Facility: CLINIC | Age: 79
End: 2019-07-26
Attending: INTERNAL MEDICINE
Payer: MEDICARE

## 2019-07-26 PROCEDURE — 40001023 ZZH STATISTIC PAD/SET VISIT: Performed by: CLINICAL EXERCISE PHYSIOLOGIST

## 2019-07-26 PROCEDURE — 93668 PERIPHERAL VASCULAR REHAB: CPT | Performed by: CLINICAL EXERCISE PHYSIOLOGIST

## 2019-07-29 ENCOUNTER — HOSPITAL ENCOUNTER (OUTPATIENT)
Dept: CARDIAC REHAB | Facility: CLINIC | Age: 79
End: 2019-07-29
Attending: INTERNAL MEDICINE
Payer: MEDICARE

## 2019-07-29 PROCEDURE — 40001023 ZZH STATISTIC PAD/SET VISIT: Performed by: REHABILITATION PRACTITIONER

## 2019-07-29 PROCEDURE — 93668 PERIPHERAL VASCULAR REHAB: CPT | Performed by: REHABILITATION PRACTITIONER

## 2019-08-02 ENCOUNTER — HOSPITAL ENCOUNTER (OUTPATIENT)
Dept: CARDIAC REHAB | Facility: CLINIC | Age: 79
End: 2019-08-02
Attending: INTERNAL MEDICINE
Payer: MEDICARE

## 2019-08-02 PROCEDURE — 93668 PERIPHERAL VASCULAR REHAB: CPT | Performed by: CLINICAL EXERCISE PHYSIOLOGIST

## 2019-08-02 PROCEDURE — 40001023 ZZH STATISTIC PAD/SET VISIT: Performed by: CLINICAL EXERCISE PHYSIOLOGIST

## 2019-08-05 ENCOUNTER — HOSPITAL ENCOUNTER (OUTPATIENT)
Dept: CARDIAC REHAB | Facility: CLINIC | Age: 79
End: 2019-08-05
Attending: INTERNAL MEDICINE
Payer: MEDICARE

## 2019-08-05 PROCEDURE — 93668 PERIPHERAL VASCULAR REHAB: CPT | Performed by: OCCUPATIONAL THERAPIST

## 2019-08-05 PROCEDURE — 40000116 ZZH STATISTIC OP CR VISIT: Performed by: OCCUPATIONAL THERAPIST

## 2019-08-07 ENCOUNTER — HOSPITAL ENCOUNTER (OUTPATIENT)
Dept: CARDIAC REHAB | Facility: CLINIC | Age: 79
End: 2019-08-07
Attending: INTERNAL MEDICINE
Payer: MEDICARE

## 2019-08-07 PROCEDURE — 40001023 ZZH STATISTIC PAD/SET VISIT: Performed by: REHABILITATION PRACTITIONER

## 2019-08-07 PROCEDURE — 93668 PERIPHERAL VASCULAR REHAB: CPT | Performed by: REHABILITATION PRACTITIONER

## 2019-08-09 ENCOUNTER — HOSPITAL ENCOUNTER (OUTPATIENT)
Dept: CARDIAC REHAB | Facility: CLINIC | Age: 79
End: 2019-08-09
Attending: INTERNAL MEDICINE
Payer: MEDICARE

## 2019-08-09 PROCEDURE — 93668 PERIPHERAL VASCULAR REHAB: CPT | Performed by: REHABILITATION PRACTITIONER

## 2019-08-09 PROCEDURE — 40001023 ZZH STATISTIC PAD/SET VISIT: Performed by: REHABILITATION PRACTITIONER

## 2019-08-12 ENCOUNTER — HOSPITAL ENCOUNTER (OUTPATIENT)
Dept: CARDIAC REHAB | Facility: CLINIC | Age: 79
End: 2019-08-12
Attending: INTERNAL MEDICINE
Payer: MEDICARE

## 2019-08-12 PROCEDURE — 93668 PERIPHERAL VASCULAR REHAB: CPT | Performed by: REHABILITATION PRACTITIONER

## 2019-08-12 PROCEDURE — 40001023 ZZH STATISTIC PAD/SET VISIT: Performed by: REHABILITATION PRACTITIONER

## 2019-08-14 ENCOUNTER — HOSPITAL ENCOUNTER (OUTPATIENT)
Dept: CARDIAC REHAB | Facility: CLINIC | Age: 79
End: 2019-08-14
Attending: INTERNAL MEDICINE
Payer: MEDICARE

## 2019-08-14 PROCEDURE — 40001023 ZZH STATISTIC PAD/SET VISIT: Performed by: REHABILITATION PRACTITIONER

## 2019-08-14 PROCEDURE — 93668 PERIPHERAL VASCULAR REHAB: CPT | Performed by: REHABILITATION PRACTITIONER

## 2019-08-16 ENCOUNTER — HOSPITAL ENCOUNTER (OUTPATIENT)
Dept: CARDIAC REHAB | Facility: CLINIC | Age: 79
End: 2019-08-16
Attending: INTERNAL MEDICINE
Payer: MEDICARE

## 2019-08-16 PROCEDURE — 40001023 ZZH STATISTIC PAD/SET VISIT: Performed by: REHABILITATION PRACTITIONER

## 2019-08-16 PROCEDURE — 93668 PERIPHERAL VASCULAR REHAB: CPT | Performed by: REHABILITATION PRACTITIONER

## 2019-08-19 ENCOUNTER — HOSPITAL ENCOUNTER (OUTPATIENT)
Dept: CARDIAC REHAB | Facility: CLINIC | Age: 79
End: 2019-08-19
Attending: INTERNAL MEDICINE
Payer: MEDICARE

## 2019-08-19 PROCEDURE — 93668 PERIPHERAL VASCULAR REHAB: CPT

## 2019-08-19 PROCEDURE — 40001023 ZZH STATISTIC PAD/SET VISIT

## 2019-08-21 ENCOUNTER — HOSPITAL ENCOUNTER (OUTPATIENT)
Dept: CARDIAC REHAB | Facility: CLINIC | Age: 79
End: 2019-08-21
Attending: INTERNAL MEDICINE
Payer: MEDICARE

## 2019-08-21 PROCEDURE — 40001023 ZZH STATISTIC PAD/SET VISIT

## 2019-08-21 PROCEDURE — 93668 PERIPHERAL VASCULAR REHAB: CPT

## 2019-08-23 ENCOUNTER — HOSPITAL ENCOUNTER (OUTPATIENT)
Dept: CARDIAC REHAB | Facility: CLINIC | Age: 79
End: 2019-08-23
Attending: INTERNAL MEDICINE
Payer: MEDICARE

## 2019-08-23 PROCEDURE — 93668 PERIPHERAL VASCULAR REHAB: CPT | Performed by: REHABILITATION PRACTITIONER

## 2019-08-23 PROCEDURE — 40001023 ZZH STATISTIC PAD/SET VISIT: Performed by: REHABILITATION PRACTITIONER

## 2019-08-26 ENCOUNTER — HOSPITAL ENCOUNTER (OUTPATIENT)
Dept: CARDIAC REHAB | Facility: CLINIC | Age: 79
End: 2019-08-26
Attending: INTERNAL MEDICINE
Payer: MEDICARE

## 2019-08-26 PROCEDURE — 40001023 ZZH STATISTIC PAD/SET VISIT: Performed by: REHABILITATION PRACTITIONER

## 2019-08-26 PROCEDURE — 93668 PERIPHERAL VASCULAR REHAB: CPT | Performed by: REHABILITATION PRACTITIONER

## 2019-08-28 ENCOUNTER — HOSPITAL ENCOUNTER (OUTPATIENT)
Dept: CARDIAC REHAB | Facility: CLINIC | Age: 79
End: 2019-08-28
Attending: INTERNAL MEDICINE
Payer: MEDICARE

## 2019-08-28 PROCEDURE — 93668 PERIPHERAL VASCULAR REHAB: CPT | Performed by: REHABILITATION PRACTITIONER

## 2019-08-28 PROCEDURE — 40001023 ZZH STATISTIC PAD/SET VISIT: Performed by: REHABILITATION PRACTITIONER

## 2019-08-30 ENCOUNTER — HOSPITAL ENCOUNTER (OUTPATIENT)
Dept: CARDIAC REHAB | Facility: CLINIC | Age: 79
End: 2019-08-30
Attending: INTERNAL MEDICINE
Payer: MEDICARE

## 2019-08-30 PROCEDURE — 93668 PERIPHERAL VASCULAR REHAB: CPT

## 2019-08-30 PROCEDURE — 40001023 ZZH STATISTIC PAD/SET VISIT

## 2019-09-04 ENCOUNTER — HOSPITAL ENCOUNTER (OUTPATIENT)
Dept: CARDIAC REHAB | Facility: CLINIC | Age: 79
End: 2019-09-04
Attending: INTERNAL MEDICINE
Payer: MEDICARE

## 2019-09-04 PROCEDURE — 40001023 ZZH STATISTIC PAD/SET VISIT: Performed by: OCCUPATIONAL THERAPIST

## 2019-09-04 PROCEDURE — 93668 PERIPHERAL VASCULAR REHAB: CPT | Performed by: OCCUPATIONAL THERAPIST

## 2019-09-20 ENCOUNTER — OFFICE VISIT (OUTPATIENT)
Dept: CARDIOLOGY | Facility: CLINIC | Age: 79
End: 2019-09-20
Attending: INTERNAL MEDICINE
Payer: MEDICARE

## 2019-09-20 VITALS
HEIGHT: 62 IN | HEART RATE: 57 BPM | WEIGHT: 133.4 LBS | SYSTOLIC BLOOD PRESSURE: 187 MMHG | OXYGEN SATURATION: 99 % | DIASTOLIC BLOOD PRESSURE: 79 MMHG | BODY MASS INDEX: 24.55 KG/M2

## 2019-09-20 DIAGNOSIS — I73.9 PERIPHERAL ARTERY DISEASE (H): ICD-10-CM

## 2019-09-20 DIAGNOSIS — I11.9 HYPERTENSIVE HEART DISEASE WITHOUT HEART FAILURE: ICD-10-CM

## 2019-09-20 DIAGNOSIS — I73.9 PERIPHERAL ARTERY DISEASE (H): Primary | ICD-10-CM

## 2019-09-20 DIAGNOSIS — I70.90 ATHEROSCLEROSIS: ICD-10-CM

## 2019-09-20 LAB
CHOLEST SERPL-MCNC: 250 MG/DL
HDLC SERPL-MCNC: 54 MG/DL
LDLC SERPL CALC-MCNC: 154 MG/DL
NONHDLC SERPL-MCNC: 197 MG/DL
TRIGL SERPL-MCNC: 216 MG/DL

## 2019-09-20 PROCEDURE — 99214 OFFICE O/P EST MOD 30 MIN: CPT | Mod: ZP | Performed by: INTERNAL MEDICINE

## 2019-09-20 PROCEDURE — G0463 HOSPITAL OUTPT CLINIC VISIT: HCPCS

## 2019-09-20 PROCEDURE — 36415 COLL VENOUS BLD VENIPUNCTURE: CPT | Performed by: INTERNAL MEDICINE

## 2019-09-20 PROCEDURE — 80061 LIPID PANEL: CPT | Performed by: INTERNAL MEDICINE

## 2019-09-20 RX ORDER — AMLODIPINE BESYLATE 10 MG/1
10 TABLET ORAL DAILY
Qty: 90 TABLET | Refills: 3 | Status: SHIPPED | OUTPATIENT
Start: 2019-09-20 | End: 2019-11-26

## 2019-09-20 ASSESSMENT — PAIN SCALES - GENERAL: PAINLEVEL: NO PAIN (0)

## 2019-09-20 ASSESSMENT — MIFFLIN-ST. JEOR: SCORE: 1033.35

## 2019-09-20 NOTE — PATIENT INSTRUCTIONS
-Return to clinic 6 months in [facility] (please call if you do not hear about it)  -Recommend seeing orthopedic specialist for back pain  -Echocardiogram at your convenience   -Renal artery ultrasound  -Continue walking therapy at home, can refer to exercise rehab again if desired in future  -Recommend addition of norvasc 10 mg daily   -Continue aspirin 81 mg daily     Fouzia Menon, RN  Cardiology Care Coordinator  Please be aware that I work part-time but I will be checking messages several times per week.   For urgent needs, please call the number below.    [phone], press 1 for University, press 4 to speak to a nurse    .

## 2019-09-20 NOTE — LETTER
9/20/2019      RE: Francois Ly  [id number] [facility] [id number]       Dear Colleague,    Thank you for the opportunity to participate in the care of your patient, Francois Ly, at the [facility] at [facility]. Please see a copy of my visit note below.    HPI:   This is a 78 YO F with history of spinal stenosis s/p surgery in 2015 with minimal relief, essential hypertension, hyperlipidemia, no prior documented coronary artery disease, is here for follow up. Prior visit she was establishing care and discussing results from her abnormal ABIs. Patient had been experiencing claudication pain walking for a few blocks. Gets better after resting. Pain is mostly in her calf bilaterally. She was seen at the [facility] and had an SAULO in the past that was mildly abnormal and so no intervention was done. She had not been taking asa or statin.   Denies any chest pain, palpitations, SOB, leg swelling. She has extensive deformities in her foot and knee joints bilaterally related to arthritis.  Interval History:  In PAD rehab: Back pain with walking, negative aortoiliac disease on imaging. Has Ddistal PAD, responsive to SET, finished sessions and doing well. Walking distance improved. BP high today, says spikes in MD offices, usually 130s. Prefers to continue SET at home and is diligent about regimen.  Past Medical History:   Past Medical History:   Diagnosis Date     Benign essential hypertension 10/12/2016     Bilateral low back pain without sciatica, unspecified chronicity 12/19/2017     Blunt trauma of rib, initial encounter 11/25/2016     Gallstones      Hypertension      PAD (peripheral artery disease) (H) 6/19/2017     Pancreatic mass 8/17/2016     Slipped intervertebral disc      Past Surgical History:   Past Surgical History:   Procedure Laterality Date     GYN SURGERY      hysterectomy     ORTHOPEDIC SURGERY      Slipped disc with chronic back pain  "    Allergies: Per MAR     Allergies   Allergen Reactions     Lactose GI Disturbance     Reduced fat dairy- Diarrhea     Sorbitan Trioleate      Vancomycin      Medications:   Per MAR current outpatient cardiovascular medications include:   (Not in a hospital admission)  Current Outpatient Medications   Medication Sig Dispense Refill     aspirin (ASA) 81 MG chewable tablet Take 81 mg by mouth daily       atorvastatin (LIPITOR) 20 MG tablet Take 0.5 tablets (10 mg) by mouth daily 90 tablet 3     chlorthalidone (HYGROTON) 25 MG tablet Take 0.5 tablets (12.5 mg) by mouth daily 45 tablet 3     Cholecalciferol (VITAMIN D3) 1000 units CAPS Take 1,000 Units by mouth daily        Cyanocobalamin (VITAMIN B-12 PO) Take 10,000 mg by mouth daily       losartan (COZAAR) 50 MG tablet TAKE ONE TABLET BY MOUTH EVERY DAY 90 tablet 3     MAGNESIUM OXIDE 400 PO        Omega-3 Fatty Acids ([employer] FISH OIL) 1200 MG CAPS Take 1 capsule by mouth daily       UNABLE TO FIND MEDICATION NAME: EGOMA OIL       [unfilled]  Family History:   Family History   Problem Relation Age of Onset     Family History Negative Other      Social History:   Social History     Tobacco Use     Smoking status: Former Smoker     Smokeless tobacco: Former User     Tobacco comment: Former \"social\" smoker, quit in 1978.   Substance Use Topics     Alcohol use: Yes     Alcohol/week: 0.0 oz     Comment: occ wine       ROS:   A comprehensive 10 point ROS was neg other than as mentioned in HPI.    Physical Examination:   VITALS: BP (!) 187/79 (BP Location: Right arm, Patient Position: Chair, Cuff Size: Adult Regular)   Pulse 57   Ht 1.575 m (5' 2\")   Wt 60.5 kg (133 lb 6.4 oz)   LMP  (LMP Unknown)   SpO2 99%   BMI 24.40 kg/m       GENERAL APPEARANCE: AxO, NAD   HEENT: NCAT, EOMI, MMM.   NECK: Supple. No JVD or bruit. Good carotid upstroke.   CHEST: CTAB   CARDIOVASCULAR: S1S2, Reg, No m/r/g.   ABDOMEN: BS+, soft, No pulsatile masses or bruits.   EXTREMITIES: No " pedal edema. Distal pulses intact.   NEURO: Grossly nonfocal.   PSYCH: Normal affect.  SKIN: Warm and dry.     Data:   Labs:  SAULO bilaterally    Right:       Arm: 185 mmHg   PT at ankle: 124 mmHg   DP at foot: 130 mmHg      SAULO: 0.70     Right pulse volume recordings or VPR:       High thigh: Normal   Lower thigh: Normal   Proximal calf: Normal   Ankle: Normal     Left:      Arm: 135 mmHg   PT at ankle: 119 mmHg   DP at foot: 106 mmHg      SAULO: 0.64     Left pulse volume recordings or VPR:       High thigh: Normal   Lower thigh: Normal   Proximal calf: Normal   Ankle: Normal                                                                   Impression:      Right leg: Resting SAULO is 0.70. Mild to moderately abnormal,  0.41-0.90.     Left leg: Resting SAULO is 0.64. Mild to moderately abnormal, 0.41-0.90.     Elevated brachial systolic blood pressure of 185 mmHg.    No results found for: CKTOTAL, CKMB, TROPN  Cholesterol (mg/dL)   Date Value   09/20/2019 250 (H)   12/28/2018 285 (H)   12/19/2017 253 (H)   08/21/2017 218 (H)     HDL Cholesterol (mg/dL)   Date Value   09/20/2019 54   12/28/2018 57   12/19/2017 54   08/21/2017 54     LDL Cholesterol Calculated (mg/dL)   Date Value   09/20/2019 154 (H)   12/28/2018 175 (H)   12/19/2017 135 (H)   08/21/2017 120 (H)     ECHO: 4/16  There is borderline concentric left ventricular hypertrophy.  The visual ejection fraction is estimated at 60-65%.  The left atrium is mildly dilated.  There is mild mitral annular calcification.  There is trace aortic regurgitation.  The ascending aorta is Mildly dilated.  Trivial pericardial effusion    NM lexiscan 4/6/2016    Impression  1.  Myocardial perfusion imaging using single isotope technique  demonstrated a small predominantly fixed anteroseptal apical defect  consistent with prior nontransmural infarct or breast attenuation  artifact in the setting of normal wall motion.   2. Gated images demonstrated normal wall motion and thickening.   The  left ventricular systolic function is 80% at rest and 78% post stress.  3. Compared to the prior study from is no prior study for comparison .      Aortoiliac Duplex      Infrarenal distal: 64 cm/s, triphasic     Right side:      Common iliac artery prox:  123 cm/sec   Common iliac artery distal:  108 cm/sec   External iliac artery prox:  77 cm/sec   External iliac artery mid: 106 cm/sec   External iliac artery distal: 124 cm/sec   Common femoral artery: 88 cm/sec      Waveforms are biphasic to triphasic..     Left side:      Common iliac artery prox:  77 cm/sec   Common iliac artery distal:  104 cm/sec   External iliac artery prox:  119 cm/sec   External iliac artery mid: 122 cm/sec   External iliac artery distal: 114 cm/sec   Common femoral artery: 104 cm/sec      Waveforms are biphasic to triphasic..                                                                   Impression:      Arterial evaluation: No hemodynamically significant stenosis in the  above-mentioned arteries.     Lower Extremity Duplex 7/2019  Impression:   1. Right leg: Patent femoropopliteal portion of the right lower  extremity arteries. There is occlusion of posterior tibial artery from  the mid calf down to the ankle.  Anterior tibial artery is patent with  runoff into the foot.  2. Left leg: Patent femoropopliteal portion of the left lower  extremity arteries. Monophasic waveform in the posterior tibial and  anterior tibial arteries are suggestive of peripheral vascular  disease.     ABIS 7/2019    Impression:      1. Right leg: Resting SAULO is 0.76, Abnormal, which is suggestive of  mild to moderate peripheral arterial disease.      2. Left leg: Resting SAULO is 0.77, abnormal, which is suggestive of  mild to moderate peripheral arterial disease.      3. Incidental noted patient is markedly hypertensive.    Assessment:   This is a 78 YO F with history of spinal stenosis s/p surgery in 2015 with minimal relief, essential hypertension,  hyperlipidemia, no prior documented coronary artery disease, is s/p exercise rehab doing well.   Recommendations:    -Return to clinic 6 months in [facility]   -Recommend seeing orthopedic specialist for back pain  -Echocardiogram  -Renal artery ultrasound for HTN and atherosclerosis  -Continue walking therapy at home, can refer to exercise rehab again if desired in future  -Recommend addition of norvasc 10 mg daily   -Continue aspirin 81 mg daily       Aysha Dumont MD MSc  Division of Cardiology  [facility]

## 2019-09-20 NOTE — NURSING NOTE
Chief Complaint   Patient presents with     Follow Up     follow up of PAD w/ claudication, s/p PAD rehab

## 2019-09-20 NOTE — PROGRESS NOTES
HPI:   This is a 78 YO F with history of spinal stenosis s/p surgery in 2015 with minimal relief, essential hypertension, hyperlipidemia, no prior documented coronary artery disease, is here for follow up. Prior visit she was establishing care and discussing results from her abnormal ABIs. Patient had been experiencing claudication pain walking for a few blocks. Gets better after resting. Pain is mostly in her calf bilaterally. She was seen at the [facility] and had an SAULO in the past that was mildly abnormal and so no intervention was done. She had not been taking asa or statin.     Denies any chest pain, palpitations, SOB, leg swelling. She has extensive deformities in her foot and knee joints bilaterally related to arthritis.     Interval History:  In PAD rehab: Back pain with walking, negative aortoiliac disease on imaging. Has Ddistal PAD, responsive to SET, finished sessions and doing well. Walking distance improved. BP high today, says spikes in MD offices, usually 130s. Prefers to continue SET at home and is diligent about regimen.    Past Medical History:   Past Medical History:   Diagnosis Date     Benign essential hypertension 10/12/2016     Bilateral low back pain without sciatica, unspecified chronicity 12/19/2017     Blunt trauma of rib, initial encounter 11/25/2016     Gallstones      Hypertension      PAD (peripheral artery disease) (H) 6/19/2017     Pancreatic mass 8/17/2016     Slipped intervertebral disc      Past Surgical History:   Past Surgical History:   Procedure Laterality Date     GYN SURGERY      hysterectomy     ORTHOPEDIC SURGERY      Slipped disc with chronic back pain     Allergies: Per MAR     Allergies   Allergen Reactions     Lactose GI Disturbance     Reduced fat dairy- Diarrhea     Sorbitan Trioleate      Vancomycin      Medications:   Per MAR current outpatient cardiovascular medications include:   (Not in a hospital admission)  Current Outpatient Medications   Medication Sig  "Dispense Refill     aspirin (ASA) 81 MG chewable tablet Take 81 mg by mouth daily       atorvastatin (LIPITOR) 20 MG tablet Take 0.5 tablets (10 mg) by mouth daily 90 tablet 3     chlorthalidone (HYGROTON) 25 MG tablet Take 0.5 tablets (12.5 mg) by mouth daily 45 tablet 3     Cholecalciferol (VITAMIN D3) 1000 units CAPS Take 1,000 Units by mouth daily        Cyanocobalamin (VITAMIN B-12 PO) Take 10,000 mg by mouth daily       losartan (COZAAR) 50 MG tablet TAKE ONE TABLET BY MOUTH EVERY DAY 90 tablet 3     MAGNESIUM OXIDE 400 PO        Omega-3 Fatty Acids ([employer] FISH OIL) 1200 MG CAPS Take 1 capsule by mouth daily       UNABLE TO FIND MEDICATION NAME: DARION OIL       [unfilled]  Family History:   Family History   Problem Relation Age of Onset     Family History Negative Other      Social History:   Social History     Tobacco Use     Smoking status: Former Smoker     Smokeless tobacco: Former User     Tobacco comment: Former \"social\" smoker, quit in 1978.   Substance Use Topics     Alcohol use: Yes     Alcohol/week: 0.0 oz     Comment: occ wine       ROS:   A comprehensive 10 point ROS was neg other than as mentioned in HPI.    Physical Examination:   VITALS: BP (!) 187/79 (BP Location: Right arm, Patient Position: Chair, Cuff Size: Adult Regular)   Pulse 57   Ht 1.575 m (5' 2\")   Wt 60.5 kg (133 lb 6.4 oz)   LMP  (LMP Unknown)   SpO2 99%   BMI 24.40 kg/m      GENERAL APPEARANCE: AxO, NAD   HEENT: NCAT, EOMI, MMM.   NECK: Supple. No JVD or bruit. Good carotid upstroke.   CHEST: CTAB   CARDIOVASCULAR: S1S2, Reg, No m/r/g.   ABDOMEN: BS+, soft, No pulsatile masses or bruits.   EXTREMITIES: No pedal edema. Distal pulses intact.   NEURO: Grossly nonfocal.   PSYCH: Normal affect.  SKIN: Warm and dry.     Data:   Labs:  SAULO bilaterally    Right:       Arm: 185 mmHg   PT at ankle: 124 mmHg   DP at foot: 130 mmHg      SAULO: 0.70     Right pulse volume recordings or VPR:       High thigh: Normal   Lower thigh: Normal   " Proximal calf: Normal   Ankle: Normal     Left:      Arm: 135 mmHg   PT at ankle: 119 mmHg   DP at foot: 106 mmHg      SAULO: 0.64     Left pulse volume recordings or VPR:       High thigh: Normal   Lower thigh: Normal   Proximal calf: Normal   Ankle: Normal                                                                   Impression:      Right leg: Resting SAULO is 0.70. Mild to moderately abnormal,  0.41-0.90.     Left leg: Resting SAULO is 0.64. Mild to moderately abnormal, 0.41-0.90.     Elevated brachial systolic blood pressure of 185 mmHg.    No results found for: CKTOTAL, CKMB, TROPN  Cholesterol (mg/dL)   Date Value   09/20/2019 250 (H)   12/28/2018 285 (H)   12/19/2017 253 (H)   08/21/2017 218 (H)     HDL Cholesterol (mg/dL)   Date Value   09/20/2019 54   12/28/2018 57   12/19/2017 54   08/21/2017 54     LDL Cholesterol Calculated (mg/dL)   Date Value   09/20/2019 154 (H)   12/28/2018 175 (H)   12/19/2017 135 (H)   08/21/2017 120 (H)     ECHO: 4/16  There is borderline concentric left ventricular hypertrophy.  The visual ejection fraction is estimated at 60-65%.  The left atrium is mildly dilated.  There is mild mitral annular calcification.  There is trace aortic regurgitation.  The ascending aorta is Mildly dilated.  Trivial pericardial effusion    NM lexiscan 4/6/2016    Impression  1.  Myocardial perfusion imaging using single isotope technique  demonstrated a small predominantly fixed anteroseptal apical defect  consistent with prior nontransmural infarct or breast attenuation  artifact in the setting of normal wall motion.   2. Gated images demonstrated normal wall motion and thickening.  The  left ventricular systolic function is 80% at rest and 78% post stress.  3. Compared to the prior study from is no prior study for comparison .      Aortoiliac Duplex      Infrarenal distal: 64 cm/s, triphasic     Right side:      Common iliac artery prox:  123 cm/sec   Common iliac artery distal:  108 cm/sec    External iliac artery prox:  77 cm/sec   External iliac artery mid: 106 cm/sec   External iliac artery distal: 124 cm/sec   Common femoral artery: 88 cm/sec      Waveforms are biphasic to triphasic..     Left side:      Common iliac artery prox:  77 cm/sec   Common iliac artery distal:  104 cm/sec   External iliac artery prox:  119 cm/sec   External iliac artery mid: 122 cm/sec   External iliac artery distal: 114 cm/sec   Common femoral artery: 104 cm/sec      Waveforms are biphasic to triphasic..                                                                   Impression:      Arterial evaluation: No hemodynamically significant stenosis in the  above-mentioned arteries.     Lower Extremity Duplex 7/2019  Impression:   1. Right leg: Patent femoropopliteal portion of the right lower  extremity arteries. There is occlusion of posterior tibial artery from  the mid calf down to the ankle.  Anterior tibial artery is patent with  runoff into the foot.  2. Left leg: Patent femoropopliteal portion of the left lower  extremity arteries. Monophasic waveform in the posterior tibial and  anterior tibial arteries are suggestive of peripheral vascular  disease.     ABIS 7/2019    Impression:      1. Right leg: Resting SAULO is 0.76, Abnormal, which is suggestive of  mild to moderate peripheral arterial disease.      2. Left leg: Resting SAULO is 0.77, abnormal, which is suggestive of  mild to moderate peripheral arterial disease.      3. Incidental noted patient is markedly hypertensive.    Assessment:   This is a 80 YO F with history of spinal stenosis s/p surgery in 2015 with minimal relief, essential hypertension, hyperlipidemia, no prior documented coronary artery disease, is s/p exercise rehab doing well.   Recommendations:    -Return to clinic 6 months in [facility]   -Recommend seeing orthopedic specialist for back pain  -Echocardiogram  -Renal artery ultrasound for HTN and atherosclerosis  -Continue walking therapy at home,  can refer to exercise rehab again if desired in future  -Recommend addition of norvasc 10 mg daily   -Continue aspirin 81 mg daily       Aysha Dumont MD MSc  Division of Cardiology  [facility]

## 2019-09-24 ENCOUNTER — TELEPHONE (OUTPATIENT)
Dept: FAMILY MEDICINE | Facility: CLINIC | Age: 79
End: 2019-09-24

## 2019-09-24 ENCOUNTER — NURSE TRIAGE (OUTPATIENT)
Dept: NURSING | Facility: CLINIC | Age: 79
End: 2019-09-24

## 2019-09-24 DIAGNOSIS — I10 BENIGN ESSENTIAL HYPERTENSION: ICD-10-CM

## 2019-09-24 NOTE — TELEPHONE ENCOUNTER
Patient and  calling regarding patient's medication Losartan and that the Food and Drug administration recommended not taking this medication due to cancer causing properties.  She is going to stop the medication and would like recommendations.  May reach them [phone].    Routed to  SB2/3/4 [place] Team    Kymberly Jenkins RN  [facility]    Reason for Disposition    Caller has NON-URGENT medication question about med that PCP prescribed and triager unable to answer question    Protocols used: MEDICATION QUESTION CALL-A-

## 2019-09-24 NOTE — TELEPHONE ENCOUNTER
Patient and  calling regarding patient's medication Losartan and that the Food and Drug administration recommended not taking this medication due to cancer causing properties.  She is going to stop the medication and would like recommendations.  May reach them [phone].    Routed to CR SB2/3/4 [place] Team    Kymberly Jenkins RN  [facility]

## 2019-09-25 RX ORDER — VALSARTAN 80 MG/1
80 TABLET ORAL DAILY
Qty: 90 TABLET | Refills: 1 | Status: SHIPPED | OUTPATIENT
Start: 2019-09-25 | End: 2019-11-26

## 2019-09-25 NOTE — TELEPHONE ENCOUNTER
No problem, we can try diovan, and will start on 80 mg daily.  Recheck with me in 1 month please.  Luis Feliciano MD  [facility]  [phone]

## 2019-09-25 NOTE — TELEPHONE ENCOUNTER
Called  back, CTC on file.  States Francois will not take Losartan any longer since they saw news last evening.  Discussed not all Losartan recalled.  Not willing to take Losartan.  Saw cardiology and they advised to add hydrochlorothiazide and Norvasc.  Discussed that they are to add those and cardiology did not tell her to stop Losartan.  Again states wife will not take Losartan.  They wanted to see Dr. Feliciano and first available is 10/25/19.  Did not want to take that appt.  Offered to have speak to clinic manager but declined that as well.  Wants different med than Losartan from Dr. Feliciano.  Please advise.  Uma Magaña RN

## 2019-09-25 NOTE — TELEPHONE ENCOUNTER
No need to stop medicine unless the pharmacy informed the patient that it was recalled. Other wise, it is ok to continue on medicine.

## 2019-09-26 NOTE — TELEPHONE ENCOUNTER
Spoke with pt's spouse- agreeable to plan to start Diovan and follow-up in one month with Dr. Feliciano in clinic     Transferred to scheduling       Essie LOVE RN

## 2019-10-04 NOTE — TELEPHONE ENCOUNTER
Form completed and faxed, 01/18/18    Jacquelyn Estrada/CARTER    
Received 4 page fax for Plan/Updated Plan of Progress For Outpatient Rehabilitation for Dr Feliciano to complete. Form in the in-basket at [facility] in AA's folder.  
on chart

## 2019-10-06 ENCOUNTER — NURSE TRIAGE (OUTPATIENT)
Dept: NURSING | Facility: CLINIC | Age: 79
End: 2019-10-06

## 2019-10-07 NOTE — TELEPHONE ENCOUNTER
Takes valsartan 80 mg once a day. Took her dose this morning as usual. Then mistakenly took a dose tonight also (used to take at night). No dizziness or other symptoms at this time. Advised talk w/ pharmacist at [facility]. Pt/spouse voiced understanding and agreement; call transferred to Poison Control.       Reason for Disposition    [1] DOUBLE DOSE (an extra dose or lesser amount) of prescription drug AND [2] NO symptoms (Exception: a double dose of antibiotics)    Additional Information    Negative: Drug overdose and nurse unable to answer question    Negative: Caller requesting information not related to medicine    Negative: Caller requesting a prescription for Strep throat and has a positive culture result    Negative: Rash while taking a medication or within 3 days of stopping it    Negative: Immunization reaction suspected    Negative: [1] Asthma and [2] having symptoms of asthma (cough, wheezing, etc)    Negative: MORE THAN A DOUBLE DOSE of a prescription or over-the-counter (OTC) drug    Negative: [1] DOUBLE DOSE (an extra dose or lesser amount) of over-the-counter (OTC) drug AND [2] any symptoms (e.g., dizziness, nausea, pain, sleepiness)    Negative: [1] DOUBLE DOSE (an extra dose or lesser amount) of prescription drug AND [2] any symptoms (e.g., dizziness, nausea, pain, sleepiness)    Negative: Took another person's prescription drug    Protocols used: MEDICATION QUESTION CALL-A-

## 2019-11-22 NOTE — PROGRESS NOTES
Pre-Visit Planning     Future Appointments   Date Time Provider Department Center   11/26/2019 10:35 AM Luis Feliciano MD [facility]   3/20/2020 11:00 AM Aysha Dumont MD [facility]     Arrival Time for this Appointment: 10:35 AM     Appointment Notes for this encounter:   Med Check    Questionnaires Reviewed/Assigned  Additional questionnaires assigned      HM DUE pended   Health Maintenance Due   Topic Date Due     ANNUAL REVIEW OF HM ORDERS  1940     MEDICARE ANNUAL WELLNESS VISIT  04/08/2005     FALL RISK ASSESSMENT  09/24/2019     ZOSTER IMMUNIZATION (3 of 3) 11/26/2019     Patient preferred phone number: [phone]    Unable to reach. Left voicemail. Advised patient to call clinic back at [phone] to reschedule if unable to attend this visit     Logan Geller Nurse   [facility]

## 2019-11-26 ENCOUNTER — OFFICE VISIT (OUTPATIENT)
Dept: FAMILY MEDICINE | Facility: CLINIC | Age: 79
End: 2019-11-26
Payer: MEDICARE

## 2019-11-26 VITALS
HEART RATE: 65 BPM | BODY MASS INDEX: 24.48 KG/M2 | TEMPERATURE: 97.6 F | DIASTOLIC BLOOD PRESSURE: 86 MMHG | WEIGHT: 133 LBS | SYSTOLIC BLOOD PRESSURE: 140 MMHG | HEIGHT: 62 IN | RESPIRATION RATE: 16 BRPM | OXYGEN SATURATION: 97 %

## 2019-11-26 DIAGNOSIS — I73.9 PAD (PERIPHERAL ARTERY DISEASE) (H): ICD-10-CM

## 2019-11-26 DIAGNOSIS — K86.89 PANCREATIC MASS: ICD-10-CM

## 2019-11-26 DIAGNOSIS — I10 BENIGN ESSENTIAL HYPERTENSION: ICD-10-CM

## 2019-11-26 PROCEDURE — 99214 OFFICE O/P EST MOD 30 MIN: CPT | Performed by: FAMILY MEDICINE

## 2019-11-26 RX ORDER — ENALAPRIL MALEATE AND HYDROCHLOROTHIAZIDE 10; 25 MG/1; MG/1
1 TABLET ORAL DAILY
Qty: 90 TABLET | Refills: 1 | Status: SHIPPED | OUTPATIENT
Start: 2019-11-26 | End: 2020-01-14

## 2019-11-26 ASSESSMENT — MIFFLIN-ST. JEOR: SCORE: 1031.53

## 2019-11-26 NOTE — PROGRESS NOTES
"Subjective     Francois Ly is a 79 year old female who presents to clinic today for the following health issues:    HPI   Hypertension Follow-up      Do you check your blood pressure regularly outside of the clinic? Yes     Are you following a low salt diet? No    Are your blood pressures ever more than 140 on the top number (systolic) OR more   than 90 on the bottom number (diastolic), for example 140/90? Yes      How many servings of fruits and vegetables do you eat daily?  2-3    On average, how many sweetened beverages do you drink each day (Examples: soda, juice, sweet tea, etc.  Do NOT count diet or artificially sweetened beverages)?  None     How many days per week do you miss taking your medication? 0    Vascular Disease Follow-up  Pt is doing much better, and her legs pain has improved     How often do you take nitroglycerin? Never    Do you take an aspirin every day? Yes      Patient Active Problem List   Diagnosis     Back pain     Chest pain     Pancreatic mass     Hypertensive urgency     Benign essential hypertension     Blunt trauma of rib, initial encounter     SBO (small bowel obstruction) (H)     Small bowel obstruction (H)     PAD (peripheral artery disease) (H)     Bilateral low back pain without sciatica, unspecified chronicity     Hyperlipidemia LDL goal <130     Past Surgical History:   Procedure Laterality Date     GYN SURGERY      hysterectomy     ORTHOPEDIC SURGERY      Slipped disc with chronic back pain       Social History     Tobacco Use     Smoking status: Former Smoker     Smokeless tobacco: Former User     Tobacco comment: Former \"social\" smoker, quit in 1978.   Substance Use Topics     Alcohol use: Yes     Alcohol/week: 0.0 standard drinks     Comment: occ wine     Family History   Problem Relation Age of Onset     Family History Negative Other          Current Outpatient Medications   Medication Sig Dispense Refill     enalapril-hydrochlorothiazide (VASERETIC) 10-25 MG tablet " Take 1 tablet by mouth daily 90 tablet 1     aspirin (ASA) 81 MG chewable tablet Take 81 mg by mouth daily       atorvastatin (LIPITOR) 20 MG tablet Take 0.5 tablets (10 mg) by mouth daily 90 tablet 3     Omega-3 Fatty Acids ([employer] FISH OIL) 1200 MG CAPS Take 1 capsule by mouth daily       Allergies   Allergen Reactions     Lactose GI Disturbance     Reduced fat dairy- Diarrhea     Sorbitan Trioleate      Vancomycin      Recent Labs   Lab Test 09/20/19  1044 07/02/19  1439 12/28/18  0956 08/25/18  0925  08/11/18  0628 12/19/17  1026  04/12/17  0030  10/01/12  0406   *  --  175*  --   --   --  135*   < >  --    < >  --    HDL 54  --  57  --   --   --  54   < >  --    < >  --    TRIG 216*  --  264*  --   --   --  319*   < >  --    < >  --    ALT  --   --   --  25  --  24  --   --  31   < >  --    CR  --  0.71  --  0.71   < > 0.69 0.64   < > 0.82   < > 0.81   GFRESTIMATED  --  81  --  79   < > 82 90   < > 67   < > 70   GFRESTBLACK  --  >90  --  >90   < > >90 >90   < > 82   < > 84   POTASSIUM  --  3.5  --  3.9   < > 3.8 3.6   < > 3.7   < > 4.2   TSH  --   --  2.51  --   --   --   --   --   --   --  4.36    < > = values in this interval not displayed.          Reviewed and updated as needed this visit by Provider         Review of Systems   ROS COMP: CONSTITUTIONAL: NEGATIVE for fever, chills, change in weight  RESP: NEGATIVE for significant cough or SOB  CV: NEGATIVE for chest pain, palpitations or peripheral edema      Objective    LMP  (LMP Unknown)   There is no height or weight on file to calculate BMI.  Physical Exam   GENERAL: healthy, alert and no distress  NECK: no adenopathy, no asymmetry, masses, or scars and thyroid normal to palpation  RESP: lungs clear to auscultation - no rales, rhonchi or wheezes  CV: regular rate and rhythm, normal S1 S2, no S3 or S4, no murmur, click or rub, no peripheral edema.  MS: no gross musculoskeletal defects noted, no edema            Assessment & Plan     1. Pancreatic  mass  No signs of pancreatic mass on the last CT    2. Benign essential hypertension  Not controlled, given her PAD, I will start on enalapril, along with hCTZ, pt to start on low dose (1/2 a pill) first, then increase to 1 pill in 1 week if BP is still elevated (pt does check her blood pressure regularly.  - enalapril-hydrochlorothiazide (VASERETIC) 10-25 MG tablet; Take 1 tablet by mouth daily  Dispense: 90 tablet; Refill: 1    3. PAD (peripheral artery disease) (H)  Continue on ASA, walking, advised to restart on atorvastatin, pt does not wish to start on any statins             Follow up in 1 month.  Luis Feliciano MD  [facility]

## 2019-11-26 NOTE — ASSESSMENT & PLAN NOTE
Attended cardiac rehab August 2019   Atorvastatin caused cramps does not wish to continue, taking herbal supplements in smoothie daily

## 2019-11-26 NOTE — PATIENT INSTRUCTIONS
Take Enalopril/hydrochlorothiazide 1/2 tablet for first week, then increase to 1 tablet per day - continue checking blood pressures at home and report concerns to clinic   IF blood pressures under 140 top number AND under 90 bottom number okay to continue to take 1/2 tablet     Please call Logan Geller Nurse PAL                 (Personal Advocate Liason)   [facility] with questions / concerns [phone]

## 2019-12-16 NOTE — PROGRESS NOTES
Pre-Visit Planning     Future Appointments   Date Time Provider Department Center   12/17/2019  8:20 AM Luis Feliciano MD [facility]   12/30/2019  2:15 PM Luis Feliciano MD [facility] CR   3/20/2020 11:00 AM Aysha Dumont MD [facility]     Arrival Time for this Appointment:  8:10 AM     Appointment Notes for this encounter:   bad cough    Questionnaires Reviewed/Assigned  No additional questionnaires are needed     Health Maintenance Due   Topic Date Due     MEDICARE ANNUAL WELLNESS VISIT  04/08/2005   '   Patient preferred phone number: [phone]    Patient contact not needed. Acute visit scheduled today - did not contact     Logan Geller Nurse   [facility]

## 2019-12-17 ENCOUNTER — OFFICE VISIT (OUTPATIENT)
Dept: FAMILY MEDICINE | Facility: CLINIC | Age: 79
End: 2019-12-17
Payer: MEDICARE

## 2019-12-17 VITALS
BODY MASS INDEX: 24.66 KG/M2 | SYSTOLIC BLOOD PRESSURE: 133 MMHG | TEMPERATURE: 98.3 F | DIASTOLIC BLOOD PRESSURE: 64 MMHG | WEIGHT: 134 LBS | HEIGHT: 62 IN | HEART RATE: 72 BPM | RESPIRATION RATE: 16 BRPM | OXYGEN SATURATION: 98 %

## 2019-12-17 DIAGNOSIS — R13.11 ORAL PHASE DYSPHAGIA: ICD-10-CM

## 2019-12-17 DIAGNOSIS — R09.82 POST-NASAL DRIP: ICD-10-CM

## 2019-12-17 DIAGNOSIS — J06.9 VIRAL UPPER RESPIRATORY TRACT INFECTION: Primary | ICD-10-CM

## 2019-12-17 PROCEDURE — 99214 OFFICE O/P EST MOD 30 MIN: CPT | Performed by: FAMILY MEDICINE

## 2019-12-17 ASSESSMENT — MIFFLIN-ST. JEOR: SCORE: 1036.07

## 2019-12-30 ENCOUNTER — OFFICE VISIT (OUTPATIENT)
Dept: FAMILY MEDICINE | Facility: CLINIC | Age: 79
End: 2019-12-30
Payer: MEDICARE

## 2019-12-30 VITALS
RESPIRATION RATE: 16 BRPM | TEMPERATURE: 97.9 F | SYSTOLIC BLOOD PRESSURE: 136 MMHG | DIASTOLIC BLOOD PRESSURE: 80 MMHG | WEIGHT: 133.2 LBS | BODY MASS INDEX: 24.51 KG/M2 | HEIGHT: 62 IN | OXYGEN SATURATION: 97 % | HEART RATE: 55 BPM

## 2019-12-30 DIAGNOSIS — R10.32 LLQ ABDOMINAL PAIN: ICD-10-CM

## 2019-12-30 DIAGNOSIS — I10 ESSENTIAL HYPERTENSION: Primary | ICD-10-CM

## 2019-12-30 PROCEDURE — 99214 OFFICE O/P EST MOD 30 MIN: CPT | Performed by: FAMILY MEDICINE

## 2019-12-30 ASSESSMENT — MIFFLIN-ST. JEOR: SCORE: 1032.44

## 2019-12-30 NOTE — PATIENT INSTRUCTIONS
Please call Tonia Juan, Registered Nurse PAL(Personal Advocate Liason)   Tonia Juan Registered Nurse   [facility] [phone]

## 2019-12-30 NOTE — PROGRESS NOTES
Pre-Visit Planning     Future Appointments   Date Time Provider Department Center   12/30/2019  2:15 PM Luis Feliciano MD [facility]    3/20/2020 11:00 AM Aysha Dumont MD [facility]     Arrival Time for this Appointment:  1:55 PM   Appointment Notes for this encounter:     follow up HTN     There are no preventive care reminders to display for this patient.    Questionnaires Reviewed/Assigned  No additional questionnaires are needed        Patient preferred phone number: [phone]    Patient contact not needed.     Tonia Juan, Registered Nurse   [facility]

## 2019-12-30 NOTE — PROGRESS NOTES
"Subjective     Francois Ly is a 79 year old female who presents to clinic today for the following health issues:      Hypertension: She presents for follow up of hypertension.  She does check blood pressure  regularly outside of the clinic. Outside blood pressures have been over 140/90. She follows a low salt diet.   History of Present Illness        Hypertension: She presents for follow up of hypertension.  She does check blood pressure  regularly outside of the clinic. Outside blood pressures have been over 140/90. She follows a low salt diet.      On Left side of the ankle  patient states that she has been having pain, feels like fullness in the ankles sometimes that she has stand up and moves her legs then goes away.    Patient had an ultrasound done at the [facility] in July of that area.           Patient Active Problem List   Diagnosis     Back pain     Chest pain     Pancreatic mass     Hypertensive urgency     Benign essential hypertension     Blunt trauma of rib, initial encounter     SBO (small bowel obstruction) (H)     Small bowel obstruction (H)     PAD (peripheral artery disease) (H)     Bilateral low back pain without sciatica, unspecified chronicity     Hyperlipidemia LDL goal <130     Post-nasal drip     Past Surgical History:   Procedure Laterality Date     GYN SURGERY      hysterectomy     ORTHOPEDIC SURGERY      Slipped disc with chronic back pain       Social History     Tobacco Use     Smoking status: Former Smoker     Smokeless tobacco: Former User     Tobacco comment: Former \"social\" smoker, quit in 1978.   Substance Use Topics     Alcohol use: Yes     Alcohol/week: 0.0 standard drinks     Comment: occ wine     Family History   Problem Relation Age of Onset     Family History Negative Other          Current Outpatient Medications   Medication Sig Dispense Refill     aspirin (ASA) 81 MG chewable tablet Take 81 mg by mouth daily       atorvastatin (LIPITOR) 20 MG tablet Take 0.5 tablets (10 mg) " "by mouth daily 90 tablet 3     enalapril-hydrochlorothiazide (VASERETIC) 10-25 MG tablet Take 1 tablet by mouth daily 90 tablet 1     Omega-3 Fatty Acids ([employer] FISH OIL) 1200 MG CAPS Take 1 capsule by mouth daily       Allergies   Allergen Reactions     Lactose GI Disturbance     Reduced fat dairy- Diarrhea     Sorbitan Trioleate      Vancomycin        Reviewed and updated as needed this visit by Provider         Review of Systems   ROS COMP: CONSTITUTIONAL: NEGATIVE for fever, chills, change in weight  CV: NEGATIVE for chest pain, palpitations or peripheral edema      Objective    BP (!) 190/69 (BP Location: Right arm, Patient Position: Sitting, Cuff Size: Adult Regular)   Pulse 55   Temp 97.9  F (36.6  C) (Oral)   Resp 16   Ht 1.575 m (5' 2\")   Wt 60.4 kg (133 lb 3.2 oz)   LMP  (LMP Unknown)   SpO2 97%   Breastfeeding No   BMI 24.36 kg/m    Body mass index is 24.36 kg/m .  Physical Exam   GENERAL: healthy, alert and no distress  NECK: no adenopathy, no asymmetry, masses, or scars and thyroid normal to palpation  RESP: lungs clear to auscultation - no rales, rhonchi or wheezes  CV: regular rate and rhythm, normal S1 S2, no S3 or S4, no murmur, click or rub, no peripheral edema and peripheral pulses strong  ABDOMEN: soft, nontender, no hepatosplenomegaly, no masses and bowel sounds normal  MS: normal ROM of the ankle, no tenderness.            Assessment & Plan     1. Essential hypertension  I wonder if patient should take 1 tab vs. Half tab, I strongly recommend to use one tab daily.   Pt agreed. Blood pressure is more controlled with 1 tab of Vaseretic daily.    2. LLQ abdominal pain  Mostly related to muscle spasm, can be also related to scarring pain, no further evaluation needed given the negative studies 2018 and no change in symptoms .             Return in about 6 months (around 6/30/2020).    Luis Feliciano MD  [facility]"

## 2020-01-01 ENCOUNTER — HOSPITAL ENCOUNTER (INPATIENT)
Facility: CLINIC | Age: 80
LOS: 2 days | Discharge: HOME | DRG: 389 | End: 2020-01-03
Attending: EMERGENCY MEDICINE | Admitting: HOSPITALIST
Payer: MEDICARE

## 2020-01-01 ENCOUNTER — APPOINTMENT (OUTPATIENT)
Dept: CT IMAGING | Facility: CLINIC | Age: 80
DRG: 389 | End: 2020-01-01
Attending: EMERGENCY MEDICINE
Payer: MEDICARE

## 2020-01-01 ENCOUNTER — APPOINTMENT (OUTPATIENT)
Dept: ULTRASOUND IMAGING | Facility: CLINIC | Age: 80
DRG: 389 | End: 2020-01-01
Attending: HOSPITALIST
Payer: MEDICARE

## 2020-01-01 DIAGNOSIS — K56.609 RECURRENT INTESTINAL OBSTRUCTION (H): ICD-10-CM

## 2020-01-01 DIAGNOSIS — K56.600 PARTIAL SMALL BOWEL OBSTRUCTION (H): ICD-10-CM

## 2020-01-01 LAB
ALBUMIN SERPL-MCNC: 3.9 G/DL (ref 3.4–5)
ALP SERPL-CCNC: 79 U/L (ref 40–150)
ALT SERPL W P-5'-P-CCNC: 29 U/L (ref 0–50)
ANION GAP SERPL CALCULATED.3IONS-SCNC: 7 MMOL/L (ref 3–14)
AST SERPL W P-5'-P-CCNC: 25 U/L (ref 0–45)
BASOPHILS # BLD AUTO: 0 10E9/L (ref 0–0.2)
BASOPHILS NFR BLD AUTO: 0.3 %
BILIRUB SERPL-MCNC: 0.4 MG/DL (ref 0.2–1.3)
BUN SERPL-MCNC: 12 MG/DL (ref 7–30)
CALCIUM SERPL-MCNC: 9.9 MG/DL (ref 8.5–10.1)
CHLORIDE SERPL-SCNC: 102 MMOL/L (ref 94–109)
CO2 SERPL-SCNC: 27 MMOL/L (ref 20–32)
CREAT BLD-MCNC: 0.6 MG/DL (ref 0.52–1.04)
CREAT SERPL-MCNC: 0.65 MG/DL (ref 0.52–1.04)
CRP SERPL-MCNC: <2.9 MG/L (ref 0–8)
DIFFERENTIAL METHOD BLD: ABNORMAL
EOSINOPHIL # BLD AUTO: 0.1 10E9/L (ref 0–0.7)
EOSINOPHIL NFR BLD AUTO: 0.5 %
ERYTHROCYTE [DISTWIDTH] IN BLOOD BY AUTOMATED COUNT: 12.7 % (ref 10–15)
GFR SERPL CREATININE-BSD FRML MDRD: 84 ML/MIN/{1.73_M2}
GFR SERPL CREATININE-BSD FRML MDRD: >90 ML/MIN/{1.73_M2}
GLUCOSE SERPL-MCNC: 129 MG/DL (ref 70–99)
HCT VFR BLD AUTO: 40.2 % (ref 35–47)
HGB BLD-MCNC: 13.5 G/DL (ref 11.7–15.7)
IMM GRANULOCYTES # BLD: 0 10E9/L (ref 0–0.4)
IMM GRANULOCYTES NFR BLD: 0.1 %
LIPASE SERPL-CCNC: 50 U/L (ref 73–393)
LYMPHOCYTES # BLD AUTO: 1.9 10E9/L (ref 0.8–5.3)
LYMPHOCYTES NFR BLD AUTO: 16.9 %
MCH RBC QN AUTO: 30.3 PG (ref 26.5–33)
MCHC RBC AUTO-ENTMCNC: 33.6 G/DL (ref 31.5–36.5)
MCV RBC AUTO: 90 FL (ref 78–100)
MONOCYTES # BLD AUTO: 0.3 10E9/L (ref 0–1.3)
MONOCYTES NFR BLD AUTO: 2.3 %
NEUTROPHILS # BLD AUTO: 9 10E9/L (ref 1.6–8.3)
NEUTROPHILS NFR BLD AUTO: 79.9 %
NRBC # BLD AUTO: 0 10*3/UL
NRBC BLD AUTO-RTO: 0 /100
PLATELET # BLD AUTO: 315 10E9/L (ref 150–450)
POTASSIUM SERPL-SCNC: 3.6 MMOL/L (ref 3.4–5.3)
PROT SERPL-MCNC: 8.1 G/DL (ref 6.8–8.8)
RBC # BLD AUTO: 4.46 10E12/L (ref 3.8–5.2)
SODIUM SERPL-SCNC: 136 MMOL/L (ref 133–144)
WBC # BLD AUTO: 11.2 10E9/L (ref 4–11)

## 2020-01-01 PROCEDURE — 25000128 H RX IP 250 OP 636: Performed by: HOSPITALIST

## 2020-01-01 PROCEDURE — 76705 ECHO EXAM OF ABDOMEN: CPT

## 2020-01-01 PROCEDURE — 25000125 ZZHC RX 250: Performed by: EMERGENCY MEDICINE

## 2020-01-01 PROCEDURE — 99285 EMERGENCY DEPT VISIT HI MDM: CPT | Mod: 25

## 2020-01-01 PROCEDURE — 82565 ASSAY OF CREATININE: CPT

## 2020-01-01 PROCEDURE — 83690 ASSAY OF LIPASE: CPT | Performed by: EMERGENCY MEDICINE

## 2020-01-01 PROCEDURE — 86140 C-REACTIVE PROTEIN: CPT | Performed by: EMERGENCY MEDICINE

## 2020-01-01 PROCEDURE — 12000000 ZZH R&B MED SURG/OB

## 2020-01-01 PROCEDURE — 25800030 ZZH RX IP 258 OP 636: Performed by: HOSPITALIST

## 2020-01-01 PROCEDURE — 25000125 ZZHC RX 250: Performed by: HOSPITALIST

## 2020-01-01 PROCEDURE — 80053 COMPREHEN METABOLIC PANEL: CPT | Performed by: EMERGENCY MEDICINE

## 2020-01-01 PROCEDURE — 85025 COMPLETE CBC W/AUTO DIFF WBC: CPT | Performed by: EMERGENCY MEDICINE

## 2020-01-01 PROCEDURE — 25000132 ZZH RX MED GY IP 250 OP 250 PS 637: Mod: GY | Performed by: EMERGENCY MEDICINE

## 2020-01-01 PROCEDURE — 99223 1ST HOSP IP/OBS HIGH 75: CPT | Mod: AI | Performed by: HOSPITALIST

## 2020-01-01 PROCEDURE — 25800030 ZZH RX IP 258 OP 636: Performed by: EMERGENCY MEDICINE

## 2020-01-01 PROCEDURE — 25000132 ZZH RX MED GY IP 250 OP 250 PS 637: Mod: GY | Performed by: HOSPITALIST

## 2020-01-01 PROCEDURE — 74177 CT ABD & PELVIS W/CONTRAST: CPT

## 2020-01-01 PROCEDURE — 25000128 H RX IP 250 OP 636: Performed by: EMERGENCY MEDICINE

## 2020-01-01 PROCEDURE — 86140 C-REACTIVE PROTEIN: CPT | Performed by: HOSPITALIST

## 2020-01-01 RX ORDER — LIDOCAINE 40 MG/G
CREAM TOPICAL
Status: DISCONTINUED | OUTPATIENT
Start: 2020-01-01 | End: 2020-01-03 | Stop reason: HOSPADM

## 2020-01-01 RX ORDER — ONDANSETRON 2 MG/ML
4 INJECTION INTRAMUSCULAR; INTRAVENOUS ONCE
Status: DISCONTINUED | OUTPATIENT
Start: 2020-01-01 | End: 2020-01-03 | Stop reason: HOSPADM

## 2020-01-01 RX ORDER — BISACODYL 10 MG
10 SUPPOSITORY, RECTAL RECTAL DAILY
Status: DISCONTINUED | OUTPATIENT
Start: 2020-01-01 | End: 2020-01-03 | Stop reason: HOSPADM

## 2020-01-01 RX ORDER — IOPAMIDOL 755 MG/ML
65 INJECTION, SOLUTION INTRAVASCULAR ONCE
Status: COMPLETED | OUTPATIENT
Start: 2020-01-01 | End: 2020-01-01

## 2020-01-01 RX ORDER — SODIUM CHLORIDE 9 MG/ML
INJECTION, SOLUTION INTRAVENOUS CONTINUOUS
Status: DISCONTINUED | OUTPATIENT
Start: 2020-01-01 | End: 2020-01-01

## 2020-01-01 RX ORDER — ONDANSETRON 2 MG/ML
4 INJECTION INTRAMUSCULAR; INTRAVENOUS EVERY 6 HOURS PRN
Status: DISCONTINUED | OUTPATIENT
Start: 2020-01-01 | End: 2020-01-03 | Stop reason: HOSPADM

## 2020-01-01 RX ORDER — METOPROLOL TARTRATE 1 MG/ML
2.5 INJECTION, SOLUTION INTRAVENOUS EVERY 4 HOURS PRN
Status: DISCONTINUED | OUTPATIENT
Start: 2020-01-01 | End: 2020-01-03 | Stop reason: HOSPADM

## 2020-01-01 RX ORDER — HYDROMORPHONE HYDROCHLORIDE 1 MG/ML
0.2 INJECTION, SOLUTION INTRAMUSCULAR; INTRAVENOUS; SUBCUTANEOUS
Status: DISCONTINUED | OUTPATIENT
Start: 2020-01-01 | End: 2020-01-03 | Stop reason: HOSPADM

## 2020-01-01 RX ORDER — SODIUM CHLORIDE 9 MG/ML
1000 INJECTION, SOLUTION INTRAVENOUS CONTINUOUS
Status: DISCONTINUED | OUTPATIENT
Start: 2020-01-01 | End: 2020-01-02

## 2020-01-01 RX ORDER — ACETAMINOPHEN 325 MG/1
650 TABLET ORAL EVERY 4 HOURS PRN
Status: DISCONTINUED | OUTPATIENT
Start: 2020-01-01 | End: 2020-01-03 | Stop reason: HOSPADM

## 2020-01-01 RX ORDER — HYDRALAZINE HYDROCHLORIDE 20 MG/ML
10 INJECTION INTRAMUSCULAR; INTRAVENOUS EVERY 4 HOURS PRN
Status: DISCONTINUED | OUTPATIENT
Start: 2020-01-01 | End: 2020-01-03 | Stop reason: HOSPADM

## 2020-01-01 RX ORDER — ENALAPRILAT 1.25 MG/ML
1.25 INJECTION INTRAVENOUS EVERY 6 HOURS
Status: DISCONTINUED | OUTPATIENT
Start: 2020-01-01 | End: 2020-01-02

## 2020-01-01 RX ORDER — NALOXONE HYDROCHLORIDE 0.4 MG/ML
.1-.4 INJECTION, SOLUTION INTRAMUSCULAR; INTRAVENOUS; SUBCUTANEOUS
Status: DISCONTINUED | OUTPATIENT
Start: 2020-01-01 | End: 2020-01-03 | Stop reason: HOSPADM

## 2020-01-01 RX ORDER — ONDANSETRON 4 MG/1
4 TABLET, ORALLY DISINTEGRATING ORAL EVERY 6 HOURS PRN
Status: DISCONTINUED | OUTPATIENT
Start: 2020-01-01 | End: 2020-01-03 | Stop reason: HOSPADM

## 2020-01-01 RX ADMIN — HYDROMORPHONE HYDROCHLORIDE 0.2 MG: 1 INJECTION, SOLUTION INTRAMUSCULAR; INTRAVENOUS; SUBCUTANEOUS at 21:59

## 2020-01-01 RX ADMIN — IOPAMIDOL 65 ML: 755 INJECTION, SOLUTION INTRAVENOUS at 18:58

## 2020-01-01 RX ADMIN — SODIUM CHLORIDE 60 ML: 9 INJECTION, SOLUTION INTRAVENOUS at 18:58

## 2020-01-01 RX ADMIN — Medication 10 MG: at 23:25

## 2020-01-01 RX ADMIN — SODIUM CHLORIDE 1000 ML: 9 INJECTION, SOLUTION INTRAVENOUS at 21:50

## 2020-01-01 RX ADMIN — ENALAPRILAT 1.25 MG: 1.25 INJECTION INTRAVENOUS at 23:15

## 2020-01-01 RX ADMIN — LIDOCAINE HYDROCHLORIDE 30 ML: 20 SOLUTION ORAL; TOPICAL at 18:43

## 2020-01-01 RX ADMIN — FAMOTIDINE 20 MG: 10 INJECTION, SOLUTION INTRAVENOUS at 23:15

## 2020-01-01 RX ADMIN — SODIUM CHLORIDE: 9 INJECTION, SOLUTION INTRAVENOUS at 21:03

## 2020-01-01 ASSESSMENT — MIFFLIN-ST. JEOR: SCORE: 1017.93

## 2020-01-01 ASSESSMENT — ENCOUNTER SYMPTOMS
DIARRHEA: 0
NAUSEA: 0
VOMITING: 0
ABDOMINAL PAIN: 1
ABDOMINAL DISTENTION: 1

## 2020-01-02 LAB
ALBUMIN SERPL-MCNC: 3.3 G/DL (ref 3.4–5)
ALP SERPL-CCNC: 66 U/L (ref 40–150)
ALT SERPL W P-5'-P-CCNC: 23 U/L (ref 0–50)
ANION GAP SERPL CALCULATED.3IONS-SCNC: 2 MMOL/L (ref 3–14)
AST SERPL W P-5'-P-CCNC: 16 U/L (ref 0–45)
BILIRUB SERPL-MCNC: 0.6 MG/DL (ref 0.2–1.3)
BUN SERPL-MCNC: 14 MG/DL (ref 7–30)
CALCIUM SERPL-MCNC: 8.6 MG/DL (ref 8.5–10.1)
CHLORIDE SERPL-SCNC: 110 MMOL/L (ref 94–109)
CO2 SERPL-SCNC: 27 MMOL/L (ref 20–32)
CREAT SERPL-MCNC: 0.72 MG/DL (ref 0.52–1.04)
GFR SERPL CREATININE-BSD FRML MDRD: 80 ML/MIN/{1.73_M2}
GLUCOSE SERPL-MCNC: 111 MG/DL (ref 70–99)
LACTATE BLD-SCNC: 1 MMOL/L (ref 0.7–2)
POTASSIUM SERPL-SCNC: 3.7 MMOL/L (ref 3.4–5.3)
PROT SERPL-MCNC: 7 G/DL (ref 6.8–8.8)
SODIUM SERPL-SCNC: 139 MMOL/L (ref 133–144)
WBC # BLD AUTO: 9.3 10E9/L (ref 4–11)

## 2020-01-02 PROCEDURE — 25800030 ZZH RX IP 258 OP 636: Performed by: HOSPITALIST

## 2020-01-02 PROCEDURE — 85048 AUTOMATED LEUKOCYTE COUNT: CPT | Performed by: HOSPITALIST

## 2020-01-02 PROCEDURE — 25000132 ZZH RX MED GY IP 250 OP 250 PS 637: Mod: GY | Performed by: HOSPITALIST

## 2020-01-02 PROCEDURE — 12000000 ZZH R&B MED SURG/OB

## 2020-01-02 PROCEDURE — 80053 COMPREHEN METABOLIC PANEL: CPT | Performed by: HOSPITALIST

## 2020-01-02 PROCEDURE — 25000132 ZZH RX MED GY IP 250 OP 250 PS 637: Mod: GY | Performed by: INTERNAL MEDICINE

## 2020-01-02 PROCEDURE — 25000125 ZZHC RX 250: Performed by: HOSPITALIST

## 2020-01-02 PROCEDURE — 36415 COLL VENOUS BLD VENIPUNCTURE: CPT | Performed by: HOSPITALIST

## 2020-01-02 PROCEDURE — 99232 SBSQ HOSP IP/OBS MODERATE 35: CPT | Performed by: INTERNAL MEDICINE

## 2020-01-02 PROCEDURE — 83605 ASSAY OF LACTIC ACID: CPT | Performed by: HOSPITALIST

## 2020-01-02 PROCEDURE — 99222 1ST HOSP IP/OBS MODERATE 55: CPT | Performed by: SURGERY

## 2020-01-02 RX ORDER — ENALAPRIL MALEATE AND HYDROCHLOROTHIAZIDE 10; 25 MG/1; MG/1
0.5 TABLET ORAL DAILY
Status: DISCONTINUED | OUTPATIENT
Start: 2020-01-02 | End: 2020-01-02 | Stop reason: CLARIF

## 2020-01-02 RX ORDER — ENALAPRIL MALEATE 5 MG/1
5 TABLET ORAL DAILY
Status: DISCONTINUED | OUTPATIENT
Start: 2020-01-02 | End: 2020-01-03 | Stop reason: HOSPADM

## 2020-01-02 RX ORDER — ATORVASTATIN CALCIUM 10 MG/1
10 TABLET, FILM COATED ORAL DAILY
Status: DISCONTINUED | OUTPATIENT
Start: 2020-01-02 | End: 2020-01-03 | Stop reason: HOSPADM

## 2020-01-02 RX ORDER — HYDROCHLOROTHIAZIDE 12.5 MG/1
12.5 CAPSULE ORAL DAILY
Status: DISCONTINUED | OUTPATIENT
Start: 2020-01-02 | End: 2020-01-03 | Stop reason: HOSPADM

## 2020-01-02 RX ADMIN — ENALAPRILAT 1.25 MG: 1.25 INJECTION INTRAVENOUS at 04:45

## 2020-01-02 RX ADMIN — ENALAPRILAT 1.25 MG: 1.25 INJECTION INTRAVENOUS at 11:14

## 2020-01-02 RX ADMIN — ENALAPRIL MALEATE 5 MG: 5 TABLET ORAL at 13:45

## 2020-01-02 RX ADMIN — HYDROCHLOROTHIAZIDE 12.5 MG: 12.5 CAPSULE ORAL at 13:45

## 2020-01-02 RX ADMIN — FAMOTIDINE 20 MG: 10 INJECTION, SOLUTION INTRAVENOUS at 21:27

## 2020-01-02 RX ADMIN — FAMOTIDINE 20 MG: 10 INJECTION, SOLUTION INTRAVENOUS at 08:07

## 2020-01-02 RX ADMIN — SODIUM CHLORIDE 1000 ML: 9 INJECTION, SOLUTION INTRAVENOUS at 06:55

## 2020-01-02 RX ADMIN — ATORVASTATIN CALCIUM 10 MG: 10 TABLET, FILM COATED ORAL at 13:45

## 2020-01-02 ASSESSMENT — ACTIVITIES OF DAILY LIVING (ADL)
BATHING: 0-->INDEPENDENT
ADLS_ACUITY_SCORE: 10
ADLS_ACUITY_SCORE: 10
RETIRED_COMMUNICATION: 0-->UNDERSTANDS/COMMUNICATES WITHOUT DIFFICULTY
SWALLOWING: 0-->SWALLOWS FOODS/LIQUIDS WITHOUT DIFFICULTY
DRESS: 0-->INDEPENDENT
ADLS_ACUITY_SCORE: 10
TRANSFERRING: 0-->INDEPENDENT
ADLS_ACUITY_SCORE: 13
TOILETING: 0-->INDEPENDENT
AMBULATION: 0-->INDEPENDENT
ADLS_ACUITY_SCORE: 13
FALL_HISTORY_WITHIN_LAST_SIX_MONTHS: NO
COGNITION: 0 - NO COGNITION ISSUES REPORTED
ADLS_ACUITY_SCORE: 10
RETIRED_EATING: 0-->INDEPENDENT

## 2020-01-02 ASSESSMENT — MIFFLIN-ST. JEOR: SCORE: 1030.25

## 2020-01-02 NOTE — H&P
[facility]    History and Physical  Hospitalist    Francois Ly MRN# [id number]   Age: 79 year old YOB: 1940     Date of Admission:  1/1/2020    Primary care provider: Luis Feliciano          Assessment and Plan:     Francois Ly is a 79 year old  female with medical history of hypertension, small bowel obstruction, chronic low back pain presented to ED with abdominal pain.    Acute Partial small bowel obstruction.  History of recurrent small bowel obstruction.  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   Abdominal pain started this morning, worse in the epigastric region, intermittent on and off. Bowel movement this morning.  Patient reports this is her 5 th bowel obstruction, last one was in 2018. Her SBO are likely related to adhesions from hysterectomy in 1994--she has never had lysis of adhesions before. Status post hysterectomy and bilateral salpingo-oophorectomy. Status post multiple abdominal surgeries in the 1960s related to IUD uterine perforation, and another surgery related to this in the 1990s.   In ED afebrile, abdomen soft, periumbilical tenderness present.  No guarding or rigidity.  WBC 11.2, hemoglobin 13.5, creatinine 0.65.  Lipase 50, glucose 129.  CT abdomen and pelvis with  Abnormal thickening of the wall of the loop of small bowel in the anterior pelvis appears be causing at least a partial small-bowel obstruction. This area of thickening could be caused by an inflammatory, infectious, or ischemic process.  Admit to inpatient.  N.p.o. except ice chips.  Serial abdominal exam.  Normal saline at 100 mL/h.  IV PRN Zofran.  IV Dilaudid every 2 hours as needed for moderate to severe pain.  IV famotidine for GI prophylaxis.  General surgery consult.  Dulcolax suppository.  Will consider NG if any worsening symptoms or for abdominal distention or vomiting.  Check CPK, lactic  acid levels.    Elevated blood pressures likely due to pain.  HTN: BP elevated 174/82 in the ED.   Patient did take her usual antihypertensives this morning, in pain.  Hold PTA antihypertensives, ASA  IV enalapril 1.25 mg every 6 hours scheduled.  PRN IV metoprolol 2.5mg with hold parameters.  IV Hydralazine PRN for SBP > 180    Cholelithiasis.   No right upper quadrant tenderness, CT imaging with multiple dependently layering gallstones. Benign-appearing hepatic cysts.  Right upper quadrant ultrasound, general surgery consulted.    History of type II non-ST elevation myocardial infarction in 10/2016  Stress test at that time which was negative for ischemia.   Hold PTA aspirin.    Dyslipidemia.   , cholesterol 250 in November 2019.  Taking PTA Lipitor.  Defer treatment plan to PCP.    Pancreatic cyst.   This was diagnosed five years ago and monitored with serial imaging at the [facility].   No acute issues.    Chronic low back pain, lumbar spinal stenosis, Status post lumbar spine surgery in 07/2015.   On IV pain meds as above.     DVT Prophylaxis: Pneumatic Compression Devices  Code Status: Full Code, discussed with patient and .  Disposition: Expected discharge 1-2 days pending improvement in bowel function  Discussed with patient, her  by the bedside, ED team.    Singh Warren MD          Chief Complaint:     History is obtained from patient and medical records.    Francois Ly is a 79 year old  female with medical history of hypertension, small bowel obstruction, chronic low back pain presented to ED with abdominal pain.    Abdominal pain started this morning, worse in the epigastric region, intermittent on and off.  Fluctuates between 1-10 out of intensity.  Associated abdominal distention, burping and reflux sensation.  Nausea, no vomiting.  Bowel movement this morning, no blood.  Denies eating anything out of her routine.  Recent travel to [place] 2 weeks back.  No exposure to sick  contacts.  No chest pain or shortness of breath.  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  History of recurrent bowel obstruction in the past.  Denies any diarrhea.  No urinary disturbance.  No fever or chills.  In ED blood pressure 174/82, abdomen soft, periumbilical tenderness present.  No guarding or rigidity.  WBC 11.2, hemoglobin 13.5, creatinine 0.65.  Lipase 50, glucose 129.  CT abdomen and pelvis with  Abnormal thickening of the wall of the loop of small bowel in the anterior pelvis appears be causing at least a partial small-bowel obstruction. This area of thickening could be caused by an inflammatory, infectious, or ischemic process.  Atherosclerosis. Multiple dependently layering gallstones. Benign-appearing hepatic cysts.          Review of Systems:     GENERAL: no fever or chills  EENT: no sinus problems, no difficulty swallowing  PULMONARY: No shortness of breath, no cough, no wheezing  CARDIAC: no chest pain, no irregular or fast heart beats   GI: No vomiting, diarrhea, constipation, black or bloody stools  : No burning/pain with urination, no urgency, no hematuria.   NEURO: No significant headaches,no dizziness  ENDOCRINE: No excessive thirst  MUSCULOSKELETAL: No joint pain   SKIN: No skin rashes  PSYCHIATRY no anxiety or depression    Medical History:     Past Medical History:   Diagnosis Date     Benign essential hypertension 10/12/2016     Bilateral low back pain without sciatica, unspecified chronicity 12/19/2017     Blunt trauma of rib, initial encounter 11/25/2016     Gallstones      Hypertension      PAD (peripheral artery disease) (H) 6/19/2017     Pancreatic mass 8/17/2016     Slipped intervertebral disc         Surgical History:      Past Surgical History:   Procedure Laterality Date     GYN SURGERY      hysterectomy     ORTHOPEDIC SURGERY       "Slipped disc with chronic back pain             Social History:      Social History     Tobacco Use     Smoking status: Former Smoker     Smokeless tobacco: Former User     Tobacco comment: Former \"social\" smoker, quit in .   Substance Use Topics     Alcohol use: Yes     Alcohol/week: 0.0 standard drinks     Comment: occ wine             Family History:     Multiple family members  from TB, except for her mother. Denies fam hx of cancer, heart disease. Patient denies hx of TB.         Allergies:     Allergies   Allergen Reactions     Lactose GI Disturbance     Reduced fat dairy- Diarrhea     Sorbitan Trioleate      Vancomycin              Medications:   Home Meds reviewed.         Physical Exam      Admission Weight: 59 kg (130 lb)    Vital Signs with Ranges  Temp:  [97.9  F (36.6  C)] 97.9  F (36.6  C)  Pulse:  [88] 88  Resp:  [16] 16  BP: (174)/(82) 174/82  SpO2:  [97 %] 97 %    PHYSICAL EXAM  GENERAL: Patient is in no distress. Alert and oriented.  HEENT: Oropharynx pink, moist.  HEART: Regular rate and rhythm. S1S2. No murmurs  LUNGS: Clear to auscultation bilaterally. No expiratory wheeze.  Respirations unlabored  ABDOMEN: Soft, diffuse periumbilical tenderness.  No guarding or rigidity.  Bowel sounds heard.  NEURO: Moving all extremities, no focal weakness.  EXTREMITIES: No pedal edema. 2+ peripheral pulses.  SKIN: Warm, dry. No rash or bruising.  PSYCHIATRY Cooperative         Data:   All new lab and imaging data was reviewed.       "

## 2020-01-02 NOTE — ED PROVIDER NOTES
"  History     Chief Complaint:  Abdominal Pain    HPI   Francois Ly is a 79 year old female with a history of hypertension, hyperlipidemia , SBO, cholelithiasis and Pancreatic mass who presents with abdominal pain. The patient states that her abdominal pain is located in her epigastric region. She reports some burping and distension. The patient denies any nausea, vomiting or chest pain. She states that she had a bowel movement this morning.  Has not taken anything for pain.    Allergies:  Vancomycin   Sorbitan trioleate   Ace inhibitors   Sulfa drugs   penicillins     Medications:    Baby aspirin   Lipitor  Vaseretic   Norvasc   losartan      Past Medical History:    Hypertension  Hyperlipidemia  Pancreatic mass   PAD  cholelithiasis  SBO    Past Surgical History:    Hysterectomy  Orthopedic surgery X2     Family History:    No past pertinent family history.    Social History:  The patient was accompanied to the ED by .  Smoking Status: Former smoker  Smokeless Tobacco: Never Used  Alcohol Use: Positive  Marital Status:        Review of Systems   Gastrointestinal: Positive for abdominal distention and abdominal pain. Negative for diarrhea, nausea and vomiting.   All other systems reviewed and are negative.      Physical Exam     Patient Vitals for the past 24 hrs:   BP Temp Temp src Pulse Resp SpO2 Height Weight   01/01/20 1805 (!) 174/82 97.9  F (36.6  C) Temporal 88 16 97 % 1.575 m (5' 2\") 59 kg (130 lb)       Physical Exam  Eyes:               Sclera white; Pupils are equal and round  ENT:                External ears and nares normal  CV:                  Rate as above with regular rhythm   Resp:               Breath sounds clear and equal bilaterally                          Non-labored, no retractions or accessory muscle use  GI:                   Abdomen is soft, periumbilical tenderness, negative Sotomayor's                          No rebound tenderness or peritoneal features  MS:             "      Moves all extremities  Skin:                Warm and dry  Neuro:             Speech is normal and fluent. No apparent deficit.    Emergency Department Course     Imaging:  Radiology findings were communicated with the patient who voiced understanding of the findings.    CT abdomen pelvis w contrast:  1. Abnormal thickening of the wall of the loop of small bowel in the  anterior pelvis appears be causing at least a partial small-bowel  obstruction. This area of thickening could be caused by an  inflammatory, infectious, or ischemic process.  2. Atherosclerosis.  3. Cholelithiasis.  4. Benign-appearing hepatic cysts.  Reading per radiology    Laboratory:  Laboratory findings were communicated with the patient who voiced understanding of the findings.    Creatinine POCT: WNL    CBC: WBC 11.2 (H), HGB 13.5,     CMP: glucose 129 (H) o/w WNL (Creatinine 0.65)    Lipase: 50 (L)    Interventions:  1843 GI cocktail 30 mL oral     Emergency Department Course:     Nursing notes and vitals reviewed.    1819 I performed an exam of the patient as documented above.     1832 IV was inserted and blood was drawn for laboratory testing, results above.    1858 The patient was sent for a CT abdomen pelvis while in the emergency department, results above.     2015 I personally reviewed the laboratory and imaging results with the patient and answered all related questions prior to admit.     2030  I spoke with Dr. Warren of the Hospitalist service from [facility] regarding patient's presentation, findings, and plan of care.    Impression & Plan      Medical Decision Making:  Francois Ly is a 79 year old female who presents to the emergency department today for evaluation of abdominal pain and distension. She has a history of bowel obstructions in the past and presentation is concerning for worsening. Labs were obtained without any evidence of hepatitis, pancreatitis or sepsis. CT confirms a partial small bowel  obstruction. She was made NPO. IV fluids were started. Admission arranged.     Diagnosis:    ICD-10-CM    1. Partial small bowel obstruction (H) K56.600    2. Recurrent intestinal obstruction (H) K56.609      Disposition:   Findings and plan explained to the Patient and spouse who consents to admission. Discussed the patient with Dr. Warren, who will admit the patient to a Medical bed for further monitoring, evaluation, and treatment.    Scribe Disclosure:  I, Britney Cooper, am serving as a scribe at 6:56 PM on 1/1/2020 to document services personally performed by Rianna Feng MD based on my observations and the provider's statements to me.     EMERGENCY DEPARTMENT       Rianna Feng MD  01/01/20 1493

## 2020-01-02 NOTE — PROGRESS NOTES
[facility]    Medicine Progress Note - Hospitalist Service       Date of Admission:  1/1/2020  Assessment & Plan   Francois Ly is a 79 year old  female with medical history of hypertension, small bowel obstruction, chronic low back pain presented to ED with abdominal pain.     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 Bowel movement this morning.  Patient reports this is her 5 th bowel obstruction, last one was in 2018. Her SBO are likely related to adhesions from hysterectomy in 1994--she has never had lysis of adhesions before. Status post hysterectomy and bilateral salpingo-oophorectomy. Status post multiple abdominal surgeries in the 1960s related to IUD uterine perforation, and another surgery related to this in the 1990s.   In ED afebrile, abdomen soft, periumbilical tenderness present.  No guarding or rigidity.  WBC 11.2, hemoglobin 13.5, creatinine 0.65.  Lipase 50, glucose 129.  CT abdomen and pelvis with abnormal thickening of the wall of the loop of small bowel in the anterior pelvis appears be causing at least a partial small-bowel obstruction. This area of thickening could be caused by an inflammatory, infectious, or ischemic process.  Had good results with suppository   - Advance diet as tolerated.  Currently on full liquid  - Stop IVF   - IV PRN Zofran  - IV Dilaudid every 2 hours as needed for moderate to severe pain  - IV famotidine for GI prophylaxis  - May benefit from Dulcolax suppository at home  - General surgery following and appreciate their recommendations.  Medical management for now      Elevated blood pressures likely due to pain  HTN  BP elevated 174/82 in the ED and slightly improved today.    -  Resume PTA anti-hypertensives  - IV Hydralazine PRN     Cholelithiasis  No right upper quadrant tenderness, CT imaging with multiple dependently layering gallstones. Benign-appearing hepatic cysts.    RUQ showed the small gallstones with borderline wall thickening but no bile duct dilatation.   - General surgery recommending outpatient follow up for now      History of type II non-ST elevation myocardial infarction in 10/2016  HLP   Stress test at that time which was negative for ischemia.   - Holding PTA aspirin  - Restarted PTA Lipitor     Pancreatic cyst  This was diagnosed five years ago and monitored with serial imaging at the [facility].  No acute issues.     Chronic low back pain, lumbar spinal stenosis, Status post lumbar spine surgery in 07/2015.   - Pain management as above      Diet: Diet  Advance Diet as Tolerated: Low Fiber; Low Fiber    DVT Prophylaxis: Pneumatic Compression Devices  Collins Catheter: not present  Code Status: Full Code      Disposition Plan   Expected discharge: Tomorrow, recommended to prior living arrangement once tolerating diet.  Entered: Leonard Escamilla DO 01/02/2020, 2:21 PM       The patient's care was discussed with the Bedside Nurse, Patient and Patient's Family.    Leonard Escamilla DO  Hospitalist Service  [facility]    ______________________________________________________________________    Interval History   Patient seen and examined.  No acute events over night.  Tolerating clear liquid diet this AM.  Abdominal pain is improved.  No further nausea or vomiting.  No chest pain or SOB.  Recommended considering low fiber diet as an outpatient.     Data reviewed today: I reviewed all medications, new labs and imaging results over the last 24 hours. I personally reviewed no images or EKG's today.    Physical Exam   Vital Signs: Temp: 97.6  F (36.4  C) Temp src: Oral BP: (!) 161/65 Pulse: 59   Resp: 14 SpO2: 95 % O2 Device: None (Room air)    Weight: 132  lbs 11.47 oz  General Appearance: Resting comfortably.  NAD  Respiratory: Clear to auscultation.  No respiratory distress   Cardiovascular:  RRR. No murmurs  GI: Bowel sounds present.  Non-tender  Skin: No rashes.  No cyanosis   Other: No edema.  No calf tenderness      Data   Recent Labs   Lab 01/02/20  0846 01/01/20  1832   WBC 9.3 11.2*   HGB  --  13.5   MCV  --  90   PLT  --  315    136   POTASSIUM 3.7 3.6   CHLORIDE 110* 102   CO2 27 27   BUN 14 12   CR 0.72 0.65   ANIONGAP 2* 7   ARIN 8.6 9.9   * 129*   ALBUMIN 3.3* 3.9   PROTTOTAL 7.0 8.1   BILITOTAL 0.6 0.4   ALKPHOS 66 79   ALT 23 29   AST 16 25   LIPASE  --  50*     Recent Results (from the past 24 hour(s))   CT Abdomen Pelvis w Contrast    Narrative    CT ABDOMEN PELVIS WITH CONTRAST  1/1/2020 7:21 PM    HISTORY: Mid abdominal pain, nausea, distension.    TECHNIQUE: Scans obtained from the diaphragm through the pelvis with  IV contrast, 65mL Isovue-370.   Radiation dose for this scan was reduced using automated exposure  control, adjustment of the mA and/or kV according to patient size, or  iterative reconstruction technique.    COMPARISON:  CT abdomen and pelvis dated 8/11/2018.    FINDINGS: There are coronary artery calcifications. The heart is  upper-normal size. Visualized portions of the mediastinal contents and  lung bases are otherwise unremarkable. No aggressive osseous lesions  or acute osseous fractures are seen. Irregularity of the right ilium  is likely due to bone donor site from prior low back surgery. Patient  is status post posterior L3, L4 and L5 fusion with laminectomies at L5  and L4.    Multiple dependently layering gallstones and simple-appearing hepatic  cysts are again noted. Subcentimeter low-attenuation lesions in the  bilateral peripheral kidneys are stable since the prior study dated  8/11/2018 and are highly likely benign. The liver, gallbladder,  pancreas, spleen, bilateral adrenal glands and bilateral  kidneys  otherwise enhance normally. No hydronephrosis, hydroureter,  nephrolithiasis, or ureteral calculus is identified. Urinary bladder  is mostly decompressed but is otherwise unremarkable.    There is nonaneurysmal atherosclerosis. Uterus is not seen and is  likely surgically absent. Ovaries are not seen. No adnexal masses are  identified.    There is a small amount of free fluid in the pelvis and trace free  fluid is seen in the abdomen. No adenopathy or free air is identified.    Extensive diverticulosis is seen throughout the sigmoid and descending  colon. Other scattered diverticuli are noted in the colon. No  pericolonic inflammatory change to suggest acute diverticulitis. The  appendix is normal in appearance and extends posteriorly and medially  from the cecum.    There are multiple distended fluid-filled loops of small bowel. There  appears to be a gradual transition from distended loops of small bowel  to normally distended loops of small bowel. This is best seen on image  16 series 5. Just distal to the area of distention, the small bowel is  thickened and hypodense (image 55 series 3) which could represent an  inflammatory, ischemic, or infectious process. Distal to this area of  small bowel wall thickening, small bowel is decompressed. The colon is  mostly decompressed. The stomach is distended by ingested material and  air but is otherwise unremarkable.      Impression    IMPRESSION:  1. Abnormal thickening of the wall of the loop of small bowel in the  anterior pelvis appears be causing at least a partial small-bowel  obstruction. This area of thickening could be caused by an  inflammatory, infectious, or ischemic process.  2. Atherosclerosis.  3. Cholelithiasis.  4. Benign-appearing hepatic cysts.    RAMBO BLAIR MD   US Abdomen Limited    Narrative    EXAM: US ABDOMEN LIMITED  LOCATION: [facility]  DATE/TIME: 1/1/2020 10:13 PM    INDICATION: Right upper quadrant  pain.    COMPARISON: 1/1/2020.    TECHNIQUE: Limited abdominal ultrasound.    FINDINGS:  GALLBLADDER: Tiny gallstones are present with borderline gallbladder wall thickening.    BILE DUCTS: No biliary dilatation. The common duct measures 3.7 mm.    LIVER: Fatty infiltration. Simple cysts are present.    RIGHT KIDNEY: No hydronephrosis.    PANCREAS: The visualized portions are normal.    No ascites.      Impression    IMPRESSION:  1.  Small gallstones with borderline gallbladder wall thickening. No bile duct dilatation.    2.  Fatty infiltration of liver with hepatic cysts present.

## 2020-01-02 NOTE — PLAN OF CARE
DATE & TIME: 1/1/20 from 2130 to 0730    Cognitive Concerns/ Orientation : A&O x 4   BEHAVIOR & AGGRESSION TOOL COLOR: Green  CIWA SCORE: N/A   ABNL VS/O2: VSS on RA except hypertensive  MOBILITY: Up and independent  PAIN MANAGMENT: c/o epigastric pain, given dilaudid x 1 with good relief  DIET: NPO except ice chips  BOWEL/BLADDER: Continent, voiding well. BS+, given Bisacodyl Suppository per order. Pt had 2 medium black/brownish soft/loose stool.   ABNL LAB/BG:   DRAIN/DEVICES: IVF infusing at 100 mL/hr  TELEMETRY RHYTHM: N/A  SKIN: Intact  TESTS/PROCEDURES: N/A  D/C DAY/GOALS/PLACE: Discharge 1-2 days pending in progress for clinical improvement  OTHER IMPORTANT INFO: Pt c/o epigastric pain, given dilaudid x 1 with good result.

## 2020-01-02 NOTE — CONSULTS
"[facility]  General Surgery Consultation         Augustin Ann MD    Francois Ly MRN# [id number]   YOB: 1940 Age: 79 year old      Date of Admission:  1/1/2020  Date of Consult: 1/2/2020         Assessment and Plan:   Patient is a 79 year old female with small bowel obstruction    PLAN:  I have personally reviewed all imaging studies which show abnormal thickening of a loop of the small bowel with a potential partial small bowel obstruction. This is of unknown etiology at this time but her symptoms have improved since admission.   She currently only has minimal tenderness over her left lower quadrant and there are no indications for surgical intervention at this time.  I would recommend continued medical care with slowly starting clears and considering GI consultation if she does not improve.  I advised the patient if she does not improve or worsens that she could require exploration and she agreed.        Chief Complaint:     Chief Complaint   Patient presents with     Abdominal Pain          History of Present Illness:   Patient is a 79 year old female who I was asked to see by  Dr. Warren for evaluation of abdominal pain.The patient describes having symptoms for the last 2 days. The pain is mainly located in the periumbilical area. The patient rates the pain a 10 out of 10 when she has cramping and can be just a 1 or 2 when there is no cramps.  Since coming to the hospital her pain is greatly diminished.  She only has minimal tenderness or pain at this time.  She has had this multiple times in the past due to multiple gynecology surgeries.  She has never required a operation. Patient denies fevers, chills, vomiting, jaundice, changes in stool or urine, headache, SOB, chest pain.          Physical Exam:   Blood pressure (!) 161/65, pulse 59, temperature 97.6  F (36.4  C), temperature source Oral, resp. rate 14, height 1.575 m (5' 2\"), weight 60.2 kg (132 lb 11.5 oz), SpO2 " 95 %, not currently breastfeeding.  132 lbs 11.47 oz  General: Generally appears well.  Psych: Alert and Oriented.  Normal affect  Neurological: grossly intact  Eyes: Sclera clear  Respiratory:  Lungs with good air excursion  Cardiovascular:  Normal peripheral pulses  GI: Abdomen Soft Mild tenderness to palpation LLQ No hernias palpated..  Lymphatic/Hematologic/Immune:  No obvious lymphadenopathy.  Integumentary:  No rashes       Past Medical History:     Past Medical History:   Diagnosis Date     Benign essential hypertension 10/12/2016     Bilateral low back pain without sciatica, unspecified chronicity 12/19/2017     Blunt trauma of rib, initial encounter 11/25/2016     Gallstones      Hypertension      PAD (peripheral artery disease) (H) 6/19/2017     Pancreatic mass 8/17/2016     Slipped intervertebral disc           Past Surgical History:     Past Surgical History:   Procedure Laterality Date     GYN SURGERY      hysterectomy     ORTHOPEDIC SURGERY      Slipped disc with chronic back pain          Current Medications:           bisacodyl  10 mg Rectal Daily     enalaprilat  1.25 mg Intravenous Q6H     famotidine  20 mg Intravenous Q12H     ondansetron  4 mg Intravenous Once     sodium chloride (PF)  3 mL Intracatheter Q8H     acetaminophen, hydrALAZINE, HYDROmorphone, lidocaine 4%, lidocaine (buffered or not buffered), melatonin, metoprolol, naloxone, ondansetron **OR** ondansetron, sodium chloride (PF)       Home Medications:     Prior to Admission medications    Medication Sig Last Dose Taking? Auth Provider   aspirin (ASA) 81 MG chewable tablet Take 81 mg by mouth daily 1/1/2020 at am Yes Reported, Patient   enalapril-hydrochlorothiazide (VASERETIC) 10-25 MG tablet Take 1 tablet by mouth daily  Patient taking differently: Take 0.5 tablets by mouth daily  1/1/2020 at am Yes Luis Feliciano MD   Naproxen Sodium (ALEVE PO) Take 1 tablet by mouth daily as needed for moderate pain Past Month at Unknown time Yes  Unknown, Entered By History   Omega-3 Fatty Acids ([employer] FISH OIL) 1200 MG CAPS Take 1 capsule by mouth daily Past Month at Unknown time Yes Reported, Patient   UNKNOWN TO PATIENT Place onto the skin daily as needed (leg cramps) Cream from [place] for leg cramps Past Month at Unknown time Yes Unknown, Entered By History   atorvastatin (LIPITOR) 20 MG tablet Take 0.5 tablets (10 mg) by mouth daily  Patient not taking: Reported on 1/1/2020 Not Taking at Unknown time  Aysha Dumont MD          Allergies:     Allergies   Allergen Reactions     Lactose GI Disturbance     Reduced fat dairy- Diarrhea     Sorbitan Trioleate      Vancomycin           Family History:     Family History   Problem Relation Age of Onset     Family History Negative Other          Social History:   Francois Ly  reports that she has quit smoking. She has quit using smokeless tobacco. She reports current alcohol use. She reports that she does not use drugs.        Review of Systems:   The 12 point Review of Systems is negative other than noted in the HPI.       Labs/Imaging   All new lab and imaging data was reviewed.   Augustin Ann M.D.  [facility]

## 2020-01-02 NOTE — PLAN OF CARE
A&O x 4. Up and independent, ambulating in the halls.1 brown BM,passing gas, positive bowel sounds. Tolerating full liquid diet, will advance to low fiber. Family at the bedside.

## 2020-01-02 NOTE — ED NOTES
"[facility]  ED Nurse Handoff Report    ED Chief complaint: Abdominal Pain      ED Diagnosis:   Final diagnoses:   Partial small bowel obstruction (H)   Recurrent intestinal obstruction (H)       Code Status: Full Code    Allergies:   Allergies   Allergen Reactions     Lactose GI Disturbance     Reduced fat dairy- Diarrhea     Sorbitan Trioleate      Vancomycin        Activity level - Baseline/Home:  Independent  Activity Level - Current:   Independent    Patient's Preferred language: English   Needed?: No    Isolation: No  Infection: Not Applicable  Bariatric?: No    Vital Signs:   Vitals:    01/01/20 1805   BP: (!) 174/82   Pulse: 88   Resp: 16   Temp: 97.9  F (36.6  C)   TempSrc: Temporal   SpO2: 97%   Weight: 59 kg (130 lb)   Height: 1.575 m (5' 2\")       Cardiac Rhythm: ,        Pain level: 0-10 Pain Scale: 10    Is this patient confused?: No   Does this patient have a guardian?  No         If yes, is there guardianship documents in the Epic \"Code/ACP\" activity?  N/A         Guardian Notified?  N/A  [place] - Suicide Severity Rating Scale Completed?  Yes  If yes, what color did the patient score?  N/A    Patient Report: Initial Complaint: Abd Pain and bloating  Focused Assessment: Patient has abd pain that became severe this morning coming and going making her feel bloated.  Had a normal BM this AM not nauseated.   Tests Performed: CT  Abnormal Results:   Labs Ordered and Resulted from Time of ED Arrival Up to the Time of Departure from the ED   COMPREHENSIVE METABOLIC PANEL - Abnormal; Notable for the following components:       Result Value    Glucose 129 (*)     All other components within normal limits   LIPASE - Abnormal; Notable for the following components:    Lipase 50 (*)     All other components within normal limits   CBC WITH PLATELETS DIFFERENTIAL - Abnormal; Notable for the following components:    WBC 11.2 (*)     Absolute Neutrophil 9.0 (*)     All other components " within normal limits   CREATININE POCT   PERIPHERAL IV CATHETER   ISTAT CREATININE NURSING POCT   FREE WATER     Treatments provided: Gi cocktail, patient declined zofran    Family Comments:  at the bedside    OBS brochure/video discussed/provided to patient/family: No              Name of person given brochure if not patient: NA              Relationship to patient: NA    ED Medications:   Medications   ondansetron (ZOFRAN) injection 4 mg (has no administration in time range)   sodium chloride 0.9% infusion (has no administration in time range)   lidocaine (XYLOCAINE) 2 % 15 mL, alum & mag hydroxide-simethicone (MYLANTA ES/MAALOX  ES) 15 mL GI Cocktail (30 mLs Oral Given 1/1/20 1843)   Saline Flush - CT (60 mLs Intravenous Given 1/1/20 1858)   iopamidol (ISOVUE-370) solution 65 mL (65 mLs Intravenous Given 1/1/20 1858)       Drips infusing?:  No    For the majority of the shift this patient was Green.   Interventions performed were NA.    Severe Sepsis OR Septic Shock Diagnosis Present: No    To be done/followed up on inpatient unit:  NA    ED NURSE PHONE NUMBER: [id number]

## 2020-01-02 NOTE — PHARMACY-ADMISSION MEDICATION HISTORY
Pharmacy Medication History  Admission medication history interview status for the 1/1/2020  admission is complete. See EPIC admission navigator for prior to admission medications     Medication history sources: Patient, Surescripts and Epic &   Medication history source reliability: Good  Adherence assessment: Moderate    Significant changes made to the medication list:  -Reported that she is NOT TAKING atorvastatin - appears that she has been encouraged to take but wishes not to.  -She reports currently taking 1/2 tablet of Vaseretic daily, may take extra 1/2 tab if blood pressure high. Per last office visit 12/30/19 it was recommended to take 1 tablet daily.  -Added Aleve which she takes pretty often for pain and [place] cream for leg cramps. She did not know strength of Aleve or ingredients in cream.    Additional medication history information:   She only takes fish oil occasionally if not obtaining enough from diet.    Medication reconciliation completed by provider prior to medication history? No    Time spent in this activity: 10 min      Prior to Admission medications    Medication Sig Last Dose Taking? Auth Provider   aspirin (ASA) 81 MG chewable tablet Take 81 mg by mouth daily 1/1/2020 at am Yes Reported, Patient   enalapril-hydrochlorothiazide (VASERETIC) 10-25 MG tablet Take 1 tablet by mouth daily  Patient taking differently: Take 0.5 tablets by mouth daily  1/1/2020 at am Yes Luis Feliciano MD   Naproxen Sodium (ALEVE PO) Take 1 tablet by mouth daily as needed for moderate pain Past Month at Unknown time Yes Unknown, Entered By History   Omega-3 Fatty Acids ([employer] FISH OIL) 1200 MG CAPS Take 1 capsule by mouth daily Past Month at Unknown time Yes Reported, Patient   UNKNOWN TO PATIENT Place onto the skin daily as needed (leg cramps) Cream from [place] for leg cramps Past Month at Unknown time Yes Unknown, Entered By History   atorvastatin (LIPITOR) 20 MG tablet Take 0.5 tablets (10 mg) by mouth  daily  Patient not taking: Reported on 1/1/2020 Not Taking at Unknown time  Aysha Dumont MD

## 2020-01-02 NOTE — PROGRESS NOTES
RECEIVING UNIT ED HANDOFF REVIEW    ED Nurse Handoff Report was reviewed by: Az Del Valle RN on January 1, 2020 at 8:59 PM

## 2020-01-02 NOTE — PROGRESS NOTES
"General Surgery Progress Note    Francois Ly  YOB: 1940    Age: 79 year old  MRN#: [id number]    Date of Admission: 1/1/2020  Surgeon:   Augustin Ann MD                SUBJECTIVE   Francois was seen by Dr. Ann earlier this morning, she was started on clear liquids which she has been tolerating well. She currently denies pain or nausea. She is ambulating well, feeling much better than she was on admission.  She has known gallstones. Denies any history of right upper quadrant pain, dark urine, yellow skin or eyes. She does mention to me a superficial mass over her left lower quadrant. This mass has been present for a few years. It fluctuates in size and sometimes becomes large and painful.    The patient's surgical history includes uterine trauma/perforation from IUD requiring multiple pelvic surgeries in the 1960s and additional surgery in 1994. She mentions she also had a very large ovarian cyst removed on the left side at some point.    OBJECTIVE   Blood pressure (!) 161/65, pulse 59, temperature 97.6  F (36.4  C), temperature source Oral, resp. rate 14, height 1.575 m (5' 2\"), weight 60.2 kg (132 lb 11.5 oz), SpO2 95 %, not currently breastfeeding.  No intake/output data recorded.    General - This is a well developed, well nourished female in no apparent distress. Alert and oriented x 3  Head - normocephalic, atraumatic  Eyes - pupils equally round and reactive to light, no scleral icterus, extraocular movements intact  Respiratory: lungs clear to auscultation bilaterally without wheezes, rales or rhonchi   Cardiovascular: regular rate and rhythm; S1 and S2 distinct without murmur   Abdomen - Soft, nondistended. No RUQ or epigastric tenderness. Mild central and left pelvic pain to palpation. No rebound or guarding. Small <1cm round mass palpated in subcutaneous tissue over left lower quadrant adjacent to an incision. No real tenderness to palpation.  Extremities - Moves all " extremities. Warm without edema. No calf tenderness  Neurologic - Nonfocal          Data:     Recent Labs   Lab Test 01/02/20  0846 01/01/20  1832 07/02/19  1439 08/25/18  0925   WBC 9.3 11.2* 5.8 9.7   HGB  --  13.5 12.1 13.4   HCT  --  40.2 35.8 39.7   PLT  --  315 249 277     Recent Labs   Lab Test 01/01/20  1832 07/02/19  1439 08/25/18  0925   POTASSIUM 3.6 3.5 3.9   CHLORIDE 102 106 103   CO2 27 30 31   BUN 12 18 16   CR 0.65 0.71 0.71     Recent Labs   Lab Test 01/01/20 1832 08/25/18  0925 08/11/18  0628   BILITOTAL 0.4 0.3 0.3   ALT 29 25 24   AST 25 22 22   ALKPHOS 79 66 69   LIPASE 50* 91 72*     Recent Labs   Lab Test 01/01/20 1832 07/02/19  1439 08/25/18  0925   ARIN 9.9 8.9 9.3     Recent Labs   Lab Test 01/01/20 1832 07/02/19  1439 08/25/18  1122 08/25/18  0925  08/11/18  0756 08/11/18  0628  04/12/17  0038   ANIONGAP 7 2*  --  6   < >  --  8   < >  --    PROTEIN  --   --  Negative  --   --  Negative  --   --  Negative   ALBUMIN 3.9  --   --  3.8  --   --  3.6  --   --     < > = values in this interval not displayed.     Imaging reviewed.       Assessment   Francois Ly is a 79 year old female with recurrent partial small bowel obstruction, gallstones, mild gallbladder wall thickening on ultrasound, small mass in subcutaneous tissue left lower abdomen.         Plan:   Gallstones, gallbladder wall thickening  - Gallstones were noted on ultrasound in 2018 and patient has no history of symptoms from stones. She has no right upper quadrant tenderness, no pericholecystic fluid nor biliary dilatation, and wall thickening on ultrasound is minimal.  If patient develops any signs/symptoms of cholecystitis, would consider HIDA scan for evaluation. Patient is in agreement.    Partial small bowel obstruction  - Likely related to pelvic adhesions from previous abdominal surgeries. Patient's pain is improving, she is free of nausea, is having bowel function and tolerating clear liquids.. No role for urgent  surgical intervention. Slowly advance diet as tolerated.    Subcutaneous abdominal wall mass  - Based on the fluctuation in size and occasional tenderness of this mass as well as location adjacent to an incision, I suspect it may be an endometrioma from previous pelvic surgeries. Otherwise may be a sebaceous cyst or lipoma. I offered the patient an outpatient consultation with Dr. Ann to discuss this and possibly have removed likely under MAC. Patient is very interested in this and will call our office once she is out of the hospital to set this up. Our contact information is in the AVS.      Susie Feliz PA-C  [facility]  [phone]

## 2020-01-03 VITALS
OXYGEN SATURATION: 95 % | RESPIRATION RATE: 18 BRPM | TEMPERATURE: 97.3 F | SYSTOLIC BLOOD PRESSURE: 189 MMHG | HEART RATE: 59 BPM | DIASTOLIC BLOOD PRESSURE: 90 MMHG | HEIGHT: 62 IN | BODY MASS INDEX: 24.42 KG/M2 | WEIGHT: 132.72 LBS

## 2020-01-03 PROCEDURE — 99231 SBSQ HOSP IP/OBS SF/LOW 25: CPT | Performed by: PHYSICIAN ASSISTANT

## 2020-01-03 PROCEDURE — 99239 HOSP IP/OBS DSCHRG MGMT >30: CPT | Performed by: HOSPITALIST

## 2020-01-03 PROCEDURE — 25000132 ZZH RX MED GY IP 250 OP 250 PS 637: Mod: GY | Performed by: INTERNAL MEDICINE

## 2020-01-03 PROCEDURE — 25000132 ZZH RX MED GY IP 250 OP 250 PS 637: Mod: GY | Performed by: HOSPITALIST

## 2020-01-03 RX ORDER — CALCIUM CARBONATE 500 MG/1
1 TABLET, CHEWABLE ORAL 3 TIMES DAILY PRN
Qty: 90 TABLET | Refills: 0 | Status: SHIPPED | OUTPATIENT
Start: 2020-01-03 | End: 2021-12-08

## 2020-01-03 RX ADMIN — ENALAPRIL MALEATE 5 MG: 5 TABLET ORAL at 08:35

## 2020-01-03 RX ADMIN — ATORVASTATIN CALCIUM 10 MG: 10 TABLET, FILM COATED ORAL at 08:35

## 2020-01-03 RX ADMIN — HYDROCHLOROTHIAZIDE 12.5 MG: 12.5 CAPSULE ORAL at 08:35

## 2020-01-03 ASSESSMENT — ACTIVITIES OF DAILY LIVING (ADL)
ADLS_ACUITY_SCORE: 10

## 2020-01-03 NOTE — PLAN OF CARE
Discharge    Patient discharged to home with spouse  Care plan note  Discharge instructions reviewed, questions answered. IV removed. Belongings accounted for    Listed belongings gathered and returned to patient. yes  Care Plan and Patient education resolved:yes  Prescriptions if needed, hard copies sent with patient  yes  Home and hospital acquired medications returned to patient: NA  Medication Bin checked and emptied on discharge yes  Follow up appointment made for patient:will make her own, phone numbers given

## 2020-01-03 NOTE — PLAN OF CARE
DATE & TIME: 1/03/20 5633-8671    Cognitive Concerns/ Orientation : A&O x4   BEHAVIOR & AGGRESSION TOOL COLOR: Green  CIWA SCORE: NA   ABNL VS/O2: VSS except HTN on room air   MOBILITY: Independent  PAIN MANAGMENT: Abdominal pain managed with heat packs  DIET: Tolerated low fiber diet; advanced to regular diet for breakfast  BOWEL/BLADDER: Continent  ABNL LAB/BG: None  DRAIN/DEVICES: PIV R arm saline locked  TELEMETRY RHYTHM: NA  SKIN: WNL  TESTS/PROCEDURES: None  D/C DAY/GOALS/PLACE: Expected discharge today if tolerating diet  OTHER IMPORTANT INFO: General surgery following. Bowel sounds active.

## 2020-01-03 NOTE — PROGRESS NOTES
"General Surgery Progress Note    Admission Date: 1/1/2020  Today's Date: 1/3/2020         Assessment:      Francois Ly is a 79 year old female admitted 1/1/20 with partial small bowel obstruction - resolving.   Tolerating diet, having bowel function, minimal pain.         Plan:   - Continue with low fiber diet for 1-2 weeks. Discussed consideration of a more low fiber diet long term given her history of recurrent obstruction  - Ambulate, tylenol for pain if needed  - No surgical intervention indicated  - Patient to follow-up with surgeon after discharge for possible removal of abdominal wall subcutaneous mass. Contact information provided  - OK to discharge today if cleared by hospitalist. Please call us with questions.        Interval History:   Afebrile overnight, pulse normal, hypertensive. Not taking anything for pain. Having intermittent mild discomfort, some when she ate last night and a little when ambulating today. Currently pain free. No nausea, tolerating regular diet. Not charted but per patient she had BM yesterday, had another BM today and is passing gas. Eager to go home.          Physical Exam:   BP (!) 189/90 (BP Location: Right arm)   Pulse 59   Temp 97.3  F (36.3  C) (Oral)   Resp 18   Ht 1.575 m (5' 2\")   Wt 60.2 kg (132 lb 11.5 oz)   LMP  (LMP Unknown)   SpO2 95%   BMI 24.27 kg/m    I/O last 3 completed shifts:  In: 2187 [P.O.:720; I.V.:1467]  Out: -   General: NAD, pleasant, alert and oriented x3  Respiratory: non-labored breathing   Abdomen - Soft, nondistended. Completely nontender to palpation today. No rebound or guarding. Small <1cm round mass palpated in subcutaneous tissue over left lower quadrant adjacent to an incision.  Extremities: no lower extremity edema, no calf tenderness    LABS:  Recent Labs   Lab Test 01/02/20  0846 01/01/20  1832 07/02/19  1439 08/25/18  0925   WBC 9.3 11.2* 5.8 9.7   HGB  --  13.5 12.1 13.4   MCV  --  90 91 91   PLT  --  315 249 277      Recent " Labs   Lab Test 01/02/20  0846 01/01/20  1832 07/02/19  1439   POTASSIUM 3.7 3.6 3.5   CHLORIDE 110* 102 106   CO2 27 27 30   BUN 14 12 18   CR 0.72 0.65 0.71   ANIONGAP 2* 7 2*      Recent Labs   Lab Test 01/02/20  0846 01/01/20  1832 08/25/18  0925   ALBUMIN 3.3* 3.9 3.8   BILITOTAL 0.6 0.4 0.3   ALT 23 29 25   AST 16 25 22   ALKPHOS 66 79 66            -------------------------------    Susie Feliz PA-C  [facility]  [phone]

## 2020-01-03 NOTE — DISCHARGE SUMMARY
[facility]  Hospitalist Discharge Summary       Date of Admission:  1/1/2020  Date of Discharge:  1/3/2020  Discharging Provider: Tim Downey, DO      Discharge Diagnoses   Small bowel obstruction, recurrent      Follow-ups Needed After Discharge   Follow-up Appointments     Follow-up and recommended labs and tests       You were seen by Dr. Augustin Ann and his PA Susie Felzi from general   surgery. We discussed your gallbladder, small bowel obstruction, and the   mass/bump that you feel on the lower left side of your abdomen.    We recommend considering removal of this mass since it has been bothersome   and fluctuating in size. If you would like to discuss this further, please   call Dr. Ann's office at [phone] and schedule an appointment to   see him or another one of our surgeons in clinic. Our clinic's name is   [facility]. The address is [address] [place] [address], [address], [place], [address]         Follow-up and recommended labs and tests       Follow up with primary care provider, Luis Feliciano, within 7 days for   hospital follow- up.  No follow up labs or test are needed.    Follow-up with general surgery as outlined in a few weeks             Unresulted Labs Ordered in the Past 30 Days of this Admission     No orders found from 12/2/2019 to 1/2/2020.      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 Bowel movement this morning.  Patient reports this is her 5 th bowel obstruction, last one was in 2018. Her SBO are likely related to adhesions from hysterectomy in  1994--she has never had lysis of adhesions before. Status post hysterectomy and bilateral salpingo-oophorectomy. Status post multiple abdominal surgeries in the 1960s related to IUD uterine perforation, and another surgery related to this in the 1990s.   In ED afebrile, abdomen soft, periumbilical tenderness present.  No guarding or rigidity.  WBC 11.2, hemoglobin 13.5, creatinine 0.65.  Lipase 50, glucose 129.  CT abdomen and pelvis with abnormal thickening of the wall of the loop of small bowel in the anterior pelvis appears be causing at least a partial small-bowel obstruction. This area of thickening could be caused by an inflammatory, infectious, or ischemic process.  Had good results with suppository   - tolerating diet and having bowel movements on discharge  - surgery evaluated and would like to see as outpatient to discuss removal of possible endometrial cyst  - tums for heartburn prn       HTN  Possibly white coat syndrome  BP elevated mostly throughout admission  - Resume PTA anti-hypertensives  - I offered to increase her PTA medications to assist in the prevention of stroke and MI: she declined, stating she normally runs high at [facility] [facility] and also at home she normalizes.   - could consider 24 hour blood pressure monitoring, defer to PCP, advise follow-up in 1 week     Cholelithiasis  No right upper quadrant tenderness, CT imaging with multiple dependently layering gallstones. Benign-appearing hepatic cysts.    RUQ showed the small gallstones with borderline wall thickening but no bile duct dilatation.   - General surgery recommending outpatient follow up for now      History of type II non-ST elevation myocardial infarction in 10/2016  HLP   Stress test at that time which was negative for ischemia.   - resume PTA aspirin  - Restarted PTA Lipitor     Pancreatic cyst  This was diagnosed five years ago and monitored with serial imaging at the [facility].  No acute issues.     Chronic low back pain,  lumbar spinal stenosis, Status post lumbar spine surgery in 07/2015.   - Pain management as above       Consultations This Hospital Stay   SURGERY GENERAL IP CONSULT    Code Status   Prior    Time Spent on this Encounter   I, Tim Downey DO, personally saw the patient today and spent greater than 30 minutes discharging this patient.       Tim Downey DO  [facility]  ______________________________________________________________________    Physical Exam   Vital Signs: Temp: 97.3  F (36.3  C) Temp src: Oral BP: (!) 189/90 Pulse: 59   Resp: 18 SpO2: 95 % O2 Device: None (Room air)    Weight: 132 lbs 11.47 oz  General Appearance:  Resting comfortably.  NAD  Respiratory: Clear to auscultation.  No respiratory distress   Cardiovascular:  RRR. No murmurs  GI: Bowel sounds present.  Non-tender  Skin: No rashes.  No cyanosis   Other:  No edema.  No calf tenderness            Primary Care Physician   Luis Feliciano    Discharge Orders      Follow-up and recommended labs and tests     You were seen by Dr. Augustin Ann and his PA Susie Feliz from general surgery. We discussed your gallbladder, small bowel obstruction, and the mass/bump that you feel on the lower left side of your abdomen.    We recommend considering removal of this mass since it has been bothersome and fluctuating in size. If you would like to discuss this further, please call Dr. Ann's office at [phone] and schedule an appointment to see him or another one of our surgeons in clinic. Our clinic's name is [facility]. The address is [address] [place] [address], [address], [place], [address]     Reason for your hospital stay    Small bowel obstruction     Follow-up and recommended labs and tests     Follow up with primary care provider, Luis Feliciano, within 7 days for hospital follow- up.  No follow up labs or test are needed.    Follow-up with general surgery as outlined in a few weeks     Activity    Your activity upon  discharge: activity as tolerated     Diet    Follow a LOW FIBER DIET for 1-2 weeks due to recent bowel obstruction.       Significant Results and Procedures   Most Recent 3 CBC's:  Recent Labs   Lab Test 01/02/20  0846 01/01/20  1832 07/02/19  1439 08/25/18  0925   WBC 9.3 11.2* 5.8 9.7   HGB  --  13.5 12.1 13.4   MCV  --  90 91 91   PLT  --  315 249 277     Most Recent 3 BMP's:  Recent Labs   Lab Test 01/02/20  0846 01/01/20  1832 07/02/19  1439    136 138   POTASSIUM 3.7 3.6 3.5   CHLORIDE 110* 102 106   CO2 27 27 30   BUN 14 12 18   CR 0.72 0.65 0.71   ANIONGAP 2* 7 2*   ARIN 8.6 9.9 8.9   * 129* 107*     Most Recent 2 LFT's:  Recent Labs   Lab Test 01/02/20  0846 01/01/20  1832   AST 16 25   ALT 23 29   ALKPHOS 66 79   BILITOTAL 0.6 0.4     Most Recent 3 INR's:No lab results found.,   Results for orders placed or performed during the hospital encounter of 01/01/20   CT Abdomen Pelvis w Contrast    Narrative    CT ABDOMEN PELVIS WITH CONTRAST  1/1/2020 7:21 PM    HISTORY: Mid abdominal pain, nausea, distension.    TECHNIQUE: Scans obtained from the diaphragm through the pelvis with  IV contrast, 65mL [id number]-370.   Radiation dose for this scan was reduced using automated exposure  control, adjustment of the mA and/or kV according to patient size, or  iterative reconstruction technique.    COMPARISON:  CT abdomen and pelvis dated 8/11/2018.    FINDINGS: There are coronary artery calcifications. The heart is  upper-normal size. Visualized portions of the mediastinal contents and  lung bases are otherwise unremarkable. No aggressive osseous lesions  or acute osseous fractures are seen. Irregularity of the right ilium  is likely due to bone donor site from prior low back surgery. Patient  is status post posterior L3, L4 and L5 fusion with laminectomies at L5  and L4.    Multiple dependently layering gallstones and simple-appearing hepatic  cysts are again noted. Subcentimeter low-attenuation lesions in  the  bilateral peripheral kidneys are stable since the prior study dated  8/11/2018 and are highly likely benign. The liver, gallbladder,  pancreas, spleen, bilateral adrenal glands and bilateral kidneys  otherwise enhance normally. No hydronephrosis, hydroureter,  nephrolithiasis, or ureteral calculus is identified. Urinary bladder  is mostly decompressed but is otherwise unremarkable.    There is nonaneurysmal atherosclerosis. Uterus is not seen and is  likely surgically absent. Ovaries are not seen. No adnexal masses are  identified.    There is a small amount of free fluid in the pelvis and trace free  fluid is seen in the abdomen. No adenopathy or free air is identified.    Extensive diverticulosis is seen throughout the sigmoid and descending  colon. Other scattered diverticuli are noted in the colon. No  pericolonic inflammatory change to suggest acute diverticulitis. The  appendix is normal in appearance and extends posteriorly and medially  from the cecum.    There are multiple distended fluid-filled loops of small bowel. There  appears to be a gradual transition from distended loops of small bowel  to normally distended loops of small bowel. This is best seen on image  16 series 5. Just distal to the area of distention, the small bowel is  thickened and hypodense (image 55 series 3) which could represent an  inflammatory, ischemic, or infectious process. Distal to this area of  small bowel wall thickening, small bowel is decompressed. The colon is  mostly decompressed. The stomach is distended by ingested material and  air but is otherwise unremarkable.      Impression    IMPRESSION:  1. Abnormal thickening of the wall of the loop of small bowel in the  anterior pelvis appears be causing at least a partial small-bowel  obstruction. This area of thickening could be caused by an  inflammatory, infectious, or ischemic process.  2. Atherosclerosis.  3. Cholelithiasis.  4. Benign-appearing hepatic  cysts.    RAMBO BLAIR MD   US Abdomen Limited    Narrative    EXAM: US ABDOMEN LIMITED  LOCATION: [facility]  DATE/TIME: 1/1/2020 10:13 PM    INDICATION: Right upper quadrant pain.    COMPARISON: 1/1/2020.    TECHNIQUE: Limited abdominal ultrasound.    FINDINGS:  GALLBLADDER: Tiny gallstones are present with borderline gallbladder wall thickening.    BILE DUCTS: No biliary dilatation. The common duct measures 3.7 mm.    LIVER: Fatty infiltration. Simple cysts are present.    RIGHT KIDNEY: No hydronephrosis.    PANCREAS: The visualized portions are normal.    No ascites.      Impression    IMPRESSION:  1.  Small gallstones with borderline gallbladder wall thickening. No bile duct dilatation.    2.  Fatty infiltration of liver with hepatic cysts present.           Discharge Medications   Discharge Medication List as of 1/3/2020 10:07 AM      START taking these medications    Details   calcium carbonate (TUMS) 500 MG chewable tablet Take 1 tablet (500 mg) by mouth 3 times daily as needed for heartburn, Disp-90 tablet, R-0, E-Prescribe         CONTINUE these medications which have NOT CHANGED    Details   aspirin (ASA) 81 MG chewable tablet Take 81 mg by mouth daily, Historical      atorvastatin (LIPITOR) 20 MG tablet Take 0.5 tablets (10 mg) by mouth daily, Disp-90 tablet, R-3, E-Prescribe      enalapril-hydrochlorothiazide (VASERETIC) 10-25 MG tablet Take 1 tablet by mouth daily, Disp-90 tablet, R-1, E-Prescribe      Naproxen Sodium (ALEVE PO) Take 1 tablet by mouth daily as needed for moderate pain, Historical      Omega-3 Fatty Acids ([employer] FISH OIL) 1200 MG CAPS Take 1 capsule by mouth daily, Historical      UNKNOWN TO PATIENT Place onto the skin daily as needed (leg cramps) Cream from [place] for leg cramps, Historical           Allergies   Allergies   Allergen Reactions     Lactose GI Disturbance     Reduced fat dairy- Diarrhea     Sorbitan Trioleate      Vancomycin

## 2020-01-03 NOTE — PLAN OF CARE
DATE & TIME: 01/02/20 Evening   Cognitive Concerns/ Orientation : AO X4   BEHAVIOR & AGGRESSION TOOL COLOR: Green  CIWA SCORE: N/A   ABNL VS/O2: VSS ex HTN but within parameters, room air  MOBILITY: Independent, walked in halls this shift  PAIN MANAGMENT: C/O intermittent abd pain managed w/ walking and heat packs  DIET: Low Fiber diet, tolerated well  BOWEL/BLADDER: Voiding adequately  ABNL LAB/BG: HTN  DRAIN/DEVICES: R PIV SL  TELEMETRY RHYTHM: N/A  SKIN: WDL and intact  TESTS/PROCEDURES: N/A  D/C DAY/GOALS/PLACE: Pending  OTHER IMPORTANT INFO: N/A

## 2020-01-06 ENCOUNTER — HOSPITAL ENCOUNTER (EMERGENCY)
Facility: CLINIC | Age: 80
Discharge: HOME | End: 2020-01-07
Attending: EMERGENCY MEDICINE | Admitting: EMERGENCY MEDICINE
Payer: MEDICARE

## 2020-01-06 DIAGNOSIS — I10 BENIGN ESSENTIAL HYPERTENSION: ICD-10-CM

## 2020-01-06 LAB
ANION GAP SERPL CALCULATED.3IONS-SCNC: 6 MMOL/L (ref 3–14)
BASOPHILS # BLD AUTO: 0 10E9/L (ref 0–0.2)
BASOPHILS NFR BLD AUTO: 0.5 %
BUN SERPL-MCNC: 16 MG/DL (ref 7–30)
CALCIUM SERPL-MCNC: 9.2 MG/DL (ref 8.5–10.1)
CHLORIDE SERPL-SCNC: 102 MMOL/L (ref 94–109)
CO2 SERPL-SCNC: 28 MMOL/L (ref 20–32)
CREAT SERPL-MCNC: 0.65 MG/DL (ref 0.52–1.04)
DIFFERENTIAL METHOD BLD: NORMAL
EOSINOPHIL # BLD AUTO: 0.2 10E9/L (ref 0–0.7)
EOSINOPHIL NFR BLD AUTO: 3.1 %
ERYTHROCYTE [DISTWIDTH] IN BLOOD BY AUTOMATED COUNT: 12.5 % (ref 10–15)
GFR SERPL CREATININE-BSD FRML MDRD: 84 ML/MIN/{1.73_M2}
GLUCOSE SERPL-MCNC: 104 MG/DL (ref 70–99)
HCT VFR BLD AUTO: 37 % (ref 35–47)
HGB BLD-MCNC: 12.4 G/DL (ref 11.7–15.7)
IMM GRANULOCYTES # BLD: 0 10E9/L (ref 0–0.4)
IMM GRANULOCYTES NFR BLD: 0.2 %
LYMPHOCYTES # BLD AUTO: 2.2 10E9/L (ref 0.8–5.3)
LYMPHOCYTES NFR BLD AUTO: 39.9 %
MCH RBC QN AUTO: 30.3 PG (ref 26.5–33)
MCHC RBC AUTO-ENTMCNC: 33.5 G/DL (ref 31.5–36.5)
MCV RBC AUTO: 91 FL (ref 78–100)
MONOCYTES # BLD AUTO: 0.3 10E9/L (ref 0–1.3)
MONOCYTES NFR BLD AUTO: 5.3 %
NEUTROPHILS # BLD AUTO: 2.8 10E9/L (ref 1.6–8.3)
NEUTROPHILS NFR BLD AUTO: 51 %
NRBC # BLD AUTO: 0 10*3/UL
NRBC BLD AUTO-RTO: 0 /100
PLATELET # BLD AUTO: 269 10E9/L (ref 150–450)
POTASSIUM SERPL-SCNC: 3.2 MMOL/L (ref 3.4–5.3)
RBC # BLD AUTO: 4.09 10E12/L (ref 3.8–5.2)
SODIUM SERPL-SCNC: 136 MMOL/L (ref 133–144)
WBC # BLD AUTO: 5.5 10E9/L (ref 4–11)

## 2020-01-06 PROCEDURE — 84484 ASSAY OF TROPONIN QUANT: CPT | Performed by: EMERGENCY MEDICINE

## 2020-01-06 PROCEDURE — 93005 ELECTROCARDIOGRAM TRACING: CPT

## 2020-01-06 PROCEDURE — 85025 COMPLETE CBC W/AUTO DIFF WBC: CPT | Performed by: EMERGENCY MEDICINE

## 2020-01-06 PROCEDURE — 99285 EMERGENCY DEPT VISIT HI MDM: CPT

## 2020-01-06 PROCEDURE — 80048 BASIC METABOLIC PNL TOTAL CA: CPT | Performed by: EMERGENCY MEDICINE

## 2020-01-06 NOTE — ED AVS SNAPSHOT
Emergency Department  [address] [place] [address]  Phone:  [phone]  Fax:  [phone]                                    Francois Ly   MRN: [id number]    Department:   Emergency Department   Date of Visit:  1/6/2020           After Visit Summary Signature Page    I have received my discharge instructions, and my questions have been answered. I have discussed any challenges I see with this plan with the nurse or doctor.    ..........................................................................................................................................  Patient/Patient Representative Signature      ..........................................................................................................................................  Patient Representative Print Name and Relationship to Patient    ..................................................               ................................................  Date                                   Time    ..........................................................................................................................................  Reviewed by Signature/Title    ...................................................              ..............................................  Date                                               Time          22EPIC Rev 08/18

## 2020-01-07 ENCOUNTER — TELEPHONE (OUTPATIENT)
Dept: FAMILY MEDICINE | Facility: CLINIC | Age: 80
End: 2020-01-07

## 2020-01-07 ENCOUNTER — APPOINTMENT (OUTPATIENT)
Dept: GENERAL RADIOLOGY | Facility: CLINIC | Age: 80
End: 2020-01-07
Attending: EMERGENCY MEDICINE
Payer: MEDICARE

## 2020-01-07 VITALS
TEMPERATURE: 97.6 F | OXYGEN SATURATION: 97 % | SYSTOLIC BLOOD PRESSURE: 162 MMHG | DIASTOLIC BLOOD PRESSURE: 89 MMHG | RESPIRATION RATE: 16 BRPM | HEART RATE: 48 BPM

## 2020-01-07 LAB
INTERPRETATION ECG - MUSE: NORMAL
TROPONIN I SERPL-MCNC: 0.04 UG/L (ref 0–0.04)

## 2020-01-07 PROCEDURE — 71046 X-RAY EXAM CHEST 2 VIEWS: CPT

## 2020-01-07 ASSESSMENT — ENCOUNTER SYMPTOMS
SHORTNESS OF BREATH: 0
HEADACHES: 0
NUMBNESS: 0
FEVER: 0
COUGH: 0

## 2020-01-07 NOTE — TELEPHONE ENCOUNTER
"Dr. Feliciano   Patient was in to ER for HTN 1/7/2020 meds not changed   Patient monitoring at home today was 160/78, she swetha like to take 1 pill of vaseretic instead of 0.5 pills to see if this helps - is this okay?   She will continue to monitor and has f/u with you in office 1/14/2020     ED/Discharge Protocol    \"Hi, my name is Tonia Juan RN, a registered nurse, and I am calling on behalf of Dr. Feliciano's office at [place].  I am calling to follow up and see how things are going for you after your recent visit.\"    \"I see that you were in the (ER/UC/IP) on   Admission from 1/1 - 1/3/2020 small bowel obstruction -   ER visit HTN 1/6/2020     How are you doing now that you are home?\"  SBO - feeling well. Decrease in abdominal pain. Regular BM's since coming home. Working on low fiber diet which is hard for her as she likes to eat high fiber foods. Advised to increase fluids as well. Patient has a mass left abdomen that she will discuss with Dr Feliciano - she was given option to f/u with surgeon if she would like to have removed. Started to take tums, at times has burning with eating.     Is patient experiencing symptoms that may require a hospital visit?  No     Discharge Instructions    \"Let's review your discharge instructions.  What is/are the follow-up recommendations?  Pt. Response: low fiber diet, tums, f/u Dr. Feliciano     \"Were you instructed to make a follow-up appointment?\"  Pt. Response: Yes.  Has appointment been made?   No.  \"Can I help you schedule that appointment?\" assisted with scheduling 1/14/2020       \"When you see the provider, I would recommend that you bring your discharge instructions with you.    Medications    \"How many new medications are you on since your hospitalization/ED visit?\"    0-1 Tums   \"How many of your current medicines changed (dose, timing, name, etc.) while you were in the hospital/ED visit?\"   0-1  \"Do you have questions about your medications?\"   Question regarding HTN meds - " "sent to Dr. Feliciano for review   \"Were you newly diagnosed with heart failure, COPD, diabetes or did you have a heart attack?\"   No  For patients on insulin: \"Did you start on insulin in the hospital or did you have your insulin dose changed?\"   No  Post Discharge Medication Reconciliation Status: discharge medications reconciled, continue medications without change.    Was MTM referral placed (*Make sure to put transitions as reason for referral)?   No    Call Summary    \"Do you have any questions or concerns about your condition or care plan at the moment?\"    Yes - blood pressure medications - sent to pcp for review     Triage nurse advice given: continue with low fiber diet, f/u with Dr. Feliciano 1/14/2020. RN will call patient back after pcp review of HTN med question     Patient was in ER 1 in the past year (assess appropriateness of ER visits.)      \"If you have questions or things don't continue to improve, we encourage you contact us through the main clinic number,  [phone].  Even if the clinic is not open, triage nurses are available 24/7 to help you.     We would like you to know that our clinic has extended hours (provide information).  We also have urgent care (provide details on closest location and hours/contact info)\"      \"Thank you for your time and take care!\"    Logan Geller Nurse   [facility]         "

## 2020-01-07 NOTE — DISCHARGE INSTRUCTIONS
Take your blood pressure once a day in the morning and write this down for your primary care doctor.  If your blood pressure remains elevated over a period of time your primary care doctor will likely increase your blood pressure medication dose.

## 2020-01-07 NOTE — ED TRIAGE NOTES
Patient states her SBP is in the 200s, asymptomatic. Patient checks her BP everyday. She took an extra dose of her blood pressure medicine tonight at 2000 and 325mg of aspirin.

## 2020-01-07 NOTE — ED PROVIDER NOTES
"  History     Chief Complaint:  ***  Hypertension      HPI   Francois Ly is a 79 year old female who presents with ***    Allergies:  Lactose  Sorbitan Trioleate  Ace Inhibitors  Vancomycin   Penicillins  Sulfonamide Derivatives    Medications:    Aspirin 81 mg  Lipitor  Tums  Vaseretic  Aleve  Fish oil  Cholecalciferol   Hydroten    Past Medical History:    Hypertension  Bilateral low back pain without sciatica, unspecified chronicity  Blunt trauma of rib, initial encounter  Gallstones  Peripheral artery disease  Pancreatic mass  Slipped intervertebral disc  Hypertensive emergency  Small bowel obstruction  Hyperlipidemia  Glaucoma    Past Surgical History:    Hysterectomy  Orthopedic surgery, slipped disc with chronic back pain (fusion L4-L5)  Bunionectomy   Posterior lumbar decompression  Allograft - non structural, femoral head  Autograft - iliac creat, right side  Extraction extracapsular cataract with intraocular lens implant    Family History:    No past pertinent family history.    Social History:  Negative for tobacco use - former smoker, quit 1978  Positive for alcohol use - occasional wine.  Negative for drug use.  Marital Status:   [2]     Review of Systems  ***    Physical Exam   First Vitals:  BP: (!) 235/87  Pulse: 64  Temp: 97.6  F (36.4  C)  Resp: 18  SpO2: 99 %      Physical Exam  ***    Emergency Department Course   ECG:  ***    Imaging:  {Radiographic findings?:[id number]::\" \"}  ***    Laboratory:  ***    Procedures:  ***        Interventions:  ***  {Response to meds:551641::\" \"}    Emergency Department Course:  ***       Impression & Plan       {trauma activation?:431063::\" \"}  CMS Diagnoses: {Sepsis/Septic Shock/Stemi/Stroke:739570::\" \"}       Medical Decision Making:  ***  Critical Care time:  {none or minutes:643158::\"none\"}    Diagnosis:  No diagnosis found.    Disposition:  {discharged to home/discharged to home with.../Admitted to...:044731}    Discharge Medications:  New " Prescriptions    No medications on file       Maggi Phillip  1/6/2020    EMERGENCY DEPARTMENT

## 2020-01-07 NOTE — ED PROVIDER NOTES
History     Chief Complaint:    Hypertension       HPI   Francois Ly is a 79 year old female who was recently admitted to the hospital from [address] with a recurrent small bowel obstruction.  Back in 1960 she had perforation due to an intrauterine device and is required multiple surgeries, and has had multiple small bowel obstructions since then.  It was noted on that state that she does have hypertension, whitecoat syndrome may contribute to this.  They resumed her antihypertensives at the time of discharge.  She states that she took her blood pressure at home last night it was 130 systolic.  She then checked her blood pressure today and it was 170 systolic.  When she rechecked it this evening it was 182 systolic.  She does take her blood pressure medication only half a pill.  She was told if her blood pressure does go above 130 then she should take another half the dose.  So she did take another extra half a dose tonight.  Her normal half dose is in the morning.  She denies having any headache, vision abnormalities, shortness of breath, chest pain, fever, cough, numbness or tingling.  She has intermittent leg pain secondary to her back surgery this is chronic.  They did not start her on any new medications when she was discharged.  Her abdominal pain has resolved.  She is having normal bowel movements.    Allergies:  Lactose  Sorbitan Trioleate  Vancomycin     Medications:    aspirin (ASA) 81 MG chewable tablet  atorvastatin (LIPITOR) 20 MG tablet  calcium carbonate (TUMS) 500 MG chewable tablet  enalapril-hydrochlorothiazide (VASERETIC) 10-25 MG tablet  Naproxen Sodium (ALEVE PO)  Omega-3 Fatty Acids ([employer] FISH OIL) 1200 MG CAPS  UNKNOWN TO PATIENT        Past Medical History:    Past Medical History:   Diagnosis Date     Benign essential hypertension 10/12/2016     Bilateral low back pain without sciatica, unspecified chronicity 12/19/2017     Blunt trauma of rib, initial encounter 11/25/2016     Gallstones       Hypertension      PAD (peripheral artery disease) (H) 6/19/2017     Pancreatic mass 8/17/2016     Slipped intervertebral disc        Patient Active Problem List    Diagnosis Date Noted     Post-nasal drip 12/17/2019     Priority: Medium     Hyperlipidemia LDL goal <130 01/08/2019     Priority: Medium     Bilateral low back pain without sciatica, unspecified chronicity 12/19/2017     Priority: Medium     PAD (peripheral artery disease) (H) 06/19/2017     Priority: Medium     Small bowel obstruction (H) 04/13/2017     Priority: Medium     SBO (small bowel obstruction) (H) 04/12/2017     Priority: Medium     Blunt trauma of rib, initial encounter 11/25/2016     Priority: Medium     Benign essential hypertension 10/12/2016     Priority: Medium     Hypertensive urgency 10/02/2016     Priority: Medium     Pancreatic mass 08/17/2016     Priority: Medium     Benign appearing, last CT scan 7/2019 was negative for any pancreatic lesion.       Chest pain 04/07/2016     Priority: Medium     Back pain 09/22/2012     Priority: Medium        Past Surgical History:    Past Surgical History:   Procedure Laterality Date     GYN SURGERY      hysterectomy     ORTHOPEDIC SURGERY      Slipped disc with chronic back pain        Family History:    family history includes Family History Negative in an other family member.    Social History:   reports that she has quit smoking. She has quit using smokeless tobacco. She reports current alcohol use. She reports that she does not use drugs.    PCP: Luis Feliciano     Review of Systems   Constitutional: Negative for fever.   Eyes: Negative for visual disturbance.   Respiratory: Negative for cough and shortness of breath.    Cardiovascular: Negative for chest pain.   Neurological: Negative for numbness and headaches.   All other systems reviewed and are negative.        Physical Exam     Patient Vitals for the past 24 hrs:   BP Temp Pulse Heart Rate Resp SpO2   01/07/20 0229 (!) 162/89 -- (!) 48 --  16 97 %   01/07/20 0200 (!) 182/83 -- -- 51 9 --   01/07/20 0100 (!) 176/80 -- 51 52 16 --   01/07/20 0040 (!) 187/84 -- 53 53 -- --   01/06/20 2257 (!) 194/76 -- -- -- -- --   01/06/20 2157 -- -- 64 -- -- --   01/06/20 2156 (!) 235/87 97.6  F (36.4  C) -- -- 18 99 %        Physical Exam    Physical Exam   Constitutional:  Patient is oriented to person, place, and time. They appear well-developed and well-nourished.   HENT:   Mouth/Throat:   Oropharynx is clear and moist.   Eyes:    Conjunctivae normal and EOM are normal. Pupils are equal, round, and reactive to light.   Neck:    Normal range of motion.   Cardiovascular: Normal rate, regular rhythm and normal heart sounds.  Exam reveals no gallop and no friction rub.  No murmur heard.  Pulmonary/Chest:  Effort normal and breath sounds normal. Patient has no wheezes. Patient has no rales.   Abdominal:   Soft. Bowel sounds are normal. Patient exhibits no mass. There is no tenderness. There is no rebound and no guarding.   Musculoskeletal:  Normal range of motion. Patient exhibits no edema.   Neurological:   Patient is alert and oriented to person, place, and time. Patient has normal strength. No cranial nerve deficit or sensory deficit. GCS 15  Skin:   Skin is warm and dry. No rash noted. No erythema.   Psychiatric:   Patient has a normal mood and affect. Patient's behavior is normal. Judgment and thought content normal.       Emergency Department Course     ECG (0:05:13):  Rate 54 bpm.   QT/QTc 476/451.   P-R-T axes 17 -29 109.   No significant change as compared to a previous EKG August 11, 2018   Interpreted at 0030 by Domi Mckeon MD.     Imaging:    Chest XR,  PA & LAT   Final Result   IMPRESSION: No evidence of active cardiopulmonary disease.          All imaging results were discussed with the patient who voiced understanding of the findings.    Laboratory:    Labs Ordered and Resulted from Time of ED Arrival Up to the Time of Departure from the ED    BASIC METABOLIC PANEL - Abnormal; Notable for the following components:       Result Value    Potassium 3.2 (*)     Glucose 104 (*)     All other components within normal limits   CBC WITH PLATELETS DIFFERENTIAL   TROPONIN I    Troponin:  0.035    Emergency Department Course:  Past medical records, nursing notes, and vitals reviewed.  I performed an exam of the patient and obtained history, as documented above.    I rechecked the patient. Findings and plan explained to the Patient and . Patient was discharged.    Impression & Plan      Medical Decision Making:  Francois Ly is a 79-year-old female presents to the emergency department with high blood pressure.  On a recent hospitalization here she was noted to have elevated blood pressure and it was thought that part of it could be due to whitecoat syndrome.  She is on only half a dose of her blood pressure medications in the morning but has been told that if her blood pressure is elevated she can take another one half.  She does not have any chest pain or shortness of breath her previous abdominal symptoms have since resolved.  An EKG was performed here which is unchanged from previous.  Basic blood work was performed evaluating for any endorgan damage.  Her troponin while detectable is within normal limits.  No evidence of renal failure.  No evidence of pleural effusion.  She is not acutely anemic.  Her blood pressure has trended downwards since she has been here.  I did not give her any beta-blockers being that her heart rate was on the slow side.  By the time that I had seen her she had no symptoms so watch for trends.  After everything was returned her blood pressure is 160/89.  Her normal is 130 over 80s.  At this time while this is elevated she is asymptomatic.  I am going to discharge her and she will trend her blood pressures once a day for her primary care doctor to review.  If she indeed remains elevated and that this is not white coat syndrome,  then she will likely need a permanent increase to her blood pressure medication.  She will follow-up with her doctor in 3 to 5 days.  Diagnosis:    ICD-10-CM    1. Benign essential hypertension I10         Discharge Medications:  Discharge Medication List as of 1/7/2020  2:22 AM           1/7/2020   Domi Mckeon MD Bochert, Michelle Ann, MD  01/07/20 0823

## 2020-01-07 NOTE — TELEPHONE ENCOUNTER
Patient informed   Okay to take 1 full tablet of vaseretic - continue monitoring bp and bring readings with to appt scheduled on 1/14/2020   If questions/concerns arise patient is to call ARMAAN DIAS back     Tonia Juan Registered Nurse   [facility]

## 2020-01-13 ENCOUNTER — PATIENT OUTREACH (OUTPATIENT)
Dept: FAMILY MEDICINE | Facility: CLINIC | Age: 80
End: 2020-01-13

## 2020-01-13 NOTE — TELEPHONE ENCOUNTER
Dr. Feliciano -   Update from  below regarding hypertension (pt appt tomorrow) please advise on plan for today     This AM 8:45 blood pressure was 199/98    Took 1 tablet of enalapril-hydrochlorothiazide 10-25     Rechecked blood pressure at 10:10 199/90    Patient denies chest pain, headaches, blurry vision, sob     Feels fine no symptoms out of the ordinary     Patient has f/u with Dr. Feliciano on 1/14/2020    Tonia Juan, Registered Nurse   [facility]

## 2020-01-13 NOTE — TELEPHONE ENCOUNTER
Huddled with Dr. Feliciano     Continue same does and f/u in clinic tomorrow as scheduled       Spoke to patient's  Sahil - BP now is 152/72 which is down   Continue monitoring and discuss at ov tomorrow     Tonia Juan Registered Nurse   [facility]

## 2020-01-13 NOTE — PROGRESS NOTES
"Pre-Visit Planning     Future Appointments   Date Time Provider Department Center   1/14/2020  9:20 AM Luis Feliciano MD [facility]     Arrival Time for this Appointment:  9:00 AM     Appointment Notes for this encounter:   hosp f/u SBO, ER f/u HTN   Admission from 1/1 - 1/3 SBO, mass lower left abdomen - advised to f/u with surgical consultants if wishes to have removed   1/6 - ER visit hypertension, advised to monitor and f/u with pcp for possibility of changing htn meds     Questionnaires Reviewed/Assigned  Additional questionnaires assigned        Patient preferred phone number: [phone]    Spoke to patient via phone. Patient does not have additional questions or concerns.        Visit is not preventive.    Health Maintenance Due   Topic Date Due     PHQ-2  01/01/2020     Patient is due for:  HM due pended   No appointment needed.    MyChart  Patient is active on [employer].    Questionnaire Review   Advised patient to arrive early in order to complete questionnaires.    Call Summary  \"Thank you for your time today.  If anything comes up before your appointment, please feel free to contact us at [phone].\"    Tonia Juan Registered Nurse   [facility]       "

## 2020-01-14 ENCOUNTER — OFFICE VISIT (OUTPATIENT)
Dept: FAMILY MEDICINE | Facility: CLINIC | Age: 80
End: 2020-01-14
Payer: MEDICARE

## 2020-01-14 VITALS
RESPIRATION RATE: 16 BRPM | OXYGEN SATURATION: 97 % | DIASTOLIC BLOOD PRESSURE: 80 MMHG | SYSTOLIC BLOOD PRESSURE: 186 MMHG | HEIGHT: 62 IN | TEMPERATURE: 97.9 F | WEIGHT: 131 LBS | HEART RATE: 60 BPM | BODY MASS INDEX: 24.11 KG/M2

## 2020-01-14 DIAGNOSIS — K56.609 SBO (SMALL BOWEL OBSTRUCTION) (H): ICD-10-CM

## 2020-01-14 DIAGNOSIS — I10 BENIGN ESSENTIAL HYPERTENSION: Primary | ICD-10-CM

## 2020-01-14 PROCEDURE — 80048 BASIC METABOLIC PNL TOTAL CA: CPT | Performed by: FAMILY MEDICINE

## 2020-01-14 PROCEDURE — 99214 OFFICE O/P EST MOD 30 MIN: CPT | Performed by: FAMILY MEDICINE

## 2020-01-14 PROCEDURE — 36415 COLL VENOUS BLD VENIPUNCTURE: CPT | Performed by: FAMILY MEDICINE

## 2020-01-14 RX ORDER — NIFEDIPINE 30 MG
30 TABLET, EXTENDED RELEASE ORAL DAILY
Qty: 30 TABLET | Refills: 1 | Status: SHIPPED | OUTPATIENT
Start: 2020-01-14 | End: 2020-03-01

## 2020-01-14 RX ORDER — ENALAPRIL MALEATE AND HYDROCHLOROTHIAZIDE 10; 25 MG/1; MG/1
1 TABLET ORAL DAILY
Qty: 90 TABLET | Refills: 1 | COMMUNITY
Start: 2020-01-14 | End: 2020-03-27 | Stop reason: ALTCHOICE

## 2020-01-14 ASSESSMENT — MIFFLIN-ST. JEOR: SCORE: 1022.46

## 2020-01-14 NOTE — PROGRESS NOTES
Subjective     Francois Ly is a 79 year old female who presents to clinic today for the following health issues:    History of Present Illness        Hypertension: She presents for follow up of hypertension.  She does check blood pressure  regularly outside of the clinic. Outside blood pressures have been over 140/90. She follows a low salt diet.     She eats 2-3 servings of fruits and vegetables daily.She consumes 0 sweetened beverage(s) daily.  She is taking medications regularly.         Hospital Follow-up Visit:    Hospital/Nursing Home/ Rehab Facility: [facility]  Date of Admission: 1/1/20  Date of Discharge: 1/3/20  Reason(s) for Admission: small bowel obstruction            Problems taking medications regularly:  None       Medication changes since discharge: None       Problems adhering to non-medication therapy:  None    Summary of hospitalization:  [facility] discharge summary reviewed  Diagnostic Tests/Treatments reviewed.  Follow up needed: none  Other Healthcare Providers Involved in Patient s Care:         Surgical follow-up appointment - for recurrent SBO.  Update since discharge: improved. Symptoms are back to normal.    Post Discharge Medication Reconciliation: discharge medications reconciled, continue medications without change.  Plan of care communicated with patient     Coding guidelines for this visit:  Type of Medical   Decision Making Face-to-Face Visit       within 7 Days of discharge Face-to-Face Visit        within 14 days of discharge   Moderate Complexity [id number]   High Complexity 86208 [id number]                Patient Active Problem List   Diagnosis     Back pain     Chest pain     Pancreatic mass     Hypertensive urgency     Benign essential hypertension     Blunt trauma of rib, initial encounter     SBO (small bowel obstruction) (H)     Small bowel obstruction (H)     PAD (peripheral artery disease) (H)     Bilateral low back pain without sciatica,  "unspecified chronicity     Hyperlipidemia LDL goal <130     Post-nasal drip     Past Surgical History:   Procedure Laterality Date     GYN SURGERY      hysterectomy     ORTHOPEDIC SURGERY      Slipped disc with chronic back pain       Social History     Tobacco Use     Smoking status: Former Smoker     Smokeless tobacco: Former User     Tobacco comment: Former \"social\" smoker, quit in 1978.   Substance Use Topics     Alcohol use: Yes     Alcohol/week: 0.0 standard drinks     Comment: occ wine     Family History   Problem Relation Age of Onset     Family History Negative Other          Current Outpatient Medications   Medication Sig Dispense Refill     enalapril-hydrochlorothiazide (VASERETIC) 10-25 MG tablet Take 1 tablet by mouth daily 90 tablet 1     NIFEdipine ER (ADALAT CC) 30 MG 24 hr tablet Take 1 tablet (30 mg) by mouth daily 30 tablet 1     aspirin (ASA) 81 MG chewable tablet Take 81 mg by mouth daily       atorvastatin (LIPITOR) 20 MG tablet Take 0.5 tablets (10 mg) by mouth daily (Patient not taking: Reported on 1/1/2020) 90 tablet 3     calcium carbonate (TUMS) 500 MG chewable tablet Take 1 tablet (500 mg) by mouth 3 times daily as needed for heartburn 90 tablet 0     Naproxen Sodium (ALEVE PO) Take 1 tablet by mouth daily as needed for moderate pain       Omega-3 Fatty Acids ([employer] FISH OIL) 1200 MG CAPS Take 1 capsule by mouth daily       UNKNOWN TO PATIENT Place onto the skin daily as needed (leg cramps) Cream from [place] for leg cramps       Allergies   Allergen Reactions     Lactose GI Disturbance     Reduced fat dairy- Diarrhea     Sorbitan Trioleate      Vancomycin      Recent Labs   Lab Test 01/06/20  2312 01/02/20  0846  01/01/20  1832 09/20/19  1044  12/28/18  0956 08/25/18  0925  12/19/17  1026  10/01/12  0406   LDL  --   --   --   --  154*  --  175*  --   --  135*   < >  --    HDL  --   --   --   --  54  --  57  --   --  54   < >  --    TRIG  --   --   --   --  216*  --  264*  --   --  319*   < > " " --    ALT  --  23  --  29  --   --   --  25   < >  --    < >  --    CR 0.65 0.72  --  0.65  --    < >  --  0.71   < > 0.64   < > 0.81   GFRESTIMATED 84 80   < > 84  --    < >  --  79   < > 90   < > 70   GFRESTBLACK >90 >90   < > >90  --    < >  --  >90   < > >90   < > 84   POTASSIUM 3.2* 3.7  --  3.6  --    < >  --  3.9   < > 3.6   < > 4.2   TSH  --   --   --   --   --   --  2.51  --   --   --   --  4.36    < > = values in this interval not displayed.        Reviewed and updated as needed this visit by Provider         Review of Systems   ROS COMP: CONSTITUTIONAL: NEGATIVE for fever, chills, change in weight  RESP: NEGATIVE for significant cough or SOB  CV: NEGATIVE for chest pain, palpitations or peripheral edema      Objective    BP (!) 186/80 (BP Location: Right arm, Patient Position: Chair, Cuff Size: Adult Regular)   Pulse 60   Temp 97.9  F (36.6  C) (Oral)   Resp 16   Ht 1.575 m (5' 2\")   Wt 59.4 kg (131 lb)   LMP  (LMP Unknown)   SpO2 97%   BMI 23.96 kg/m    Body mass index is 23.96 kg/m .  Physical Exam   GENERAL: healthy, alert and no distress  NECK: no adenopathy, no asymmetry, masses, or scars and thyroid normal to palpation  RESP: lungs clear to auscultation - no rales, rhonchi or wheezes  CV: regular rate and rhythm, normal S1 S2, no S3 or S4, no murmur, click or rub, no peripheral edema and peripheral pulses strong  ABDOMEN: soft, nontender, no hepatosplenomegaly, no masses and bowel sounds normal            Assessment & Plan     1. Benign essential hypertension  Not controlled, continue on Vaseretic once daily, but add Nifedipine 30 mg daily.   Recheck in 1 week.  - NIFEdipine ER (ADALAT CC) 30 MG 24 hr tablet; Take 1 tablet (30 mg) by mouth daily  Dispense: 30 tablet; Refill: 1  - enalapril-hydrochlorothiazide (VASERETIC) 10-25 MG tablet; Take 1 tablet by mouth daily  Dispense: 90 tablet; Refill: 1  - Basic metabolic panel    2. SBO (small bowel obstruction) (H)  Resolved, pt does not wish " to see any surgeon at this time, in regards to her recurrent SBO, or gallstones (which I think they are asymptomatic)             Return in about 1 week (around 1/21/2020) for Hypertension follow up.    Luis Feliciano MD  [facility]

## 2020-01-15 ENCOUNTER — TELEPHONE (OUTPATIENT)
Dept: FAMILY MEDICINE | Facility: CLINIC | Age: 80
End: 2020-01-15

## 2020-01-15 LAB
ANION GAP SERPL CALCULATED.3IONS-SCNC: 6 MMOL/L (ref 3–14)
BUN SERPL-MCNC: 14 MG/DL (ref 7–30)
CALCIUM SERPL-MCNC: 8.7 MG/DL (ref 8.5–10.1)
CHLORIDE SERPL-SCNC: 103 MMOL/L (ref 94–109)
CO2 SERPL-SCNC: 28 MMOL/L (ref 20–32)
CREAT SERPL-MCNC: 0.7 MG/DL (ref 0.52–1.04)
GFR SERPL CREATININE-BSD FRML MDRD: 81 ML/MIN/{1.73_M2}
GLUCOSE SERPL-MCNC: 107 MG/DL (ref 70–99)
POTASSIUM SERPL-SCNC: 3.2 MMOL/L (ref 3.4–5.3)
SODIUM SERPL-SCNC: 137 MMOL/L (ref 133–144)

## 2020-01-15 NOTE — TELEPHONE ENCOUNTER
LMOM for pt to return call to [place].  Madeline Valadez CMA    ------    Notes recorded by Luis Feliciano MD on 1/15/2020 at 10:55 AM CST  Please call patient :  Advise to increase salt intake please.  Will recheck labs during her follow up visit.  Luis Feliciano MD

## 2020-01-21 ENCOUNTER — ALLIED HEALTH/NURSE VISIT (OUTPATIENT)
Dept: FAMILY MEDICINE | Facility: CLINIC | Age: 80
End: 2020-01-21
Payer: MEDICARE

## 2020-01-21 VITALS — SYSTOLIC BLOOD PRESSURE: 134 MMHG | DIASTOLIC BLOOD PRESSURE: 64 MMHG

## 2020-01-21 DIAGNOSIS — I10 BENIGN ESSENTIAL HYPERTENSION: Primary | ICD-10-CM

## 2020-01-21 PROCEDURE — 99207 ZZC NO CHARGE NURSE ONLY: CPT

## 2020-01-21 NOTE — PROGRESS NOTES
Francois Ly is a 79 year old patient who comes in today for a Blood Pressure check.  Initial BP:  LMP  (LMP Unknown)      Data Unavailable  Disposition: follow-up as previously indicated by provider and results routed to provider.  Informed patient will routed to provider to review and we will f/u by phone if any changes are recommended otherwise to follow up as prn.     Patient states has been taking at home averages 130's-150/70's.

## 2020-02-04 ENCOUNTER — PATIENT OUTREACH (OUTPATIENT)
Dept: FAMILY MEDICINE | Facility: CLINIC | Age: 80
End: 2020-02-04

## 2020-02-04 NOTE — TELEPHONE ENCOUNTER
Dr. Feliciano     Please see update blood pressures from  Sahil     1. Patient is taking both enalapril and nifedipine in the AM - she is very tired after taking these medications, can they be taken at night?     2. Patient has upcoming appt on 2/14/2020, should we see if MTM can do covisit?     179/81, 174/81, 150/79 (prior to taking medications)   BP does go down after taking meds 133/72, 126/68     Please advise     Tonia Juan, Registered Nurse   [facility] ([facility]

## 2020-02-04 NOTE — TELEPHONE ENCOUNTER
MTM - are you able to do covisit at next appt?     Patient's  notified to try taking medications at night and if questions/concerns to return call to clinic     Routing to MTM to see if they are able to do covisit that day - no openings on schedule     Tonia Juan, Registered Nurse   [facility]

## 2020-02-04 NOTE — TELEPHONE ENCOUNTER
Will add her to my schedule but I have another visit at that time. Because Francois isn't on many medications, I think I can fit her in. She may have to wait a little bit so I might be in about 10:45 after Dr. Feliciano.     Domi Sanders, PharmD  Medication Therapy Management Provider, [facility]  Pager: [phone]

## 2020-02-13 NOTE — PROGRESS NOTES
MTM ENCOUNTER  SUBJECTIVE/OBJECTIVE:                           Francois Ly is a 79 year old female coming in for an initial visit.  She was referred to me from Dr. Feliciano.    Chief Complaint: Pt has questions on BP medications. Pt saw Dr. Feliciano immediately prior to visit.    Allergies/ADRs: Reviewed in Epic  Tobacco:  reports that she has quit smoking. She has quit using smokeless tobacco.  Alcohol: yes occasional wine  PMH: Reviewed in Epic    Medication Adherence/Access:  She is taking medications as prescribed except atorvastatin. States she does not want a cholesterol medication. Also is hesitant about some medications or increasing doses.     Hypertension: Current medications include enalapril-hydrochlorothiazide 10-25mg daily and nifedipine ER 30mg daily HS.  Patient does not self-monitor BP.  Patient reports the following medication side effects: palpitations once in a while during the night with nifedipine. Also takes nifedipine at bedtime b/c it makes her sleepy.  Past trials:   Valsartan  Chlorthalidone  Amlodipine  Terazosin   Clonidine (bradycardia)  Losartan (stopped d/t recall)  BP Readings from Last 3 Encounters:   02/14/20 (!) 144/70   01/21/20 134/64   01/14/20 (!) 186/80     Hyperlipidemia/PAD: Current therapy includes aspirin 81mg daily, and fish oil/flax seed oil daily.  Pt reports no side effects. Reports she had insomnia, aches and pain when she was taking atorvastatin. She would like to work on changing diet and rechecking lipid panel next visit.   The 10-year ASCVD risk score ([place] AGUIRRE Jr., et al., 2013) is: 37.1%    Values used to calculate the score:      Age: 79 years      Sex: Female      Is Non- : No      Diabetic: No      Tobacco smoker: No      Systolic Blood Pressure: 144 mmHg      Is BP treated: Yes      HDL Cholesterol: 54 mg/dL      Total Cholesterol: 250 mg/dL  Lab Results   Component Value Date    CHOL 250 09/20/2019     Lab Results   Component Value Date  "   HDL 54 09/20/2019     Lab Results   Component Value Date     09/20/2019     Lab Results   Component Value Date    TRIG 216 09/20/2019       Today's Vitals:   BP Readings from Last 1 Encounters:   02/14/20 (!) 144/70     Pulse Readings from Last 1 Encounters:   02/14/20 70     Wt Readings from Last 1 Encounters:   02/14/20 132 lb (59.9 kg)     Ht Readings from Last 1 Encounters:   02/14/20 5' 2\" (1.575 m)     Estimated body mass index is 24.14 kg/m  as calculated from the following:    Height as of an earlier encounter on 2/14/20: 5' 2\" (1.575 m).    Weight as of an earlier encounter on 2/14/20: 132 lb (59.9 kg).    Temp Readings from Last 1 Encounters:   02/14/20 97.6  F (36.4  C) (Oral)       ASSESSMENT:                            Medication Adherence: fair, no issues identified, educated pt on importance of taking medications as prescribed    Hypertension: Needs Improvement. Patient is not meeting BP goal of < 140/90mmHg. Continue to monitor. She would like to try nifedipine for a few more weeks and if still having side effects, she will call to make appt for f/up to change nifedipine. Possible considerations for BP medications could be to increase enalapril dose and could add an extra enalapril 10mg dose at bedtime instead of nifedipine.     Hyperlipidemia/PAD: Needs improvement. Discussed dietary changes with pt and will recheck lipid panel next PCP visit. Her ASCVD risk is high and she is not on statin therapy as indicated per 2018 AHA Cholesterol Guidelines although she is almost 81 yo. Need to assess risk vs benefits and she declines a statin at this time. Future if she would like to retry statin depending on repeat lipid panel, could consider trying very low dose rosuvastatin or pravastatin instead.     PLAN:                            1. No medication changes. Discussed dietary changes.     I spent 15 minutes with this patient today. All changes were made via collaborative practice agreement with " Luis Feliciano. A copy of the visit note was provided to the patient's primary care provider.    Will follow up in 2 months covisit or sooner if needed.    The patient was given a summary of these recommendations. See Provider note/AVS from today.     Domi Sanders, PharmD  Medication Therapy Management Provider, [facility]  Pager: [phone]

## 2020-02-13 NOTE — PROGRESS NOTES
Pre-Visit Planning     Future Appointments   Date Time Provider Department Center   2/14/2020 10:30 AM Luis Feliciano MD [facility]   2/14/2020 10:40 AM Domi Sanders, [facility]     Arrival Time for this Appointment: 10:20 AM     Appointment Notes for this encounter:      Domi can come in about 10:45  HTN and growth on thumb    Questionnaires Reviewed/Assigned  No additional questionnaires are needed       There are no preventive care reminders to display for this patient.    Patient preferred phone number: [phone]    Unable to reach. Left voicemail. Advised patient to call clinic back at [phone] to reschedule if unable to attend visit     Tonia Juan Registered Nurse   [facility]

## 2020-02-14 ENCOUNTER — OFFICE VISIT (OUTPATIENT)
Dept: FAMILY MEDICINE | Facility: CLINIC | Age: 80
End: 2020-02-14
Payer: MEDICARE

## 2020-02-14 ENCOUNTER — OFFICE VISIT (OUTPATIENT)
Dept: PHARMACY | Facility: CLINIC | Age: 80
End: 2020-02-14
Payer: COMMERCIAL

## 2020-02-14 VITALS
DIASTOLIC BLOOD PRESSURE: 70 MMHG | HEIGHT: 62 IN | TEMPERATURE: 97.6 F | BODY MASS INDEX: 24.29 KG/M2 | RESPIRATION RATE: 16 BRPM | WEIGHT: 132 LBS | OXYGEN SATURATION: 98 % | SYSTOLIC BLOOD PRESSURE: 144 MMHG | HEART RATE: 70 BPM

## 2020-02-14 DIAGNOSIS — M18.11 DEGENERATIVE ARTHRITIS OF THUMB, RIGHT: Primary | ICD-10-CM

## 2020-02-14 DIAGNOSIS — E78.5 HYPERLIPIDEMIA LDL GOAL <130: ICD-10-CM

## 2020-02-14 DIAGNOSIS — I73.9 PAD (PERIPHERAL ARTERY DISEASE) (H): ICD-10-CM

## 2020-02-14 DIAGNOSIS — I10 ESSENTIAL HYPERTENSION: ICD-10-CM

## 2020-02-14 DIAGNOSIS — I10 BENIGN ESSENTIAL HYPERTENSION: Primary | ICD-10-CM

## 2020-02-14 PROCEDURE — 99605 MTMS BY PHARM NP 15 MIN: CPT | Performed by: PHARMACIST

## 2020-02-14 PROCEDURE — 99214 OFFICE O/P EST MOD 30 MIN: CPT | Performed by: FAMILY MEDICINE

## 2020-02-14 ASSESSMENT — MIFFLIN-ST. JEOR: SCORE: 1027

## 2020-02-14 NOTE — PROGRESS NOTES
Answers for HPI/ROS submitted by the patient on 2/14/2020   Chronic problems general questions HPI Form  Do you check your blood pressure regularly outside of the clinic?: Yes  Are your blood pressures ever more than 140 on the top number (systolic) OR more than 90 on the bottom number (diastolic)? (For example, greater than 140/90): Yes  Are you following a low salt diet?: Yes

## 2020-02-14 NOTE — PROGRESS NOTES
"Subjective     Francois Ly is a 79 year old female who presents to clinic today for the following health issues:    History of Present Illness        Hypertension: She presents for follow up of hypertension.  She does check blood pressure  regularly outside of the clinic. Outside blood pressures have been over 140/90. She follows a low salt diet.       About 1 week ago, she noticed pain and swelling and it is tender to touch, she does not remember any injury, the pain is not there if the patient does not touch it.      Hx of cyst in the DIP joint of the Rt thumb, that is making it hard to hold in the right hand.    Patient Active Problem List   Diagnosis     Back pain     Chest pain     Pancreatic mass     Hypertensive urgency     Benign essential hypertension     Blunt trauma of rib, initial encounter     SBO (small bowel obstruction) (H)     Small bowel obstruction (H)     PAD (peripheral artery disease) (H)     Bilateral low back pain without sciatica, unspecified chronicity     Hyperlipidemia LDL goal <130     Post-nasal drip     Past Surgical History:   Procedure Laterality Date     GYN SURGERY      hysterectomy     ORTHOPEDIC SURGERY      Slipped disc with chronic back pain       Social History     Tobacco Use     Smoking status: Former Smoker     Smokeless tobacco: Former User     Tobacco comment: Former \"social\" smoker, quit in 1978.   Substance Use Topics     Alcohol use: Yes     Alcohol/week: 0.0 standard drinks     Comment: occ wine     Family History   Problem Relation Age of Onset     Family History Negative Other          Current Outpatient Medications   Medication Sig Dispense Refill     aspirin (ASA) 81 MG chewable tablet Take 81 mg by mouth daily       calcium carbonate (TUMS) 500 MG chewable tablet Take 1 tablet (500 mg) by mouth 3 times daily as needed for heartburn 90 tablet 0     enalapril-hydrochlorothiazide (VASERETIC) 10-25 MG tablet Take 1 tablet by mouth daily 90 tablet 1     Naproxen " Sodium (ALEVE PO) Take 1 tablet by mouth daily as needed for moderate pain       NIFEdipine ER (ADALAT CC) 30 MG 24 hr tablet Take 1 tablet (30 mg) by mouth daily 30 tablet 1     Omega-3 Fatty Acids ([employer] FISH OIL) 1200 MG CAPS Take 1 capsule by mouth daily       UNKNOWN TO PATIENT Place onto the skin daily as needed (leg cramps) Cream from [place] for leg cramps       Allergies   Allergen Reactions     Lactose GI Disturbance     Reduced fat dairy- Diarrhea     Sorbitan Trioleate      Vancomycin      Recent Labs   Lab Test 01/14/20  0940 01/06/20  2312 01/02/20  0846  01/01/20  1832 09/20/19  1044  12/28/18  0956 08/25/18  0925  12/19/17  1026  10/01/12  0406   LDL  --   --   --   --   --  154*  --  175*  --   --  135*   < >  --    HDL  --   --   --   --   --  54  --  57  --   --  54   < >  --    TRIG  --   --   --   --   --  216*  --  264*  --   --  319*   < >  --    ALT  --   --  23  --  29  --   --   --  25   < >  --    < >  --    CR 0.70 0.65 0.72  --  0.65  --    < >  --  0.71   < > 0.64   < > 0.81   GFRESTIMATED 81 84 80   < > 84  --    < >  --  79   < > 90   < > 70   GFRESTBLACK >90 >90 >90   < > >90  --    < >  --  >90   < > >90   < > 84   POTASSIUM 3.2* 3.2* 3.7  --  3.6  --    < >  --  3.9   < > 3.6   < > 4.2   TSH  --   --   --   --   --   --   --  2.51  --   --   --   --  4.36    < > = values in this interval not displayed.        Reviewed and updated as needed this visit by Provider         Review of Systems   ROS COMP: CONSTITUTIONAL: NEGATIVE for fever, chills, change in weight  RESP: NEGATIVE for significant cough or SOB  CV: NEGATIVE for chest pain, palpitations or peripheral edema      Objective    LMP  (LMP Unknown)   There is no height or weight on file to calculate BMI.  Physical Exam   GENERAL: healthy, alert and no distress  RESP: lungs clear to auscultation - no rales, rhonchi or wheezes  CV: regular rate and rhythm, normal S1 S2, no S3 or S4, no murmur, click or rub, no peripheral edema and  peripheral pulses strong  ABDOMEN: soft, nontender, no hepatosplenomegaly, no masses and bowel sounds normal  MS: significant lateral deviation of the distal phalanx of the thumb with swelling medially of the DIP joint.            Assessment & Plan     1. Degenerative arthritis of thumb, right  Refer to orthopedic.  - ORTHOPEDICS ADULT REFERRAL    2. Essential hypertension  Not controlled, pt does not wish to change her meds, but want to try diet and exercise and recheck in 2 months.             Return in about 2 months (around 4/14/2020) for Hypertension follow up.    Luis Feliciano MD  [facility]

## 2020-03-01 DIAGNOSIS — I10 BENIGN ESSENTIAL HYPERTENSION: ICD-10-CM

## 2020-03-01 RX ORDER — NIFEDIPINE 30 MG
TABLET, EXTENDED RELEASE ORAL
Qty: 30 TABLET | Refills: 0 | Status: SHIPPED | OUTPATIENT
Start: 2020-03-01 | End: 2020-03-10 | Stop reason: SINTOL

## 2020-03-01 NOTE — TELEPHONE ENCOUNTER
"Next 5 appointments (look out 90 days)    Apr 10, 2020  9:20 AM CDT  (Arrive by 9:00 AM)  Office Visit with Luis [facility]iciano MD  [facility] [id number] [address] [facility] [id number] [phone]   Apr 10, 2020  9:20 AM CDT  Office Visit with Domi Sanders [facility] ([facility]) [id number] [place] [id number] [phone]        Requested Prescriptions   Signed Prescriptions Disp Refills    NIFEdipine ER (ADALAT CC) 30 MG 24 hr tablet 30 tablet 0     Sig: TAKE ONE TABLET BY MOUTH ONCE DAILY       Calcium Channel Blockers Protocol  Failed - 3/1/2020  9:04 AM        Failed - Blood pressure under 140/90 in past 12 months     BP Readings from Last 3 Encounters:   02/14/20 (!) 144/70   01/21/20 134/64   01/14/20 (!) 186/80                 Passed - Recent (12 mo) or future (30 days) visit within the authorizing provider's specialty     Patient has had an office visit with the authorizing provider or a provider within the authorizing providers department within the previous 12 mos or has a future within next 30 days. See \"Patient Info\" tab in inbasket, or \"Choose Columns\" in Meds & Orders section of the refill encounter.              Passed - Medication is active on med list        Passed - Patient is age 18 or older        Passed - No active pregnancy on record        Passed - Normal serum creatinine on file in past 12 months     Recent Labs   Lab Test 01/14/20  0940  01/01/20  1840   CR 0.70   < >  --    CREAT  --   --  0.6    < > = values in this interval not displayed.             Passed - No positive pregnancy test in past 12 months          "

## 2020-03-04 ENCOUNTER — PATIENT OUTREACH (OUTPATIENT)
Dept: FAMILY MEDICINE | Facility: CLINIC | Age: 80
End: 2020-03-04

## 2020-03-04 NOTE — TELEPHONE ENCOUNTER
Dr. Feliciano     Patient and  do NOT want to continue with this medication     Patient takes a full glass of water with medications     Patient walks daily and exercises     MTM has told patient that there are other alternatives     Tonia Juan, Registered Nurse   [facility]

## 2020-03-04 NOTE — TELEPHONE ENCOUNTER
No, I don't recommend alternate medicine, let's try stretching exercising before bed time, and encourage walking.

## 2020-03-04 NOTE — TELEPHONE ENCOUNTER
Dr. Feliciano / Domi MTM     Patient was given 30 day supply of nifedipine     Blood pressures are down, which they are happy about 131/72, 138/69, 136/59     However patient is getting leg pain and spasms since starting the nifedipine - requesting alternative     RN received call from patient's  Sahil AMEZQUITA on file with concerns above regarding BP/medication     Tonia Juan, Registered Nurse   [facility]

## 2020-03-04 NOTE — TELEPHONE ENCOUNTER
Message left for patient to return call to this RN PAL - please transfer if available     Direct number left for this RN PAL on message for return call     Tonia Juan Registered Nurse   [facility] [phone]

## 2020-03-04 NOTE — TELEPHONE ENCOUNTER
No problem, let's get her an appointment with MTM.  Luis Feliciano MD  [facility]  [phone]

## 2020-03-05 NOTE — TELEPHONE ENCOUNTER
Discussed with  Sahil, they are agreeable to MTM visit - scheduled for 3/10/2020    Tonia Juan, Registered Nurse   [facility]

## 2020-03-10 ENCOUNTER — OFFICE VISIT (OUTPATIENT)
Dept: PHARMACY | Facility: CLINIC | Age: 80
End: 2020-03-10
Payer: COMMERCIAL

## 2020-03-10 VITALS — DIASTOLIC BLOOD PRESSURE: 60 MMHG | WEIGHT: 132 LBS | BODY MASS INDEX: 24.14 KG/M2 | SYSTOLIC BLOOD PRESSURE: 146 MMHG

## 2020-03-10 DIAGNOSIS — I10 BENIGN ESSENTIAL HYPERTENSION: Primary | ICD-10-CM

## 2020-03-10 DIAGNOSIS — E78.5 HYPERLIPIDEMIA LDL GOAL <130: ICD-10-CM

## 2020-03-10 DIAGNOSIS — I73.9 PAD (PERIPHERAL ARTERY DISEASE) (H): ICD-10-CM

## 2020-03-10 PROCEDURE — 99606 MTMS BY PHARM EST 15 MIN: CPT | Performed by: PHARMACIST

## 2020-03-10 RX ORDER — ENALAPRIL MALEATE 10 MG/1
10 TABLET ORAL DAILY
Qty: 90 TABLET | Refills: 1 | Status: SHIPPED | OUTPATIENT
Start: 2020-03-10 | End: 2020-03-20

## 2020-03-10 NOTE — PATIENT INSTRUCTIONS
Recommendations from today's MTM visit:                                                      1. Stop nifedipine.    2. Add enalapril 10mg at bedtime in addition to your morning dose.     It was great to speak with you today.  I value your experience and would be very thankful for your time with providing feedback on our clinic survey. You may receive a survey via email or text message in the next few days.     To schedule another MTM appointment, please call the clinic directly or you may call the MTM scheduling line at [phone] or toll-free at [phone].     My Clinical Pharmacist's contact information:                                                      It was a pleasure talking with you today!  Please feel free to contact me with any questions or concerns you have.      Domi Sanders, PharmD  Medication Therapy Management Provider, [facility]  Pager: [phone]

## 2020-03-10 NOTE — PROGRESS NOTES
MTM ENCOUNTER  SUBJECTIVE/OBJECTIVE:                           Francois Ly is a 79 year old female coming in for a follow-up visit.  She was referred to me from Dr. Feliciano. Here with  Dino.     Chief Complaint: Follow-up from 2/14 visit. Would like to get off nifedipine.    Allergies/ADRs: Reviewed in Epic  Tobacco:  reports that she has quit smoking. She has quit using smokeless tobacco.  Alcohol: yes occasional wine  PMH: Reviewed in Epic    Medication Adherence/Access:  No issues reported    Hypertension: Current medications include enalapril-hydrochlorothiazide 10-25mg daily and nifedipine ER 30mg daily HS.  Patient does self-monitor BP and reports systolic readings in the 120-130.  Patient reports the following medication side effects: palpitations once in a while during the night with nifedipine and leg muscle aches from nifedipine.  She would like to change nifedipine medicine.   and patient also report whitecoat hypertension because her home blood pressure readings are always normal.  For her leg cramps she has been using Thera cream her  bought her and it works very well to help with cramps.  Only once in a while takes naproxen PRN.  Past trials:   Valsartan  Chlorthalidone  Amlodipine  Terazosin   Clonidine (bradycardia)  Losartan (stopped d/t recall)  BP Readings from Last 3 Encounters:   03/10/20 (!) 146/60   02/14/20 (!) 144/70   01/21/20 134/64     Hyperlipidemia/PAD: Current therapy includes aspirin 81mg daily, and fish oil/flax seed oil daily.  Pt reports no side effects. Reports she had insomnia, aches and pain when she was taking atorvastatin. She would like to work on changing diet and rechecking lipid panel next visit.   The 10-year ASCVD risk score ([place] TIMOTHY Jr., et al., 2013) is: 37.9%    Values used to calculate the score:      Age: 79 years      Sex: Female      Is Non- : No      Diabetic: No      Tobacco smoker: No      Systolic Blood Pressure:  146 mmHg      Is BP treated: Yes      HDL Cholesterol: 54 mg/dL      Total Cholesterol: 250 mg/dL  Lab Results   Component Value Date    CHOL 250 09/20/2019     Lab Results   Component Value Date    HDL 54 09/20/2019     Lab Results   Component Value Date     09/20/2019     Lab Results   Component Value Date    TRIG 216 09/20/2019       Today's Vitals: BP (!) 146/60   Wt 132 lb (59.9 kg)   LMP  (LMP Unknown)   BMI 24.14 kg/m      ASSESSMENT:                            Medication Adherence: No issues identified today    Hypertension: Needs Improvement. Patient is not meeting BP goal of < 140/90mmHg although suspect whitecoat hypertension as home readings have been stable.  She would benefit from discontinuing nifedipine due to side effects and increasing enalapril dose by adding an extra enalapril 10 mg at bedtime.    Hyperlipidemia/PAD: Needs improvement. Will recheck lipid panel next PCP visit. Her ASCVD risk is high and she is not on statin therapy as indicated per 2018 AHA Cholesterol Guidelines although she is almost 79 yo. Need to assess risk vs benefits and she declines a statin at this time. Future if she would like to retry statin depending on repeat lipid panel, could consider trying very low dose rosuvastatin or pravastatin instead.     PLAN:                            1.  Stop nifedipine.  2.  Add enalapril 10 mg at bedtime in addition to morning enalapril dose.    I spent 15 minutes with this patient today. All changes were made via collaborative practice agreement with Luis Feliciano. A copy of the visit note was provided to the patient's primary care provider.    Will follow up in 1 month covisit or sooner if needed.    The patient was given a summary of these recommendations.    Domi Sanders, PharmD  Medication Therapy Management Provider, [facility]  Pager: [phone]

## 2020-03-20 ENCOUNTER — ALLIED HEALTH/NURSE VISIT (OUTPATIENT)
Dept: PHARMACY | Facility: CLINIC | Age: 80
End: 2020-03-20
Payer: COMMERCIAL

## 2020-03-20 DIAGNOSIS — I10 BENIGN ESSENTIAL HYPERTENSION: Primary | ICD-10-CM

## 2020-03-20 PROCEDURE — 99606 MTMS BY PHARM EST 15 MIN: CPT | Performed by: PHARMACIST

## 2020-03-20 RX ORDER — ENALAPRIL MALEATE 20 MG/1
20 TABLET ORAL AT BEDTIME
Qty: 90 TABLET | Refills: 1 | Status: SHIPPED | OUTPATIENT
Start: 2020-03-20 | End: 2020-03-27 | Stop reason: ALTCHOICE

## 2020-03-20 NOTE — PROGRESS NOTES
MTM ENCOUNTER  SUBJECTIVE/OBJECTIVE:                           Francois Ly is a 79 year old female called for a follow-up visit.  She was referred to me from Dr. Feliciano. Talked to patient and  Dino.     Chief Complaint: Follow-up from 3/10 visit. High BP.     Allergies/ADRs: Reviewed in Epic  Tobacco:  reports that she has quit smoking. She has quit using smokeless tobacco.  Alcohol: yes occasional wine  PMH: Reviewed in Epic    Medication Adherence/Access:  No issues reported    Hypertension: Current medications include enalapril-hydrochlorothiazide 10-25mg daily and enalapril 10mg HS (started 2 weeks ago).  Patient does self-monitor BP and reports systolic readings in the 150-170's since medication change. Today's BP was 161/89 after taking AM medications. They report their home monitor is accurate. No side effects.  Past trials:   Valsartan  Chlorthalidone  Amlodipine  Terazosin   Clonidine (bradycardia)  Losartan (stopped d/t recall)  BP Readings from Last 3 Encounters:   03/10/20 (!) 146/60   02/14/20 (!) 144/70   01/21/20 134/64     Today's Vitals: none - telephone visit    ASSESSMENT:                            Medication Adherence: No issues identified today    Hypertension: Needs Improvement. Patient is not meeting BP goal of < 140/90mmHg and would benefit from further enalapril dose increase.     PLAN:                            1.  Increase enalapril at bedtime to 20mg for a total of 30mg/day of enalapril.     I spent 10 minutes with this patient today. All changes were made via collaborative practice agreement with Luis Feliciano. A copy of the visit note was provided to the patient's primary care provider.    Will follow up in 1 week via phone.     The patient was given a summary of these recommendations.    Domi Sanders, PharmD  Medication Therapy Management Provider, [facility]  Pager: [phone]

## 2020-03-27 ENCOUNTER — ALLIED HEALTH/NURSE VISIT (OUTPATIENT)
Dept: PHARMACY | Facility: CLINIC | Age: 80
End: 2020-03-27
Payer: COMMERCIAL

## 2020-03-27 DIAGNOSIS — I10 BENIGN ESSENTIAL HYPERTENSION: Primary | ICD-10-CM

## 2020-03-27 PROCEDURE — 99607 MTMS BY PHARM ADDL 15 MIN: CPT | Performed by: PHARMACIST

## 2020-03-27 PROCEDURE — 99606 MTMS BY PHARM EST 15 MIN: CPT | Performed by: PHARMACIST

## 2020-03-27 RX ORDER — LOSARTAN POTASSIUM AND HYDROCHLOROTHIAZIDE 25; 100 MG/1; MG/1
1 TABLET ORAL DAILY
Qty: 90 TABLET | Refills: 1 | Status: SHIPPED | OUTPATIENT
Start: 2020-03-27 | End: 2020-09-28

## 2020-03-27 NOTE — PROGRESS NOTES
MTM ENCOUNTER  SUBJECTIVE/OBJECTIVE:                           Francois Ly is a 79 year old female called for a follow-up visit.  She was referred to me from Dr. Feliciano.    Chief Complaint: Follow-up from 3/20. BP still elevated.     Allergies/ADRs: Reviewed in Epic  Tobacco:  reports that she has quit smoking. She has quit using smokeless tobacco.  Alcohol: yes occasional wine  PMH: Reviewed in Epic    Medication Adherence/Access:  No issues reported    Hypertension: Current medications include enalapril-hydrochlorothiazide 10-25mg daily and enalapril 20mg HS (increased last week).  Patient does self-monitor BP and reports systolic readings in the 160-170's since medication change. She reports this AM's BP was 198/86 but stated her friends father passed away so dealing with this, yesterday 176 systolic. States she got these readings immediately after sitting down, did not wait 15 min as instructed before obtaining BP once seated. States the otc cream she was using for leg cramps was stopped a few days ago too b/c she was worried that elevated BP but has not made a difference. Now has been having leg cramps again. Wonders if she can go back on losartan b/c she tolerated that well. After having her sit down for 15 min on the call and recheck her BP reading, her BP was 165/88. Also wonders since she's been having some headaches if continuing to take aspirin 325mg as needed is ok.   Past trials:   Valsartan  Chlorthalidone  Amlodipine  Terazosin   Clonidine (bradycardia)  Losartan (stopped d/t recall)  BP Readings from Last 3 Encounters:   03/10/20 (!) 146/60   02/14/20 (!) 144/70   01/21/20 134/64       Today's Vitals: none - telephone visit    ASSESSMENT:                            Medication Adherence: No issues identified today    Hypertension: Needs Improvement. Patient is not meeting BP goal of < 140/90mmHg and would benefit from switch to losartan rather than enalapril with simpler dosing for her and higher  dose available in combo with hydrochlorothiazide. If BP still high with this regimen, would recommend addition of hydralazine or could switch to olmesartan-hydrochlorothiazide if BP only slightly elevated. Also educated her on avoiding aspirin for headaches for now and to use acetaminophen PRN instead to avoid further elevations in BP. She may restart otc cream for leg cramps.     PLAN:                            1. Stop all enalapril tablets including enalapril-hydrochlorothiazide combo.  2. Switch to losartan-hydrochlorothiazide 100-25mg daily. Educated her further on how to check accurate BP readings.   3. Use acetaminophen 500mg PRN rather than aspirin 325mg.     I spent 20 minutes with this patient today. All changes were made via collaborative practice agreement with Luis Feliciano. A copy of the visit note was provided to the patient's primary care provider.    Will follow up in 1 week via phone for BP recheck.     The patient declined a summary of these recommendations.    Domi Sanders, PharmD  Medication Therapy Management Provider, [facility]  Pager: [phone]

## 2020-04-03 ENCOUNTER — ALLIED HEALTH/NURSE VISIT (OUTPATIENT)
Dept: PHARMACY | Facility: CLINIC | Age: 80
End: 2020-04-03
Payer: COMMERCIAL

## 2020-04-03 DIAGNOSIS — I10 BENIGN ESSENTIAL HYPERTENSION: Primary | ICD-10-CM

## 2020-04-03 PROCEDURE — 99606 MTMS BY PHARM EST 15 MIN: CPT | Performed by: PHARMACIST

## 2020-04-03 NOTE — PROGRESS NOTES
"[facility] ENCOUNTER  SUBJECTIVE/OBJECTIVE:                           Francois Ly is a 79 year old female called for a follow-up visit.  She was referred to me from Dr. Feliciano.    Chief Complaint: Follow-up from 3/27. BP improved.    Allergies/ADRs: Reviewed in Epic  Tobacco:  reports that she has quit smoking. She has quit using smokeless tobacco.  Alcohol: yes occasional wine  PMH: Reviewed in Epic    Medication Adherence/Access:  No issues reported    Hypertension: Current medications include losartan-hydrochlorothiazide 100-25mg daily.  Patient does self-monitor BP and reports systolic readings in the 130-140's since medication change. She reports this AM's BP was 133/70 and last few days was low 140's systolic so she thinks her BP readings are improving. States still gets leg cramps but uses her cream which helps and improves shortly after. After taking new losartan-hydrochlorothiazide she does notice \"some pressure on her chest\" for a few min but then resolves quickly. She wonders if this is medication related. No other issues. States she salts her food and wonders if she should limit this.   Past trials:   Valsartan  Chlorthalidone  Amlodipine  Terazosin   Clonidine (bradycardia)  Losartan (previously stopped d/t recall)  enlapril (no effective enough)  nifedipine  BP Readings from Last 3 Encounters:   03/10/20 (!) 146/60   02/14/20 (!) 144/70   01/21/20 134/64       Today's Vitals: none - telephone visit    ASSESSMENT:                            Medication Adherence: No issues identified today    Hypertension: Improved. Patient is meeting BP goal of < 140/90mmHg today and would benefit from continuing new BP medication. Educated pt on limiting sodium to 2g/day. BMP check due in 3 weeks since changing ARB therapy.     PLAN:                            1. No medication changes.   2. BMP ordered for recheck in 3 weeks.     I spent 15 minutes with this patient today. All changes were made via collaborative practice " agreement with Luis Feliciano. A copy of the visit note was provided to the patient's primary care provider.    Will follow up in 1 week via phone with PCP, 3 weeks BMP lab since ARB dose changes.     The patient declined a summary of these recommendations.    Domi Sanders, PharmD  Medication Therapy Management Provider, [facility]  Pager: [phone]

## 2020-04-10 ENCOUNTER — TELEPHONE (OUTPATIENT)
Dept: FAMILY MEDICINE | Facility: CLINIC | Age: 80
End: 2020-04-10

## 2020-04-10 ENCOUNTER — VIRTUAL VISIT (OUTPATIENT)
Dept: FAMILY MEDICINE | Facility: CLINIC | Age: 80
End: 2020-04-10
Payer: MEDICARE

## 2020-04-10 DIAGNOSIS — I10 BENIGN ESSENTIAL HYPERTENSION: Primary | ICD-10-CM

## 2020-04-10 PROCEDURE — 99442 ZZC PHYSICIAN TELEPHONE EVALUATION 11-20 MIN: CPT | Performed by: FAMILY MEDICINE

## 2020-04-10 NOTE — TELEPHONE ENCOUNTER
I totally agree, sometimes she pushes for new meds, and sometimes she does not want to.  I think she needs reassurance that her BP is controlled, and even if it is on the 140's I still consider it well control at this age group.  Would you call her? Or would you like me to call her back?  Luis Feliciano MD  [facility]  [phone]

## 2020-04-10 NOTE — TELEPHONE ENCOUNTER
MINNIE Duron:  jose martin is wondering if we should add new BP meds as her BP is always on the higher side (140's/80's).   Do you think we should add a new medicine? Or leaving it at this level?   Luis Feliciano MD  [facility]  [phone]

## 2020-04-10 NOTE — TELEPHONE ENCOUNTER
Dr. Feliciano -   Last week when I spoke to her she had some readings in the 130's so I question if she truly needs another medication b/c I think it's her anxiety sometimes increasing her BP. So I lean towards leaving her at this level for now considering her age.    If all her readings are over systolic 140 and you think we should add something, I think low dose hydralazine is an option just twice daily based on her past med trials.     Domi Sanders, PharmD  Medication Therapy Management Provider, [facility]  Pager: [phone]

## 2020-04-23 ENCOUNTER — TELEPHONE (OUTPATIENT)
Dept: PHARMACY | Facility: CLINIC | Age: 80
End: 2020-04-23

## 2020-04-23 NOTE — TELEPHONE ENCOUNTER
Pt's spouse (Sahil called) wondering if pt truly needs lab due to concern for coronavirus. Covering for Domi Sanders, PharmD. Reviewed Domi's recommendations to NOT defer lab at this time.     Chart review recent in [facility] agree with lab protocol to not defer at this time.     Sahil verbalized understanding and was reminded of the lab appt time.     Kristen Cantrell, PharmD  Medication Therapy Management Pharmacist

## 2020-04-24 DIAGNOSIS — I10 BENIGN ESSENTIAL HYPERTENSION: ICD-10-CM

## 2020-04-24 LAB
ANION GAP SERPL CALCULATED.3IONS-SCNC: 6 MMOL/L (ref 3–14)
BUN SERPL-MCNC: 17 MG/DL (ref 7–30)
CALCIUM SERPL-MCNC: 9.1 MG/DL (ref 8.5–10.1)
CHLORIDE SERPL-SCNC: 103 MMOL/L (ref 94–109)
CO2 SERPL-SCNC: 29 MMOL/L (ref 20–32)
CREAT SERPL-MCNC: 0.72 MG/DL (ref 0.52–1.04)
GFR SERPL CREATININE-BSD FRML MDRD: 80 ML/MIN/{1.73_M2}
GLUCOSE SERPL-MCNC: 126 MG/DL (ref 70–99)
POTASSIUM SERPL-SCNC: 3.5 MMOL/L (ref 3.4–5.3)
SODIUM SERPL-SCNC: 138 MMOL/L (ref 133–144)

## 2020-04-24 PROCEDURE — 80048 BASIC METABOLIC PNL TOTAL CA: CPT | Performed by: FAMILY MEDICINE

## 2020-04-24 PROCEDURE — 36415 COLL VENOUS BLD VENIPUNCTURE: CPT | Performed by: FAMILY MEDICINE

## 2020-04-27 ENCOUNTER — ALLIED HEALTH/NURSE VISIT (OUTPATIENT)
Dept: PHARMACY | Facility: CLINIC | Age: 80
End: 2020-04-27
Payer: COMMERCIAL

## 2020-04-27 DIAGNOSIS — I10 BENIGN ESSENTIAL HYPERTENSION: Primary | ICD-10-CM

## 2020-04-27 PROCEDURE — 99606 MTMS BY PHARM EST 15 MIN: CPT | Performed by: PHARMACIST

## 2020-04-27 NOTE — PROGRESS NOTES
"[facility] ENCOUNTER  SUBJECTIVE/OBJECTIVE:                           Francois Ly is a 80 year old female called for a follow-up visit.  She was referred to me from Dr. Feliciano.    Patient consented to a telehealth visit: yes    Chief Complaint: Follow-up from 4/3. Discuss lab results.     Tobacco:  reports that she has quit smoking. She has quit using smokeless tobacco.  Alcohol: yes occasional wine    Medication Adherence/Access:  No issues reported    Hypertension: Current medications include losartan-hydrochlorothiazide 100-25mg daily.  Patient does self-monitor BP and reports systolic readings in the 130-150's since medication change. She reports earlier this week's BP was 153/76 but states she feels anxious and \"worked up\" all the time. Has had other systolic BP readings in the 130's. No longer leg cramps since switching medications. Still some \"shortness of breath/chest pain\" when she wakes up to go to the bathroom in the middle of the night but quickly subsides when she goes back to sleep.   Past trials:   Valsartan  Chlorthalidone  Amlodipine  Terazosin   Clonidine (bradycardia)  Losartan (previously stopped d/t recall)  enlapril (no effective enough)  nifedipine  BP Readings from Last 3 Encounters:   03/10/20 (!) 146/60   02/14/20 (!) 144/70   01/21/20 134/64       Today's Vitals: none - telephone visit    ASSESSMENT:                            Medication Adherence: No issues identified today    Hypertension: Continue to monitor. Patient is meeting BP goal of < 140/90mmHg some of the time and would benefit from continuing current BP medication. BMP stable and will continue to monitor BP readings next month before f/up.     PLAN:                            1. No medication changes.     I spent 10 minutes with this patient today. All changes were made via collaborative practice agreement with Luis Feliciano. A copy of the visit note was provided to the patient's primary care provider.    Will follow up in 1-2 months " for BP check.     The patient declined a summary of these recommendations.    Domi Sanders, PharmD  Medication Therapy Management Provider, [facility]  Pager: [phone]

## 2020-05-06 ENCOUNTER — VIRTUAL VISIT (OUTPATIENT)
Dept: FAMILY MEDICINE | Facility: CLINIC | Age: 80
End: 2020-05-06
Payer: MEDICARE

## 2020-05-06 DIAGNOSIS — R22.1 LUMP IN THROAT: Primary | ICD-10-CM

## 2020-05-06 PROBLEM — Z98.1 STATUS POST LUMBAR SPINAL FUSION: Status: ACTIVE | Noted: 2018-08-13

## 2020-05-06 PROCEDURE — 99441 ZZC PHYSICIAN TELEPHONE EVALUATION 5-10 MIN: CPT | Performed by: FAMILY MEDICINE

## 2020-09-08 ENCOUNTER — NURSE TRIAGE (OUTPATIENT)
Dept: NURSING | Facility: CLINIC | Age: 80
End: 2020-09-08

## 2020-09-08 NOTE — TELEPHONE ENCOUNTER
Sahil () calls and says that he and his wife need the flu shots. RN had just spoken to Sahil and told him that according to his chart, he needs the Influenza vaccine and a Tdap shot. RN saw that Francois needs the Influenza vaccine. RN then conferenced Sahil and Francois with [facility] , for assistance in scheduling those shots. COVID 19 Nurse Triage Plan/Patient Instructions    Please be aware that novel coronavirus (COVID-19) may be circulating in the community. If you develop symptoms such as fever, cough, or SOB or if you have concerns about the presence of another infection including coronavirus (COVID-19), please contact your health care provider or visit [email or web address].     Disposition/Instructions    Home care recommended. Follow home care protocol based instructions.    Thank you for taking steps to prevent the spread of this virus.  o Limit your contact with others.  o Wear a simple mask to cover your cough.  o Wash your hands well and often.    Resources    [facility]: About COVID-19: [email or web address]    CDC: What to Do If You're Sick: www.cdc.gov/coronavirus/2019-ncov/about/steps-when-sick.html    CDC: Ending Home Isolation: www.cdc.gov/coronavirus/2019-ncov/hcp/disposition-in-home-patients.html     CDC: Caring for Someone: www.cdc.gov/coronavirus/2019-ncov/if-you-are-sick/care-for-someone.html     [facility]: Interim Guidance for Hospital Discharge to Home: www.health.[facility].mn.us/diseases/coronavirus/hcp/hospdischarge.pdf    [facility] clinical trials (COVID-19 research studies): clinicalaffairs.[facility]/umn-clinical-trials     Below are the COVID-19 hotlines at the [facility] ([facility]). Interpreters are available.   o For health questions: Call 990-383-5483 or 1-508.351.9452 (7 a.m. to 7 p.m.)  o For questions about schools and childcare: Call 938-184-7938 or 1-375.554.6717 (7 a.m. to 7 p.m.)                     Additional Information    Negative: [1]  Caller is not with the adult (patient) AND [2] reporting urgent symptoms    Negative: Lab result questions    Negative: Medication questions    Negative: Caller can't be reached by phone    Negative: Caller has already spoken to PCP or another triager    Negative: RN needs further essential information from caller in order to complete triage    Negative: Requesting regular office appointment    Negative: [1] Caller requesting NON-URGENT health information AND [2] PCP's office is the best resource    Health Information question, no triage required and triager able to answer question    Protocols used: INFORMATION ONLY CALL-A-AH

## 2020-09-15 ENCOUNTER — HOSPITAL ENCOUNTER (OUTPATIENT)
Facility: CLINIC | Age: 80
Setting detail: OBSERVATION
Discharge: HOME | End: 2020-09-16
Attending: EMERGENCY MEDICINE | Admitting: HOSPITALIST
Payer: MEDICARE

## 2020-09-15 ENCOUNTER — PATIENT OUTREACH (OUTPATIENT)
Dept: FAMILY MEDICINE | Facility: CLINIC | Age: 80
End: 2020-09-15

## 2020-09-15 ENCOUNTER — APPOINTMENT (OUTPATIENT)
Dept: GENERAL RADIOLOGY | Facility: CLINIC | Age: 80
End: 2020-09-15
Attending: EMERGENCY MEDICINE
Payer: MEDICARE

## 2020-09-15 DIAGNOSIS — R07.9 CHEST PAIN, UNSPECIFIED TYPE: ICD-10-CM

## 2020-09-15 LAB
ALBUMIN SERPL-MCNC: 3.7 G/DL (ref 3.4–5)
ALP SERPL-CCNC: 85 U/L (ref 40–150)
ALT SERPL W P-5'-P-CCNC: 33 U/L (ref 0–50)
ANION GAP SERPL CALCULATED.3IONS-SCNC: 6 MMOL/L (ref 3–14)
AST SERPL W P-5'-P-CCNC: 27 U/L (ref 0–45)
BASOPHILS # BLD AUTO: 0 10E9/L (ref 0–0.2)
BASOPHILS NFR BLD AUTO: 0.6 %
BILIRUB SERPL-MCNC: 0.4 MG/DL (ref 0.2–1.3)
BUN SERPL-MCNC: 18 MG/DL (ref 7–30)
CALCIUM SERPL-MCNC: 8.6 MG/DL (ref 8.5–10.1)
CHLORIDE SERPL-SCNC: 102 MMOL/L (ref 94–109)
CO2 SERPL-SCNC: 29 MMOL/L (ref 20–32)
CREAT SERPL-MCNC: 0.8 MG/DL (ref 0.52–1.04)
DIFFERENTIAL METHOD BLD: NORMAL
EOSINOPHIL # BLD AUTO: 0.2 10E9/L (ref 0–0.7)
EOSINOPHIL NFR BLD AUTO: 3 %
ERYTHROCYTE [DISTWIDTH] IN BLOOD BY AUTOMATED COUNT: 12.5 % (ref 10–15)
GFR SERPL CREATININE-BSD FRML MDRD: 69 ML/MIN/{1.73_M2}
GLUCOSE SERPL-MCNC: 108 MG/DL (ref 70–99)
HCT VFR BLD AUTO: 38.1 % (ref 35–47)
HGB BLD-MCNC: 12.8 G/DL (ref 11.7–15.7)
IMM GRANULOCYTES # BLD: 0 10E9/L (ref 0–0.4)
IMM GRANULOCYTES NFR BLD: 0.3 %
INTERPRETATION ECG - MUSE: NORMAL
INTERPRETATION ECG - MUSE: NORMAL
LIPASE SERPL-CCNC: 76 U/L (ref 73–393)
LYMPHOCYTES # BLD AUTO: 2.1 10E9/L (ref 0.8–5.3)
LYMPHOCYTES NFR BLD AUTO: 33.4 %
MCH RBC QN AUTO: 30.7 PG (ref 26.5–33)
MCHC RBC AUTO-ENTMCNC: 33.6 G/DL (ref 31.5–36.5)
MCV RBC AUTO: 91 FL (ref 78–100)
MONOCYTES # BLD AUTO: 0.3 10E9/L (ref 0–1.3)
MONOCYTES NFR BLD AUTO: 5.2 %
NEUTROPHILS # BLD AUTO: 3.6 10E9/L (ref 1.6–8.3)
NEUTROPHILS NFR BLD AUTO: 57.5 %
NRBC # BLD AUTO: 0 10*3/UL
NRBC BLD AUTO-RTO: 0 /100
PLATELET # BLD AUTO: 300 10E9/L (ref 150–450)
POTASSIUM SERPL-SCNC: 3.4 MMOL/L (ref 3.4–5.3)
PROT SERPL-MCNC: 7.8 G/DL (ref 6.8–8.8)
RBC # BLD AUTO: 4.17 10E12/L (ref 3.8–5.2)
SODIUM SERPL-SCNC: 137 MMOL/L (ref 133–144)
TROPONIN I SERPL-MCNC: 0.03 UG/L (ref 0–0.04)
TROPONIN I SERPL-MCNC: 0.04 UG/L (ref 0–0.04)
WBC # BLD AUTO: 6.3 10E9/L (ref 4–11)

## 2020-09-15 PROCEDURE — 84484 ASSAY OF TROPONIN QUANT: CPT | Performed by: EMERGENCY MEDICINE

## 2020-09-15 PROCEDURE — U0003 INFECTIOUS AGENT DETECTION BY NUCLEIC ACID (DNA OR RNA); SEVERE ACUTE RESPIRATORY SYNDROME CORONAVIRUS 2 (SARS-COV-2) (CORONAVIRUS DISEASE [COVID-19]), AMPLIFIED PROBE TECHNIQUE, MAKING USE OF HIGH THROUGHPUT TECHNOLOGIES AS DESCRIBED BY CMS-2020-01-R: HCPCS | Performed by: EMERGENCY MEDICINE

## 2020-09-15 PROCEDURE — 25000128 H RX IP 250 OP 636: Performed by: EMERGENCY MEDICINE

## 2020-09-15 PROCEDURE — 83690 ASSAY OF LIPASE: CPT | Performed by: EMERGENCY MEDICINE

## 2020-09-15 PROCEDURE — 93005 ELECTROCARDIOGRAM TRACING: CPT | Mod: 76

## 2020-09-15 PROCEDURE — 96366 THER/PROPH/DIAG IV INF ADDON: CPT

## 2020-09-15 PROCEDURE — 80053 COMPREHEN METABOLIC PANEL: CPT | Performed by: EMERGENCY MEDICINE

## 2020-09-15 PROCEDURE — 99285 EMERGENCY DEPT VISIT HI MDM: CPT | Mod: 25

## 2020-09-15 PROCEDURE — C9803 HOPD COVID-19 SPEC COLLECT: HCPCS

## 2020-09-15 PROCEDURE — 96376 TX/PRO/DX INJ SAME DRUG ADON: CPT

## 2020-09-15 PROCEDURE — 71046 X-RAY EXAM CHEST 2 VIEWS: CPT

## 2020-09-15 PROCEDURE — 85025 COMPLETE CBC W/AUTO DIFF WBC: CPT | Performed by: EMERGENCY MEDICINE

## 2020-09-15 PROCEDURE — 25000132 ZZH RX MED GY IP 250 OP 250 PS 637: Mod: GY | Performed by: EMERGENCY MEDICINE

## 2020-09-15 PROCEDURE — 96365 THER/PROPH/DIAG IV INF INIT: CPT

## 2020-09-15 PROCEDURE — 93005 ELECTROCARDIOGRAM TRACING: CPT

## 2020-09-15 PROCEDURE — 99220 ZZC INITIAL OBSERVATION CARE,LEVL III: CPT | Performed by: HOSPITALIST

## 2020-09-15 PROCEDURE — 84484 ASSAY OF TROPONIN QUANT: CPT | Mod: 91 | Performed by: EMERGENCY MEDICINE

## 2020-09-15 RX ORDER — NITROGLYCERIN 0.4 MG/1
0.4 TABLET SUBLINGUAL EVERY 5 MIN PRN
Status: DISCONTINUED | OUTPATIENT
Start: 2020-09-15 | End: 2020-09-16

## 2020-09-15 RX ORDER — HEPARIN SODIUM 10000 [USP'U]/100ML
12 INJECTION, SOLUTION INTRAVENOUS CONTINUOUS
Status: DISCONTINUED | OUTPATIENT
Start: 2020-09-15 | End: 2020-09-16

## 2020-09-15 RX ADMIN — HEPARIN SODIUM 12 UNITS/KG/HR: 10000 INJECTION, SOLUTION INTRAVENOUS at 21:21

## 2020-09-15 RX ADMIN — NITROGLYCERIN 0.4 MG: 0.4 TABLET SUBLINGUAL at 20:02

## 2020-09-15 RX ADMIN — NITROGLYCERIN 0.4 MG: 0.4 TABLET SUBLINGUAL at 22:28

## 2020-09-15 RX ADMIN — Medication 3200 UNITS: at 21:22

## 2020-09-15 ASSESSMENT — MIFFLIN-ST. JEOR: SCORE: 1012.93

## 2020-09-15 NOTE — TELEPHONE ENCOUNTER
Dr. Braden SANTIZO     S-(situation): RN PAL received incoming call from patients  Sahil     B-(background): HTN, Hyperlipidemia, PAD     A-(assessment):  Sahil reports that patient had her eye appointment and they suggested that she take a vitamin preservision   Patient has taken for 3 days and has noticed increase in heart palpitations and heaviness in chest   BP elevated today 185/85, 165/73 this afternoon   Pain in left arm near left breast -  gave 325 mg aspirin and pain is now gone     Patient was taking the preservision with a smoothie beet, banana, collagen in the AM     R-(recommendations): RN advised NOT to take any further preservision and advised ER for cardiac work up     Routing to pcp as DARLYN Juan, Registered Nurse   [facility]

## 2020-09-16 ENCOUNTER — APPOINTMENT (OUTPATIENT)
Dept: NUCLEAR MEDICINE | Facility: CLINIC | Age: 80
End: 2020-09-16
Attending: HOSPITALIST
Payer: MEDICARE

## 2020-09-16 VITALS
SYSTOLIC BLOOD PRESSURE: 130 MMHG | HEIGHT: 62 IN | DIASTOLIC BLOOD PRESSURE: 65 MMHG | RESPIRATION RATE: 18 BRPM | BODY MASS INDEX: 24.48 KG/M2 | HEART RATE: 67 BPM | OXYGEN SATURATION: 96 % | TEMPERATURE: 98 F | WEIGHT: 133 LBS

## 2020-09-16 LAB
CV STRESS MAX HR HE: 78
NUC STRESS EJECTION FRACTION: 85 %
RATE PRESSURE PRODUCT: NORMAL
SARS-COV-2 RNA SPEC QL NAA+PROBE: NOT DETECTED
SPECIMEN SOURCE: NORMAL
STRESS ECHO BASELINE DIASTOLIC HE: 91
STRESS ECHO BASELINE HR: 61
STRESS ECHO BASELINE SYSTOLIC BP: 151
STRESS ECHO CALCULATED PERCENT HR: 56 %
STRESS ECHO LAST STRESS DIASTOLIC BP: 86
STRESS ECHO LAST STRESS SYSTOLIC BP: 162
STRESS ECHO TARGET HR: 140
TROPONIN I SERPL-MCNC: 0.04 UG/L (ref 0–0.04)
TROPONIN I SERPL-MCNC: 0.04 UG/L (ref 0–0.04)

## 2020-09-16 PROCEDURE — G0378 HOSPITAL OBSERVATION PER HR: HCPCS

## 2020-09-16 PROCEDURE — 99203 OFFICE O/P NEW LOW 30 MIN: CPT | Performed by: INTERNAL MEDICINE

## 2020-09-16 PROCEDURE — 78452 HT MUSCLE IMAGE SPECT MULT: CPT

## 2020-09-16 PROCEDURE — 25000132 ZZH RX MED GY IP 250 OP 250 PS 637: Mod: GY | Performed by: INTERNAL MEDICINE

## 2020-09-16 PROCEDURE — 25000125 ZZHC RX 250: Performed by: HOSPITALIST

## 2020-09-16 PROCEDURE — 93017 CV STRESS TEST TRACING ONLY: CPT

## 2020-09-16 PROCEDURE — 93016 CV STRESS TEST SUPVJ ONLY: CPT | Performed by: INTERNAL MEDICINE

## 2020-09-16 PROCEDURE — 40000855 ZZH STATISTIC ECHO STRESS OR NM NPI

## 2020-09-16 PROCEDURE — 78452 HT MUSCLE IMAGE SPECT MULT: CPT | Mod: 26 | Performed by: INTERNAL MEDICINE

## 2020-09-16 PROCEDURE — 25000132 ZZH RX MED GY IP 250 OP 250 PS 637: Mod: GY | Performed by: HOSPITALIST

## 2020-09-16 PROCEDURE — 25000128 H RX IP 250 OP 636: Performed by: HOSPITALIST

## 2020-09-16 PROCEDURE — 34300033 ZZH RX 343: Performed by: HOSPITALIST

## 2020-09-16 PROCEDURE — A9502 TC99M TETROFOSMIN: HCPCS | Performed by: HOSPITALIST

## 2020-09-16 PROCEDURE — 36415 COLL VENOUS BLD VENIPUNCTURE: CPT | Performed by: HOSPITALIST

## 2020-09-16 PROCEDURE — 84484 ASSAY OF TROPONIN QUANT: CPT | Performed by: HOSPITALIST

## 2020-09-16 PROCEDURE — 93018 CV STRESS TEST I&R ONLY: CPT | Performed by: INTERNAL MEDICINE

## 2020-09-16 PROCEDURE — 99217 ZZC OBSERVATION CARE DISCHARGE: CPT | Performed by: INTERNAL MEDICINE

## 2020-09-16 RX ORDER — NITROGLYCERIN 0.4 MG/1
0.4 TABLET SUBLINGUAL EVERY 5 MIN PRN
Status: DISCONTINUED | OUTPATIENT
Start: 2020-09-16 | End: 2020-09-16 | Stop reason: HOSPADM

## 2020-09-16 RX ORDER — ALUMINA, MAGNESIA, AND SIMETHICONE 2400; 2400; 240 MG/30ML; MG/30ML; MG/30ML
30 SUSPENSION ORAL EVERY 4 HOURS PRN
Status: DISCONTINUED | OUTPATIENT
Start: 2020-09-16 | End: 2020-09-16 | Stop reason: HOSPADM

## 2020-09-16 RX ORDER — NALOXONE HYDROCHLORIDE 0.4 MG/ML
.1-.4 INJECTION, SOLUTION INTRAMUSCULAR; INTRAVENOUS; SUBCUTANEOUS
Status: DISCONTINUED | OUTPATIENT
Start: 2020-09-16 | End: 2020-09-16 | Stop reason: HOSPADM

## 2020-09-16 RX ORDER — LIDOCAINE 40 MG/G
CREAM TOPICAL
Status: DISCONTINUED | OUTPATIENT
Start: 2020-09-16 | End: 2020-09-16 | Stop reason: HOSPADM

## 2020-09-16 RX ORDER — ACETAMINOPHEN 650 MG/1
650 SUPPOSITORY RECTAL EVERY 4 HOURS PRN
Status: DISCONTINUED | OUTPATIENT
Start: 2020-09-16 | End: 2020-09-16 | Stop reason: HOSPADM

## 2020-09-16 RX ORDER — ASPIRIN 81 MG/1
81 TABLET ORAL DAILY
Status: DISCONTINUED | OUTPATIENT
Start: 2020-09-16 | End: 2020-09-16 | Stop reason: HOSPADM

## 2020-09-16 RX ORDER — ASPIRIN 81 MG/1
162 TABLET, CHEWABLE ORAL ONCE
Status: DISCONTINUED | OUTPATIENT
Start: 2020-09-16 | End: 2020-09-16

## 2020-09-16 RX ORDER — ONDANSETRON 2 MG/ML
4 INJECTION INTRAMUSCULAR; INTRAVENOUS EVERY 6 HOURS PRN
Status: DISCONTINUED | OUTPATIENT
Start: 2020-09-16 | End: 2020-09-16 | Stop reason: HOSPADM

## 2020-09-16 RX ORDER — REGADENOSON 0.08 MG/ML
0.4 INJECTION, SOLUTION INTRAVENOUS ONCE
Status: COMPLETED | OUTPATIENT
Start: 2020-09-16 | End: 2020-09-16

## 2020-09-16 RX ORDER — LANOLIN ALCOHOL/MO/W.PET/CERES
3 CREAM (GRAM) TOPICAL
Status: DISCONTINUED | OUTPATIENT
Start: 2020-09-16 | End: 2020-09-16 | Stop reason: HOSPADM

## 2020-09-16 RX ORDER — HEPARIN SODIUM 10000 [USP'U]/100ML
650 INJECTION, SOLUTION INTRAVENOUS CONTINUOUS
Status: DISCONTINUED | OUTPATIENT
Start: 2020-09-16 | End: 2020-09-16

## 2020-09-16 RX ORDER — ONDANSETRON 4 MG/1
4 TABLET, ORALLY DISINTEGRATING ORAL EVERY 6 HOURS PRN
Status: DISCONTINUED | OUTPATIENT
Start: 2020-09-16 | End: 2020-09-16 | Stop reason: HOSPADM

## 2020-09-16 RX ORDER — ACETAMINOPHEN 325 MG/1
650 TABLET ORAL EVERY 4 HOURS PRN
Status: DISCONTINUED | OUTPATIENT
Start: 2020-09-16 | End: 2020-09-16 | Stop reason: HOSPADM

## 2020-09-16 RX ADMIN — LIDOCAINE HYDROCHLORIDE 30 ML: 20 SOLUTION ORAL; TOPICAL at 01:27

## 2020-09-16 RX ADMIN — HYDROCHLOROTHIAZIDE: 25 TABLET ORAL at 11:55

## 2020-09-16 RX ADMIN — ALUMINUM HYDROXIDE, MAGNESIUM HYDROXIDE, AND DIMETHICONE 30 ML: 400; 400; 40 SUSPENSION ORAL at 01:27

## 2020-09-16 RX ADMIN — REGADENOSON 0.4 MG: 0.08 INJECTION, SOLUTION INTRAVENOUS at 09:24

## 2020-09-16 RX ADMIN — ASPIRIN 81 MG: 81 TABLET, COATED ORAL at 11:55

## 2020-09-16 RX ADMIN — TETROFOSMIN 25.8 MCI.: 1.38 INJECTION, POWDER, LYOPHILIZED, FOR SOLUTION INTRAVENOUS at 09:25

## 2020-09-16 RX ADMIN — TETROFOSMIN 9.2 MCI.: 1.38 INJECTION, POWDER, LYOPHILIZED, FOR SOLUTION INTRAVENOUS at 07:15

## 2020-09-16 ASSESSMENT — MIFFLIN-ST. JEOR: SCORE: 1026.53

## 2020-09-16 NOTE — CONSULTS
Consult Date:  09/16/2020      REASON FOR CONSULTATION:  Chest pain.      HISTORY OF PRESENT ILLNESS:  The patient is a very pleasant 80-year-old female with a history of hypertension and hyperlipidemia who was admitted to the hospital with chest pain.      She has been noticing left-sided intermittent chest discomfort over the last few days.  This is reproducible with palpation.  However, yesterday she noted epigastric discomfort with associated bloating.  She took aspirin for this with some improvement.  She subsequently called her primary doctor and was asked to come to the Emergency Department.      She was later admitted.  An electrocardiogram as well as laboratory studies have shown no evidence of acute coronary syndrome.  This morning when I saw her, the patient was completely asymptomatic.      She was sent for a stress test appropriately.  The stress test was a pharmacological nuclear test.  This showed no evidence of inducible ischemia.      PAST MEDICAL HISTORY:   1.  Hypertension.   2.  Hyperlipidemia.      SOCIAL HISTORY:  She is a lifetime nonsmoker.  Denies alcohol use.      FAMILY HISTORY:  Negative for premature coronary artery disease.      REVIEW OF SYSTEMS:  Compressive review of systems was performed and essentially negative except as mentioned in the HPI.      ALLERGIES:  ACE INHIBITOR, LACTOSE, SORBITOL, SULFONYLUREAS AND VANCOMYCIN.      CURRENT MEDICATIONS:   1.  Aspirin 81 mg daily.   2.  Hyzaar 100/25.      PHYSICAL EXAMINATION:   VITAL SIGNS:  Blood pressure is 170/80, pulse is 58.   GENERAL:  She is resting, appears to be in no acute distress.   NECK:  Jugular venous pressure is normal.  Carotid upstrokes brisk.   LUNGS:  Clear to auscultation bilaterally.   HEART:  Normal S1, S2, no murmurs, rubs or gallops.   ABDOMEN:  Soft, nontender.   EXTREMITIES:  Warm, no edema, cyanosis or clubbing.   SKIN:  No rashes or lesions.   NEUROLOGIC:  No focal deficits.   VESSELS:  2+ radial and 2+  femoral.   PSYCHIATRIC:  Normal mood and affect.      IMPRESSION AND PLAN:   1.  Atypical chest pain.   2.  Hypertension.   3.  Hyperlipidemia.      The patient was ruled out for acute coronary syndrome and subsequent stress test from this morning shows no evidence of inducible ischemia.  Her symptoms are quite atypical for ischemic pain and could be gastrointestinal in origin.      For now, I would not recommend any additional cardiac workup.  However, we need to control the blood pressure more aggressively.  Could consider adding an additional agent such as amlodipine to her regimen.      We will sign off.  I appreciate the opportunity to participate in her care.         JULIANNE CLINE MD             D: 2020   T: 2020   MT: CONNOR      Name:     FRANCISCO GARCÍA   MRN:      [id number]        Account:       [id number]   :      1940           Consult Date:  2020      Document: [id number]

## 2020-09-16 NOTE — ED NOTES
Bed: ED29  Expected date:   Expected time:   Means of arrival:   Comments:  Please let triage know when ready

## 2020-09-16 NOTE — PHARMACY-ADMISSION MEDICATION HISTORY
"Pharmacy Medication History  Admission medication history interview status for the 9/15/2020  admission is complete. See EPIC admission navigator for prior to admission medications     Medication history sources: Patient and Surescripts  Medication history source reliability: Good  Adherence assessment: Good    Significant changes made to the medication list:  D/C'd naproxen and Fish oil and added Perilla seed oil.      Additional medication history information:   none    Medication reconciliation completed by provider prior to medication history? No    Time spent in this activity: 15\"      Prior to Admission medications    Medication Sig Last Dose Taking? Auth Provider   aspirin (ASA) 81 MG chewable tablet Take 81 mg by mouth daily 9/15/2020 at am 325 mg here Yes Reported, Patient   losartan-hydrochlorothiazide (HYZAAR) 100-25 MG tablet Take 1 tablet by mouth daily 9/15/2020 at am Yes Luis Feliciano MD   NONFORMULARY Take 5 mLs by mouth every morning Perilla seed oil 1 tsp in smoothie every morning. 9/15/2020 at am Yes Unknown, Entered By History   calcium carbonate (TUMS) 500 MG chewable tablet Take 1 tablet (500 mg) by mouth 3 times daily as needed for heartburn Tim Tomlinson DO   UNKNOWN TO PATIENT Place onto the skin daily as needed (leg cramps) Cream from [place] for leg cramps   Unknown, Entered By History       "

## 2020-09-16 NOTE — H&P
[facility]    History and Physical  Hospitalist       Date of Admission:  9/15/2020  Date of Service (when I saw the patient): 09/15/20    ASSESSMENT  Francois Ly is a very pleasant 80 year old woman with past history that is most notable for PAD, hypertension, and dyslipidemia among others; who presents with chest pain.     PLAN    1) Chest pain: Without known history of CAD, having reportedly had a Lexiscan in 2016 after possible NSTEMI that did not show definitive evidence for ischemia. She does have known PAD in bilateral lower extremities, thought by Doppler US and ABIS from 2019 to be mild to moderate in severity. She presents for chest pain that seems atypical for ACS by history. It could be due to GERD. We must rule out ACS.    -- Observation. Telemetry, serial enzymes, Lexiscan ordered. Cardiology consulted. Start Heparin if chest pain recurs or rate of rise of enzymes becomes concerning. PRN nitroglycerin and GI cocktails available.    -- Resume hydrochlorothiazide when verified    Rule Out COVID-19 infection  This patient was evaluated during a global COVID-19 pandemic, which necessitated consideration that the patient might be at risk for infection with the SARS-CoV-2 virus that causes COVID-19. Applicable protocols for evaluation were followed during the patient's care. LOW suspicion for infection.   -follow-up COVID-19 PCR test result; no current indication for precautions    Chief Complaint   Chest pain    History is obtained from the patient and the ED physician whom I have spoken with    History of Present Illness   Francois Ly is a markedly pleasant 80 year old woman who presents with chest pain. This started a few days ago as intermittent, mild fleeting pain in the left side of the chest. Today, she noticed after eating an omelet onset of severe gassy discomfort in the abdomen associated with bloating that resolved with some aspirin, but some time after that she developed  "onset of substernal sharp, severe pain that lasted over an hour. Eventually she came in. She was given nitroglycerin which she says resolved the pain. She says she has also felt associated fatigue today. She denies dyspnea or nausea, cough, fever, leg swelling, or change in bowel baits, or melena, hematochezia, or any other acute complaints.    In the ED, Blood pressure (!) 154/91, pulse 85, temperature 97.3  F (36.3  C), temperature source Temporal, resp. rate 23, height 1.575 m (5' 2\"), weight 59 kg (130 lb), SpO2 96 %, not currently breastfeeding.    CBC, CMP were within normal reference range. Lipase 76. Troponin 0.034. Two EKG tests showed NSR with PAC's, no ST segment changes. CXR showed no acute pathology reportedly. Heparin IV bolus was started in the ED.    PHYSICAL EXAM  Blood pressure (!) 154/91, pulse 85, temperature 97.3  F (36.3  C), temperature source Temporal, resp. rate 23, height 1.575 m (5' 2\"), weight 59 kg (130 lb), SpO2 96 %, not currently breastfeeding.  Constitutional: Alert and oriented to person, place and time; no apparent distress  HEENT: normocephalic moist mucus membranes  Respiratory: lungs clear to auscultation bilaterally  Cardiovascular: regular S1 S2   GI: abdomen soft mildly tender in epigastrium, non distended bowel sounds positive  Lymph/Hematologic: no pallor, no cervical lymphadenopathy  Skin: no rash, good turgor  Musculoskeletal: no clubbing, cyanosis or edema  Neurologic: extra-ocular muscles intact; moves all four extremities  Psychiatric: appropriate affect, insight and judgment     DVT Prophylaxis: Pneumatic Compression Devices  Code Status: Full Code    Disposition: Expected discharge in 0-2 days    George Bhakta MD    Past Medical History    I have reviewed this patient's medical history and updated it with pertinent information if needed.   Past Medical History:   Diagnosis Date     Benign essential hypertension 10/12/2016     Bilateral low back pain without " sciatica, unspecified chronicity 12/19/2017     Blunt trauma of rib, initial encounter 11/25/2016     Dyslipidemia      Gallstones      Hypertension      PAD (peripheral artery disease) (H) 6/19/2017     Pancreatic mass 8/17/2016     SBO (small bowel obstruction) (H)      Slipped intervertebral disc        Past Surgical History   I have reviewed this patient's surgical history and updated it with pertinent information if needed.  Past Surgical History:   Procedure Laterality Date     GYN SURGERY      hysterectomy     ORTHOPEDIC SURGERY      Slipped disc with chronic back pain       Prior to Admission Medications   Prior to Admission Medications   Prescriptions Last Dose Informant Patient Reported? Taking?   Naproxen Sodium (ALEVE PO)   Yes No   Sig: Take 1 tablet by mouth daily as needed for moderate pain   Omega-3 Fatty Acids ([employer] FISH OIL) 1200 MG CAPS  Self Yes No   Sig: Take 1 capsule by mouth daily   UNKNOWN TO PATIENT   Yes No   Sig: Place onto the skin daily as needed (leg cramps) Cream from [place] for leg cramps   aspirin (ASA) 81 MG chewable tablet   Yes No   Sig: Take 81 mg by mouth daily   calcium carbonate (TUMS) 500 MG chewable tablet   No No   Sig: Take 1 tablet (500 mg) by mouth 3 times daily as needed for heartburn   losartan-hydrochlorothiazide (HYZAAR) 100-25 MG tablet   No No   Sig: Take 1 tablet by mouth daily      Facility-Administered Medications: None     Allergies   Allergies   Allergen Reactions     Ace Inhibitors      Lactose GI Disturbance     Reduced fat dairy- Diarrhea     Sorbitan Trioleate      Sulfonylureas      Vancomycin        Social History   I have reviewed this patient's social history and updated it with pertinent information if needed. Rajanmeenakshi VARGAS Aliyah  reports that she has quit smoking. She has quit using smokeless tobacco. She reports current alcohol use. She reports that she does not use drugs.    Family History   Family history assessed and, except as above, is  non-contributory.    Family History   Problem Relation Age of Onset     Family History Negative Other        Review of Systems   The 10 point Review of Systems is negative other than noted in the HPI or here.     Primary Care Physician   Luis Feliciano    Data   Labs Ordered and Resulted from Time of ED Arrival Up to the Time of Departure from the ED   COMPREHENSIVE METABOLIC PANEL - Abnormal; Notable for the following components:       Result Value    Glucose 108 (*)     All other components within normal limits   CBC WITH PLATELETS DIFFERENTIAL   TROPONIN I   LIPASE   TROPONIN I   COVID-19 VIRUS (CORONAVIRUS) BY PCR   PULSE OXIMETRY NURSING   CARDIAC CONTINUOUS MONITORING   PERIPHERAL IV CATHETER   PATIENT CARE ORDER   MEASURE WEIGHT   NOTIFY PHYSICIAN   NOTIFY PHYSICIAN       Data reviewed today:  I personally reviewed the EKG tracing showing NSR with PAC's.    Recent Results (from the past 24 hour(s))   XR Chest 2 Views    Narrative    CHEST TWO VIEWS 9/15/2020 8:55 PM     HISTORY: Chest pain    COMPARISON: 1/7/2020       Impression    IMPRESSION: There are no acute infiltrates. The cardiac silhouette is  not enlarged. Pulmonary vasculature is unremarkable.    SOLANGE ESTEVEZ MD

## 2020-09-16 NOTE — PROGRESS NOTES
Care Suites Procedure Nursing Note    Patient Information  Name: Francois Ly  Age: 80 year old    Procedure  Procedure: Lexiscan Stress:  Patient arrived for Lexiscan stress test. Admission intake info taken. PIV in place and is flushed and patent. Patient's lungs sound clear to auscultation. Patient was monitored throughout test with 12 lead EKG, BP and O2 sats. Patient tolerated well. VSS. Patient denied chest pain or pressure prior to injection, but does state her stomach is aching. Injection given 0925, pt symptoms remain GI, W/C to bathroom then to 6th floor.  0938- Pt transferred back to Rm [id number] per wheelchair.  Procedure start time: 0914  Procedure complete time: 0932  Concerns/abnormal assessment: None  If abnormal assessment, provider notified: N/A  Plan/Other: return to inpatient room    Katerina Dye RN

## 2020-09-16 NOTE — ED TRIAGE NOTES
Around 430pm pt developed mid sternal chest pressure while she was sitting on the couch. Denies SOB. Pt reports pain moved to area of left axilla and to epigastric area. Pt then took 324mg aspirin.

## 2020-09-16 NOTE — PLAN OF CARE
DATE & TIME: 9/16/2020 0234-2199  Cognitive Concerns/ Orientation : A&O x4, calm and cooperative. Pt primary language is Syrian but speaks good English   BEHAVIOR & AGGRESSION TOOL COLOR: Green  ABNL VS/O2: /80 before AM medications, on RA  MOBILITY: Independent   PAIN MANAGMENT: Denies  DIET: Regular   BOWEL/BLADDER: Continent  ABNL LAB/BG: WDL  DRAIN/DEVICES: IV removed (discharging)  TELEMETRY RHYTHM: NSR  SKIN: Pale, intact   TESTS/PROCEDURES: Stress test completed, negative for any findings  D/C DAY/GOALS/PLACE: Discharging home via    OTHER IMPORTANT INFO: LS clear, BS active x4. Denies SOB, MAKI noted. Cardiology signed off

## 2020-09-16 NOTE — ED NOTES
"[facility]  ED Nurse Handoff Report    ED Chief complaint: Chest Pain      ED Diagnosis:   Final diagnoses:   None       Code Status: Full Code    Allergies:   Allergies   Allergen Reactions     Ace Inhibitors      Lactose GI Disturbance     Reduced fat dairy- Diarrhea     Sorbitan Trioleate      Sulfonylureas      Vancomycin        Patient Story: Pt. Comes to ER  With CP for 4 hours prior to arrival. Initially pain was lateral left chest but now episodes lasting seconds are midsternal. Denies pain rad. Or SOB.  Focused Assessment:  Pt. Is alert and orient. times 3, stable on feet, no CP currently.     Treatments and/or interventions provided: nitroglycerin SL 0.4mg, Heparin bolus 3200 units with Gtt. Of 650 units/hr.  Patient's response to treatments and/or interventions: Pt. Is painfree and sittting in room talking with .     To be done/followed up on inpatient unit:  Nothing    Does this patient have any cognitive concerns?: None    Activity level - Baseline/Home:  Independent  Activity Level - Current:   Independent    Patient's Preferred language: English   Needed?: No    Isolation: None  Infection: Not Applicable  Bariatric?: No    Vital Signs:   Vitals:    09/15/20 1908 09/15/20 1910 09/15/20 1917 09/15/20 2000   BP:   (!) 166/95 (!) 154/91   Pulse: 59   85   Resp: 17   23   Temp:  97.3  F (36.3  C)     TempSrc:  Temporal     SpO2: 96%   96%   Weight: 59 kg (130 lb)      Height: 1.575 m (5' 2\")          Cardiac Rhythm:Cardiac Rhythm: Normal sinus rhythm(SR with PAC's)    Was the PSS-3 completed:   Yes  What interventions are required if any?               Family Comments:  is present.  OBS brochure/video discussed/provided to patient/family: Yes              Name of person given brochure if not patient:                Relationship to patient:      For the majority of the shift this patient's behavior was Green.   Behavioral interventions performed were None.    ED " NURSE PHONE NUMBER: [phone]

## 2020-09-16 NOTE — PROGRESS NOTES
Discharge    Patient discharged to home via car with      Listed belongings gathered and returned to patient. Yes  Care Plan and Patient education resolved: Yes  Prescriptions if needed, hard copies sent with patient  NA  Home and hospital acquired medications returned to patient: NA  Medication Bin checked and emptied on discharge Yes  Follow up appointment made for patient: No

## 2020-09-16 NOTE — ED PROVIDER NOTES
"  History     Chief Complaint:  Chest Pain       The history is provided by the patient and the spouse.      Francois Ly is a 80 year old female with a history of hypertension and hyperlipidemia who presents for evaluation of chest pain starting three and a half hours ago. The patient's  states that the patient had a pain on the left side of her chest and ribs and treated with a full adult aspirin at that time. She has since had intermittent episodes of chest pain and \"a lot of gas\". The patient presents today concerned with the severity of her pain.      Allergies:  Ace Inhibitors  Lactose  Sorbitan Trioleate  Sulfonylureas  Vancomycin     Medications:    Aspirin 81mg  Losartan-hydrochlorothiazide    Past Medical History:    Hypertension  Bilateral low back pain  Blunt trauma of rib  Gallstones  Hyperlipidemia  Peripheral artery disease  Pancreatic mass  Slipped intervertebral disc  Small bowel obstruction    Past Surgical History:    Hysterectomy     Family History:    History reviewed. No pertinent family history.    Social History:  The patient presents to the ED with her .  Smoking Status: Former Smoker, quit 1978  Smokeless Tobacco: Former user, quit 1978  Alcohol Use: Yes  Drug Use: No  PCP: Luis Feliciano     Review of Systems   Cardiovascular: Positive for chest pain.   All other systems reviewed and are negative.      Physical Exam     Patient Vitals for the past 24 hrs:   BP Temp Temp src Pulse Resp SpO2 Height Weight   09/15/20 2000 (!) 154/91 -- -- 85 23 96 % -- --   09/15/20 1917 (!) 166/95 -- -- -- -- -- -- --   09/15/20 1910 -- 97.3  F (36.3  C) Temporal -- -- -- -- --   09/15/20 1908 -- -- -- 59 17 96 % 1.575 m (5' 2\") 59 kg (130 lb)       Physical Exam  Vitals: reviewed by me  General: Pt seen on [facility], cooperative, and alert to conversation  Eyes: Tracking well, clear conjunctiva BL  ENT: MMM, midline trachea.   Lungs: No tachypnea, no accessory muscle use. No " respiratory distress.   CV: Rate as above, regular rhythm.    Abd: Soft, non tender, no guarding, no rebound. Non distended  MSK: no peripheral edema or joint effusion.  No evidence of trauma  Skin: No rash, normal turgor and temperature  Neuro: Clear speech and no facial droop.  Psych: Not RIS, no e/o AH/VH      Emergency Department Course     ECG:  ECG 1:  Indication: Chest Pain  Time: 1908  Vent. Rate 88 bpm. ME interval 168. QRS duration 90. QT/QTc 392/474. P-R-T axis 21 -33 94. Sinus rhythm with premature atrial complexes. Left axis deviation. Septal infarct, age undetermined. Abnormal ECG.  Read time: 1910     ECG 2:   Indication: Chest Pain  Time: 1954  Vent. Rate 83 bpm. ME interval 218. QRS duration 96. QT/QTc 400/470. P-R-T axis 47 -27 98. Sinus rhythm with 1st degree AV block with premature atrial complexes. Septal infarct, age undetermined. T wave abnormality, consider lateral ischemia. Abnormal ECG.   Read time: 1955      Imaging:  Radiology findings were communicated with the patient, family and admitting MD who voiced understanding of the findings.    XR Chest 2 views:   There are no acute infiltrates. The cardiac silhouette is not enlarged. Pulmonary vasculature is unremarkable.  As per radiology.    Laboratory:  Laboratory findings were communicated with the patient, family and admitting MD who voiced understanding of the findings.    CBC: WBC: 6.3, HGB: 12.8, PLT: 300    CMP: Glucose 108 (high), o/w WNL (Creatinine: 0.80)    Lipase: 76    1918 Troponin: 0.034       Asymptomatic COVID-19 Virus (Coronavirus) PCR: Pending     Interventions:  2002 Nitrostat 0.4mg sublingual  2121 Heparin drip 12 units/kg/hr IV  2122 Heparin 3,200 Units IV    Emergency Department Course:  Past medical records, nursing notes, and vitals reviewed.    1953 I performed an exam of the patient as documented above.     EKG obtained in the ED, see results above.     IV was inserted and blood was drawn for laboratory testing,  results above.    The patient was sent for a chest x-ray while in the emergency department, results above.     2126 I rechecked the patient and discussed the results of her workup thus far. The patient reports that she is pain free at this time. I still recommended admission at this time and the patient consented.    2146 I consulted with Dr. Bhakta, hospitalist, regarding the patient's history and presentation here in the emergency department who accepted the patient for admission.     Findings and plan explained to the patient who consents to admission. Discussed the patient with Dr. Bhakta, who will admit the patient to an observation bed for further monitoring, evaluation, and treatment.    I personally reviewed the laboratory and imaging results with the patient and answered all related questions prior to admission.     Impression & Plan     Covid-19  Francois Ly was evaluated during a global COVID-19 pandemic, which necessitated consideration that the patient might be at risk for infection with the SARS-CoV-2 virus that causes COVID-19.   Applicable protocols for evaluation were followed during the patient's care. COVID-19 was considered as part of the patient's evaluation. The plan for testing is: a test was obtained during this visit.     Medical Decision Making:  Francois Ly is a very pleasant 80 year old female who presents to the emergency department with chest pain both during exertion and at rest. I am treating her for unstable angina, given the fact that she had active and significant chest pain with hypertension here in the emergency department on my initial evaluation. She did have flipped T waves in the lateral leads, although this appears to be a chronic issue for her. Regardless, with her pain at rest, I did give heparin and sublingual nitroglycerin which had good results. I do think that she needs to be admitted for provocative testing. Her last stress test was in 2016 and showed an  abnormality. Spoke to Dr. Bhakta of the hospitalist team who very kindly accepted to take care of the patient.      Diagnosis:    ICD-10-CM    1. Chest pain, unspecified type  R07.9        Disposition:  Admitted to Dr. Bhakta.    Scribe Disclosure:  I, Leroy Rhoadesme, am serving as a scribe at 7:52 PM on 9/15/2020 to document services personally performed by Lalito Pelayo MD based on my observations and the provider's statements to me.      Lalito Pelayo MD  09/15/20 2246

## 2020-09-16 NOTE — DISCHARGE SUMMARY
[facility]  Hospitalist Discharge Summary      Date of Admission:  9/15/2020  Date of Discharge:  9/16/2020  Discharging Provider: Drake Vigil MD      Discharge Diagnoses   Atypical chest pain    Follow-ups Needed After Discharge   Follow-up Appointments     Follow-up and recommended labs and tests       Follow up with primary care provider, Luis Feliciano, within 7 days for   hospital follow- up.  No follow up labs or test are needed.             Unresulted Labs Ordered in the Past 30 Days of this Admission     Date and Time Order Name Status Description    9/15/2020 2149 Asymptomatic COVID-19 Virus (Coronavirus) by PCR In process           Discharge Disposition   Discharged to home  Condition at discharge: Stable    Hospital Course   Francois Ly is a very pleasant 80 year old woman with past history that is most notable for PAD, hypertension, and dyslipidemia among others; who presents with chest pain.      Chest pain, atypical  Without known history of CAD, having reportedly had a Lexiscan in 2016 after possible NSTEMI that did not show definitive evidence for ischemia. She does have known PAD in bilateral lower extremities, thought by Doppler US and ABIS from 2019 to be mild to moderate in severity. She presents for chest pain that seems atypical for ACS by history. Ruled out for ACS.    - Lexiscan stress test negative for inducible myocardial ischemia   - has been evaluated by cardiology    HTN  BP was elevated today which improved after received home Losartan/HCTZ      Rule Out COVID-19 infection  This patient was evaluated during a global COVID-19 pandemic, which necessitated consideration that the patient might be at risk for infection with the SARS-CoV-2 virus that causes COVID-19. Applicable protocols for evaluation were followed during the patient's care. LOW suspicion for infection.   - COVID-19 PCR results pending at time of discharge    Consultations This Hospital Stay    PHARMACY TO DOSE HEPARIN  CARDIOLOGY IP CONSULT  PHARMACY TO DOSE HEPARIN    Code Status   Full Code    Time Spent on this Encounter   I, Drake Vigil MD, personally saw the patient today and spent less than or equal to 30 minutes discharging this patient.       Drake Vigil MD  [facility]  ______________________________________________________________________    Physical Exam   Vital Signs: Temp: 98  F (36.7  C) Temp src: Oral BP: 130/65 Pulse: 67   Resp: 18 SpO2: 96 % O2 Device: None (Room air)    Weight: 133 lbs 0 oz  General Appearance: Alert, awake and no apparent distress  Respiratory: clear to ausculation bilaterally  Cardiovascular: regular rate and rhythm  GI: soft and non-tender  Skin: warm and dry         Primary Care Physician   Luis Feliciano    Discharge Orders      Follow-up and recommended labs and tests     Follow up with primary care provider, Luis Feliciano, within 7 days for hospital follow- up.  No follow up labs or test are needed.     Activity    Your activity upon discharge: activity as tolerated     Full Code     Diet    Follow this diet upon discharge: Orders Placed This Encounter      Regular Diet Adult       Significant Results and Procedures   Most Recent 3 CBC's:  Recent Labs   Lab Test 09/15/20  1918 01/06/20  2312 01/02/20  0846 01/01/20  1832   WBC 6.3 5.5 9.3 11.2*   HGB 12.8 12.4  --  13.5   MCV 91 91  --  90    269  --  315     Most Recent 3 BMP's:  Recent Labs   Lab Test 09/15/20  1918 04/24/20  0855 01/14/20  0940    138 137   POTASSIUM 3.4 3.5 3.2*   CHLORIDE 102 103 103   CO2 29 29 28   BUN 18 17 14   CR 0.80 0.72 0.70   ANIONGAP 6 6 6   ARIN 8.6 9.1 8.7   * 126* 107*   ,   Results for orders placed or performed during the hospital encounter of 09/15/20   XR Chest 2 Views    Narrative    CHEST TWO VIEWS 9/15/2020 8:55 PM     HISTORY: Chest pain    COMPARISON: 1/7/2020       Impression    IMPRESSION: There are no acute infiltrates.  The cardiac silhouette is  not enlarged. Pulmonary vasculature is unremarkable.    SOLANGE ESTEVEZ MD   NM Lexiscan stress test (nuc card)     Value    Target     Baseline Systolic     Baseline Diastolic BP 91    Last Stress Systolic     Last Stress Diastolic BP 86    Baseline HR 61    Max HR 78    Calculated Percent HR 56    Rate Pressure Product 12,636.0    Left Ventricular EF 85    Narrative       The nuclear stress test is negative for inducible myocardial ischemia   or infarction.     Left ventricular function is normal.     The left ventricular ejection fraction at stress is 85%.     A prior study was conducted on 10/3/2016.  This study has no change   when compared with the prior study.           Discharge Medications   Current Discharge Medication List      CONTINUE these medications which have NOT CHANGED    Details   aspirin (ASA) 81 MG chewable tablet Take 81 mg by mouth daily      losartan-hydrochlorothiazide (HYZAAR) 100-25 MG tablet Take 1 tablet by mouth daily  Qty: 90 tablet, Refills: 1    Associated Diagnoses: Benign essential hypertension      NONFORMULARY Take 5 mLs by mouth every morning Perilla seed oil 1 tsp in smoothie every morning.      calcium carbonate (TUMS) 500 MG chewable tablet Take 1 tablet (500 mg) by mouth 3 times daily as needed for heartburn  Qty: 90 tablet, Refills: 0    Associated Diagnoses: Recurrent intestinal obstruction (H)      UNKNOWN TO PATIENT Place onto the skin daily as needed (leg cramps) Cream from [place] for leg cramps           Allergies   Allergies   Allergen Reactions     Ace Inhibitors      Lactose GI Disturbance     Reduced fat dairy- Diarrhea     Sorbitan Trioleate      Sulfonylureas      Vancomycin

## 2020-09-17 ENCOUNTER — TELEPHONE (OUTPATIENT)
Dept: FAMILY MEDICINE | Facility: CLINIC | Age: 80
End: 2020-09-17

## 2020-09-17 NOTE — TELEPHONE ENCOUNTER
ED / Discharge Outreach Protocol    Admission to [facility] 9/15 - 9/16/2020 chest pain   Covid negative  Troponin WNL   NM Lexiscan stress test EF 85%   No medication changes   Advised to f/u with pcp 7 days no labs/imaging needed     Patient Contact    Attempt # 1    Was call answered?  No.  Left message on voicemail with information to call me back.    Tonia Juan, Registered Nurse, PAL (Patient Advocate Liason)   [facility]   [phone]

## 2020-09-18 NOTE — TELEPHONE ENCOUNTER
"ED/Discharge Protocol    \"Hi, my name is Tonia Juan RN, a registered nurse, and I am calling on behalf of Dr. Feliciano's office at [place].  I am calling to follow up and see how things are going for you after your recent visit.\"    \"I see that you were in the (ER/UC/IP) on 9/15 - 916 chest pain       How are you doing now that you are home?\" patient is not having any chest pain since coming home however she is now having pain in her abdomen and wonders if she may have an ulcer?   Troponin/stress test WNL     Is patient experiencing symptoms that may require a hospital visit?  NO     Discharge Instructions    \"Let's review your discharge instructions.  What is/are the follow-up recommendations?  Pt. Response: f/u with Dr. Feliciano     \"Were you instructed to make a follow-up appointment?\"  Pt. Response: Yes.  Has appointment been made?   No.  \"Can I help you schedule that appointment?\" yes RN scheduled   Next 5 appointments (look out 90 days)    Sep 29, 2020  1:30 PM CDT  (Arrive by 1:10 PM)  Office Visit with Luis Feliciano MD  [facility] ([facility] [address] [place] [id number] [phone]        \"When you see the provider, I would recommend that you bring your discharge instructions with you.    Medications    \"How many new medications are you on since your hospitalization/ED visit?\"    0-1  \"How many of your current medicines changed (dose, timing, name, etc.) while you were in the hospital/ED visit?\"   0-1  \"Do you have questions about your medications?\"   No  \"Were you newly diagnosed with heart failure, COPD, diabetes or did you have a heart attack?\"   No  For patients on insulin: \"Did you start on insulin in the hospital or did you have your insulin dose changed?\"   No  Post Discharge Medication Reconciliation Status: discharge medications reconciled, continue medications without change.    Was MTM referral placed (Make sure to put transitions as " "reason for referral)?   No    Call Summary    \"Do you have any questions or concerns about your condition or care plan at the moment?\"    No  Triage nurse advice given: continue to monitor stomach pain, refrain from eating spicy/acidic foods/drinks   Call clinic if worsens prior to scheduled appt     Patient was in ER 3 in the past year (assess appropriateness of ER visits.)      \"If you have questions or things don't continue to improve, we encourage you contact us through the main clinic number,[phone].  Even if the clinic is not open, triage nurses are available 24/7 to help you.   We would like you to know that our clinic has extended hours (provide information).  We also have urgent care (provide details on closest location and hours/contact info)\"    \"Thank you for your time and take care!\"    Tonia Juan, Registered Nurse, PAL (Patient Advocate Liason)   [facility]   [phone]         "

## 2020-09-21 ENCOUNTER — TELEPHONE (OUTPATIENT)
Dept: FAMILY MEDICINE | Facility: CLINIC | Age: 80
End: 2020-09-21

## 2020-09-21 NOTE — TELEPHONE ENCOUNTER
RN placed call to patient - spoke to  Sahil     Inquired about lab visit tomorrow, Sahil reports that patient would like her cholesterol checked. RN advised that this can be completed at visit with Dr. Feliciano on 9/29/2020 and they were in agreement with this     RN will cancel lab only appt 9/22/2020    Tonia Juan, Registered Nurse, PAL (Patient Advocate Liason)   [facility]   [phone]

## 2020-09-21 NOTE — TELEPHONE ENCOUNTER
Hi aston, does the patient needs any labs as she is scheduled for one tomorrow? I don't think there is a need for any lab testing, even if she needs one, we can do it when she comes in on 9/29

## 2020-09-26 NOTE — PROGRESS NOTES
Pre-Visit Planning     Future Appointments   Date Time Provider Department Center   9/29/2020  1:30 PM Luis Feliciano MD [facility]     Arrival Time for this Appointment:  1:10 PM   Appointment Notes for this encounter:   hopsital follow up - stomach pain  WELLNESS VISIT ADD ON if possable    Questionnaires Reviewed/Assigned  No additional questionnaires are needed    Hospital/ER visits since last pcp appt?  YES 9/15/2020 chest pain   Troponin and Covid negative   Lexiscan stress test      The nuclear stress test is negative for inducible myocardial ischemia or infarction.     Left ventricular function is normal.     The left ventricular ejection fraction at stress is 85%.     A prior study was conducted on 10/3/2016.  This study has no change when compared with the prior study.  Imaging: Normal chest xray 9/15/2020   Lab review: no concerns noted by RN review     [employer]  Patient is active on [employer].    Specialty Visits - None per chart review     There are no preventive care reminders to display for this patient.    Patient preferred phone number: [phone]    ARMAAN DAIS sent my chart message with Previsit Planning questions and awaiting patient response     Tonia Juan Registered Nurse, PAL (Patient Advocate Liason)   [facility]   [phone]

## 2020-09-27 DIAGNOSIS — I10 BENIGN ESSENTIAL HYPERTENSION: ICD-10-CM

## 2020-09-28 RX ORDER — LOSARTAN POTASSIUM AND HYDROCHLOROTHIAZIDE 25; 100 MG/1; MG/1
TABLET ORAL
Qty: 90 TABLET | Refills: 3 | Status: SHIPPED | OUTPATIENT
Start: 2020-09-28 | End: 2020-09-29

## 2020-09-28 NOTE — TELEPHONE ENCOUNTER
Prescription approved per [facility].    Dharmesh Tamayo RN

## 2020-09-29 ENCOUNTER — OFFICE VISIT (OUTPATIENT)
Dept: FAMILY MEDICINE | Facility: CLINIC | Age: 80
End: 2020-09-29
Payer: MEDICARE

## 2020-09-29 VITALS
RESPIRATION RATE: 18 BRPM | SYSTOLIC BLOOD PRESSURE: 138 MMHG | HEART RATE: 75 BPM | OXYGEN SATURATION: 98 % | WEIGHT: 134 LBS | BODY MASS INDEX: 24.51 KG/M2 | TEMPERATURE: 98.2 F | DIASTOLIC BLOOD PRESSURE: 77 MMHG

## 2020-09-29 DIAGNOSIS — I10 BENIGN ESSENTIAL HYPERTENSION: ICD-10-CM

## 2020-09-29 DIAGNOSIS — Z00.00 ENCOUNTER FOR MEDICARE ANNUAL WELLNESS EXAM: Primary | ICD-10-CM

## 2020-09-29 DIAGNOSIS — R07.89 ATYPICAL CHEST PAIN: ICD-10-CM

## 2020-09-29 DIAGNOSIS — E78.5 HYPERLIPIDEMIA LDL GOAL <100: ICD-10-CM

## 2020-09-29 PROCEDURE — 99496 TRANSJ CARE MGMT HIGH F2F 7D: CPT | Performed by: FAMILY MEDICINE

## 2020-09-29 PROCEDURE — 36415 COLL VENOUS BLD VENIPUNCTURE: CPT | Performed by: FAMILY MEDICINE

## 2020-09-29 PROCEDURE — 80061 LIPID PANEL: CPT | Performed by: FAMILY MEDICINE

## 2020-09-29 RX ORDER — LOSARTAN POTASSIUM AND HYDROCHLOROTHIAZIDE 25; 100 MG/1; MG/1
1 TABLET ORAL DAILY
Qty: 90 TABLET | Refills: 3 | Status: SHIPPED | OUTPATIENT
Start: 2020-09-29 | End: 2021-09-27

## 2020-09-29 ASSESSMENT — PAIN SCALES - GENERAL: PAINLEVEL: NO PAIN (0)

## 2020-09-29 NOTE — PROGRESS NOTES
Subjective     Francois Ly is a 80 year old female who presents to clinic today for the following health issues:    HPI           Hospital Follow-up Visit:    Hospital/Nursing Home/IP Rehab Facility: [facility]  Date of Admission: 09/15/2020  Date of Discharge: 09/16/2020  Reason(s) for Admission: atypical chest pain      Was your hospitalization related to COVID-19? No   Problems taking medications regularly:  None  Medication changes since discharge: None  Problems adhering to non-medication therapy:  None    Summary of hospitalization:  [facility] discharge summary reviewed  Diagnostic Tests/Treatments reviewed.  Follow up needed: none  Other Healthcare Providers Involved in Patient s Care:         None  Update since discharge: improved.  Pt pain has resolved completely, she complains today of occasional abdominal pain when she bend or cough/sneeze, that usually resolves with stretching out the stomach.  Post Discharge Medication Reconciliation: discharge medications reconciled, continue medications without change.  Plan of care communicated with patient                Review of Systems   CONSTITUTIONAL: NEGATIVE for fever, chills, change in weight  RESP: NEGATIVE for significant cough or SOB  CV: NEGATIVE for chest pain, palpitations or peripheral edema      Objective    LMP  (LMP Unknown)   There is no height or weight on file to calculate BMI.  Physical Exam   GENERAL: healthy, alert and no distress  RESP: lungs clear to auscultation - no rales, rhonchi or wheezes  CV: regular rate and rhythm, normal S1 S2, no S3 or S4, no murmur, click or rub, no peripheral edema and peripheral pulses strong  ABDOMEN: soft, nontender, without hepatosplenomegaly or masses and bowel sounds normal              Assessment & Plan     Encounter for Medicare annual wellness exam  Check below.     Benign essential hypertension  Controlled, continue on   - losartan-hydrochlorothiazide (HYZAAR) 100-25 MG  "tablet; Take 1 tablet by mouth daily    Atypical chest pain  Negative stress test, mostly musculoskeletal. Resolved.    Hyperlipidemia LDL goal <100  Pt does not wish to take statin due to leg cramps on meds. Check lipid profile.  - Lipid panel reflex to direct LDL Non-fasting           Return in about 1 year (around 9/29/2021) for Annual Wellness Visit, Routine physical..    Luis Feliciano MD  [facility]  SUBJECTIVE:   Francois Ly is a 80 year old female who presents for Preventive Visit.      Patient has been advised of split billing requirements and indicates understanding: Yes  Are you in the first 12 months of your Medicare Part B coverage?  No    Physical Health:    In general, how would you rate your overall physical health? good    Outside of work, how many days during the week do you exercise? 2-3 days/week    Outside of work, approximately how many minutes a day do you exercise?45-60 minutes    If you drink alcohol do you typically have >3 drinks per day or >7 drinks per week? No    Do you usually eat at least 4 servings of fruit and vegetables a day, include whole grains & fiber and avoid regularly eating high fat or \"junk\" foods? Yes    Do you have any problems taking medications regularly?  No    Do you have any side effects from medications? none    Needs assistance for the following daily activities: no assistance needed    Which of the following safety concerns are present in your home?  none identified     Hearing impairment: No    In the past 6 months, have you been bothered by leaking of urine? no    Mental Health:    In general, how would you rate your overall mental or emotional health? good  PHQ-2 Score:      Do you feel safe in your environment? Yes    Have you ever done Advance Care Planning? (For example, a Health Directive, POLST, or a discussion with a medical provider or your loved ones about your wishes):     Additional concerns to address?  As above.    Fall risk:   "     Cognitive Screenin) Repeat 3 items (Leader, Season, Table)    2) Clock draw: NORMAL  3) 3 item recall: Recalls 3 objects  Results: NORMAL clock, 1-2 items recalled: COGNITIVE IMPAIRMENT LESS LIKELY     Do you have sleep apnea, excessive snoring or daytime drowsiness?: no

## 2020-09-30 LAB
CHOLEST SERPL-MCNC: 248 MG/DL
HDLC SERPL-MCNC: 48 MG/DL
LDLC SERPL CALC-MCNC: 126 MG/DL
NONHDLC SERPL-MCNC: 200 MG/DL
TRIGL SERPL-MCNC: 371 MG/DL

## 2020-10-06 ENCOUNTER — PATIENT OUTREACH (OUTPATIENT)
Dept: FAMILY MEDICINE | Facility: CLINIC | Age: 80
End: 2020-10-06

## 2020-10-06 DIAGNOSIS — E78.5 HYPERLIPIDEMIA LDL GOAL <100: Primary | ICD-10-CM

## 2020-10-06 RX ORDER — SIMVASTATIN 10 MG
10 TABLET ORAL AT BEDTIME
Qty: 90 TABLET | Refills: 3 | Status: ON HOLD | OUTPATIENT
Start: 2020-10-06 | End: 2021-08-27

## 2020-10-06 NOTE — TELEPHONE ENCOUNTER
That is great, why don't we start on low dose zocor like 10 mg daily and see if pt does have any side effects.  Luis Feliciano MD  [facility]  [phone]

## 2020-10-06 NOTE — TELEPHONE ENCOUNTER
Patient aware that new rx was to be sent today and will check with pharmacy to  - monitor for side effects and return call to RN PAL with concerns/questions that arise     Tonia Juan, Registered Nurse, PAL (Patient Advocate Liason)   [facility]   [phone]

## 2020-10-06 NOTE — TELEPHONE ENCOUNTER
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

## 2021-02-04 ENCOUNTER — IMMUNIZATION (OUTPATIENT)
Dept: NURSING | Facility: CLINIC | Age: 81
End: 2021-02-04
Payer: MEDICARE

## 2021-02-04 PROCEDURE — 91300 PR COVID VAC PFIZER DIL RECON 30 MCG/0.3 ML IM: CPT

## 2021-02-04 PROCEDURE — 0001A PR COVID VAC PFIZER DIL RECON 30 MCG/0.3 ML IM: CPT

## 2021-02-25 ENCOUNTER — IMMUNIZATION (OUTPATIENT)
Dept: NURSING | Facility: CLINIC | Age: 81
End: 2021-02-25
Attending: FAMILY MEDICINE
Payer: MEDICARE

## 2021-02-25 PROCEDURE — 91300 PR COVID VAC PFIZER DIL RECON 30 MCG/0.3 ML IM: CPT

## 2021-02-25 PROCEDURE — 0002A PR COVID VAC PFIZER DIL RECON 30 MCG/0.3 ML IM: CPT

## 2021-05-05 NOTE — MR AVS SNAPSHOT
After Visit Summary   9/24/2018    Francois Ly    MRN: [id number]           Patient Information     Date Of Birth          1940        Visit Information        Provider Department      9/24/2018 1:15 PM Luis Feliciano MD [facility]        Today's Diagnoses     Upper respiratory tract infection, unspecified type    -  1    Benign essential hypertension           Follow-ups after your visit        Follow-up notes from your care team     Return in about 5 days (around 9/29/2018), or if symptoms worsen or fail to improve.      Your next 10 appointments already scheduled     Oct 01, 2018 10:30 AM CDT   SHORT with Luis Feliciano MD   [facility] ([facility]    [address] [place] [id number] [phone]              Who to contact     If you have questions or need follow up information about today's clinic visit or your schedule please contact [facility] directly at [phone].  Normal or non-critical lab and imaging results will be communicated to you by [employer], letter or phone within 4 business days after the clinic has received the results. If you do not hear from us within 7 days, please contact the clinic through [employer] or phone. If you have a critical or abnormal lab result, we will notify you by phone as soon as possible.  Submit refill requests through [employer] or call your pharmacy and they will forward the refill request to us. Please allow 3 business days for your refill to be completed.          Additional Information About Your Visit        MyChart Information     [employer] gives you secure access to your electronic health record. If you see a primary care provider, you can also send messages to your care team and make appointments. If you have questions, please call your primary care clinic.  If you do not have a primary care provider, please call [phone] and they will assist you.       Writer reviewed referral from PCP.  Referral for skin disturbance & possible dysautonomia.  Writer spoke to patient.  She wants to keep both consult appointments as she can get into Dr. Rivera soonsaira & he can start addressing her numbness & tingling & then once patient has consult with Dr. Jaime, then address her dysautonomia.  Writer thanked patient for their time.  Verbalized understanding and no other questions or concerns at this time that need to be addressed.     "  Care EveryWhere ID     This is your Care EveryWhere ID. This could be used by other organizations to access your [facility] medical records  [id number]        Your Vitals Were     Pulse Temperature Respirations Height Pulse Oximetry BMI (Body Mass Index)    63 97.9  F (36.6  C) (Oral) 16 5' 2\" (1.575 m) 99% 23.78 kg/m2       Blood Pressure from Last 3 Encounters:   09/24/18 138/78   08/25/18 (!) 163/92   08/12/18 166/67    Weight from Last 3 Encounters:   09/24/18 130 lb (59 kg)   08/25/18 130 lb (59 kg)   08/11/18 130 lb (59 kg)              Today, you had the following     No orders found for display         Today's Medication Changes          These changes are accurate as of 9/24/18  1:39 PM.  If you have any questions, ask your nurse or doctor.               Start taking these medicines.        Dose/Directions    amoxicillin 500 MG capsule   Commonly known as:  AMOXIL   Used for:  Upper respiratory tract infection, unspecified type   Started by:  Luis Feliciano MD        Dose:  500 mg   Take 1 capsule (500 mg) by mouth 3 times daily   Quantity:  30 capsule   Refills:  0         These medicines have changed or have updated prescriptions.        Dose/Directions    chlorthalidone 25 MG tablet   Commonly known as:  HYGROTON   This may have changed:  See the new instructions.   Used for:  Benign essential hypertension   Changed by:  Luis Feliciano MD        Dose:  12.5 mg   Take 0.5 tablets (12.5 mg) by mouth daily   Quantity:  45 tablet   Refills:  3            Where to get your medicines      These medications were sent to [facility] [address]  [address] [facility] [address]     Phone:  [phone]     amoxicillin 500 MG capsule    chlorthalidone 25 MG tablet                Primary Care Provider Office Phone # Fax #    Luis Feliciano [phone] [address] [facility] [address]        Equal Access to Services     IAM YOON AH: April voss " marilia Schmitz, danielle rhodesnancieha, qarichardta kamarito crespo, giovanni daviein hayaadeon hollinsmarita lizetteiveth laAraselimarcos luis. [facility] [phone].    ATENCIÓN: Si habla leno, tiene a huertas disposición servicios gratuitos de asistencia lingüística. Helen al [phone].    We comply with applicable federal civil rights laws and [place] laws. We do not discriminate on the basis of race, color, national origin, age, disability, sex, sexual orientation, or gender identity.            Thank you!     Thank you for choosing [facility]  for your care. Our goal is always to provide you with excellent care. Hearing back from our patients is one way we can continue to improve our services. Please take a few minutes to complete the written survey that you may receive in the mail after your visit with us. Thank you!             Your Updated Medication List - Protect others around you: Learn how to safely use, store and throw away your medicines at www.disposemymeds.org.          This list is accurate as of 9/24/18  1:39 PM.  Always use your most recent med list.                   Brand Name Dispense Instructions for use Diagnosis    amoxicillin 500 MG capsule    AMOXIL    30 capsule    Take 1 capsule (500 mg) by mouth 3 times daily    Upper respiratory tract infection, unspecified type       chlorthalidone 25 MG tablet    HYGROTON    45 tablet    Take 0.5 tablets (12.5 mg) by mouth daily    Benign essential hypertension       [facility] FISH OIL 1200 MG Caps      Take 1 capsule by mouth daily        losartan 50 MG tablet    COZAAR    90 tablet    Take 1 tablet (50 mg) by mouth daily    Benign essential hypertension       STATIN NOT PRESCRIBED (INTENTIONAL)      Please choose reason not prescribed, below    PAD (peripheral artery disease) (H)       vitamin D3 1000 units Caps      Take 1,000 Units by mouth daily

## 2021-07-06 ENCOUNTER — PATIENT OUTREACH (OUTPATIENT)
Dept: FAMILY MEDICINE | Facility: CLINIC | Age: 81
End: 2021-07-06

## 2021-07-06 DIAGNOSIS — R22.1 LUMP IN THROAT: Primary | ICD-10-CM

## 2021-07-06 NOTE — TELEPHONE ENCOUNTER
Dr. Feliciano     S-(situation): phone call from  Sahil     OLGA-(background): last pcp visit 5/6/20 - lump in throat     A-(assessment): patient continues with throat problem -  asking for ENT referral   Discomfort in throat   Denies trouble swallowing or breathing   No fever   Slight cough on and off - occurs after throat discomfort   Acid reflux symptoms on and off     R-(recommendations): routing to pcp for recommendations and will return call to /patient     Tonia Juan, Registered Nurse, PAL (Patient Advocate Liason)   [facility]   [phone]

## 2021-07-06 NOTE — TELEPHONE ENCOUNTER
Called and spoke with patient, relayed information for referrals. No further questions or concerns at this time.     Ankit ALEXANDER RN

## 2021-07-06 NOTE — TELEPHONE ENCOUNTER
Of course.   Here are the numbers:    N: [facility] [phone]   [email or web address]   [facility]: [facility] [phone]   [email or web address]

## 2021-07-26 ENCOUNTER — OFFICE VISIT (OUTPATIENT)
Dept: FAMILY MEDICINE | Facility: CLINIC | Age: 81
End: 2021-07-26
Payer: MEDICARE

## 2021-07-26 VITALS
DIASTOLIC BLOOD PRESSURE: 72 MMHG | HEART RATE: 68 BPM | OXYGEN SATURATION: 99 % | RESPIRATION RATE: 16 BRPM | SYSTOLIC BLOOD PRESSURE: 132 MMHG | WEIGHT: 131 LBS | BODY MASS INDEX: 23.96 KG/M2 | TEMPERATURE: 98.2 F

## 2021-07-26 DIAGNOSIS — K13.79 UVULAR SWELLING: Primary | ICD-10-CM

## 2021-07-26 DIAGNOSIS — I73.9 PERIPHERAL VASCULAR DISEASE, UNSPECIFIED (H): ICD-10-CM

## 2021-07-26 DIAGNOSIS — K56.609 SBO (SMALL BOWEL OBSTRUCTION) (H): ICD-10-CM

## 2021-07-26 PROCEDURE — 99214 OFFICE O/P EST MOD 30 MIN: CPT | Performed by: FAMILY MEDICINE

## 2021-07-26 RX ORDER — EPINEPHRINE 0.3 MG/.3ML
0.3 INJECTION SUBCUTANEOUS ONCE
Qty: 0.3 ML | Refills: 0 | Status: SHIPPED | OUTPATIENT
Start: 2021-07-26 | End: 2021-07-26

## 2021-07-26 RX ORDER — FEXOFENADINE HCL 180 MG/1
180 TABLET ORAL DAILY
Qty: 60 TABLET | Refills: 1 | Status: ON HOLD | OUTPATIENT
Start: 2021-07-26 | End: 2021-08-27

## 2021-07-26 NOTE — PROGRESS NOTES
Assessment & Plan     Uvular swelling  No signs of swelling today but according to , there was swelling, I wonder if that is related to allergic reaction, start on   - fexofenadine (ALLEGRA) 180 MG tablet; Take 1 tablet (180 mg) by mouth daily  Also will give   - EPINEPHrine (ANY BX GENERIC EQUIV) 0.3 MG/0.3ML injection 2-pack; Inject 0.3 mLs (0.3 mg) into the muscle once for 1 dose  To use in case of sever sypmtoms .   Pt to call if symptoms recur, or fail to improve.    Peripheral vascular disease, unspecified (H)      SBO (small bowel obstruction) (H)  With occasional cramps in the abdomen, unsure of etiology, can be related to scar tissue, pt would like an opinion about it, will refer to   - Adult General Surg Referral; Future                   Luis Feliciano MD  [facility]    Jamir Parrish is a 81 year old who presents for the following health issues  accompanied by her spouse:    HPI     F/u throat concerns, problems swallowing    Pt has been symptoms of irritation in the Rt side of the throat, she feels something bothering her in the upper throat, along with congestion in the nose, with itching in the ear with pain on the Rt side of the throat.  The attacks usually last for 2 weeks or so, then it goes away, when her  was looking in her throat and noticed to have swelling of uvula.  This incident occurs 2 times so far, now it is getting better, and she has tried benadryl and felt it is improving.  Pt admitted that she felt ok otherwise, no fever, no chills, body aches no cough.  Pt cannot remember any aggravating factors.    Also pt has still complaining of sudden cramps in the abdomen, usually occur when she moves round or twist her abdomen or bend over, it will last for 5 minutes then go away, occurs every 4 to5 days, they are very painful that she has to stand and stretch, relax until they go away on their own.    Review of Systems   CONSTITUTIONAL: NEGATIVE  for fever, chills, change in weight      Objective    LMP  (LMP Unknown)   There is no height or weight on file to calculate BMI.  Physical Exam   Head: Normocephalic, atraumatic.  Eyes: Conjunctiva clear, non icteric. PERRLA.  Ears: External ears nl, TM is nl   Nose: Septum midline, nasal mucosa nl. No discharge.  There is no tenderness over the maxillary sinuses, there is no tenderness over the frontal sinuses  Mouth / Throat: Normal dentition.  No oral lesions. Pharynx mild  Erythematous swelling to the Right side of the throat, tonsils no exudate/hypertrophy.  Neck: Supple, no enlarged LN, trachea midline.  LUNGS:  CTA B/L, no wheezing or crackles.  CVS : RRR, no murmur, no rub.

## 2021-08-02 ENCOUNTER — OFFICE VISIT (OUTPATIENT)
Dept: SURGERY | Facility: CLINIC | Age: 81
End: 2021-08-02
Payer: MEDICARE

## 2021-08-02 VITALS
SYSTOLIC BLOOD PRESSURE: 124 MMHG | OXYGEN SATURATION: 99 % | HEIGHT: 62 IN | BODY MASS INDEX: 24.11 KG/M2 | HEART RATE: 57 BPM | RESPIRATION RATE: 16 BRPM | WEIGHT: 131 LBS | DIASTOLIC BLOOD PRESSURE: 76 MMHG

## 2021-08-02 DIAGNOSIS — R10.84 ABDOMINAL PAIN, GENERALIZED: Primary | ICD-10-CM

## 2021-08-02 DIAGNOSIS — R10.31 ABDOMINAL PAIN, BILATERAL LOWER QUADRANT: Primary | ICD-10-CM

## 2021-08-02 DIAGNOSIS — R10.32 ABDOMINAL PAIN, BILATERAL LOWER QUADRANT: Primary | ICD-10-CM

## 2021-08-02 PROCEDURE — 99203 OFFICE O/P NEW LOW 30 MIN: CPT | Performed by: SURGERY

## 2021-08-02 ASSESSMENT — MIFFLIN-ST. JEOR: SCORE: 1012.46

## 2021-08-02 NOTE — PROGRESS NOTES
[facility]   [facility] Patient Office Visit         Assessment and Plan:   Assessment:   Patient is a 81 year old female with a PMH of ex lap in 1960s due to uterine perforation from IUD, FARHAT/BSO 1994, hx of recurrent SBOs, presents to clinic with right and left lower quadrant abdominal pain.  Pain comes on with specific movements and sound like abdominal wall pain, sounds consistent with inguinal hernias, but no obvious hernias on exam, though small pannus makes exam there more difficult.  Pain does not sound consistent with obstruction from scar tissue, we discussed this with the more pain/cramping after eating      Plan:   CT abd/pelv with valsalva  Discussed proceeding with surgery if CT shows hernias.               Francois Ly is a 81 year old female seen in consultation for Abdominal pain, right lower quadrant  Abdominal pain, left lower quadrant, at the request of Luis Feliciano MD.       Chief Complaint:   Abdominal pain, right lower quadrant  Abdominal pain, left lower quadrant           History of Present Illness:   Francois Ly is a 81 year old female who presents with right lower quadrant and left lower quadrant abdominal pain for several years.  The pain is intermittent and cramping.  The pain comes on with specific movements such as bending or twisting.  She can get the pain to go away with changing positions or stretching.  Pain is not brought on by eating.  She has normal diet and normal bowel movements.  She has history of small bowel obstructions, last admission was January 2020.  She is wondering if the pain is due to scar tissue from her prior surgeries.   present at visit today.            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   Multiple abdominal surgeries in the 1960s related to IUD uterine perforation, and another surgery related to this in the 1990s.           Social History:     Social History     Tobacco Use     Smoking status: Former Smoker     Smokeless tobacco: Former User     Tobacco comment: Former \"social\" smoker, quit in 1978.   Substance Use Topics     Alcohol use: Yes     Alcohol/week: 0.0 standard drinks     Comment: occ wine           Family History:     Family History   Problem Relation Age of Onset     Family History Negative Other             Medications:     Current Outpatient Medications   Medication     aspirin (ASA) 81 MG chewable tablet     calcium carbonate (TUMS) 500 MG chewable tablet     fexofenadine (ALLEGRA) 180 MG tablet     losartan-hydrochlorothiazide (HYZAAR) 100-25 MG tablet     NONFORMULARY     simvastatin (ZOCOR) 10 MG tablet     UNKNOWN TO PATIENT     No current facility-administered medications for this visit.            Review of Systems:   The Review of Systems is negative other than noted in the HPI or below            Physical Exam:   /76   Pulse 57   Resp 16   Ht 1.575 m (5' 2\")   Wt 59.4 kg (131 lb)   LMP  (LMP Unknown)   SpO2 99%   BMI 23.96 kg/m    General - Well developed, well nourished female in no apparent distress  HEENT:  Head normocephalic and atraumatic, pupils equal and round, conjunctivae clear, no scleral icterus   Abdomen:   soft, small pannus, previous lower midline scar noted, non-distended with no tenderness noted diffusely. . no masses " palpated.  : bilateral groins: no obvious bulging over the inguinal ligament area with valsalva  Skin: Without lesions, rashes, or jaundice         Data:   All labs imaging studies reviewed by me.  Imaging shows:  CT January 2020: Shows small bowel obstruction.  Question left inguinal hernia.      Eladia Tavarez MD    Please route or send letter to:  Primary Care Provider (PCP)

## 2021-08-02 NOTE — PATIENT INSTRUCTIONS
CT ABDOMEN AND PELVIS WITH VALSALVA      Date: 8/4/2021  Time: 11:20 AM   Location: [facility]  [address]  [address]  [place]  [address]       Please check in at 10:50 AM       Preparation for CT scanning    Please bring an updated list of all your medications, including IV Chemo Therapy over the counter and herbal supplements.    For questions regarding your test please call [phone].   If you are taking any medications and you have questions, please call your primary care physician.

## 2021-08-02 NOTE — LETTER
2021    RE: Francois Ly, : 1940      [facility]   [facility] Patient Office Visit          Assessment and Plan:   Assessment:    Patient is a 81 year old female with a PMH of ex lap in  due to uterine perforation from IUD, FARHAT/BSO , hx of recurrent SBOs, presents to clinic with right and left lower quadrant abdominal pain.  Pain comes on with specific movements and sound like abdominal wall pain, sounds consistent with inguinal hernias, but no obvious hernias on exam, though small pannus makes exam there more difficult.  Pain does not sound consistent with obstruction from scar tissue, we discussed this with the more pain/cramping after eating      Plan:    CT abd/pelv with valsalva  Discussed proceeding with surgery if CT shows hernias.         Francois Ly is a 81 year old female seen in consultation for Abdominal pain, right lower quadrant  Abdominal pain, left lower quadrant, at the request of Luis Feliciano MD.       Chief Complaint:   Abdominal pain, right lower quadrant  Abdominal pain, left lower quadrant           History of Present Illness:   Francois Ly is a 81 year old female who presents with right lower quadrant and left lower quadrant abdominal pain for several years.  The pain is intermittent and cramping.  The pain comes on with specific movements such as bending or twisting.  She can get the pain to go away with changing positions or stretching.  Pain is not brought on by eating.  She has normal diet and normal bowel movements.  She has history of small bowel obstructions, last admission was 2020.  She is wondering if the pain is due to scar tissue from her prior surgeries.   present at visit today.     Status post hysterectomy and bilateral salpingo-oophorectomy .   Multiple abdominal surgeries in the  related to IUD uterine perforation, and another surgery related to this in the .                Review of Systems:  "  The Review of Systems is negative other than noted in the HPI or below             Physical Exam:   /76   Pulse 57   Resp 16   Ht 1.575 m (5' 2\")   Wt 59.4 kg (131 lb)   LMP  (LMP Unknown)   SpO2 99%   BMI 23.96 kg/m    General - Well developed, well nourished female in no apparent distress  HEENT:  Head normocephalic and atraumatic, pupils equal and round, conjunctivae clear, no scleral icterus   Abdomen:              soft, small pannus, previous lower midline scar noted, non-distended with no tenderness noted diffusely. . no masses palpated.  : bilateral groins: no obvious bulging over the inguinal ligament area with valsalva  Skin: Without lesions, rashes, or jaundice          Data:   All labs imaging studies reviewed by me.  Imaging shows:  CT January 2020: Shows small bowel obstruction.  Question left inguinal hernia.        Eladia Tavarez MD       "

## 2021-08-04 ENCOUNTER — HOSPITAL ENCOUNTER (OUTPATIENT)
Dept: CT IMAGING | Facility: CLINIC | Age: 81
Discharge: HOME | End: 2021-08-04
Attending: SURGERY | Admitting: SURGERY
Payer: MEDICARE

## 2021-08-04 DIAGNOSIS — R10.84 ABDOMINAL PAIN, GENERALIZED: ICD-10-CM

## 2021-08-04 PROCEDURE — 250N000009 HC RX 250: Performed by: SURGERY

## 2021-08-04 PROCEDURE — 250N000011 HC RX IP 250 OP 636: Performed by: SURGERY

## 2021-08-04 PROCEDURE — 74177 CT ABD & PELVIS W/CONTRAST: CPT

## 2021-08-04 RX ORDER — IOPAMIDOL 755 MG/ML
500 INJECTION, SOLUTION INTRAVASCULAR ONCE
Status: COMPLETED | OUTPATIENT
Start: 2021-08-04 | End: 2021-08-04

## 2021-08-04 RX ADMIN — SODIUM CHLORIDE 46 ML: 9 INJECTION, SOLUTION INTRAVENOUS at 11:09

## 2021-08-04 RX ADMIN — IOPAMIDOL 65 ML: 755 INJECTION, SOLUTION INTRAVENOUS at 11:08

## 2021-08-06 ENCOUNTER — TELEPHONE (OUTPATIENT)
Dept: FAMILY MEDICINE | Facility: CLINIC | Age: 81
End: 2021-08-06

## 2021-08-06 ENCOUNTER — HOSPITAL ENCOUNTER (OUTPATIENT)
Dept: ULTRASOUND IMAGING | Facility: CLINIC | Age: 81
Discharge: HOME | End: 2021-08-06
Attending: FAMILY MEDICINE | Admitting: FAMILY MEDICINE
Payer: MEDICARE

## 2021-08-06 DIAGNOSIS — N28.1 KIDNEY CYST, ACQUIRED: ICD-10-CM

## 2021-08-06 DIAGNOSIS — N28.1 KIDNEY CYST, ACQUIRED: Primary | ICD-10-CM

## 2021-08-06 PROCEDURE — 76775 US EXAM ABDO BACK WALL LIM: CPT

## 2021-08-06 NOTE — TELEPHONE ENCOUNTER
I would like to see patient or virtual visit to discuss her CT abdomen/pelvis results.  Meanwhile, I will order US of the kidney to evaluate kidney cyst.  Luis Feliciano MD  [facility]  [phone]  r

## 2021-08-06 NOTE — TELEPHONE ENCOUNTER
Called patient and advised of below.  Kidney U/S scheduled today at 3:30 pm.  Seeing surgeon 8/11/21 10 am.  Scheduled her with Dr. Feliciano for 8/11/21 1:10 pm.  Patient and  agree with plan.  Uma Magaña RN

## 2021-08-11 ENCOUNTER — OFFICE VISIT (OUTPATIENT)
Dept: FAMILY MEDICINE | Facility: CLINIC | Age: 81
End: 2021-08-11
Payer: MEDICARE

## 2021-08-11 ENCOUNTER — OFFICE VISIT (OUTPATIENT)
Dept: SURGERY | Facility: CLINIC | Age: 81
End: 2021-08-11
Payer: MEDICARE

## 2021-08-11 VITALS
RESPIRATION RATE: 16 BRPM | BODY MASS INDEX: 24.11 KG/M2 | SYSTOLIC BLOOD PRESSURE: 118 MMHG | OXYGEN SATURATION: 95 % | DIASTOLIC BLOOD PRESSURE: 78 MMHG | HEART RATE: 67 BPM | HEIGHT: 62 IN | WEIGHT: 131 LBS

## 2021-08-11 VITALS
DIASTOLIC BLOOD PRESSURE: 70 MMHG | BODY MASS INDEX: 23.96 KG/M2 | TEMPERATURE: 98 F | SYSTOLIC BLOOD PRESSURE: 130 MMHG | RESPIRATION RATE: 12 BRPM | OXYGEN SATURATION: 98 % | HEART RATE: 64 BPM | WEIGHT: 131 LBS

## 2021-08-11 DIAGNOSIS — N28.1 CYST OF RIGHT KIDNEY: ICD-10-CM

## 2021-08-11 DIAGNOSIS — D49.0 INTRADUCTAL PAPILLARY MUCINOUS NEOPLASM OF PANCREAS: Primary | ICD-10-CM

## 2021-08-11 DIAGNOSIS — K57.30 DIVERTICULOSIS OF LARGE INTESTINE WITHOUT HEMORRHAGE: ICD-10-CM

## 2021-08-11 DIAGNOSIS — K40.20 NON-RECURRENT BILATERAL INGUINAL HERNIA WITHOUT OBSTRUCTION OR GANGRENE: Primary | ICD-10-CM

## 2021-08-11 PROCEDURE — 99214 OFFICE O/P EST MOD 30 MIN: CPT | Performed by: FAMILY MEDICINE

## 2021-08-11 PROCEDURE — 99214 OFFICE O/P EST MOD 30 MIN: CPT | Performed by: SURGERY

## 2021-08-11 PROCEDURE — 99207 E-CONSULT TO GASTROENTEROLOGY (ADULT OUTPT PROVIDER TO SPECIALIST WRITTEN QUESTION & RESPONSE): CPT | Performed by: FAMILY MEDICINE

## 2021-08-11 ASSESSMENT — PATIENT HEALTH QUESTIONNAIRE - PHQ9
SUM OF ALL RESPONSES TO PHQ QUESTIONS 1-9: 0
SUM OF ALL RESPONSES TO PHQ QUESTIONS 1-9: 0
10. IF YOU CHECKED OFF ANY PROBLEMS, HOW DIFFICULT HAVE THESE PROBLEMS MADE IT FOR YOU TO DO YOUR WORK, TAKE CARE OF THINGS AT HOME, OR GET ALONG WITH OTHER PEOPLE: NOT DIFFICULT AT ALL

## 2021-08-11 ASSESSMENT — MIFFLIN-ST. JEOR: SCORE: 1012.46

## 2021-08-11 ASSESSMENT — ANXIETY QUESTIONNAIRES
2. NOT BEING ABLE TO STOP OR CONTROL WORRYING: NOT AT ALL
5. BEING SO RESTLESS THAT IT IS HARD TO SIT STILL: NOT AT ALL
7. FEELING AFRAID AS IF SOMETHING AWFUL MIGHT HAPPEN: NOT AT ALL
3. WORRYING TOO MUCH ABOUT DIFFERENT THINGS: NOT AT ALL
GAD7 TOTAL SCORE: 0
GAD7 TOTAL SCORE: 0
1. FEELING NERVOUS, ANXIOUS, OR ON EDGE: NOT AT ALL
4. TROUBLE RELAXING: NOT AT ALL
8. IF YOU CHECKED OFF ANY PROBLEMS, HOW DIFFICULT HAVE THESE MADE IT FOR YOU TO DO YOUR WORK, TAKE CARE OF THINGS AT HOME, OR GET ALONG WITH OTHER PEOPLE?: NOT DIFFICULT AT ALL
6. BECOMING EASILY ANNOYED OR IRRITABLE: NOT AT ALL
7. FEELING AFRAID AS IF SOMETHING AWFUL MIGHT HAPPEN: NOT AT ALL
GAD7 TOTAL SCORE: 0

## 2021-08-11 NOTE — PROGRESS NOTES
Established patient visit    Patient returns to clinic today with her  to discuss CT scan results.  I initially saw her 8/2/2021 for bilateral lower quadrant abdominal pain.  Pain was consistent with inguinal hernias but difficult to palpate.    CT scan was done which I reviewed with radiology which shows bilateral inguinal hernias.  On the left side the hernia contains bowel, on the right side there is a small bulge which likely represent a hernia but not as definitive. I have showed them the CT scans today.      Assessment:   Rebel is a 81-year-old female with bilateral inguinal hernias.    Plan:    We discussed in detail laparoscopic or robotic hernia repair and open hernia repair. Based on her prior quite extensive lower abdominal surgical history I recommended open hernia repairs as the likelihood for adhesions in the pelvis and risk of bowel injury are higher for her. We discussed we could attempt robotic repair but could end up having to do open if there are significant adhesions.    We have had a detailed discussion regarding the nature of inguinal hernias. Surgery, indications, alternatives, risks, benefits, incisions, scarring, anesthesia, recovery, mesh, infection, bleeding, numbness, nerve damage and chronic pain, hernia recurrence, lifting and activity limitations after surgery.  All questions have been answered to the best of my ability.    She and  do not want surgery at [facility] due to previous bad experience with our operating room with her daughter. I let them know I could do an open hernia repair at the [facility] outpatient surgery center. They would prefer surgery at [facility]. Since I do not operate there I recommended she see one of the [facility] surgeons in consultation up in [place] to discuss hernia repair at [facility].    30 minutes spent on the date of the encounter doing chart review, history and exam, documentation and further activities as noted  above

## 2021-08-11 NOTE — LETTER
2021       Re: Francois VARGAS Aliyah - 1940    Patient returns to clinic today with her  to discuss CT scan results.  I initially saw her 2021 for bilateral lower quadrant abdominal pain.  Pain was consistent with inguinal hernias but difficult to palpate.    CT scan was done which I reviewed with radiology which shows bilateral inguinal hernias.  On the left side the hernia contains bowel, on the right side there is a small bulge which likely represent a hernia but not as definitive. I have showed them the CT scans today.      Assessment:   Rebel is a 81-year-old female with bilateral inguinal hernias.     Plan:    We discussed in detail laparoscopic or robotic hernia repair and open hernia repair. Based on her prior quite extensive lower abdominal surgical history I recommended open hernia repairs as the likelihood for adhesions in the pelvis and risk of bowel injury are higher for her. We discussed we could attempt robotic repair but could end up having to do open if there are significant adhesions.     We have had a detailed discussion regarding the nature of inguinal hernias. Surgery, indications, alternatives, risks, benefits, incisions, scarring, anesthesia, recovery, mesh, infection, bleeding, numbness, nerve damage and chronic pain, hernia recurrence, lifting and activity limitations after surgery.  All questions have been answered to the best of my ability.     She and  do not want surgery at [facility] due to previous bad experience with our operating room with her daughter. I let them know I could do an open hernia repair at the [facility] outpatient surgery center. They would prefer surgery at [facility]. Since I do not operate there I recommended she see one of the [facility] surgeons in consultation up in [place] to discuss hernia repair at [facility].     30 minutes spent on the date of the encounter doing chart review, history and exam, documentation and  further activities as noted above       Eladia Tavarez MD

## 2021-08-11 NOTE — PROGRESS NOTES
Assessment & Plan     Intraductal papillary mucinous neoplasm of pancreas  Will consult with GI for the appropriate follow up  For this patient, completely asymptomatic.  - E-CONSULT TO GASTROENTEROLOGY (ADULT OUTPT PROVIDER TO SPECIALIST WRITTEN QUESTION & RESPONSE)    Cyst of right kidney  Repeat US Of the kidney showed simple cyst, no further study needed.    Diverticulosis of large intestine without hemorrhage  discussed high fiber diet.                   Return in about 1 year (around 8/11/2022) for Routine physical..    Luis Feliciano MD  [facility] GUMARO Parrish is a 81 year old who presents for the following health issues     History of Present Illness       She eats 4 or more servings of fruits and vegetables daily.She consumes 0 sweetened beverage(s) daily.She exercises with enough effort to increase her heart rate 30 to 60 minutes per day.  She exercises with enough effort to increase her heart rate 4 days per week.   She is taking medications regularly.       Answers for HPI/ROS submitted by the patient on 8/11/2021  If you checked off any problems, how difficult have these problems made it for you to do your work, take care of things at home, or get along with other people?: Not difficult at all  PHQ9 TOTAL SCORE: 0  HUDSON 7 TOTAL SCORE: 0      Results: discuss CT scan results  Pt was concerned about her CT scan results that was ordered by the surgeon. Pt denies any symptoms at this time like abdominal pain, nausea or vomiting, no bloody stool.      Review of Systems   CONSTITUTIONAL: NEGATIVE for fever, chills, change in weight      Objective    LMP  (LMP Unknown)   There is no height or weight on file to calculate BMI.  Physical Exam   GENERAL: healthy, alert and no distress

## 2021-08-12 ASSESSMENT — ANXIETY QUESTIONNAIRES: GAD7 TOTAL SCORE: 0

## 2021-08-12 ASSESSMENT — PATIENT HEALTH QUESTIONNAIRE - PHQ9: SUM OF ALL RESPONSES TO PHQ QUESTIONS 1-9: 0

## 2021-08-16 ENCOUNTER — E-CONSULT (OUTPATIENT)
Dept: GASTROENTEROLOGY | Facility: CLINIC | Age: 81
End: 2021-08-16
Payer: MEDICARE

## 2021-08-16 ENCOUNTER — TELEPHONE (OUTPATIENT)
Dept: FAMILY MEDICINE | Facility: CLINIC | Age: 81
End: 2021-08-16

## 2021-08-16 PROCEDURE — 99451 NTRPROF PH1/NTRNET/EHR 5/>: CPT | Performed by: INTERNAL MEDICINE

## 2021-08-16 NOTE — PROGRESS NOTES
ALL SMARTFIELDS MUST BE COMPLETED FOR PATIENT CARE AND BILLING    8/16/2021     E-Consult has been accepted.    Interprofessional consultation requested by:  Luis Feliciano MD      Clinical Question/Purpose: MY CLINICAL QUESTION IS: pt recently had a CT scan to evaluate hernia, accidentally there was a possible branch intraductal papillary mucinous neoplasm that hast not changed from 2016, and here is the report:    Patient assessment and information reviewed:   1.6 cystic lesion uncinate process of pancreas - seen on CT 8/4/2021 and stable compared to MRI from 2016.    Small cystic lesion stable for 5 years. Given appearance, size, and stability over 5 years, and the fact that the patient is 81 years old no further monitoring is needed at this time. We typically monitor these for stability until the age of 75 and then stop surveillance.     Recommendations: As above. No further monitoring is needed. This is not contributing to patient's pain.       The recommendations provided in this E-Consult are based on the clinical data available to me at this time, and are furnished without the benefit of a comprehensive in-person or virtual patient evaluation, Any new clinical issues or changes in patient status since the filing of this E-Consult will need to be taken into account when assessing these recommendations. Please contact me if you have further questions.    My total time spent reviewing clinical information and formulating assessment was 20 minutes.    Report sent automatically to requesting provider once signed.     Charge codes - [id number] (5+ minutes) or [id number] (No charge code)    Leonard Pineda MD

## 2021-08-16 NOTE — TELEPHONE ENCOUNTER
Please call Atsuko, in regards to the lesion in the pancrease, no further follow up needed.  Luis Feliciano MD  [facility]  [phone]

## 2021-08-16 NOTE — TELEPHONE ENCOUNTER
Message left for patient to return call to this RN PAL - please transfer if available     Direct number left for this RN PAL on message for return call     Tonia Juan Registered Nurse PAL (patient advocate liaison)   [facility] [phone]

## 2021-08-17 NOTE — TELEPHONE ENCOUNTER
Called to pt and  and relayed msg below.    Maia ROGEL RN, BSN, PAL (Patient Advocate Liaison)  [facility]   [phone]

## 2021-08-26 ENCOUNTER — APPOINTMENT (OUTPATIENT)
Dept: CT IMAGING | Facility: CLINIC | Age: 81
DRG: 066 | End: 2021-08-26
Attending: EMERGENCY MEDICINE
Payer: MEDICARE

## 2021-08-26 ENCOUNTER — NURSE TRIAGE (OUTPATIENT)
Dept: NURSING | Facility: CLINIC | Age: 81
End: 2021-08-26

## 2021-08-26 ENCOUNTER — HOSPITAL ENCOUNTER (INPATIENT)
Facility: CLINIC | Age: 81
LOS: 3 days | Discharge: HOME | DRG: 066 | End: 2021-08-30
Attending: EMERGENCY MEDICINE | Admitting: STUDENT IN AN ORGANIZED HEALTH CARE EDUCATION/TRAINING PROGRAM
Payer: MEDICARE

## 2021-08-26 DIAGNOSIS — I63.9 CEREBROVASCULAR ACCIDENT (CVA), UNSPECIFIED MECHANISM (H): ICD-10-CM

## 2021-08-26 DIAGNOSIS — E87.6 HYPOKALEMIA: ICD-10-CM

## 2021-08-26 DIAGNOSIS — I10 UNCONTROLLED HYPERTENSION: ICD-10-CM

## 2021-08-26 LAB
ANION GAP SERPL CALCULATED.3IONS-SCNC: 3 MMOL/L (ref 3–14)
APTT PPP: 36 SECONDS (ref 22–38)
BASOPHILS # BLD AUTO: 0.1 10E3/UL (ref 0–0.2)
BASOPHILS NFR BLD AUTO: 1 %
BUN SERPL-MCNC: 15 MG/DL (ref 7–30)
CALCIUM SERPL-MCNC: 8.9 MG/DL (ref 8.5–10.1)
CHLORIDE BLD-SCNC: 103 MMOL/L (ref 94–109)
CO2 SERPL-SCNC: 30 MMOL/L (ref 20–32)
CREAT SERPL-MCNC: 0.76 MG/DL (ref 0.52–1.04)
EOSINOPHIL # BLD AUTO: 0.3 10E3/UL (ref 0–0.7)
EOSINOPHIL NFR BLD AUTO: 4 %
ERYTHROCYTE [DISTWIDTH] IN BLOOD BY AUTOMATED COUNT: 12.5 % (ref 10–15)
GFR SERPL CREATININE-BSD FRML MDRD: 74 ML/MIN/1.73M2
GLUCOSE BLD-MCNC: 155 MG/DL (ref 70–99)
GLUCOSE BLDC GLUCOMTR-MCNC: 150 MG/DL (ref 70–99)
HCT VFR BLD AUTO: 39 % (ref 35–47)
HGB BLD-MCNC: 12.7 G/DL (ref 11.7–15.7)
IMM GRANULOCYTES # BLD: 0 10E3/UL
IMM GRANULOCYTES NFR BLD: 0 %
INR PPP: 0.9 (ref 0.85–1.15)
LYMPHOCYTES # BLD AUTO: 2.3 10E3/UL (ref 0.8–5.3)
LYMPHOCYTES NFR BLD AUTO: 37 %
MCH RBC QN AUTO: 29.8 PG (ref 26.5–33)
MCHC RBC AUTO-ENTMCNC: 32.6 G/DL (ref 31.5–36.5)
MCV RBC AUTO: 92 FL (ref 78–100)
MONOCYTES # BLD AUTO: 0.5 10E3/UL (ref 0–1.3)
MONOCYTES NFR BLD AUTO: 8 %
NEUTROPHILS # BLD AUTO: 3.1 10E3/UL (ref 1.6–8.3)
NEUTROPHILS NFR BLD AUTO: 50 %
NRBC # BLD AUTO: 0 10E3/UL
NRBC BLD AUTO-RTO: 0 /100
PLATELET # BLD AUTO: 270 10E3/UL (ref 150–450)
POTASSIUM BLD-SCNC: 3.3 MMOL/L (ref 3.4–5.3)
RBC # BLD AUTO: 4.26 10E6/UL (ref 3.8–5.2)
SODIUM SERPL-SCNC: 136 MMOL/L (ref 133–144)
WBC # BLD AUTO: 6.3 10E3/UL (ref 4–11)

## 2021-08-26 PROCEDURE — 36415 COLL VENOUS BLD VENIPUNCTURE: CPT | Performed by: EMERGENCY MEDICINE

## 2021-08-26 PROCEDURE — 96374 THER/PROPH/DIAG INJ IV PUSH: CPT | Mod: 59

## 2021-08-26 PROCEDURE — 80048 BASIC METABOLIC PNL TOTAL CA: CPT | Performed by: EMERGENCY MEDICINE

## 2021-08-26 PROCEDURE — 85730 THROMBOPLASTIN TIME PARTIAL: CPT | Performed by: EMERGENCY MEDICINE

## 2021-08-26 PROCEDURE — 85025 COMPLETE CBC W/AUTO DIFF WBC: CPT | Performed by: EMERGENCY MEDICINE

## 2021-08-26 PROCEDURE — 99285 EMERGENCY DEPT VISIT HI MDM: CPT | Mod: 25

## 2021-08-26 PROCEDURE — 84484 ASSAY OF TROPONIN QUANT: CPT | Performed by: EMERGENCY MEDICINE

## 2021-08-26 PROCEDURE — 250N000009 HC RX 250: Performed by: EMERGENCY MEDICINE

## 2021-08-26 PROCEDURE — 85610 PROTHROMBIN TIME: CPT | Performed by: EMERGENCY MEDICINE

## 2021-08-26 PROCEDURE — 250N000011 HC RX IP 250 OP 636: Performed by: EMERGENCY MEDICINE

## 2021-08-26 PROCEDURE — 70498 CT ANGIOGRAPHY NECK: CPT | Mod: MG

## 2021-08-26 PROCEDURE — 70450 CT HEAD/BRAIN W/O DYE: CPT | Mod: MG

## 2021-08-26 RX ORDER — LABETALOL HYDROCHLORIDE 5 MG/ML
20 INJECTION, SOLUTION INTRAVENOUS ONCE
Status: COMPLETED | OUTPATIENT
Start: 2021-08-26 | End: 2021-08-26

## 2021-08-26 RX ORDER — IOPAMIDOL 755 MG/ML
70 INJECTION, SOLUTION INTRAVASCULAR ONCE
Status: COMPLETED | OUTPATIENT
Start: 2021-08-26 | End: 2021-08-26

## 2021-08-26 RX ADMIN — LABETALOL HYDROCHLORIDE 20 MG: 5 INJECTION, SOLUTION INTRAVENOUS at 23:51

## 2021-08-26 RX ADMIN — IOPAMIDOL 70 ML: 755 INJECTION, SOLUTION INTRAVENOUS at 23:33

## 2021-08-26 RX ADMIN — SODIUM CHLORIDE 90 ML: 9 INJECTION, SOLUTION INTRAVENOUS at 23:33

## 2021-08-26 ASSESSMENT — ENCOUNTER SYMPTOMS
HEADACHES: 0
SPEECH DIFFICULTY: 0
CONFUSION: 0
WEAKNESS: 1
DIZZINESS: 0
VOMITING: 0
NUMBNESS: 0

## 2021-08-26 ASSESSMENT — MIFFLIN-ST. JEOR: SCORE: 1035.14

## 2021-08-27 ENCOUNTER — APPOINTMENT (OUTPATIENT)
Dept: CARDIOLOGY | Facility: CLINIC | Age: 81
DRG: 066 | End: 2021-08-27
Attending: STUDENT IN AN ORGANIZED HEALTH CARE EDUCATION/TRAINING PROGRAM
Payer: MEDICARE

## 2021-08-27 ENCOUNTER — APPOINTMENT (OUTPATIENT)
Dept: MRI IMAGING | Facility: CLINIC | Age: 81
DRG: 066 | End: 2021-08-27
Attending: STUDENT IN AN ORGANIZED HEALTH CARE EDUCATION/TRAINING PROGRAM
Payer: MEDICARE

## 2021-08-27 ENCOUNTER — APPOINTMENT (OUTPATIENT)
Dept: OCCUPATIONAL THERAPY | Facility: CLINIC | Age: 81
DRG: 066 | End: 2021-08-27
Attending: STUDENT IN AN ORGANIZED HEALTH CARE EDUCATION/TRAINING PROGRAM
Payer: MEDICARE

## 2021-08-27 PROBLEM — I63.9 CVA (CEREBRAL VASCULAR ACCIDENT) (H): Status: ACTIVE | Noted: 2021-08-27

## 2021-08-27 LAB
ANION GAP SERPL CALCULATED.3IONS-SCNC: 7 MMOL/L (ref 3–14)
ATRIAL RATE - MUSE: 61 BPM
BUN SERPL-MCNC: 12 MG/DL (ref 7–30)
CALCIUM SERPL-MCNC: 8.8 MG/DL (ref 8.5–10.1)
CHLORIDE BLD-SCNC: 109 MMOL/L (ref 94–109)
CHOLEST SERPL-MCNC: 188 MG/DL
CO2 SERPL-SCNC: 25 MMOL/L (ref 20–32)
CREAT SERPL-MCNC: 0.7 MG/DL (ref 0.52–1.04)
DIASTOLIC BLOOD PRESSURE - MUSE: NORMAL MMHG
ERYTHROCYTE [DISTWIDTH] IN BLOOD BY AUTOMATED COUNT: 12.5 % (ref 10–15)
FASTING STATUS PATIENT QL REPORTED: YES
GFR SERPL CREATININE-BSD FRML MDRD: 82 ML/MIN/1.73M2
GLUCOSE BLD-MCNC: 113 MG/DL (ref 70–99)
GLUCOSE BLDC GLUCOMTR-MCNC: 108 MG/DL (ref 70–99)
GLUCOSE BLDC GLUCOMTR-MCNC: 125 MG/DL (ref 70–99)
HBA1C MFR BLD: 6.2 % (ref 0–5.6)
HCT VFR BLD AUTO: 34.2 % (ref 35–47)
HDLC SERPL-MCNC: 42 MG/DL
HGB BLD-MCNC: 11.1 G/DL (ref 11.7–15.7)
HOLD SPECIMEN: NORMAL
INTERPRETATION ECG - MUSE: NORMAL
LDLC SERPL CALC-MCNC: 83 MG/DL
LVEF ECHO: NORMAL
MCH RBC QN AUTO: 29.6 PG (ref 26.5–33)
MCHC RBC AUTO-ENTMCNC: 32.5 G/DL (ref 31.5–36.5)
MCV RBC AUTO: 91 FL (ref 78–100)
NONHDLC SERPL-MCNC: 146 MG/DL
P AXIS - MUSE: 16 DEGREES
PLATELET # BLD AUTO: 235 10E3/UL (ref 150–450)
POTASSIUM BLD-SCNC: 3.7 MMOL/L (ref 3.4–5.3)
PR INTERVAL - MUSE: 196 MS
QRS DURATION - MUSE: 94 MS
QT - MUSE: 470 MS
QTC - MUSE: 473 MS
R AXIS - MUSE: -28 DEGREES
RBC # BLD AUTO: 3.75 10E6/UL (ref 3.8–5.2)
SARS-COV-2 RNA RESP QL NAA+PROBE: NEGATIVE
SODIUM SERPL-SCNC: 141 MMOL/L (ref 133–144)
SYSTOLIC BLOOD PRESSURE - MUSE: NORMAL MMHG
T AXIS - MUSE: 112 DEGREES
TRIGL SERPL-MCNC: 317 MG/DL
TROPONIN I SERPL-MCNC: 0.04 UG/L (ref 0–0.04)
VENTRICULAR RATE- MUSE: 61 BPM
WBC # BLD AUTO: 5.4 10E3/UL (ref 4–11)

## 2021-08-27 PROCEDURE — 250N000013 HC RX MED GY IP 250 OP 250 PS 637: Performed by: EMERGENCY MEDICINE

## 2021-08-27 PROCEDURE — A9585 GADOBUTROL INJECTION: HCPCS | Performed by: STUDENT IN AN ORGANIZED HEALTH CARE EDUCATION/TRAINING PROGRAM

## 2021-08-27 PROCEDURE — 255N000002 HC RX 255 OP 636: Performed by: STUDENT IN AN ORGANIZED HEALTH CARE EDUCATION/TRAINING PROGRAM

## 2021-08-27 PROCEDURE — 93306 TTE W/DOPPLER COMPLETE: CPT | Mod: 26 | Performed by: INTERNAL MEDICINE

## 2021-08-27 PROCEDURE — 99223 1ST HOSP IP/OBS HIGH 75: CPT | Mod: AI | Performed by: STUDENT IN AN ORGANIZED HEALTH CARE EDUCATION/TRAINING PROGRAM

## 2021-08-27 PROCEDURE — 250N000013 HC RX MED GY IP 250 OP 250 PS 637: Performed by: STUDENT IN AN ORGANIZED HEALTH CARE EDUCATION/TRAINING PROGRAM

## 2021-08-27 PROCEDURE — 93005 ELECTROCARDIOGRAM TRACING: CPT

## 2021-08-27 PROCEDURE — 36415 COLL VENOUS BLD VENIPUNCTURE: CPT | Performed by: STUDENT IN AN ORGANIZED HEALTH CARE EDUCATION/TRAINING PROGRAM

## 2021-08-27 PROCEDURE — 120N000001 HC R&B MED SURG/OB

## 2021-08-27 PROCEDURE — C9803 HOPD COVID-19 SPEC COLLECT: HCPCS

## 2021-08-27 PROCEDURE — 99222 1ST HOSP IP/OBS MODERATE 55: CPT | Mod: GC | Performed by: PSYCHIATRY & NEUROLOGY

## 2021-08-27 PROCEDURE — 250N000013 HC RX MED GY IP 250 OP 250 PS 637: Performed by: INTERNAL MEDICINE

## 2021-08-27 PROCEDURE — 97165 OT EVAL LOW COMPLEX 30 MIN: CPT | Mod: GO | Performed by: OCCUPATIONAL THERAPIST

## 2021-08-27 PROCEDURE — 70553 MRI BRAIN STEM W/O & W/DYE: CPT | Mod: ME

## 2021-08-27 PROCEDURE — 80061 LIPID PANEL: CPT | Performed by: STUDENT IN AN ORGANIZED HEALTH CARE EDUCATION/TRAINING PROGRAM

## 2021-08-27 PROCEDURE — 83036 HEMOGLOBIN GLYCOSYLATED A1C: CPT | Performed by: STUDENT IN AN ORGANIZED HEALTH CARE EDUCATION/TRAINING PROGRAM

## 2021-08-27 PROCEDURE — 85027 COMPLETE CBC AUTOMATED: CPT | Performed by: STUDENT IN AN ORGANIZED HEALTH CARE EDUCATION/TRAINING PROGRAM

## 2021-08-27 PROCEDURE — 99207 PR NO BILLABLE SERVICE THIS VISIT: CPT | Performed by: PSYCHIATRY & NEUROLOGY

## 2021-08-27 PROCEDURE — 80048 BASIC METABOLIC PNL TOTAL CA: CPT | Performed by: STUDENT IN AN ORGANIZED HEALTH CARE EDUCATION/TRAINING PROGRAM

## 2021-08-27 PROCEDURE — 99233 SBSQ HOSP IP/OBS HIGH 50: CPT | Performed by: INTERNAL MEDICINE

## 2021-08-27 PROCEDURE — 93306 TTE W/DOPPLER COMPLETE: CPT

## 2021-08-27 PROCEDURE — 87635 SARS-COV-2 COVID-19 AMP PRB: CPT | Performed by: EMERGENCY MEDICINE

## 2021-08-27 RX ORDER — ASPIRIN 81 MG/1
81 TABLET ORAL DAILY
Status: DISCONTINUED | OUTPATIENT
Start: 2021-08-27 | End: 2021-08-30 | Stop reason: HOSPADM

## 2021-08-27 RX ORDER — LIFITEGRAST 50 MG/ML
1 SOLUTION/ DROPS OPHTHALMIC 2 TIMES DAILY
COMMUNITY
End: 2023-06-21

## 2021-08-27 RX ORDER — GADOBUTROL 604.72 MG/ML
6 INJECTION INTRAVENOUS ONCE
Status: COMPLETED | OUTPATIENT
Start: 2021-08-27 | End: 2021-08-27

## 2021-08-27 RX ORDER — CLOPIDOGREL BISULFATE 75 MG/1
75 TABLET ORAL DAILY
Status: DISCONTINUED | OUTPATIENT
Start: 2021-08-28 | End: 2021-08-30 | Stop reason: HOSPADM

## 2021-08-27 RX ORDER — ATORVASTATIN CALCIUM 80 MG/1
80 TABLET, FILM COATED ORAL ONCE
Status: COMPLETED | OUTPATIENT
Start: 2021-08-27 | End: 2021-08-27

## 2021-08-27 RX ORDER — POTASSIUM CHLORIDE 1500 MG/1
40 TABLET, EXTENDED RELEASE ORAL ONCE
Status: COMPLETED | OUTPATIENT
Start: 2021-08-27 | End: 2021-08-27

## 2021-08-27 RX ORDER — LIDOCAINE 40 MG/G
CREAM TOPICAL
Status: DISCONTINUED | OUTPATIENT
Start: 2021-08-27 | End: 2021-08-30 | Stop reason: HOSPADM

## 2021-08-27 RX ORDER — CLOPIDOGREL BISULFATE 75 MG/1
75 TABLET ORAL DAILY
Qty: 30 TABLET | Refills: 0 | Status: SHIPPED | OUTPATIENT
Start: 2021-08-27 | End: 2021-09-27

## 2021-08-27 RX ORDER — LOSARTAN POTASSIUM AND HYDROCHLOROTHIAZIDE 25; 100 MG/1; MG/1
1 TABLET ORAL DAILY
Status: DISCONTINUED | OUTPATIENT
Start: 2021-08-27 | End: 2021-08-30 | Stop reason: HOSPADM

## 2021-08-27 RX ORDER — CLOPIDOGREL BISULFATE 75 MG/1
75 TABLET ORAL DAILY
Status: DISCONTINUED | OUTPATIENT
Start: 2021-08-28 | End: 2021-08-27

## 2021-08-27 RX ORDER — ATORVASTATIN CALCIUM 40 MG/1
40 TABLET, FILM COATED ORAL DAILY
Qty: 30 TABLET | Refills: 0 | Status: SHIPPED | OUTPATIENT
Start: 2021-08-27 | End: 2021-09-27

## 2021-08-27 RX ORDER — CLOPIDOGREL 300 MG/1
300 TABLET, FILM COATED ORAL ONCE
Status: COMPLETED | OUTPATIENT
Start: 2021-08-27 | End: 2021-08-27

## 2021-08-27 RX ORDER — NAPROXEN SODIUM 220 MG
220 TABLET ORAL 3 TIMES DAILY PRN
Status: ON HOLD | COMMUNITY
End: 2021-08-30

## 2021-08-27 RX ORDER — SIMVASTATIN 10 MG
10 TABLET ORAL AT BEDTIME
Status: DISCONTINUED | OUTPATIENT
Start: 2021-08-27 | End: 2021-08-30 | Stop reason: HOSPADM

## 2021-08-27 RX ADMIN — CLOPIDOGREL BISULFATE 300 MG: 300 TABLET, FILM COATED ORAL at 00:23

## 2021-08-27 RX ADMIN — ATORVASTATIN CALCIUM 80 MG: 80 TABLET, FILM COATED ORAL at 00:50

## 2021-08-27 RX ADMIN — SIMVASTATIN 10 MG: 10 TABLET, FILM COATED ORAL at 21:14

## 2021-08-27 RX ADMIN — POTASSIUM CHLORIDE 40 MEQ: 1500 TABLET, EXTENDED RELEASE ORAL at 00:24

## 2021-08-27 RX ADMIN — DICLOFENAC SODIUM 2 G: 10 GEL TOPICAL at 21:19

## 2021-08-27 RX ADMIN — ASPIRIN 81 MG: 81 TABLET, COATED ORAL at 08:19

## 2021-08-27 RX ADMIN — GADOBUTROL 6 ML: 604.72 INJECTION INTRAVENOUS at 03:09

## 2021-08-27 ASSESSMENT — ACTIVITIES OF DAILY LIVING (ADL)
ADLS_ACUITY_SCORE: 8
PREVIOUS_RESPONSIBILITIES: MEAL PREP;HOUSEKEEPING;LAUNDRY;SHOPPING;YARDWORK;MEDICATION MANAGEMENT;FINANCES;DRIVING
ADLS_ACUITY_SCORE: 8

## 2021-08-27 ASSESSMENT — ENCOUNTER SYMPTOMS: ACTIVITY CHANGE: 0

## 2021-08-27 NOTE — ED NOTES
[facility]  ED Nurse Handoff Report    ED Chief complaint: One-sided Weakness      ED Diagnosis:   Final diagnoses:   None       Code Status: Full Code    Allergies:   Allergies   Allergen Reactions     Ace Inhibitors      Lactose GI Disturbance     Reduced fat dairy- Diarrhea     Sorbitan Trioleate      Sulfonylureas      Vancomycin Hives       Patient Story: Pt showering at home, and at about 10:15pm she felt that her left hand was not working properly and felt mildly numb. Vision changes also noted today, but are not present on arrival. She has a hx of HTN, and takes a baby aspirin daily. Tonight she took 324mg Aspirin before she and her  came to the hospital. She reports having restless leg syndrome.  Focused Assessment:  Left  strength and left arm strength is mildly diminished compared to the right upper extremity. She feels control in her left hand has improved significantly as she has been here in the ER. Pt is Alert and Oriented, and consistently follows commands.   Treatments and/or interventions provided:   Medications   atorvastatin (LIPITOR) tablet 80 mg (has no administration in time range)   iopamidol (ISOVUE-370) solution 70 mL (70 mLs Intravenous Given 8/26/21 2333)   Saline flush (90 mLs Intravenous Given 8/26/21 2333)   labetalol (NORMODYNE/TRANDATE) injection 20 mg (20 mg Intravenous Given 8/26/21 2351)   clopidogrel (PLAVIX) tablet 300 mg (300 mg Oral Given 8/27/21 0023)   potassium chloride ER (KLOR-CON M) CR tablet 40 mEq (40 mEq Oral Given 8/27/21 0024)       Patient's response to treatments and/or interventions:  To be done/followed up on inpatient unit:  q2h neuro checks, Permissive Hypertension. MRI.    Does this patient have any cognitive concerns?: none    Activity level - Baseline/Home:  Independent  Activity Level - Current:   Independent    Patient's Preferred language: English   Needed?: No    Isolation: None  Infection: Not Applicable  Patient  tested for COVID 19 prior to admission: YES  Bariatric?: No    Vital Signs:   Vitals:    08/26/21 2350 08/27/21 0000 08/27/21 0010 08/27/21 0020   BP: (!) 154/75 (!) 171/70 (!) 194/90 (!) 176/84   Pulse: 77 60 60 61   Resp: 9 17 18 12   Temp:       TempSrc:       SpO2:  95%     Weight:       Height:           Cardiac Rhythm:     Was the PSS-3 completed:   Yes  What interventions are required if any?               Family Comments:  at bedside.    For the majority of the shift this patient's behavior was Green.   Behavioral interventions performed were none.    ED NURSE PHONE NUMBER: *[phone]

## 2021-08-27 NOTE — PROGRESS NOTES
RECEIVING UNIT ED HANDOFF REVIEW    ED Nurse Handoff Report was reviewed by: Chelsie Bzaan RN on August 27, 2021 at 12:57 AM

## 2021-08-27 NOTE — PROVIDER NOTIFICATION
Neurology paged: [phone] Francois Ly. Per Dr. Lopez, 80mg atorvastatin dose may be too high. Also wondering if this patient needs to be put on plavix for discharge? Thanks! Sahil CROOK [phone]

## 2021-08-27 NOTE — TELEPHONE ENCOUNTER
About 15 min ago while in the shower her left hand went numb and won't function now.  She is able to move her arm , but from the wrist down her hand will not function. Her chest feels tex funny.  I said the best thing for her  to do is bring her in to the hospital to be evaluated.  Care advice is to go to the ED to be evaluated.    Patient's  verbalized understanding and agrees with plan.    Eileen Bolanos Nurse Triage Advisor 10:18 PM 8/26/2021    Reason for Disposition    [1] Numbness (loss of sensation) of finger(s) AND [2] present now    Protocols used: HAND AND WRIST INJURY-A-AH

## 2021-08-27 NOTE — PROVIDER NOTIFICATION
Neurology paged: [phone] Francois Ly. Can this patient be taken off q2 Neuros/vitals? Thanks! Sahil CROOK [phone]

## 2021-08-27 NOTE — PHARMACY-CONSULT NOTE
Pharmacy Consult to evaluate for medication related stroke core measures    Francois Ly, 81 year old female admitted for CVA, stroke-like symptoms on 8/26/2021.    Thrombolytic was not given because of minor/isolated/quickly resolving symptoms    VTE Prophylaxis SCDs /PCDs placed on 8/27, as appropriate prior to end of hospital day 2.    Antithrombotic: aspirin started on 8/27, as appropriate by end of hospital day 2. Clopidogrel 300 mg loading dose given 8/27. Continue antithrombotic therapy on discharge to meet quality measures, unless contraindicated.    Anticoagulation if history of A-fib/flutter: Patient does not have history of A-fib/flutter - anticoagulation not required for medication related stroke core measures.     LDL Cholesterol Calculated   Date Value Ref Range Status   08/27/2021 83 <=100 mg/dL Final     Comment:     Age 0-19 years:  Desirable: 0-110 mg/dL   Borderline high: 110-129 mg/dL   High: >= 130 mg/dL    Age 20 years and older:  Desirable: <100mg/dL  Above desirable: 100-129 mg/dL   Borderline high: 130-159 mg/dL   High: 160-189 mg/dL   Very high: >= 190 mg/dL   09/29/2020 126 (H) <100 mg/dL Final     Comment:     Above desirable:  100-129 mg/dl  Borderline High:  130-159 mg/dL  High:             160-189 mg/dL  Very high:       >189 mg/dl         Patient's home statin, Zocor (simvastatin) restarted; continue statin on discharge to meet quality measures, unless contraindicated.     Recommendations: None at this time    Thank you for the consult.    Bernadette Erwin [facility] 8/27/2021 8:48 AM

## 2021-08-27 NOTE — PROGRESS NOTES
08/27/21 1055   Quick Adds   Type of Visit Initial Occupational Therapy Evaluation   Living Environment   People in home spouse   Current Living Arrangements house   Home Accessibility stairs within home   Number of Stairs, Within Home, Primary greater than 10 stairs   Stair Railings, Within Home, Primary railings safe and in good condition   Transportation Anticipated family or friend will provide   Living Environment Comments Pt resides in multi-level home w/ spouse. Tub/shower unit and has shower chair available, if needed.   Self-Care   Usual Activity Tolerance good   Current Activity Tolerance good   Equipment Currently Used at Home none   Activity/Exercise/Self-Care Comment Pt reports independence w/ all I/ADLs at home w/o device. Active in her garden throughout the year.   Instrumental Activities of Daily Living (IADL)   Previous Responsibilities meal prep;housekeeping;laundry;shopping;yardwork;medication management;finances;driving   Disability/Function   Hearing Difficulty or Deaf no   Wear Glasses or Blind no   Concentrating, Remembering or Making Decisions Difficulty no   Difficulty Communicating no   Fall history within last six months no   General Information   Onset of Illness/Injury or Date of Surgery 08/26/21   Referring Physician Donna Newman MD   Patient/Family Therapy Goal Statement (OT) Return home   Additional Occupational Profile Info/Pertinent History of Current Problem R MCA CVA   Cognitive Status Examination   Orientation Status orientation to person, place and time   Visual Perception   Visual Impairment/Limitations WNL   Sensory   Sensory Quick Adds No deficits were identified   Pain Assessment   Patient Currently in Pain No   Range of Motion Comprehensive   General Range of Motion bilateral upper extremity ROM WNL   Strength Comprehensive (MMT)   General Manual Muscle Testing (MMT) Assessment no strength deficits identified   Coordination   Upper Extremity Coordination No  deficits were identified   Transfers   Transfers sit-stand transfer   Sit-Stand Transfer   Sit-Stand [place] (Transfers) independent   Clinical Impression   Criteria for Skilled Therapeutic Interventions Met (OT) no problems identified which require skilled intervention   Clinical Decision Making Complexity (OT) low complexity   Therapy Frequency (OT) 1x eval   Predicted Duration of Therapy 1 day   Risk & Benefits of therapy have been explained evaluation/treatment results reviewed;care plan/treatment goals reviewed;risks/benefits reviewed;current/potential barriers reviewed;participants voiced agreement with care plan;participants included;patient;spouse/significant other   OT Discharge Planning    OT Discharge Recommendation (DC Rec) home   OT Rationale for DC Rec Per pt and spouse, pt CVA symptoms have resolved, back to baseline for ADL and functional mobility performance. No skilled tx indicated. Safe to return home w/ spouse when medically ready.

## 2021-08-27 NOTE — CONSULTS
"[facility]    Stroke Consult Note    Reason for Consult: Stroke Code     Chief Complaint: One-sided Weakness      HPI  Francois Ly is a 81 year old female 80 yo female, LKW at about 1030 and noted no power in her left hand. She noted a sensation of \"broken glass\" in her vision. She has drift in the left upper extremity with mild weakness in left lower extremity.           Thrombolytic Treatment   Not given due to minor/isolated/quickly resolving symptoms.    Endovascular Treatment  Not initiated due to absence of proximal vessel occlusion    Impression   Acute ischemic stroke of R MCA due to large-artery atherosclerosis vs Cardioembolic      Recommendations  - Use orderset: \"Ischemic Stroke Routine Admission\" or \"Ischemic Stroke No Thrombolytics/No Thrombectomy ICU Admission\"  - Neurochecks and Vital Signs every 2 hours   - Permissive HTN; goal SBP < 220 mmHg  - Daily aspirin 81 mg for secondary stroke prevention  - Plavix (clopidogrel) 300 mg PO loading dose x 1  - Statin: Atorva 80mg  - MRI Brain with and without contrast  - TTE (with Bubble Study if age 60 yrs or less)  - Telemetry, EKG  - Bedside Glucose Monitoring  - A1c, Lipid Panel, Troponin x 3  - PT/OT/SLP  - Stroke Education  - Euthermia, Euglycemia    Patient Follow-up    - final recommendation pending work-up    Thank you for this consult. We will continue to follow.     The Stroke Staff is Dr. Edgar.    Lalito Vallecillo MD  Vascular Neurology Fellow  To page me or covering stroke neurology team member, click here: [facility]   Choose \"On Call\" tab at top, then search dropdown box for \"Neurology Adult\", select location, press Enter, then look for stroke/neuro ICU/telestroke.    ______________________________________________________    Clinically Significant Risk Factors Present on Admission             # Platelet Defect: home medication list includes an antiplatelet medication       Past Medical History   Past Medical " History:   Diagnosis Date     Benign essential hypertension 10/12/2016     Bilateral low back pain without sciatica, unspecified chronicity 12/19/2017     Blunt trauma of rib, initial encounter 11/25/2016     Dyslipidemia      Gallstones      Hypertension      PAD (peripheral artery disease) (H) 6/19/2017     Pancreatic mass 8/17/2016     SBO (small bowel obstruction) (H)      Slipped intervertebral disc      Past Surgical History   Past Surgical History:   Procedure Laterality Date     GYN SURGERY      hysterectomy     ORTHOPEDIC SURGERY      Slipped disc with chronic back pain     Medications   Home Meds  Prior to Admission medications    Medication Sig Start Date End Date Taking? Authorizing Provider   aspirin (ASA) 81 MG chewable tablet Take 81 mg by mouth daily    Reported, Patient   calcium carbonate (TUMS) 500 MG chewable tablet Take 1 tablet (500 mg) by mouth 3 times daily as needed for heartburn 1/3/20   Tim Downey DO   fexofenadine (ALLEGRA) 180 MG tablet Take 1 tablet (180 mg) by mouth daily 7/26/21   Luis Feliciano MD   losartan-hydrochlorothiazide (HYZAAR) 100-25 MG tablet Take 1 tablet by mouth daily 9/29/20   Luis Feliciano MD   NONFORMULARY Take 5 mLs by mouth every morning Perilla seed oil 1 tsp in smoothie every morning.    Unknown, Entered By History   simvastatin (ZOCOR) 10 MG tablet Take 1 tablet (10 mg) by mouth At Bedtime 10/6/20   Luis Feliciano MD   UNKNOWN TO PATIENT Place onto the skin daily as needed (leg cramps) Cream from [place] for leg cramps    Unknown, Entered By History       Scheduled Meds      Infusion Meds      PRN Meds      Allergies   Allergies   Allergen Reactions     Ace Inhibitors      Lactose GI Disturbance     Reduced fat dairy- Diarrhea     Sorbitan Trioleate      Sulfonylureas      Vancomycin Hives     Family History   Family History   Problem Relation Age of Onset     Family History Negative Other      Social History   Social History     Tobacco Use     Smoking  "status: Former Smoker     Smokeless tobacco: Former User     Tobacco comment: Former \"social\" smoker, quit in 1978.   Vaping Use     Vaping Use: Never used   Substance Use Topics     Alcohol use: Yes     Alcohol/week: 0.0 standard drinks     Comment: occ wine, twice a week     Drug use: No       Review of Systems   The 5 point Review of Systems is negative other than noted in the HPI or here.        PHYSICAL EXAMINATION  Temp:  [97.9  F (36.6  C)] 97.9  F (36.6  C)  Pulse:  [60-87] 60  Resp:  [9-18] 17  BP: (154-222)/(69-87) 171/70  SpO2:  [95 %-98 %] 95 %     General Exam  General:  patient lying in bed without any acute distress    HEENT:  normocephalic/atraumatic  Cardio:  RRR  Pulmonary:  no respiratory distress  Abdomen:  non-distended  Extremities:  no edema  Skin:  intact     Neuro Exam  Mental Status:  alert, oriented x 3, follows commands, speech clear and fluent, naming and repetition normal  Cranial Nerves:  visual fields intact (tested by nurse), EOMI with normal smooth pursuit, facial sensation intact and symmetric (tested by nurse), facial movements symmetric, hearing not formally tested but intact to conversation, no dysarthria, shoulder shrug equal bilaterally, tongue protrusion midline  Motor:  no abnormal movements, able to move all limbs antigravity spontaneously with no signs of hemiparesis observed, no pronator drift, l hand weakness and clumsiness   Reflexes:  unable to test (telestroke)  Sensory:  no extinction on double simultaneous stimulation (assessed by nurse), decreased lt in lue  Coordination:  normal finger-to-nose and heel-to-shin bilaterally without dysmetria  Station/Gait:  unable to test due to telestroke    Dysphagia Screen  Per Nursing    Stroke Scales    NIHSS  Interval baseline (08/26/21 4397)   Interval Comments     1a. Level of Consciousness 0-->Alert, keenly responsive   1b. LOC Questions 0-->Answers both questions correctly   1c. LOC Commands 0-->Performs both tasks " correctly   2.   Best Gaze 0-->Normal   3.   Visual 0-->No visual loss   4.   Facial Palsy 0-->Normal symmetrical movements   5a. Motor Arm, Left 0-->No drift, limb holds 90 (or 45) degrees for full 10 secs   5b. Motor Arm, Right 0-->No drift, limb holds 90 (or 45) degrees for full 10 secs   6a. Motor Leg, Left 0-->No drift, leg holds 30 degree position for full 5 secs   6b. Motor Leg, right 0-->No drift, leg holds 30 degree position for full 5 secs   7.   Limb Ataxia 0-->Absent   8.   Sensory 1-->Mild-to-moderate sensory loss, patient feels pinprick is less sharp or is dull on the affected side, or there is a loss of superficial pain with pinprick, but patient is aware of being touched   9.   Best Language 0-->No aphasia, normal   10. Dysarthria 0-->Normal   11. Extinction and Inattention  0-->No abnormality   Total 1 (08/26/21 2349)       Modified Kelsey Score (Pre-morbid)  0-No symptoms    Imaging  I personally reviewed all imaging; relevant findings per HPI.     Lab Results Data   CBC  Recent Labs   Lab 08/26/21 2320   WBC 6.3   RBC 4.26   HGB 12.7   HCT 39.0        Basic Metabolic Panel    Recent Labs   Lab 08/26/21  2329 08/26/21  2320   NA  --  136   POTASSIUM  --  3.3*   CHLORIDE  --  103   CO2  --  30   BUN  --  15   CR  --  0.76   * 155*   ARIN  --  8.9     Liver Panel  No results for input(s): PROTTOTAL, ALBUMIN, BILITOTAL, ALKPHOS, AST, ALT, BILIDIRECT in the last 168 hours.  INR    Recent Labs   Lab Test 08/26/21 2320   INR 0.90      Lipid Profile    Recent Labs   Lab Test 09/29/20  1410 09/20/19  1044 12/28/18  0956   CHOL 248* 250* 285*   HDL 48* 54 57   * 154* 175*   TRIG 371* 216* 264*     A1C  No lab results found.  Troponin I  No results for input(s): TROPONIN in the last 168 hours.       Stroke Code / Stroke Consult Data Data   Stroke Code Data  (for stroke code with tele)  Stroke code activated 08/26/21   2330   First stroke provider response 08/26/21   2330   Video start  time 08/26/21   2348   Video end time 08/27/21   0013   Last known normal 08/26/21   2215   Time of discovery  (or onset of symptoms)          Head CT read by Stroke Neuro Dr/Provider 08/26/21   2330   Was stroke code de-escalated? No               Telestroke Service Details  Type of service telemedicine diagnostic assessment of acute neurological changes   Reason telemedicine is appropriate patient requires assessment with a specialist for diagnosis and treatment of neurological symptoms   Mode of transmission secure interactive audio and video communication per Angelika   Originating site (patient location) [facility]    Distant site (provider location) Provider remote site

## 2021-08-27 NOTE — PROVIDER NOTIFICATION
710 Francois Ly. Patient is having bad leg cramps. She rates pain 12/10. She uses alieve at home and some form of topical medication which they're unsure the name of. Could we try some voltarin perhaps? Sahil CROOK [phone]

## 2021-08-27 NOTE — ED TRIAGE NOTES
Pt reports hitting her head 2 days ago. Pt states that about an hour ago she developed L hand weakness while she was in the shower.

## 2021-08-27 NOTE — PLAN OF CARE
"PT: Orders received. Chart reviewed and discussed with care team.  PT not indicated per OT screen: \"Per pt and spouse, pt CVA symptoms have resolved, back to baseline for ADL and functional mobility performance. No skilled tx indicated. Safe to return home w/ spouse when medically ready\".  Defer discharge recommendations to medical team. Will complete orders.    "

## 2021-08-27 NOTE — PLAN OF CARE
Patient admitted overnight after an episode of hand weakness last evening. MRI positive for multiple small acute infarcts in the right frontal and parietal regions (hospitalist made aware of these results overnight). Symptoms have improved since patient first admitted. A&0x4, VSS--hypertensive but within stroke parameters. Neuros with left hand and arm weakness, patient also reported it being numb at first onset of symptoms but this has since resolved. Ambulating to the bathroom with Ax1/GB. Patient experienced some severe leg cramps around her knees when she first arrived to the floor, warm packs provided, otherwise no complaints of pain. Passed bedside swallow eval, regular diet ordered. Tele NSR. Discharge plan pending full stroke work up.

## 2021-08-27 NOTE — PROGRESS NOTES
"[facility]    Hospitalist  Progress Note       Date of Admission:  8/26/2021  Date of Discharge:    Discharging Provider: Ovidio Lopez MD      Discharge Diagnoses   Acute ischemic stroke, suspect intracranial atherosclerosis    Follow-ups Needed After Discharge   Follow-up Appointments     Follow-up and recommended labs and tests       Follow up with primary care provider, Luis Feliciano, within 7 days for   hospital follow- up.  No follow up labs or test are needed.    - Follow up with neurology in about one month           Hospital Course   Francois Ly is an 81 year old female with PMH of HTN, PAD, dyslipidemia admitted on 8/26/2021 with neurological deficits. She described left hand weakness and visual disturbance. This occurred about one hour prior to presentation. Stroke code was called. CT/CTA did not demonstrate any acute intracranial pathology. 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 The patient had fairly rapid improvement in her symptoms so TNK was not given. Patient was back to her baseline soon after admission. MRI brain showed \"right front and parietal regions demonstrate multiple small acute infarcts predominantly within the watershed distribution.\" Neurology suspects CVA from intracranial atherosclerosis.   She was loaded with Plavix and will be discharged with ASA and Plavix at discharge. LDL was 83, patient's simvastatin was changed to atorvastatin 40mg daily. Echocardiogram noted hypertension, mobile density on posterior mitral valve leaflet.  - Event monitor at discharge to fully rule out embolic stroke, per Neuro  Addendum: Discussed with neurology, given echocardiogram results, patient will need JOE to further assess.    Hypertension  BPs markedly elevated on arrival with systolic pressures >220. Patient was allowed permissive HTN this admission, and to resume her antihypertensives at " discharge.    Leg crams  Patient reports a history of leg cramping with multiple evaluations including at the [facility] ER without any clear etiology found. Consider discontinuing hydrochlorothiazide as an outpatient; however, patient's electrolytes were normal limits here. Voltaren gel was provided with good results.    Consultations This Hospital Stay   PATIENT LEARNING CENTER IP CONSULT  NEUROLOGY IP CONSULT  SPEECH LANGUAGE PATH ADULT IP CONSULT  PHARMACY IP CONSULT  PHARMACY IP CONSULT  PHARMACY IP CONSULT  PHYSICAL THERAPY ADULT IP CONSULT  OCCUPATIONAL THERAPY ADULT IP CONSULT  CARE MANAGEMENT / SOCIAL WORK IP CONSULT    Code Status   Full Code    Time Spent on this Encounter   I, Ovidio Lopez, personally saw the patient today and spent approximately 35 minutes discharging this patient.       Ovidio Lopez MD  [facility]  ______________________________________________________________________    Physical Exam   Vital Signs: Temp: 98  F (36.7  C) Temp src: Oral BP: (!) 148/73 Pulse: 57   Resp: 16 SpO2: 96 % O2 Device: None (Room air)    Weight: 136 lbs 0 oz    Constitutional: Female in NAD  Eyes: Nonicteric, normal ocular movements  HEENT: Normocephalic, atraumatic, oral mucosa moist  Respiratory: CTAB, no wheezing or crackles  Cardiovascular: RRR, normal S1/2, no m/r/g  GI: No organomegaly, normoactive bowel sounds, nontender, nondistended  Skin: No rashes  Musculoskeletal: Normal strength in UE and LE, moves all extremities  Neurologic: A&Ox3  Psychiatric: Appropriate affect and mood       Primary Care Physician   Luis Feliciano    Discharge Disposition   Discharged to home  Condition at discharge: Stable    Significant Results and Procedures   Most Recent 3 CBC's:Recent Labs   Lab Test 08/27/21  0755 08/26/21  2320 09/15/20  1918   WBC 5.4 6.3 6.3   HGB 11.1* 12.7 12.8   MCV 91 92 91    270 300     Most Recent 3 BMP's:Recent Labs   Lab Test 08/27/21  0815 08/27/21  0755  08/26/21  2329 08/26/21  2320 09/15/20  1918   NA  --  141  --  136 137   POTASSIUM  --  3.7  --  3.3* 3.4   CHLORIDE  --  109  --  103 102   CO2  --  25  --  30 29   BUN  --  12  --  15 18   CR  --  0.70  --  0.76 0.80   ANIONGAP  --  7  --  3 6   ARIN  --  8.8  --  8.9 8.6   * 113* 150* 155* 108*     Most Recent 2 LFT's:Recent Labs   Lab Test 09/15/20  1918 01/02/20  0846   AST 27 16   ALT 33 23   ALKPHOS 85 66   BILITOTAL 0.4 0.6     Most Recent 3 Troponin's:Recent Labs   Lab Test 08/26/21  2320 09/16/20  0424 09/16/20  0100 09/15/20  2209   TROPI  --  0.040 0.044 0.042   TROPONIN 0.038  --   --   --    ,   Results for orders placed or performed during the hospital encounter of 08/26/21   CT Head w/o Contrast    Narrative    EXAM: CT HEAD W/O CONTRAST, CTA  HEAD NECK WITH CONTRAST  LOCATION: [facility]  DATE/TIME: 8/26/2021 11:43 PM    INDICATION: Left-sided weakness.    COMPARISON: Head CT of 10/23/2013, CT neck 7/2/2019.    CONTRAST: 70 mL Isovue-370.    TECHNIQUE: Head and neck CT angiogram with IV contrast. Noncontrast head CT followed by axial helical CT images of the head and neck vessels obtained during the arterial phase of intravenous contrast administration. Axial 2D reconstructed images and   multiplanar 3D MIP reconstructed images of the head and neck vessels were performed by the technologist. Dose reduction techniques were used. All stenosis measurements made according to NASCET criteria unless otherwise specified.    FINDINGS:   NONCONTRAST HEAD CT:   INTRACRANIAL CONTENTS: No intracranial hemorrhage, extra-axial collection, or mass effect. No CT evidence of acute infarct. Mild volume loss and presumed chronic small vessel ischemia is similar to 2013.    VISUALIZED ORBITS/SINUSES/MASTOIDS: No intraorbital abnormality. No paranasal sinus mucosal disease. No middle ear or mastoid effusion.    BONES/SOFT TISSUES: No acute abnormality.    HEAD CTA:  Moderate intracranial  atheromatous disease predominantly involving the posterior circulation.    ANTERIOR CIRCULATION: Mild multifocal narrowing of the right M1 segment and M2 branches, though no branch occlusion or vascular cut-off. Mild narrowing of the left M1/M2 junction. Mild to moderate narrowing of the carotid siphons with moderate focal   narrowing of the left paraclinoid ICA.    POSTERIOR CIRCULATION: Significant atheromatous disease involving the nondominant left vertebral artery intracranially which is occluded from the level of the skull base up to the junction with the basilar artery. There was significant atheromatous   disease noted on the soft tissue neck CT of 2019, though there was some mild flow on that study. This is technically age-indeterminate, though favored to be chronic. Mild to moderate multifocal narrowing of the distal dominant right vertebral artery   which supplies the basilar artery. There is moderate to marked focal narrowing of the proximal to mid basilar artery, though the vessel remains patent.    DURAL VENOUS SINUSES: Expected enhancement of the major dural venous sinuses.    NECK CTA:  RIGHT CAROTID: No measurable stenosis or dissection.    LEFT CAROTID: No measurable stenosis or dissection.    VERTEBRAL ARTERIES: Dominant right vertebral artery is widely patent throughout its cervical course. Mild multisegmental narrowing of the left vertebral artery which is noted to occlude at the level of the skull base and extending intracranially as   detailed above.    AORTIC ARCH: Classic aortic arch anatomy with no significant stenosis at the origin of the great vessels.    NONVASCULAR STRUCTURES: Unremarkable.      Impression    IMPRESSION:   HEAD CT:  1.  No acute intracranial abnormality.    2.  Stable age-related change.    HEAD CTA:   1.  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 This is technically age-indeterminate, though favored to be chronic.    2.  Mild to moderate  multifocal narrowing of the distal dominant right vertebral artery and moderate to marked focal narrowing of the basilar artery. The vessel remains patent.    3.  Mild atheromatous disease of the anterior circulation without high grade stenosis or branch occlusion.    NECK CTA:  1.  Mild multifocal segmental stenosis of the left vertebral artery which is patent throughout its cervical course, though occludes at the skull base.    2.  Dominant right vertebral artery is patent.    3.  No high grade carotid stenosis.    CT head findings discussed with Dr. Mac at 11:40 p.m. and CTA findings discussed with Dr. Mac at 11:55 p.m. 8/26/2021.   CTA Head Neck with Contrast    Narrative    EXAM: CT HEAD W/O CONTRAST, CTA  HEAD NECK WITH CONTRAST  LOCATION: [facility]  DATE/TIME: 8/26/2021 11:43 PM    INDICATION: Left-sided weakness.    COMPARISON: Head CT of 10/23/2013, CT neck 7/2/2019.    CONTRAST: 70 mL Isovue-370.    TECHNIQUE: Head and neck CT angiogram with IV contrast. Noncontrast head CT followed by axial helical CT images of the head and neck vessels obtained during the arterial phase of intravenous contrast administration. Axial 2D reconstructed images and   multiplanar 3D MIP reconstructed images of the head and neck vessels were performed by the technologist. Dose reduction techniques were used. All stenosis measurements made according to NASCET criteria unless otherwise specified.    FINDINGS:   NONCONTRAST HEAD CT:   INTRACRANIAL CONTENTS: No intracranial hemorrhage, extra-axial collection, or mass effect. No CT evidence of acute infarct. Mild volume loss and presumed chronic small vessel ischemia is similar to 2013.    VISUALIZED ORBITS/SINUSES/MASTOIDS: No intraorbital abnormality. No paranasal sinus mucosal disease. No middle ear or mastoid effusion.    BONES/SOFT TISSUES: No acute abnormality.    HEAD CTA:  Moderate intracranial atheromatous disease predominantly involving the  posterior circulation.    ANTERIOR CIRCULATION: Mild multifocal narrowing of the right M1 segment and M2 branches, though no branch occlusion or vascular cut-off. Mild narrowing of the left M1/M2 junction. Mild to moderate narrowing of the carotid siphons with moderate focal   narrowing of the left paraclinoid ICA.    POSTERIOR CIRCULATION: Significant atheromatous disease involving the nondominant left vertebral artery intracranially which is occluded from the level of the skull base up to the junction with the basilar artery. There was significant atheromatous   disease noted on the soft tissue neck CT of 2019, though there was some mild flow on that study. This is technically age-indeterminate, though favored to be chronic. Mild to moderate multifocal narrowing of the distal dominant right vertebral artery   which supplies the basilar artery. There is moderate to marked focal narrowing of the proximal to mid basilar artery, though the vessel remains patent.    DURAL VENOUS SINUSES: Expected enhancement of the major dural venous sinuses.    NECK CTA:  RIGHT CAROTID: No measurable stenosis or dissection.    LEFT CAROTID: No measurable stenosis or dissection.    VERTEBRAL ARTERIES: Dominant right vertebral artery is widely patent throughout its cervical course. Mild multisegmental narrowing of the left vertebral artery which is noted to occlude at the level of the skull base and extending intracranially as   detailed above.    AORTIC ARCH: Classic aortic arch anatomy with no significant stenosis at the origin of the great vessels.    NONVASCULAR STRUCTURES: Unremarkable.      Impression    IMPRESSION:   HEAD CT:  1.  No acute intracranial abnormality.    2.  Stable age-related change.    HEAD CTA:   1.  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 This is technically age-indeterminate, though favored to be chronic.    2.  Mild to moderate multifocal narrowing of the distal dominant right  vertebral artery and moderate to marked focal narrowing of the basilar artery. The vessel remains patent.    3.  Mild atheromatous disease of the anterior circulation without high grade stenosis or branch occlusion.    NECK CTA:  1.  Mild multifocal segmental stenosis of the left vertebral artery which is patent throughout its cervical course, though occludes at the skull base.    2.  Dominant right vertebral artery is patent.    3.  No high grade carotid stenosis.    CT head findings discussed with Dr. Mac at 11:40 p.m. and CTA findings discussed with Dr. Mac at 11:55 p.m. 8/26/2021.   MR Brain w/o & w Contrast    Narrative    EXAM: MR BRAIN W/O and W CONTRAST  LOCATION: [facility]  DATE/TIME: 8/27/2021 2:41 AM    INDICATION: Neuro deficit, acute, stroke suspected. Left-sided weakness.  COMPARISON: CT head 08/26/2021  CONTRAST: 6mL Gadavist  TECHNIQUE: Routine multiplanar multisequence head MRI without and with intravenous contrast.    FINDINGS:  INTRACRANIAL CONTENTS: Right frontal and parietal region demonstrates multiple small acute infarcts predominantly within the watershed distribution. (Restricted diffusion, positive FLAIR). This includes within the right precentral gyrus. No additional   acute infarcts. No mass, acute hemorrhage, or extra-axial fluid collections. Scattered nonspecific T2/FLAIR hyperintensities within the cerebral white matter most consistent with mild chronic microvascular ischemic change. Mild generalized cerebral   atrophy. No hydrocephalus. Normal position of the cerebellar tonsils. No pathologic contrast enhancement.    SELLA: No abnormality accounting for technique.    OSSEOUS STRUCTURES/SOFT TISSUES: Normal marrow signal. Left distal vertebral artery flow void absent may reflect slow flow or occlusion. Please defer to recent CTA head and neck 08/26/2021.     ORBITS: No abnormality accounting for technique.     SINUSES/MASTOIDS: Mild to moderate mucosal  thickening scattered about the paranasal sinuses. No middle ear or mastoid effusion.       Impression    IMPRESSION:  1.  Right frontal and parietal regions demonstrate multiple small acute infarcts predominantly within the watershed distribution.  2.  Age-related changes described above.             Discharge Orders      Reason for your hospital stay    You were hospitalized for a stroke. You had some weakness in your left wrist hand/wrist which improved spontaneously.     Follow-up and recommended labs and tests     Follow up with primary care provider, Luis Feliciano, within 7 days for hospital follow- up.  No follow up labs or test are needed.    - Follow up with neurology in about one month     Activity    Your activity upon discharge: activity as tolerated     Diet    Follow this diet upon discharge:     Regular Diet Adult     Discharge Medications   Current Discharge Medication List      CONTINUE these medications which have NOT CHANGED    Details   aspirin (ASA) 81 MG chewable tablet Take 81 mg by mouth daily      calcium carbonate (TUMS) 500 MG chewable tablet Take 1 tablet (500 mg) by mouth 3 times daily as needed for heartburn  Qty: 90 tablet, Refills: 0    Associated Diagnoses: Recurrent intestinal obstruction (H)      fexofenadine (ALLEGRA) 180 MG tablet Take 1 tablet (180 mg) by mouth daily  Qty: 60 tablet, Refills: 1    Associated Diagnoses: Uvular swelling      losartan-hydrochlorothiazide (HYZAAR) 100-25 MG tablet Take 1 tablet by mouth daily  Qty: 90 tablet, Refills: 3    Associated Diagnoses: Benign essential hypertension      NONFORMULARY Take 5 mLs by mouth every morning Perilla seed oil 1 tsp in smoothie every morning.      simvastatin (ZOCOR) 10 MG tablet Take 1 tablet (10 mg) by mouth At Bedtime  Qty: 90 tablet, Refills: 3    Associated Diagnoses: Hyperlipidemia LDL goal <100      UNKNOWN TO PATIENT Place onto the skin daily as needed (leg cramps) Cream from [place] for leg cramps            Allergies   Allergies   Allergen Reactions     Ace Inhibitors      Lactose GI Disturbance     Reduced fat dairy- Diarrhea     Sorbitan Trioleate      Sulfonylureas      Vancomycin Hives

## 2021-08-27 NOTE — PLAN OF CARE
"/67 (BP Location: Left arm)   Pulse 57   Temp 97.9  F (36.6  C) (Oral)   Resp 16   Ht 1.575 m (5' 2\")   Wt 61.7 kg (136 lb)   LMP  (LMP Unknown)   SpO2 97%   BMI 24.87 kg/m      Nursing shift note  Summary: Patient in with CVA  Alertness/orientation: Alert x4 pleasant  Neuro: Intact  Cardiac: WDL tele was sinus bradycardic  Resp: WDL  GI: WDL  : WDL  CMS: Intact  Mobility: SBA  Pain: None  Diet: Regular, good appetite  Skin: WDL  Plan: Echo today  Other Important Info: Patient has bad leg cramps, reports 12/10 pain. MD paged for voltarin gel.      "

## 2021-08-27 NOTE — H&P
[facility]    History and Physical - Hospitalist Service       Date of Admission:  8/26/2021    Assessment & Plan      Francois Ly is a 81 year old female with past medical history significant for Hypertension, peripheral arterial disease, dyslipidemia admitted on 8/26/2021 with stroke-like symptoms.     Probable acute ischemic stroke vs TIA of the R MCA  Dyslipidemia   Pt presented to the ED with left hand weakness and visual disturbance. This occurred about one hour prior to presentation. Stroke code was called. CT/CTA did not demonstrate any acute intracranial pathology. 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 The patient had fairly rapid improvement in her symptoms to TNK was not given. Currently has no weakness or visual disturbance.   - Neurochecks and Vital Signs every 4 hours   - Permissive HTN; goal SBP < 220 mmHg  - Daily aspirin 81 mg for secondary stroke prevention  - Plavix (clopidogrel) 300 mg PO loading dose x 1 given in the ED, defer to neurology for additional anti-platelet medications   - Statin: PTA simvastatin 10mg ordered (pt reports hx of intolerance to higher doses of statin), consider dose titration if tolerated  - MRI Brain with and without contrast  - TTE   - Telemetry  - Bedside Glucose Monitoring  - A1c, Lipid Panel  - PT/OT/SLP  - Stroke Education  - Euthermia, Euglycemia\    ADDENDUM:     MRI results as follows:                                            IMPRESSION:  1.  Right frontal and parietal regions demonstrate multiple small acute infarcts predominantly within the watershed distribution.  2.  Age-related changes described above.    Hypertension  BPs markedly elevated on arrival with systolic pressures >220.   - Permissive Hypertension   - Hold PTA lisinopril-hydrochlorothiazide for now. - may consider stopping the hydrochlorothiazide given reports of recurrent leg cramping.        Diet:  NPO until passes dysphagia screen, then regular diet   DVT Prophylaxis: PCDs  Collins Catheter: Not present  Central Lines: None  Code Status: Full Code       Disposition Plan   Expected discharge: 1-2 days recommended to prior living arrangement once mental status at baseline.     The patient's care was discussed with the Patient.    Donna Newman MD  [facility]  Securely message with the Vocera Web Console (learn more here)  Text page via [employer] Paging/Directory      ______________________________________________________________________    Chief Complaint   Weakness     History is obtained from the patient    History of Present Illness   Francois Ly is a 81 year old female who presented to the ED for evaluation of weakness. She reports that she was taking a shower when she suddenly notices weakness of her left hand. She reports that she couldn't do anything with it, not even hold a towel. She also noted some visual disturbance at the time as well. She reports looking at the tile in her bathroom and it looking like broken glass. By the time of my evaluation, her symptoms are currently resolved other than some mild numbness in the lateral aspect of her left hand.   She did endorse some muscle cramping in her legs just before I got to the room, but this is reportedly a recurrent problem for her. The cramping has now resolved.     Review of Systems    The 10 point Review of Systems is negative other than noted in the HPI or here.     Past Medical History    I have reviewed this patient's medical history and updated it with pertinent information if needed.   Past Medical History:   Diagnosis Date     Benign essential hypertension 10/12/2016     Bilateral low back pain without sciatica, unspecified chronicity 12/19/2017     Blunt trauma of rib, initial encounter 11/25/2016     Dyslipidemia      Gallstones      Hypertension      PAD (peripheral artery disease) (H) 6/19/2017     Pancreatic  "mass 8/17/2016     SBO (small bowel obstruction) (H)      Slipped intervertebral disc        Past Surgical History   I have reviewed this patient's surgical history and updated it with pertinent information if needed.  Past Surgical History:   Procedure Laterality Date     GYN SURGERY      hysterectomy     ORTHOPEDIC SURGERY      Slipped disc with chronic back pain       Social History   I have reviewed this patient's social history and updated it with pertinent information if needed.  Social History     Tobacco Use     Smoking status: Former Smoker     Smokeless tobacco: Former User     Tobacco comment: Former \"social\" smoker, quit in 1978.   Vaping Use     Vaping Use: Never used   Substance Use Topics     Alcohol use: Yes     Alcohol/week: 0.0 standard drinks     Comment: occ wine, twice a week     Drug use: No       Family History   I have reviewed this patient's family history and updated it with pertinent information if needed.  Family History   Problem Relation Age of Onset     Family History Negative Other        Prior to Admission Medications   Prior to Admission Medications   Prescriptions Last Dose Informant Patient Reported? Taking?   NONFORMULARY  Self Yes No   Sig: Take 5 mLs by mouth every morning Perilla seed oil 1 tsp in smoothie every morning.   UNKNOWN TO PATIENT  Self Yes No   Sig: Place onto the skin daily as needed (leg cramps) Cream from [place] for leg cramps   aspirin (ASA) 81 MG chewable tablet  Self Yes No   Sig: Take 81 mg by mouth daily   calcium carbonate (TUMS) 500 MG chewable tablet  Self No No   Sig: Take 1 tablet (500 mg) by mouth 3 times daily as needed for heartburn   fexofenadine (ALLEGRA) 180 MG tablet   No No   Sig: Take 1 tablet (180 mg) by mouth daily   losartan-hydrochlorothiazide (HYZAAR) 100-25 MG tablet   No No   Sig: Take 1 tablet by mouth daily   simvastatin (ZOCOR) 10 MG tablet   No No   Sig: Take 1 tablet (10 mg) by mouth At Bedtime      Facility-Administered " Medications: None     Allergies   Allergies   Allergen Reactions     Ace Inhibitors      Lactose GI Disturbance     Reduced fat dairy- Diarrhea     Sorbitan Trioleate      Sulfonylureas      Vancomycin Hives       Physical Exam   Vital Signs: Temp: 97.9  F (36.6  C) Temp src: Temporal BP: (!) 171/70 Pulse: 60   Resp: 17 SpO2: 95 % O2 Device: None (Room air)    Weight: 136 lbs 0 oz    Constitutional: Awake, alert, cooperative, no apparent distress.  Eyes: Conjunctiva and pupils examined and normal.  HEENT: Moist mucous membranes, normal dentition.  Respiratory: Clear to auscultation bilaterally, no crackles or wheezing.  Cardiovascular: Regular rate and rhythm, normal S1 and S2, and I/VI sysolic murmur noted.  GI: Soft, non-distended, non-tender, normal bowel sounds.  Skin: No rashes, no cyanosis, no edema.  Musculoskeletal: No joint swelling, erythema or tenderness.  Neurologic: Cranial nerves 2-12 intact, equal and full strength of bilateral upper and lower extremities. No pronator drift.   Psychiatric: Alert, oriented to person, place and time, no obvious anxiety or depression.      Data   Data reviewed today: I reviewed all medications, new labs and imaging results over the last 24 hours.     Recent Labs   Lab 08/26/21 2329 08/26/21  2320   WBC  --  6.3   HGB  --  12.7   MCV  --  92   PLT  --  270   INR  --  0.90   NA  --  136   POTASSIUM  --  3.3*   CHLORIDE  --  103   CO2  --  30   BUN  --  15   CR  --  0.76   ANIONGAP  --  3   ARIN  --  8.9   * 155*   TROPONIN  --  0.038     Recent Results (from the past 24 hour(s))   CT Head w/o Contrast    Narrative    EXAM: CT HEAD W/O CONTRAST, CTA  HEAD NECK WITH CONTRAST  LOCATION: [facility]  DATE/TIME: 8/26/2021 11:43 PM    INDICATION: Left-sided weakness.    COMPARISON: Head CT of 10/23/2013, CT neck 7/2/2019.    CONTRAST: 70 mL Isovue-370.    TECHNIQUE: Head and neck CT angiogram with IV contrast. Noncontrast head CT followed by axial  helical CT images of the head and neck vessels obtained during the arterial phase of intravenous contrast administration. Axial 2D reconstructed images and   multiplanar 3D MIP reconstructed images of the head and neck vessels were performed by the technologist. Dose reduction techniques were used. All stenosis measurements made according to NASCET criteria unless otherwise specified.    FINDINGS:   NONCONTRAST HEAD CT:   INTRACRANIAL CONTENTS: No intracranial hemorrhage, extra-axial collection, or mass effect. No CT evidence of acute infarct. Mild volume loss and presumed chronic small vessel ischemia is similar to 2013.    VISUALIZED ORBITS/SINUSES/MASTOIDS: No intraorbital abnormality. No paranasal sinus mucosal disease. No middle ear or mastoid effusion.    BONES/SOFT TISSUES: No acute abnormality.    HEAD CTA:  Moderate intracranial atheromatous disease predominantly involving the posterior circulation.    ANTERIOR CIRCULATION: Mild multifocal narrowing of the right M1 segment and M2 branches, though no branch occlusion or vascular cut-off. Mild narrowing of the left M1/M2 junction. Mild to moderate narrowing of the carotid siphons with moderate focal   narrowing of the left paraclinoid ICA.    POSTERIOR CIRCULATION: Significant atheromatous disease involving the nondominant left vertebral artery intracranially which is occluded from the level of the skull base up to the junction with the basilar artery. There was significant atheromatous   disease noted on the soft tissue neck CT of 2019, though there was some mild flow on that study. This is technically age-indeterminate, though favored to be chronic. Mild to moderate multifocal narrowing of the distal dominant right vertebral artery   which supplies the basilar artery. There is moderate to marked focal narrowing of the proximal to mid basilar artery, though the vessel remains patent.    DURAL VENOUS SINUSES: Expected enhancement of the major dural venous  sinuses.    NECK CTA:  RIGHT CAROTID: No measurable stenosis or dissection.    LEFT CAROTID: No measurable stenosis or dissection.    VERTEBRAL ARTERIES: Dominant right vertebral artery is widely patent throughout its cervical course. Mild multisegmental narrowing of the left vertebral artery which is noted to occlude at the level of the skull base and extending intracranially as   detailed above.    AORTIC ARCH: Classic aortic arch anatomy with no significant stenosis at the origin of the great vessels.    NONVASCULAR STRUCTURES: Unremarkable.      Impression    IMPRESSION:   HEAD CT:  1.  No acute intracranial abnormality.    2.  Stable age-related change.    HEAD CTA:   1.  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 This is technically age-indeterminate, though favored to be chronic.    2.  Mild to moderate multifocal narrowing of the distal dominant right vertebral artery and moderate to marked focal narrowing of the basilar artery. The vessel remains patent.    3.  Mild atheromatous disease of the anterior circulation without high grade stenosis or branch occlusion.    NECK CTA:  1.  Mild multifocal segmental stenosis of the left vertebral artery which is patent throughout its cervical course, though occludes at the skull base.    2.  Dominant right vertebral artery is patent.    3.  No high grade carotid stenosis.    CT head findings discussed with Dr. Mac at 11:40 p.m. and CTA findings discussed with Dr. Mac at 11:55 p.m. 8/26/2021.   CTA Head Neck with Contrast    Narrative    EXAM: CT HEAD W/O CONTRAST, CTA  HEAD NECK WITH CONTRAST  LOCATION: [facility]  DATE/TIME: 8/26/2021 11:43 PM    INDICATION: Left-sided weakness.    COMPARISON: Head CT of 10/23/2013, CT neck 7/2/2019.    CONTRAST: 70 mL Isovue-370.    TECHNIQUE: Head and neck CT angiogram with IV contrast. Noncontrast head CT followed by axial helical CT images of the head and neck vessels obtained  during the arterial phase of intravenous contrast administration. Axial 2D reconstructed images and   multiplanar 3D MIP reconstructed images of the head and neck vessels were performed by the technologist. Dose reduction techniques were used. All stenosis measurements made according to NASCET criteria unless otherwise specified.    FINDINGS:   NONCONTRAST HEAD CT:   INTRACRANIAL CONTENTS: No intracranial hemorrhage, extra-axial collection, or mass effect. No CT evidence of acute infarct. Mild volume loss and presumed chronic small vessel ischemia is similar to 2013.    VISUALIZED ORBITS/SINUSES/MASTOIDS: No intraorbital abnormality. No paranasal sinus mucosal disease. No middle ear or mastoid effusion.    BONES/SOFT TISSUES: No acute abnormality.    HEAD CTA:  Moderate intracranial atheromatous disease predominantly involving the posterior circulation.    ANTERIOR CIRCULATION: Mild multifocal narrowing of the right M1 segment and M2 branches, though no branch occlusion or vascular cut-off. Mild narrowing of the left M1/M2 junction. Mild to moderate narrowing of the carotid siphons with moderate focal   narrowing of the left paraclinoid ICA.    POSTERIOR CIRCULATION: Significant atheromatous disease involving the nondominant left vertebral artery intracranially which is occluded from the level of the skull base up to the junction with the basilar artery. There was significant atheromatous   disease noted on the soft tissue neck CT of 2019, though there was some mild flow on that study. This is technically age-indeterminate, though favored to be chronic. Mild to moderate multifocal narrowing of the distal dominant right vertebral artery   which supplies the basilar artery. There is moderate to marked focal narrowing of the proximal to mid basilar artery, though the vessel remains patent.    DURAL VENOUS SINUSES: Expected enhancement of the major dural venous sinuses.    NECK CTA:  RIGHT CAROTID: No measurable  stenosis or dissection.    LEFT CAROTID: No measurable stenosis or dissection.    VERTEBRAL ARTERIES: Dominant right vertebral artery is widely patent throughout its cervical course. Mild multisegmental narrowing of the left vertebral artery which is noted to occlude at the level of the skull base and extending intracranially as   detailed above.    AORTIC ARCH: Classic aortic arch anatomy with no significant stenosis at the origin of the great vessels.    NONVASCULAR STRUCTURES: Unremarkable.      Impression    IMPRESSION:   HEAD CT:  1.  No acute intracranial abnormality.    2.  Stable age-related change.    HEAD CTA:   1.  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 This is technically age-indeterminate, though favored to be chronic.    2.  Mild to moderate multifocal narrowing of the distal dominant right vertebral artery and moderate to marked focal narrowing of the basilar artery. The vessel remains patent.    3.  Mild atheromatous disease of the anterior circulation without high grade stenosis or branch occlusion.    NECK CTA:  1.  Mild multifocal segmental stenosis of the left vertebral artery which is patent throughout its cervical course, though occludes at the skull base.    2.  Dominant right vertebral artery is patent.    3.  No high grade carotid stenosis.    CT head findings discussed with Dr. Mac at 11:40 p.m. and CTA findings discussed with Dr. Mac at 11:55 p.m. 8/26/2021.

## 2021-08-27 NOTE — PROVIDER NOTIFICATION
Call placed by bedside RN to clarify neuro check frequency. Return call received from CHANDLER Ren 4 hr neuro checks are ordered. Bedside RN updated.

## 2021-08-27 NOTE — CONSULTS
"      [facility]    Stroke Consult Note    Reason for Consult:  Stroke    Chief Complaint: One-sided Weakness       HPI  Francois Ly is a 81 year old female  has a past medical history of Benign essential hypertension (10/12/2016), Bilateral low back pain without sciatica, unspecified chronicity (12/19/2017), Blunt trauma of rib, initial encounter (11/25/2016), Dyslipidemia, Gallstones, Hypertension, PAD (peripheral artery disease) (H) (6/19/2017), Pancreatic mass (8/17/2016), SBO (small bowel obstruction) (H), and Slipped intervertebral disc.   8/27/21 LKW at about 1030 and noted no power in her left hand. She noted a sensation of \"broken glass\" in her vision. She has drift in the left upper extremity with mild weakness in left lower extremity. Over the course of video evaluation  8/26 she noted improvement in her extremity and NIH was a 1 for sensory changes in the left upper extremity.  Patient was loaded with Plavix 300 mg and to be started on DAPT-patient took aspirin at home prior to arriving at the hospital.      Impression  Acute ischemic stroke of R MCA hand knob territory due to large-artery atherosclerosis vs cardioembolism    Echocardiogram returned with findings of a posible vegetation. Recommending JOE.         Recommendations  - Neurochecks and Vital Signs every 4   - Permissive HTN; goal SBP < 160 mmHg  - Daily aspirin 81 mg for secondary stroke prevention  - Plavix (clopidogrel) 75 mg PO Daily, for 90 days then ASA monotherapy.   - Statin: Atorvastatin 40mg qday  - Telemetry, EKG  - Bedside Glucose Monitoring  - A1c, Lipid Panel, Troponin x 3  - PT/OT/SLP  - Stroke Education  - Euthermia, Euglycemia   - JOE  -Cardiac monitor on discharge (ordered)    Patient Follow-up    - in 12 weeks with stroke clinic STEFANO (any stroke STEFANO) at virtual stroke clinic. Patient will be contacted by virtual stroke clinic team after discharge.    Thank you for this consult. We will continue to " "follow          The Stroke Staff is Dr. Espitia.    Lalito Vallecillo MD  Vascular Neurology Fellow  To page me or covering stroke neurology team member, click here: AMCOM   Choose \"On Call\" tab at top, then search dropdown box for \"Neurology Adult\", select location, press Enter, then look for stroke/neuro ICU/telestroke.    _____________________________________________________    Clinically Significant Risk Factors Present on Admission             # Platelet Defect: home medication list includes an antiplatelet medication         Past Medical History   Past Medical History:   Diagnosis Date     Benign essential hypertension 10/12/2016     Bilateral low back pain without sciatica, unspecified chronicity 12/19/2017     Blunt trauma of rib, initial encounter 11/25/2016     Dyslipidemia      Gallstones      Hypertension      PAD (peripheral artery disease) (H) 6/19/2017     Pancreatic mass 8/17/2016     SBO (small bowel obstruction) (H)      Slipped intervertebral disc      Past Surgical History   Past Surgical History:   Procedure Laterality Date     GYN SURGERY      hysterectomy     ORTHOPEDIC SURGERY      Slipped disc with chronic back pain     Medications   Home Meds  Prior to Admission medications    Medication Sig Start Date End Date Taking? Authorizing Provider   aspirin (ASA) 81 MG chewable tablet Take 81 mg by mouth daily    Reported, Patient   calcium carbonate (TUMS) 500 MG chewable tablet Take 1 tablet (500 mg) by mouth 3 times daily as needed for heartburn 1/3/20   Tim Downey,    fexofenadine (ALLEGRA) 180 MG tablet Take 1 tablet (180 mg) by mouth daily 7/26/21   Luis Feliciano MD   losartan-hydrochlorothiazide (HYZAAR) 100-25 MG tablet Take 1 tablet by mouth daily 9/29/20   Luis Feliciano MD   NONFORMULARY Take 5 mLs by mouth every morning Perilla seed oil 1 tsp in smoothie every morning.    Unknown, Entered By History   simvastatin (ZOCOR) 10 MG tablet Take 1 tablet (10 mg) by mouth At Bedtime " "10/6/20   Luis Feliciano MD   UNKNOWN TO PATIENT Place onto the skin daily as needed (leg cramps) Cream from [place] for leg cramps    Unknown, Entered By History       Scheduled Meds    aspirin  81 mg Oral Daily     diclofenac  2 g Topical 4x Daily     [Held by provider] losartan-hydrochlorothiazide  1 tablet Oral Daily     simvastatin  10 mg Oral At Bedtime     sodium chloride (PF)  3 mL Intracatheter Q8H       Infusion Meds    - MEDICATION INSTRUCTIONS -       - MEDICATION INSTRUCTIONS -         PRN Meds  lidocaine 4%, lidocaine (buffered or not buffered), - MEDICATION INSTRUCTIONS -, - MEDICATION INSTRUCTIONS -, sodium chloride (PF)    Allergies   Allergies   Allergen Reactions     Ace Inhibitors      Lactose GI Disturbance     Reduced fat dairy- Diarrhea     Sorbitan Trioleate      Sulfonylureas      Vancomycin Hives     Family History   Family History   Problem Relation Age of Onset     Family History Negative Other      Social History   Social History     Tobacco Use     Smoking status: Former Smoker     Smokeless tobacco: Former User     Tobacco comment: Former \"social\" smoker, quit in 1978.   Vaping Use     Vaping Use: Never used   Substance Use Topics     Alcohol use: Yes     Alcohol/week: 0.0 standard drinks     Comment: occ wine, twice a week     Drug use: No       Review of Systems   The 5 point Review of Systems is negative other than noted in the HPI or here.        PHYSICAL EXAMINATION   Temp:  [97.3  F (36.3  C)-98.1  F (36.7  C)] 98  F (36.7  C)  Pulse:  [54-87] 57  Resp:  [9-18] 16  BP: (134-222)/(67-90) 148/73  SpO2:  [95 %-98 %] 96 %    General Exam  General:  patient lying in bed without any acute distress    HEENT:  normocephalic/atraumatic  Cardio:  RRR  Pulmonary:  no respiratory distress  Abdomen:  non-distended, tender 2/2 to hernia  Extremities:  no edema  Skin:  intact     Neuro Exam  Mental Status:  alert, oriented x 3, follows commands, speech clear and fluent, naming and repetition " normal  Cranial Nerves:  visual fields intact, PERRL, EOMI with normal smooth pursuit, facial sensation intact and symmetric, facial movements symmetric, hearing not formally tested but intact to conversation, palate elevation symmetric and uvula midline, no dysarthria, shoulder shrug strong bilaterally, tongue protrusion midline  Motor:  normal muscle tone and bulk, no abnormal movements, able to move all limbs spontaneously, no pronator drift, LUE ext 4/5; L hand  4/5  Sensory:  light touch sensation intact and symmetric throughout upper and lower extremities  Coordination:  normal finger-to-nose and heel-to-shin bilaterally without dysmetria  Station/Gait:  deferred    Dysphagia Screen  Per Nursing    Imaging  I personally reviewed all imaging; relevant findings per HPI.    Labs Data   CBC  Recent Labs   Lab 08/27/21 0755 08/26/21 2320   WBC 5.4 6.3   RBC 3.75* 4.26   HGB 11.1* 12.7   HCT 34.2* 39.0    270     Basic Metabolic Panel   Recent Labs   Lab 08/27/21  0815 08/27/21  0755 08/26/21  2329 08/26/21  2320   NA  --  141  --  136   POTASSIUM  --  3.7  --  3.3*   CHLORIDE  --  109  --  103   CO2  --  25  --  30   BUN  --  12  --  15   CR  --  0.70  --  0.76   * 113* 150* 155*   ARIN  --  8.8  --  8.9     Liver Panel  No results for input(s): PROTTOTAL, ALBUMIN, BILITOTAL, ALKPHOS, AST, ALT, BILIDIRECT in the last 168 hours.  INR    Recent Labs   Lab Test 08/26/21 2320   INR 0.90      Lipid Profile    Recent Labs   Lab Test 08/27/21  0755 09/29/20  1410 09/20/19  1044   CHOL 188 248* 250*   HDL 42* 48* 54   LDL 83 126* 154*   TRIG 317* 371* 216*     A1C    Recent Labs   Lab Test 08/27/21 0755   A1C 6.2*     Troponin I    Recent Labs   Lab 08/26/21 2320   TROPONIN 0.038          Stroke Code / Stroke Consult Data Data This was a non-emergent, non-tele stroke consult.

## 2021-08-27 NOTE — ED PROVIDER NOTES
"  History   Chief Complaint:  One-sided Weakness     The history is provided by the patient and the spouse.      Francois Ly is an 81 year old female with history of hypertension and hyperlipidemia who presents with one-sided weakness. Francois was in her baseline state of health and finishing a shower 1 hour prior to arrival (2215) when she noticed weakness in her left hand as if it had \"no power.\" She denies numbness but remarks it feels unusual. She denies any other symptoms aside from briefly a visual disturbance while putting on her shoes which she described as looking like \"broken glass\". She denies speech difficulty or dizziness. She took 324 mg aspirin before presenting. Her  remarks she had no confusion or gait disturbance.    Of note, she did hit her head on the refrigerator 2 days ago and has a small bump that is tender here, but she denies headache. She had no loss of consciousness or vomiting with that minor head injury. She denies recent illness.     Review of Systems   Constitutional: Negative for activity change.   Eyes: Positive for visual disturbance (resolved).   Gastrointestinal: Negative for vomiting.   Musculoskeletal: Negative for gait problem.   Neurological: Positive for weakness. Negative for dizziness, syncope, speech difficulty, numbness and headaches.   Psychiatric/Behavioral: Negative for confusion.   All other systems reviewed and are negative.    Allergies:  Ace Inhibitors  Lactose  Sorbitan Trioleate  Sulfonylureas  Vancomycin    Medications:  Zocor  Hyzaar  Aspirin 81 mg    Past Medical History:    Hypertension   Dyslipidemia  Peripheral artery disease  Small bowel obstruction   Slipped intervertebral disc  Diverticulosis of large intestine  Hyperlipidemia     Past Surgical History:    Hysterectomy   Lumbar fusion    Social History:  Presents with her       Physical Exam     Patient Vitals for the past 24 hrs:   BP Temp Temp src Pulse Resp SpO2 Height Weight " "  08/27/21 0320 (!) 162/80 97.9  F (36.6  C) Oral 57 16 95 % -- --   08/27/21 0120 (!) 183/70 97.3  F (36.3  C) Oral 54 17 97 % -- --   08/27/21 0050 (!) 186/81 -- -- 56 12 -- -- --   08/27/21 0040 (!) 177/76 -- -- 60 13 -- -- --   08/27/21 0030 (!) 179/81 -- -- 63 -- -- -- --   08/27/21 0020 (!) 176/84 -- -- 61 12 -- -- --   08/27/21 0010 (!) 194/90 -- -- 60 18 -- -- --   08/27/21 0000 (!) 171/70 -- -- 60 17 95 % -- --   08/26/21 2350 (!) 154/75 -- -- 77 9 -- -- --   08/26/21 2340 (!) 196/87 -- -- 71 -- 97 % -- --   08/26/21 2329 (!) 222/69 -- -- 87 17 97 % -- --   08/26/21 2313 (!) 196/78 97.9  F (36.6  C) Temporal 77 18 98 % 1.575 m (5' 2\") 61.7 kg (136 lb)       Physical Exam  General: Well-developed and well-nourished. Well appearing elderly  woman. Cooperative.  Head:  Atraumatic.  Eyes:  Conjunctivae, lids, and sclerae are normal.  ENT:    Normal nose. Moist mucous membranes.  Neck:  Supple. Normal range of motion.  CV:  Regular rate and rhythm. Normal heart sounds with no murmurs, rubs, or gallops detected.  Resp:  No respiratory distress. Clear to auscultation bilaterally without decreased breath sounds, wheezing, rales, or rhonchi.  GI:  Soft. Non-distended. Non-tender.    MS:  Normal ROM. No bilateral lower extremity edema.  Skin:  Warm. Non-diaphoretic. No pallor.  Neuro: Awake. A&Ox3.     Strength 5/5 right upper and lower extremities.    Strength 4/5 in the left upper and lower extremities.    Left pronator drift.    Sensation intact to light touch.    No facial droop. No dysarthria.    No aphasia.    PERRL.     No visual field deficits.    No dysmetria.    No dysdiadochokinesis.  Psych:  Normal mood and affect. Normal speech.  Vitals reviewed.    Emergency Department Course   EKG  Indication: One-sided weakness  Time: 0004  Rate 61 bpm. [place] interval 196. QRS duration 94. QT/QTc 470/473.   Normal sinus rhythm. Left ventricular hypertrophy with repolarization abnormality. Cannot rule out septal " infarct, age undetermined.   No acute ST changes.  No significant change as compared to prior, dated 9/15/20.    Imaging:  CT Head w/o Contrast  1.  No acute intracranial abnormality.   2.  Stable age-related change.   As read by Radiology.    CTA Head Neck with Contrast  HEAD CTA:   1.  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 This is technically age-indeterminate, though favored to be chronic.   2.  Mild to moderate multifocal narrowing of the distal dominant right vertebral artery and moderate to marked focal narrowing of the basilar artery. The vessel remains patent.   3.  Mild atheromatous disease of the anterior circulation without high grade stenosis or branch occlusion.     NECK CTA:   1.  Mild multifocal segmental stenosis of the left vertebral artery which is patent throughout its cervical course, though occludes at the skull base.   2.  Dominant right vertebral artery is patent.   3.  No high grade carotid stenosis.   As read by Radiology.      Laboratory:  CBC: WBC 6.3, HGB 12.7,   BMP: glucose 155 (H), potassium 3.3 (L) o/w WNL (Creatinine 0.76)     Glucose (2329): 150 (H)    PTT: 36    INR: 0.90    Asymptomatic COVID-19 Virus (Coronavirus) by PCR: negative    Troponin (Collected 2320): 0.038     Emergency Department Course:    Reviewed:  I reviewed nursing notes, vitals, and past medical history.    Assessments:  2200 Last known well.   2318 Provider at bedside.  2320 Tier 1 code stroke called.   2322 I obtained history and examined the patient as noted above.   2352 I rechecked the patient, and the fellow is doing a TeleStroke examination.   0003 I rechecked the patient she still feels weak. Her blood pressure is better.   0017 De-escalate code stroke.     Consults:   2330 I spoke with Dr. Vallecillo of stroke neurology.   2339 I spoke with Dr. Do of radiology.   2347 I spoke with Dr. Vallecillo again.   2352 I spoke with Dr. Do again.   0014 I spoke with Dr. Vallecillo again.  "  0026 I spoke with Dr. Newman of the hospitalist service.     Interventions:  2351 Labetalol 20 mg IV  0023 Plavix 300 mg PO  0024 Potassium chloride 40 mEq PO   0050 Lipitor 80 mg PO    Disposition:  The patient was admitted to the hospital under the care of Dr. Newman.     Impression & Plan     CMS Diagnoses: The patient has stroke symptoms:         ED Stroke specific documentation           NIHSS PDF     Patient last known well time: 2200  ED Provider first to bedside at: 2318  CT Results received at: 2339    Thrombolytics:   Not given due to minor/isolated/quickly resolving symptoms.    If treating with thrombolytics: Ensure SBP<180 and DBP<105 prior to treatment with thrombolytics.  Administering thrombolytics after treatment with IV labetalol, hydralazine, or nicardipine is reasonable once BP control is established.    Endovascular Retrieval:  Not initiated due to absence of proximal vessel occlusion    National Institutes of Health Stroke Scale (Baseline)  Time Performed: 2322     Score    Level of consciousness: (0)   Alert, keenly responsive    LOC questions: (0)   Answers both questions correctly    LOC commands: (0)   Performs both tasks correctly    Best gaze: (0)   Normal    Visual: (0)   No visual loss    Facial palsy: (0)   Normal symmetrical movements    Motor arm (left): (1)   Drift    Motor arm (right): (0)   No drift    Motor leg (left): (0)   No drift    Motor leg (right): (0)   No drift    Limb ataxia: (0)   Absent    Sensory: (0)   Normal- no sensory loss    Best language: (0)   Normal- no aphasia    Dysarthria: (0)   Normal    Extinction and inattention: (0)   No abnormality        Total Score:  1      Medical Decision Making:  Francois is an 81-year-old woman who had acute onset of left upper extremity weakness 1 hour prior to arrival.  She describes some \"broken glass\" vision initially which has now resolved and she denies any other symptoms.  Upon arrival she appears well but she does have " drift in the left upper extremity.  She also seems to have weakness in the left lower extremity although there is no true drift.  She is significantly hypertensive to 222 systolic.  A tier 1 code stroke was called and she was immediately sent for CT imaging.  Fortunately, this does not reveal intracranial hemorrhage nor large vessel occlusion.  She has atherosclerosis primarily of the posterior circulation including what appears to likely be a chronic left vertebral artery occlusion (nondominant).  I discussed the patient's case with stroke neurology, Dr. Vallecillo, who evaluated the patient.  Because her symptoms are mild (NIH 1) and rapidly improving, she is not a TNKase candidate.  However, he recommends permissive hypertension, Plavix load, atorvastatin, and every 2 hour neuro checks.  She will require an MRI in the morning.    I did initially give her labetalol for hypertension.  This improved to 150 systolic briefly but then she trended upwards.  No further interventions were given because of plan for permissive hypertension.  EKG is reassuring without acute ST changes or arrhythmias.  Troponin is negative, but detectable.  This may be related to her severe hypertension.  Basic laboratory studies are otherwise noncontributory with minimal hypokalemia to 3.3 repleted orally. She required no further interventions and I updated her and her  on findings and plan for admission.  They verbalized understanding and are amenable.  I discussed the patient's case with Dr. Newman, hospitalist, who accepts admission and has no further orders.    Covid-19  Francois Ly was evaluated during a global COVID-19 pandemic, which necessitated consideration that the patient might be at risk for infection with the SARS-CoV-2 virus that causes COVID-19.   Applicable protocols for evaluation were followed during the patient's care.   COVID-19 was considered as part of the patient's evaluation. The plan for testing is:  a test  was obtained during this visit.    Diagnosis:    ICD-10-CM    1. Cerebrovascular accident (CVA), unspecified mechanism (H)  I63.9    2. Uncontrolled hypertension  I10    3. Hypokalemia  E87.6        Scribe Disclosure:  I, Harini Reid, am serving as a scribe at 11:21 PM on 8/26/2021 to document services personally performed by Skye Mac MD based on my observations and the provider's statements to me.              Skye Mac MD  08/27/21 1730

## 2021-08-28 LAB
GLUCOSE BLDC GLUCOMTR-MCNC: 111 MG/DL (ref 70–99)
GLUCOSE BLDC GLUCOMTR-MCNC: 112 MG/DL (ref 70–99)
GLUCOSE BLDC GLUCOMTR-MCNC: 115 MG/DL (ref 70–99)

## 2021-08-28 PROCEDURE — 120N000001 HC R&B MED SURG/OB

## 2021-08-28 PROCEDURE — 99232 SBSQ HOSP IP/OBS MODERATE 35: CPT | Performed by: HOSPITALIST

## 2021-08-28 PROCEDURE — 250N000013 HC RX MED GY IP 250 OP 250 PS 637: Performed by: STUDENT IN AN ORGANIZED HEALTH CARE EDUCATION/TRAINING PROGRAM

## 2021-08-28 PROCEDURE — 250N000013 HC RX MED GY IP 250 OP 250 PS 637: Performed by: INTERNAL MEDICINE

## 2021-08-28 RX ADMIN — ASPIRIN 81 MG: 81 TABLET, COATED ORAL at 10:25

## 2021-08-28 RX ADMIN — SIMVASTATIN 10 MG: 10 TABLET, FILM COATED ORAL at 21:32

## 2021-08-28 RX ADMIN — CLOPIDOGREL BISULFATE 75 MG: 75 TABLET ORAL at 10:25

## 2021-08-28 ASSESSMENT — ACTIVITIES OF DAILY LIVING (ADL)
ADLS_ACUITY_SCORE: 8

## 2021-08-28 NOTE — PROVIDER NOTIFICATION
710 Francois Ly. Can we change this patient's neuros to q8 given we are just waiting for JOE? Thanks, Sahil CROOK [phone]    UPDATE: Hospitalist changed order to q8 neuro checks.

## 2021-08-28 NOTE — PHARMACY-ADMISSION MEDICATION HISTORY
Pharmacy Medication History  Admission medication history interview status for the 8/26/2021  admission is complete. See EPIC admission navigator for prior to admission medications     Location of Interview: Patient room  Medication history sources: Patient, Patient's family/friend () and Surescripts    Significant changes made to the medication list:  1) Allegra removed  2) Xiidra,and supplements added    In the past week, patient estimated taking medication this percent of the time: 50-90% due to adverse effect. Patient states she takes statin about a third of the time due to muscle pains.     Additional medication history information:   1) Patient admitted to taking Aleve today in the hospital from her supply she brought from home. Counseled patient and  about increased bleeding risk now that she is on dual antiplatelet therapy. Notified nurse of this as well. Patient should consider alternative pain medications first (i.e. tylenol) before using NSAIDs.     Medication reconciliation completed by provider prior to medication history? Yes    Time spent in this activity: 25 minutes    Prior to Admission medications    Medication Sig Last Dose Taking? Auth Provider   aspirin (ASA) 81 MG chewable tablet Take 81 mg by mouth daily Past Week at Unknown time Yes Reported, Patient   atorvastatin (LIPITOR) 40 MG tablet Take 1 tablet (40 mg) by mouth daily  Yes Ovidio Lopez MD   calcium carbonate (TUMS) 500 MG chewable tablet Take 1 tablet (500 mg) by mouth 3 times daily as needed for heartburn prn med Yes Tim Downey,    clopidogrel (PLAVIX) 75 MG tablet Take 1 tablet (75 mg) by mouth daily  Yes Ovidio Lopez MD   KRILL OIL PO Take 1 capsule by mouth daily (OTC: Unsure of strength) Past Week at Unknown time Yes Unknown, Entered By History   lifitegrast (XIIDRA) 5 % opthalmic solution Place 1 drop into both eyes 2 times daily Past Week at Unknown time Yes Unknown, Entered By  History   losartan-hydrochlorothiazide (HYZAAR) 100-25 MG tablet Take 1 tablet by mouth daily Past Week at Unknown time Yes Luis Feliciano MD   naproxen sodium (ANAPROX) 220 MG tablet Take 220 mg by mouth 3 times daily as needed for moderate pain Past Week at Unknown time Yes Unknown, Entered By History   NONFORMULARY Take 5 mLs by mouth every morning Perilla seed oil 1 tsp in smoothie every morning. Past Week at Unknown time Yes Unknown, Entered By History   UNKNOWN TO PATIENT Place 1-2 sprays into both eyes every evening Patient uses an OTC eye spray to help with dry eyes in the evening but is unsure of the name. Past Week at Unknown time Yes Unknown, Entered By History   UNKNOWN TO PATIENT Take 1 tablet by mouth daily (Patient takes an eye supplement but is unsure of the name) Past Week at Unknown time Yes Unknown, Entered By History   UNKNOWN TO PATIENT Place onto the skin daily as needed (leg cramps) Cream from [place] for leg cramps Past Week at Unknown time Yes Unknown, Entered By History   VITAMIN D PO Take 1 tablet by mouth daily (OTC: Unsure of strength) Past Week at Unknown time Yes Unknown, Entered By History       The information provided in this note is only as accurate as the sources available at the time of update(s)

## 2021-08-28 NOTE — PROGRESS NOTES
[facility]    Medicine Progress Note - Hospitalist Service       Date of Admission:  8/26/2021    Assessment & Plan           Francois Ly is a 81 year old female with past medical history significant for Hypertension, peripheral arterial disease, dyslipidemia admitted on 8/26/2021 with stroke-like symptoms.      Acute ischemic right MCA stroke  Dyslipidemia   Possible lesion on the posterior mitral valve leaflet  Pt presented to the ED with left hand weakness and visual disturbance. This occurred about one hour prior to presentation. Stroke code was called. CT/CTA did not demonstrate any acute intracranial pathology.   MRI: Right frontal and parietal regions demonstrate multiple small acute infarcts predominantly within the watershed distribution.  Transthoracic echocardiogram: There is significnat calcification of the PML. There is a mobile echodensity  on the PML, likely a fibrinous strand vs vegetation vs. mobile calcification.    Currently non-focal neurologically.    - Transesophageal echocardiogram Monday  - Neurochecks  - Permissive HTN; goal SBP < 220 mmHg  - aspirin and clopidogrel  - Statin: PTA simvastatin 10mg ordered (pt reports hx of intolerance to higher doses of statin), consider dose titration if tolerated  - Telemetry  - Bedside Glucose Monitoring  - A1c, Lipid Panel  - PT/OT/SLP  - Stroke Education  - Euthermia, Euglycemia    Hypertension  BPs markedly elevated on arrival with systolic pressures >220.   - Permissive Hypertension   - Holding PTA lisinopril-hydrochlorothiazide for now. - may consider stopping the hydrochlorothiazide given reports of recurrent leg cramping.         Diet: Diet  Regular Diet Adult    DVT Prophylaxis: Pneumatic Compression Devices  Collins Catheter: Not present  Central Lines: None  Code Status: Full Code      Disposition Plan   Expected discharge: 08/28/2021   recommended to prior living arrangement once neuro status is stable- JOE Monday.      The patient's care was discussed with the Bedside Nurse, Patient and Patient's Family.    Kalen Bhagat MD  Hospitalist Service  [facility]  Securely message with the Vocera Web Console (learn more here)  Text page via Voxound Paging/Directory      Clinically Significant Risk Factors Present on Admission               ______________________________________________________________________    Interval History   No pain or complaints     Data reviewed today: I reviewed all medications, new labs and imaging results over the last 24 hours. I personally reviewed no images or EKG's today.    Physical Exam   Vital Signs: Temp: 97.9  F (36.6  C) Temp src: Oral BP: 129/78 Pulse: 75   Resp: 16 SpO2: 96 % O2 Device: None (Room air)    Weight: 136 lbs 0 oz  Constitutional: awake, alert, cooperative, no apparent distress  Skin: no rashes  Neurologic: Awake, alert, oriented to name, place and time.  Cranial nerves II-XII are grossly intact.  Motor is 5 out of 5 bilaterally.   Neuropsychiatric: General: normal, calm and normal eye contact    Data   Recent Labs   Lab 08/28/21  0826 08/28/21  0647 08/28/21  0159 08/27/21  0755 08/26/21  2320   WBC  --   --   --  5.4 6.3   HGB  --   --   --  11.1* 12.7   MCV  --   --   --  91 92   PLT  --   --   --  235 270   INR  --   --   --   --  0.90   NA  --   --   --  141 136   POTASSIUM  --   --   --  3.7 3.3*   CHLORIDE  --   --   --  109 103   CO2  --   --   --  25 30   BUN  --   --   --  12 15   CR  --   --   --  0.70 0.76   ANIONGAP  --   --   --  7 3   ARIN  --   --   --  8.8 8.9   * 115* 112* 113* 155*   TROPONIN  --   --   --   --  0.038     Recent Results (from the past 24 hour(s))   Echocardiogram Complete - For age > 60 yrs   Result Value    LVEF  65-70%    Narrative    [id number]  [id number]  [id number]  [id number]^LESTER^LOUIE^A     [facility]  Echocardiography Laboratory  [address] [place] [id number]     Name: RICKY  FRANCISCO VARGAS  MRN: [id number]  : 1940  Study Date: 2021 01:45 PM  Age: 81 yrs  Gender: Female  Patient Location: [facility]  Reason For Study: Cerebrovascular Incident  Ordering Physician: LOUIE BATISTA  Referring Physician: Luis Feliciano  Performed By: Israel Iqbal     BSA: 1.6 m2  Height: 62 in  Weight: 136 lb  HR: 71  BP: 186/81 mmHg  ______________________________________________________________________________  Procedure  Complete Portable Echo Adult. Technically difficult study. Poor acoustic  windows.  ______________________________________________________________________________  Interpretation Summary     Technically difficult study.  The visual ejection fraction is 65-70%. No regional wall motion abnormalities  noted. Mild LVH.  The right ventricle is normal in size and function.  There is significnat calcification of the PML. There is a mobile echodensity  on the PML, likely a fibrinous strand vs vegetation vs. mobile calcification.  There is moderate trileaflet aortic sclerosis without hemodynamically  significant stenosis.  Dilation of the inferior vena cava is present with normal respiratory  variation in diameter.  Small pericardial effusion without tamponade features.     Patient is severely hypertensive.  ______________________________________________________________________________  Left Ventricle  The left ventricle is normal in size. There is mild concentric left  ventricular hypertrophy. Proximal septal thickening is noted. Left ventricular  systolic function is normal. The visual ejection fraction is 65-70%. Diastolic  Doppler findings (E/E' ratio and/or other parameters) suggest left ventricular  filling pressures are increased. No regional wall motion abnormalities noted.     Right Ventricle  The right ventricle is normal in size and function.     Atria  Normal left atrial size. Right atrial size is normal. Right atrium not well  visualized.     Mitral Valve  Thickened mitral valve  posterior leaflet. There is mild to moderate mitral  annular calcification. There is trace to mild mitral regurgitation. The mean  mitral valve gradient is 2.9 mmHg. The peak mitral valve gradient is 7.8 mmHg.     Tricuspid Valve  The right ventricular systolic pressure is approximated at 19.8 mmHg plus the  right atrial pressure. There is trace to mild tricuspid regurgitation.     Aortic Valve  There is moderate trileaflet aortic sclerosis. There is trace to mild aortic  regurgitation. No hemodynamically significant valvular aortic stenosis.     Pulmonic Valve  The pulmonic valve is not well visualized.     Vessels  Dilation of the inferior vena cava is present with normal respiratory  variation in diameter. IVC diameter and respiratory changes fall into an  intermediate range suggesting an RA pressure of 8 mmHg.     Pericardium  Small pericardial effusion.     ______________________________________________________________________________  MMode/2D Measurements & Calculations  IVSd: 1.3 cm  LVIDd: 4.8 cm  LVIDs: 2.9 cm  LVPWd: 1.0 cm  FS: 39.2 %  LV mass(C)d: 213.0 grams  LV mass(C)dI: 131.3 grams/m2     Ao root diam: 3.4 cm  LA dimension: 3.2 cm  asc Aorta Diam: 3.6 cm  LA/Ao: 0.92  LVOT diam: 2.2 cm  LVOT area: 3.9 cm2  LA Volume (BP): 50.8 ml  LA Volume Index (BP): 31.4 ml/m2  RWT: 0.43     Doppler Measurements & Calculations  MV E max brent: 76.6 cm/sec  MV A max brent: 119.8 cm/sec  MV E/A: 0.64  MV max P.8 mmHg  MV mean P.9 mmHg  MV V2 VTI: 33.8 cm  MVA(VTI): 3.6 cm2     MV dec slope: 218.7 cm/sec2  Ao V2 max: 203.6 cm/sec  Ao max P.0 mmHg  Ao V2 mean: 130.4 cm/sec  Ao mean P.0 mmHg  Ao V2 VTI: 43.1 cm  MIMA(I,D): 2.8 cm2  MIMA(V,D): 2.9 cm2  LV V1 max P.9 mmHg  LV V1 max: 149.4 cm/sec  LV V1 VTI: 31.3 cm  SV(LVOT): 122.2 ml  SI(LVOT): 75.3 ml/m2  PA acc time: 0.09 sec  TR max brent: 222.6 cm/sec  TR max P.8 mmHg  AV Brent Ratio (DI): 0.73  MIMA Index (cm2/m2): 1.7  E/E' av.4  Lateral  E/e': 16.8  [facility] E/e': 20.1     ______________________________________________________________________________  Report approved by: Sheryl Miguel 08/27/2021 03:51 PM           Medications     - MEDICATION INSTRUCTIONS -       - MEDICATION INSTRUCTIONS -         aspirin  81 mg Oral Daily     clopidogrel  75 mg Oral Daily     diclofenac  2 g Topical 4x Daily     [Held by provider] losartan-hydrochlorothiazide  1 tablet Oral Daily     simvastatin  10 mg Oral At Bedtime     sodium chloride (PF)  3 mL Intracatheter Q8H

## 2021-08-28 NOTE — PROGRESS NOTES
"Pt here for left hand weakness, found to have R MCA stroke. A/O x4 and able to make needs known, Neuros intact, BP elevated but falls within specified stroke parameters. A/1 /GB with transfers. Continues to C/O severe leg cramps, uses home pain cream.Warm pack given with some effect.  NPO since midnight for JOE.     BP (!) 172/75 (BP Location: Left arm)   Pulse 73   Temp 97.8  F (36.6  C) (Oral)   Resp 18   Ht 1.575 m (5' 2\")   Wt 61.7 kg (136 lb)   LMP  (LMP Unknown)   SpO2 94%   BMI 24.87 kg/m      "

## 2021-08-28 NOTE — PLAN OF CARE
Reason for Admission: Acute R MCA CVA    Cognitive/Mentation: A/Ox 4  Neuros/CMS: Intact ex intermittent L pinky finger tingling  VS: stable. Tele: SR.  GI: BS active, + flatus. Continent.  : voiding. Continent.  Pulmonary: LS clear.  Pain: Intermittent leg cramps. Using voltarin cream as needed    Drains: PIV  Skin: intact  Activity: IND  Diet: Regular with thin liquids. Takes pills whole.     Therapies recs: home  Discharge: pending JOE results    End of shift summary: Patient stable throughout shift.

## 2021-08-29 LAB — POTASSIUM BLD-SCNC: 3.5 MMOL/L (ref 3.4–5.3)

## 2021-08-29 PROCEDURE — 84132 ASSAY OF SERUM POTASSIUM: CPT | Performed by: HOSPITALIST

## 2021-08-29 PROCEDURE — 250N000013 HC RX MED GY IP 250 OP 250 PS 637: Performed by: STUDENT IN AN ORGANIZED HEALTH CARE EDUCATION/TRAINING PROGRAM

## 2021-08-29 PROCEDURE — 36415 COLL VENOUS BLD VENIPUNCTURE: CPT | Performed by: HOSPITALIST

## 2021-08-29 PROCEDURE — 99232 SBSQ HOSP IP/OBS MODERATE 35: CPT | Performed by: HOSPITALIST

## 2021-08-29 PROCEDURE — 93005 ELECTROCARDIOGRAM TRACING: CPT

## 2021-08-29 PROCEDURE — 250N000013 HC RX MED GY IP 250 OP 250 PS 637: Performed by: INTERNAL MEDICINE

## 2021-08-29 PROCEDURE — 120N000001 HC R&B MED SURG/OB

## 2021-08-29 RX ADMIN — CLOPIDOGREL BISULFATE 75 MG: 75 TABLET ORAL at 08:03

## 2021-08-29 RX ADMIN — SIMVASTATIN 10 MG: 10 TABLET, FILM COATED ORAL at 20:55

## 2021-08-29 RX ADMIN — ASPIRIN 81 MG: 81 TABLET, COATED ORAL at 08:03

## 2021-08-29 ASSESSMENT — ACTIVITIES OF DAILY LIVING (ADL)
ADLS_ACUITY_SCORE: 8

## 2021-08-29 NOTE — PROGRESS NOTES
"Pt here for left hand weakness, found to have R MCA stroke. A/Ox4 and able to make needs known. Neuros intact, Up with assist of 1/GB, denies numbness and tingling to extremities, denies pain. Tele, SR. Plan for JOE on Monday    Problem: Bowel Elimination Impaired (Stroke, Ischemic/Transient Ischemic Attack)  Goal: Effective Bowel Elimination  Outcome: No Change     Problem: Risk for Delirium  Goal: Improved Sleep  Outcome: Improving     Problem: Adjustment to Illness (Stroke, Ischemic/Transient Ischemic Attack)  Goal: Optimal Coping  Outcome: Improving    BP (!) 146/70 (BP Location: Left arm)   Pulse 66   Temp 97.9  F (36.6  C) (Oral)   Resp 16   Ht 1.575 m (5' 2\")   Wt 61.7 kg (136 lb)   LMP  (LMP Unknown)   SpO2 95%   BMI 24.87 kg/m      "

## 2021-08-29 NOTE — PROGRESS NOTES
[facility]    Medicine Progress Note - Hospitalist Service       Date of Admission:  8/26/2021    Assessment & Plan           Francois Ly is a 81 year old female with past medical history significant for Hypertension, peripheral arterial disease, dyslipidemia admitted on 8/26/2021 with stroke-like symptoms.      Acute ischemic right MCA stroke  Dyslipidemia   Possible lesion on the posterior mitral valve leaflet  Pt presented to the ED with left hand weakness and visual disturbance. This occurred about one hour prior to presentation. Stroke code was called. CT/CTA did not demonstrate any acute intracranial pathology.   MRI: Right frontal and parietal regions demonstrate multiple small acute infarcts predominantly within the watershed distribution.  Transthoracic echocardiogram: There is significnat calcification of the PML. There is a mobile echodensity  on the PML, likely a fibrinous strand vs vegetation vs. mobile calcification.    Currently non-focal neurologically.    - Transesophageal echocardiogram Monday  - Neurochecks  - Permissive HTN; goal SBP < 220 mmHg  - aspirin and clopidogrel  - Statin: PTA simvastatin 10mg ordered (pt reports hx of intolerance to higher doses of statin), consider dose titration if tolerated  - Telemetry  - Bedside Glucose Monitoring  - A1c, Lipid Panel  - PT/OT/SLP  - Stroke Education  - Euthermia, Euglycemia    Hypertension  BPs markedly elevated on arrival with systolic pressures >220.   - Permissive Hypertension   - Holding PTA lisinopril-hydrochlorothiazide for now. - may consider stopping the hydrochlorothiazide given reports of recurrent leg cramping.   -Resume anti-hypertensive medications when ok with neurologist      8/29- stable, no complaints and neurologically intact.  Awaiting transesophageal echocardiogram tomorrow. Continue present care.     Diet: Diet  NPO per Anesthesia Guidelines for Procedure/Surgery Except for: Meds  Regular Diet Adult     DVT Prophylaxis: Pneumatic Compression Devices  Collins Catheter: Not present  Central Lines: None  Code Status: Full Code      Disposition Plan   Expected discharge: 08/30/2021   recommended to prior living arrangement once neuro status is stable- JOE Monday.     The patient's care was discussed with the Bedside Nurse, Patient and Patient's Family.    Kalen Bhagat MD  Hospitalist Service  [facility]  Securely message with the Vocera Web Console (learn more here)  Text page via [employer] Paging/Directory      Clinically Significant Risk Factors Present on Admission               ______________________________________________________________________    Interval History   No pain or complaints     Data reviewed today: I reviewed all medications, new labs and imaging results over the last 24 hours. I personally reviewed no images or EKG's today.    Physical Exam   Vital Signs: Temp: 98  F (36.7  C) Temp src: Oral BP: (!) 160/71 Pulse: 54   Resp: 16 SpO2: 96 % O2 Device: None (Room air)    Weight: 136 lbs 0 oz  Constitutional: awake, alert, cooperative, no apparent distress  Skin: no rashes  Neurologic: Awake, alert, oriented to name, place and time.  Cranial nerves II-XII are grossly intact.  Motor is 5 out of 5 bilaterally.   Neuropsychiatric: General: normal, calm and normal eye contact    Data   Recent Labs   Lab 08/28/21  0826 08/28/21  0647 08/28/21  0159 08/27/21  0755 08/26/21  2320   WBC  --   --   --  5.4 6.3   HGB  --   --   --  11.1* 12.7   MCV  --   --   --  91 92   PLT  --   --   --  235 270   INR  --   --   --   --  0.90   NA  --   --   --  141 136   POTASSIUM  --   --   --  3.7 3.3*   CHLORIDE  --   --   --  109 103   CO2  --   --   --  25 30   BUN  --   --   --  12 15   CR  --   --   --  0.70 0.76   ANIONGAP  --   --   --  7 3   ARIN  --   --   --  8.8 8.9   * 115* 112* 113* 155*   TROPONIN  --   --   --   --  0.038     No results found for this or any previous  visit (from the past 24 hour(s)).  Medications     - MEDICATION INSTRUCTIONS -       - MEDICATION INSTRUCTIONS -         aspirin  81 mg Oral Daily     clopidogrel  75 mg Oral Daily     diclofenac  2 g Topical 4x Daily     [Held by provider] losartan-hydrochlorothiazide  1 tablet Oral Daily     simvastatin  10 mg Oral At Bedtime     sodium chloride (PF)  3 mL Intravenous Q8H     sodium chloride (PF)  3 mL Intracatheter Q8H

## 2021-08-29 NOTE — PLAN OF CARE
Pt admitted with R MCA CVA. Alert and oriented x4. VSS on RA. Tele SR with PACs. Checked 12 lead to confirm that pt was NOT in afib. Neuros intact - BUE strength equal, denies numbness or tingling in RUE. Lung sounds clear. Pt reported having 3 episodes of diarrhea with some abdominal cramping after eating breakfast. No episodes in the afternoon. Denied other pain/discomfort. Ambulating independently in the room with spouse. Plan for JOE tomorrow a.m., and then should discharge home.

## 2021-08-30 ENCOUNTER — ANESTHESIA (OUTPATIENT)
Dept: CARDIOLOGY | Facility: CLINIC | Age: 81
DRG: 066 | End: 2021-08-30
Payer: MEDICARE

## 2021-08-30 ENCOUNTER — APPOINTMENT (OUTPATIENT)
Dept: CARDIOLOGY | Facility: CLINIC | Age: 81
DRG: 066 | End: 2021-08-30
Attending: INTERNAL MEDICINE
Payer: MEDICARE

## 2021-08-30 ENCOUNTER — APPOINTMENT (OUTPATIENT)
Dept: CARDIOLOGY | Facility: CLINIC | Age: 81
DRG: 066 | End: 2021-08-30
Attending: PSYCHIATRY & NEUROLOGY
Payer: MEDICARE

## 2021-08-30 ENCOUNTER — TELEPHONE (OUTPATIENT)
Dept: CARE COORDINATION | Facility: CLINIC | Age: 81
End: 2021-08-30

## 2021-08-30 ENCOUNTER — ANESTHESIA EVENT (OUTPATIENT)
Dept: CARDIOLOGY | Facility: CLINIC | Age: 81
DRG: 066 | End: 2021-08-30
Payer: MEDICARE

## 2021-08-30 VITALS
WEIGHT: 136 LBS | RESPIRATION RATE: 16 BRPM | TEMPERATURE: 98 F | SYSTOLIC BLOOD PRESSURE: 145 MMHG | DIASTOLIC BLOOD PRESSURE: 79 MMHG | HEIGHT: 62 IN | HEART RATE: 59 BPM | OXYGEN SATURATION: 94 % | BODY MASS INDEX: 25.03 KG/M2

## 2021-08-30 LAB — POTASSIUM BLD-SCNC: 3.4 MMOL/L (ref 3.4–5.3)

## 2021-08-30 PROCEDURE — 36415 COLL VENOUS BLD VENIPUNCTURE: CPT | Performed by: HOSPITALIST

## 2021-08-30 PROCEDURE — 999N000011 HC STATISTIC ANESTHESIA CASE

## 2021-08-30 PROCEDURE — 93325 DOPPLER ECHO COLOR FLOW MAPG: CPT

## 2021-08-30 PROCEDURE — 250N000013 HC RX MED GY IP 250 OP 250 PS 637: Performed by: STUDENT IN AN ORGANIZED HEALTH CARE EDUCATION/TRAINING PROGRAM

## 2021-08-30 PROCEDURE — 93270 REMOTE 30 DAY ECG REV/REPORT: CPT

## 2021-08-30 PROCEDURE — 93312 ECHO TRANSESOPHAGEAL: CPT | Mod: 26 | Performed by: INTERNAL MEDICINE

## 2021-08-30 PROCEDURE — 93005 ELECTROCARDIOGRAM TRACING: CPT

## 2021-08-30 PROCEDURE — 250N000011 HC RX IP 250 OP 636: Performed by: STUDENT IN AN ORGANIZED HEALTH CARE EDUCATION/TRAINING PROGRAM

## 2021-08-30 PROCEDURE — 84132 ASSAY OF SERUM POTASSIUM: CPT | Performed by: HOSPITALIST

## 2021-08-30 PROCEDURE — 250N000009 HC RX 250: Performed by: STUDENT IN AN ORGANIZED HEALTH CARE EDUCATION/TRAINING PROGRAM

## 2021-08-30 PROCEDURE — 93320 DOPPLER ECHO COMPLETE: CPT | Mod: 26 | Performed by: INTERNAL MEDICINE

## 2021-08-30 PROCEDURE — 93325 DOPPLER ECHO COLOR FLOW MAPG: CPT | Mod: 26 | Performed by: INTERNAL MEDICINE

## 2021-08-30 PROCEDURE — 99238 HOSP IP/OBS DSCHRG MGMT 30/<: CPT | Performed by: HOSPITALIST

## 2021-08-30 PROCEDURE — 99233 SBSQ HOSP IP/OBS HIGH 50: CPT | Mod: GC | Performed by: PSYCHIATRY & NEUROLOGY

## 2021-08-30 PROCEDURE — 93320 DOPPLER ECHO COMPLETE: CPT

## 2021-08-30 PROCEDURE — 250N000013 HC RX MED GY IP 250 OP 250 PS 637: Performed by: INTERNAL MEDICINE

## 2021-08-30 PROCEDURE — 250N000009 HC RX 250: Performed by: NURSE ANESTHETIST, CERTIFIED REGISTERED

## 2021-08-30 PROCEDURE — 250N000011 HC RX IP 250 OP 636: Performed by: INTERNAL MEDICINE

## 2021-08-30 PROCEDURE — 258N000003 HC RX IP 258 OP 636: Performed by: STUDENT IN AN ORGANIZED HEALTH CARE EDUCATION/TRAINING PROGRAM

## 2021-08-30 RX ORDER — FLUMAZENIL 0.1 MG/ML
0.2 INJECTION, SOLUTION INTRAVENOUS
Status: DISCONTINUED | OUTPATIENT
Start: 2021-08-30 | End: 2021-08-30

## 2021-08-30 RX ORDER — NALOXONE HYDROCHLORIDE 0.4 MG/ML
0.2 INJECTION, SOLUTION INTRAMUSCULAR; INTRAVENOUS; SUBCUTANEOUS
Status: DISCONTINUED | OUTPATIENT
Start: 2021-08-30 | End: 2021-08-30

## 2021-08-30 RX ORDER — ACETAMINOPHEN 325 MG/1
650 TABLET ORAL EVERY 4 HOURS PRN
Status: DISCONTINUED | OUTPATIENT
Start: 2021-08-30 | End: 2021-08-30 | Stop reason: HOSPADM

## 2021-08-30 RX ORDER — GLYCOPYRROLATE 0.2 MG/ML
0.1 INJECTION, SOLUTION INTRAMUSCULAR; INTRAVENOUS ONCE
Status: COMPLETED | OUTPATIENT
Start: 2021-08-30 | End: 2021-08-30

## 2021-08-30 RX ORDER — HYDRALAZINE HYDROCHLORIDE 10 MG/1
10 TABLET, FILM COATED ORAL EVERY 6 HOURS PRN
Status: DISCONTINUED | OUTPATIENT
Start: 2021-08-30 | End: 2021-08-30 | Stop reason: HOSPADM

## 2021-08-30 RX ORDER — DEXTROSE MONOHYDRATE 25 G/50ML
9.5 INJECTION, SOLUTION INTRAVENOUS
Status: DISCONTINUED | OUTPATIENT
Start: 2021-08-30 | End: 2021-08-30

## 2021-08-30 RX ORDER — NALOXONE HYDROCHLORIDE 0.4 MG/ML
0.4 INJECTION, SOLUTION INTRAMUSCULAR; INTRAVENOUS; SUBCUTANEOUS
Status: DISCONTINUED | OUTPATIENT
Start: 2021-08-30 | End: 2021-08-30

## 2021-08-30 RX ORDER — LIDOCAINE HYDROCHLORIDE 40 MG/ML
1.5 SOLUTION TOPICAL ONCE
Status: COMPLETED | OUTPATIENT
Start: 2021-08-30 | End: 2021-08-30

## 2021-08-30 RX ORDER — LIDOCAINE 50 MG/G
OINTMENT TOPICAL ONCE
Status: COMPLETED | OUTPATIENT
Start: 2021-08-30 | End: 2021-08-30

## 2021-08-30 RX ORDER — ACETAMINOPHEN 650 MG/1
650 SUPPOSITORY RECTAL EVERY 4 HOURS PRN
Status: DISCONTINUED | OUTPATIENT
Start: 2021-08-30 | End: 2021-08-30 | Stop reason: HOSPADM

## 2021-08-30 RX ORDER — FENTANYL CITRATE 50 UG/ML
25 INJECTION, SOLUTION INTRAMUSCULAR; INTRAVENOUS
Status: DISCONTINUED | OUTPATIENT
Start: 2021-08-30 | End: 2021-08-30

## 2021-08-30 RX ORDER — PROPOFOL 10 MG/ML
INJECTION, EMULSION INTRAVENOUS PRN
Status: DISCONTINUED | OUTPATIENT
Start: 2021-08-30 | End: 2021-08-30

## 2021-08-30 RX ORDER — SODIUM CHLORIDE 9 MG/ML
INJECTION, SOLUTION INTRAVENOUS CONTINUOUS PRN
Status: DISCONTINUED | OUTPATIENT
Start: 2021-08-30 | End: 2021-08-30 | Stop reason: HOSPADM

## 2021-08-30 RX ORDER — LIDOCAINE 40 MG/G
CREAM TOPICAL
Status: DISCONTINUED | OUTPATIENT
Start: 2021-08-30 | End: 2021-08-30

## 2021-08-30 RX ADMIN — CLOPIDOGREL BISULFATE 75 MG: 75 TABLET ORAL at 12:41

## 2021-08-30 RX ADMIN — FENTANYL CITRATE 25 MCG: 50 INJECTION, SOLUTION INTRAMUSCULAR; INTRAVENOUS at 09:19

## 2021-08-30 RX ADMIN — GLYCOPYRROLATE 0.1 MG: 0.2 INJECTION, SOLUTION INTRAMUSCULAR; INTRAVENOUS at 08:37

## 2021-08-30 RX ADMIN — SODIUM CHLORIDE: 9 INJECTION, SOLUTION INTRAVENOUS at 08:40

## 2021-08-30 RX ADMIN — MIDAZOLAM HYDROCHLORIDE 0.5 MG: 1 INJECTION, SOLUTION INTRAMUSCULAR; INTRAVENOUS at 09:26

## 2021-08-30 RX ADMIN — MIDAZOLAM HYDROCHLORIDE 1 MG: 1 INJECTION, SOLUTION INTRAMUSCULAR; INTRAVENOUS at 09:11

## 2021-08-30 RX ADMIN — TOPICAL ANESTHETIC 1 ML: 200 SPRAY DENTAL; PERIODONTAL at 09:04

## 2021-08-30 RX ADMIN — FENTANYL CITRATE 25 MCG: 50 INJECTION, SOLUTION INTRAMUSCULAR; INTRAVENOUS at 09:12

## 2021-08-30 RX ADMIN — MIDAZOLAM HYDROCHLORIDE 1 MG: 1 INJECTION, SOLUTION INTRAMUSCULAR; INTRAVENOUS at 09:16

## 2021-08-30 RX ADMIN — PROPOFOL 40 MG: 10 INJECTION, EMULSION INTRAVENOUS at 10:01

## 2021-08-30 RX ADMIN — MIDAZOLAM 0.5 MG: 1 INJECTION INTRAMUSCULAR; INTRAVENOUS at 10:28

## 2021-08-30 RX ADMIN — LIDOCAINE HYDROCHLORIDE 1.5 ML: 40 SOLUTION TOPICAL at 08:42

## 2021-08-30 RX ADMIN — FENTANYL CITRATE 12.5 MCG: 50 INJECTION, SOLUTION INTRAMUSCULAR; INTRAVENOUS at 09:34

## 2021-08-30 RX ADMIN — MIDAZOLAM HYDROCHLORIDE 1 MG: 1 INJECTION, SOLUTION INTRAMUSCULAR; INTRAVENOUS at 09:22

## 2021-08-30 RX ADMIN — ASPIRIN 81 MG: 81 TABLET, COATED ORAL at 12:40

## 2021-08-30 ASSESSMENT — ACTIVITIES OF DAILY LIVING (ADL)
ADLS_ACUITY_SCORE: 8
ADLS_ACUITY_SCORE: 11
ADLS_ACUITY_SCORE: 8
ADLS_ACUITY_SCORE: 11
ADLS_ACUITY_SCORE: 10

## 2021-08-30 ASSESSMENT — LIFESTYLE VARIABLES: TOBACCO_USE: 0

## 2021-08-30 NOTE — PLAN OF CARE
Patient is here with a right mca stroke, symptoms have resolved. A&0x4, VSS on room air, hypertensive but still within parameters. CMS and neuros intact. Denies pain. Up with SBA/Ind in the room. Tele NSR with frequent PACs. Plan is for JOE tomorrow and then possible discharge home.

## 2021-08-30 NOTE — PROGRESS NOTES
piv removed.  Discharge instructions including new meds and follow up care reviewed with pt and pt SO.  All questions answered.  Pt left with all belongings.  Pt to discharge when family arrives with transport.

## 2021-08-30 NOTE — CONSULTS
08/30/21 1318 Klisch, Christine M, RN     Stroke Education Note     The following information has been reviewed with the patient and family:     1. Warning signs of stroke     2. Calling 911 if having warning signs of stroke     3. All modifiable risk factors: hypertension, CAD, atrial fib, diabetes, hypercholesterolemia, smoking, substance abuse, diet, physical inactivity, obesity, sleep apnea.     4. Patient's risk factors for stroke which include: HTN,HLD     5. Follow-up plan for after discharge     6. Discharge medications which include: aspirin,plavix,zocor,hyzaar     In addition, the above information was given to the patient and family in writing as a part of the [facility] Stroke Class Handout.     Learner's response to risk factors / lifestyle modification education: Taking steps      Christine M Klisch, RN

## 2021-08-30 NOTE — ANESTHESIA CARE TRANSFER NOTE
Patient: Francois Ly    * No procedures listed *    Diagnosis: * No pre-op diagnosis entered *  Diagnosis Additional Information: No value filed.    Anesthesia Type:   General     Note:    Oropharynx: nasal airway in place  Level of Consciousness: awake  Oxygen Supplementation: blow-by O2    Independent Airway: airway patency satisfactory and stable  Dentition: dentition unchanged  Vital Signs Stable: post-procedure vital signs reviewed and stable  Report to RN Given: handoff report given  Patient transferred to: Telemetry/Step Down Unit    Handoff Report: Identifed the Patient, Identified the Reponsible Provider, Reviewed the pertinent medical history, Discussed the surgical course, Reviewed Intra-OP anesthesia mangement and issues during anesthesia, Set expectations for post-procedure period and Allowed opportunity for questions and acknowledgement of understanding      Vitals:  Vitals Value Taken Time   BP     Temp     Pulse     Resp     SpO2         Electronically Signed By: Fahad German MD  August 30, 2021  4:06 PM

## 2021-08-30 NOTE — PLAN OF CARE
Stroke. Neuros/CMS - alert & oriented; calm & cooperative; moving all extremities spontaneously/equally; no numbness/tingling; generalized weakness. No dizziness or lightheadedness. VSS, room air. Tele SR with first degree AV block/no chest discomfort. Regular diet tolerated/pills whole with water. Up with 1 assist/gait belt to bathroom this am. Continent of bowel & bladder. Denies pain. Off floor for JOE at 0800, back approximately 1105 to Station 73. Pt scoring green on the Aggression Stop Light Tool. Discharging home this afternoon via . Patient agrees with discharge plan, no unmet needs at this time.  AVS will be reviewed/questions answered.     Discharge delayed due to orders changing/cultures discontinued/holter monitor needed/orders clarified with neurology and medical. Anticipate discharge this afternoon.

## 2021-08-30 NOTE — PROGRESS NOTES
"[facility]    Stroke Consult Note    Reason for Consult:  Stroke    Chief Complaint: One-sided Weakness       HPI  Francois Ly is a 81 year old female  has a past medical history of Benign essential hypertension (10/12/2016), Bilateral low back pain without sciatica, unspecified chronicity (12/19/2017), Blunt trauma of rib, initial encounter (11/25/2016), Dyslipidemia, Gallstones, Hypertension, PAD (peripheral artery disease) (H) (6/19/2017), Pancreatic mass (8/17/2016), SBO (small bowel obstruction) (H), and Slipped intervertebral disc.   8/27/21 LKW at about 1030 and noted no power in her left hand. She noted a sensation of \"broken glass\" in her vision. She has drift in the left upper extremity with mild weakness in left lower extremity. Over the course of video evaluation  8/26 she noted improvement in her extremity and NIH was a 1 for sensory changes in the left upper extremity.  Patient was loaded with Plavix 300 mg and to be started on DAPT-patient took aspirin at home prior to arriving at the hospital.      Impression  Acute ischemic stroke of R MCA hand knob territory due to large-artery atherosclerosis vs cardioembolism. TTE: Echocardiogram returned with findings of a posible vegetation. JOE did not show vegetation. Location of stroke is distal to some intracranial atherosclerotic disease however ESUS is still a consideration for etiology.    LDL Cholesterol Calculated   Date Value Ref Range Status   08/27/2021 83 <=100 mg/dL Final     Comment:     Age 0-19 years:  Desirable: 0-110 mg/dL   Borderline high: 110-129 mg/dL   High: >= 130 mg/dL    Age 20 years and older:  Desirable: <100mg/dL  Above desirable: 100-129 mg/dL   Borderline high: 130-159 mg/dL   High: 160-189 mg/dL   Very high: >= 190 mg/dL   09/29/2020 126 (H) <100 mg/dL Final     Comment:     Above desirable:  100-129 mg/dl  Borderline High:  130-159 mg/dL  High:             160-189 mg/dL  Very high:       >189 " "mg/dl            Recommendations  - Ok to discharge from Neuro Perspective  - goal SBP < 140 mmHg  - Daily aspirin 81 mg for secondary stroke prevention  - Plavix (clopidogrel) 75 mg PO Daily, for 90 days then ASA monotherapy.   - Statin: Atorvastatin 40mg qday  -Cardiac monitor on discharge (ordered)    Patient Follow-up    - in 12 weeks with stroke clinic STEFANO (any stroke STEFANO) at [facility] stroke clinic. Patient will be contacted by virtual stroke clinic team after discharge.    Thank you for this consult.  Stroke neurology will sign off at this time.      The Stroke Staff is Dr. Espitia.    Lalito Vallecillo MD  Vascular Neurology Fellow  To page me or covering stroke neurology team member, click here: [facility]   Choose \"On Call\" tab at top, then search dropdown box for \"Neurology Adult\", select location, press Enter, then look for stroke/neuro ICU/telestroke.    _____________________________________________________    Clinically Significant Risk Factors Present on Admission                   Past Medical History   Past Medical History:   Diagnosis Date     Benign essential hypertension 10/12/2016     Bilateral low back pain without sciatica, unspecified chronicity 12/19/2017     Blunt trauma of rib, initial encounter 11/25/2016     Dyslipidemia      Gallstones      Hypertension      PAD (peripheral artery disease) (H) 6/19/2017     Pancreatic mass 8/17/2016     SBO (small bowel obstruction) (H)      Slipped intervertebral disc      Past Surgical History   Past Surgical History:   Procedure Laterality Date     GYN SURGERY      hysterectomy     ORTHOPEDIC SURGERY      Slipped disc with chronic back pain     Medications   Home Meds  Prior to Admission medications    Medication Sig Start Date End Date Taking? Authorizing Provider   aspirin (ASA) 81 MG chewable tablet Take 81 mg by mouth daily    Reported, Patient   calcium carbonate (TUMS) 500 MG chewable tablet Take 1 tablet (500 mg) by mouth 3 times daily as needed for " "heartburn 1/3/20   Tim Downey,    fexofenadine (ALLEGRA) 180 MG tablet Take 1 tablet (180 mg) by mouth daily 7/26/21   Luis Feliciano MD   losartan-hydrochlorothiazide (HYZAAR) 100-25 MG tablet Take 1 tablet by mouth daily 9/29/20   Luis Feliciano MD   NONFORMULARY Take 5 mLs by mouth every morning Perilla seed oil 1 tsp in smoothie every morning.    Unknown, Entered By History   simvastatin (ZOCOR) 10 MG tablet Take 1 tablet (10 mg) by mouth At Bedtime 10/6/20   Luis Feliciano MD   UNKNOWN TO PATIENT Place onto the skin daily as needed (leg cramps) Cream from [place] for leg cramps    Unknown, Entered By History       Scheduled Meds    aspirin  81 mg Oral Daily     clopidogrel  75 mg Oral Daily     diclofenac  2 g Topical 4x Daily     [Held by provider] losartan-hydrochlorothiazide  1 tablet Oral Daily     simvastatin  10 mg Oral At Bedtime     sodium chloride (PF)  3 mL Intracatheter Q8H       Infusion Meds    - MEDICATION INSTRUCTIONS -       - MEDICATION INSTRUCTIONS -       - MEDICATION INSTRUCTIONS -       sodium chloride 30 mL/hr at 08/30/21 0840       PRN Meds  acetaminophen **OR** acetaminophen, hydrALAZINE, lidocaine 4%, lidocaine (buffered or not buffered), - MEDICATION INSTRUCTIONS -, - MEDICATION INSTRUCTIONS -, - MEDICATION INSTRUCTIONS -, sodium chloride (PF), sodium chloride    Allergies   Allergies   Allergen Reactions     Ace Inhibitors      Lactose GI Disturbance     Reduced fat dairy- Diarrhea     Sorbitan Trioleate      Sulfonylureas      Vancomycin Hives     Family History   Family History   Problem Relation Age of Onset     Family History Negative Other      Social History   Social History     Tobacco Use     Smoking status: Former Smoker     Smokeless tobacco: Former User     Tobacco comment: Former \"social\" smoker, quit in 1978.   Vaping Use     Vaping Use: Never used   Substance Use Topics     Alcohol use: Yes     Alcohol/week: 0.0 standard drinks     Comment: occ wine, twice a week "     Drug use: No       Review of Systems   The 5 point Review of Systems is negative other than noted in the HPI or here.        PHYSICAL EXAMINATION   Temp:  [97.2  F (36.2  C)-98  F (36.7  C)] 98  F (36.7  C)  Pulse:  [56-83] 59  Resp:  [10-21] 16  BP: (127-224)/() 145/79  SpO2:  [91 %-100 %] 94 %    General Exam  General:  patient lying in bed without any acute distress    HEENT:  normocephalic/atraumatic  Cardio:  RRR  Pulmonary:  no respiratory distress  Abdomen:  non-distended, tender 2/2 to hernia  Extremities:  no edema  Skin:  intact     Neuro Exam  Mental Status:  alert, oriented x 3, follows commands, speech clear and fluent, naming and repetition normal  Cranial Nerves:  visual fields intact, PERRL, EOMI with normal smooth pursuit, facial sensation intact and symmetric, facial movements symmetric, hearing not formally tested but intact to conversation, palate elevation symmetric and uvula midline, no dysarthria, shoulder shrug strong bilaterally, tongue protrusion midline  Motor:  normal muscle tone and bulk, no abnormal movements, able to move all limbs spontaneously, no pronator drift, LUE ext 5/5; L hand  4+/5  Sensory:  light touch sensation intact and symmetric throughout upper and lower extremities  Coordination:  normal finger-to-nose and heel-to-shin bilaterally without dysmetria  Station/Gait:  deferred    Dysphagia Screen  Per Nursing    Imaging  I personally reviewed all imaging; relevant findings per HPI.    Labs Data   CBC  Recent Labs   Lab 08/27/21  0755 08/26/21  2320   WBC 5.4 6.3   RBC 3.75* 4.26   HGB 11.1* 12.7   HCT 34.2* 39.0    270     Basic Metabolic Panel   Recent Labs   Lab 08/30/21  0734 08/29/21  0901 08/28/21  0826 08/28/21  0647 08/28/21  0159 08/27/21  0755 08/26/21  2320   NA  --   --   --   --   --  141 136   POTASSIUM 3.4 3.5  --   --   --  3.7 3.3*   CHLORIDE  --   --   --   --   --  109 103   CO2  --   --   --   --   --  25 30   BUN  --   --   --   --    --  12 15   CR  --   --   --   --   --  0.70 0.76   GLC  --   --  111* 115* 112* 113* 155*   ARIN  --   --   --   --   --  8.8 8.9     Liver Panel  No results for input(s): PROTTOTAL, ALBUMIN, BILITOTAL, ALKPHOS, AST, ALT, BILIDIRECT in the last 168 hours.  INR    Recent Labs   Lab Test 08/26/21  2320   INR 0.90      Lipid Profile    Recent Labs   Lab Test 08/27/21  0755 09/29/20  1410 09/20/19  1044   CHOL 188 248* 250*   HDL 42* 48* 54   LDL 83 126* 154*   TRIG 317* 371* 216*     A1C    Recent Labs   Lab Test 08/27/21 0755   A1C 6.2*     Troponin I    Recent Labs   Lab 08/26/21 2320   TROPONIN 0.038

## 2021-08-30 NOTE — DISCHARGE SUMMARY
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  No follow up labs or test are needed.    - Follow up with neurology in about one month           Unresulted Labs Ordered in the Past 30 Days of this Admission     No orders found from 7/27/2021 to 8/27/2021.        Discharge Disposition   Discharged to home  Condition at discharge: Stable    Hospital Course    HPI:  \"Francois Ly is a 81 year old female who presented to the ED for evaluation of weakness. She reports that she was taking a shower when she suddenly notices weakness of her left hand. She reports that she couldn't do anything with it, not even hold a towel. She also noted some visual disturbance at the time as well. She reports looking at the tile in her bathroom and it looking like broken glass. By the time of my evaluation, her symptoms are currently resolved other than some mild numbness in the lateral aspect of her left hand.   She did endorse some muscle cramping in her legs just before I got to the room, but this is reportedly a recurrent problem for her. The cramping has now resolved.\"     Acute ischemic right MCA stroke  Dyslipidemia   Possible lesion on the posterior mitral valve leaflet  Pt presented to the ED with left hand weakness and visual disturbance. This occurred about one hour prior to presentation. Stroke code was called. CT/CTA did not demonstrate any acute intracranial pathology.   MRI: Right frontal and parietal regions demonstrate multiple small acute infarcts predominantly within the watershed " distribution.  Transthoracic echocardiogram: There is significnat calcification of the PML. There is a mobile echodensity  on the PML, likely a fibrinous strand vs vegetation vs. mobile calcification.  Transesophageal echocardiogram: Upon review of the previous two echocardiograms dated 08/27/2021 and 04/07/2016 the mobile echodensity associated with the posterior leaflet of the mitral valve appears similar and most likely represents mobile calcification.     Currently non-focal neurologically and stable for discharge.    Clopidogrel and aspirin x 90 days then aspirin     Continue antihypertensive medications    Continue statin     Discharge with a cardiac monitor     Follow up in neurology clinic      Hypertension  BPs markedly elevated on arrival with systolic pressures >220.   Permissive hypertension during stay.    Resume prior to admission medications at discharge     Follow up with primary care provider      Consultations This Hospital Stay   PATIENT LEARNING CENTER IP CONSULT  NEUROLOGY IP CONSULT  SPEECH LANGUAGE PATH ADULT IP CONSULT  PHARMACY IP CONSULT  PHARMACY IP CONSULT  PHARMACY IP CONSULT  PHYSICAL THERAPY ADULT IP CONSULT  OCCUPATIONAL THERAPY ADULT IP CONSULT  CARE MANAGEMENT / SOCIAL WORK IP CONSULT    Code Status   Full Code    Time Spent on this Encounter   I, Kalen Bhagat MD, personally saw the patient today and spent less than or equal to 30 minutes discharging this patient.     Kalen Bhagat MD  [address] STRO[facility]ENTER  [facility] [place] [address]  Phone: [phone]  ______________________________________________________________________    Physical Exam   Vital Signs: Temp: 98  F (36.7  C) Temp src: Oral BP: (!) 145/79 Pulse: 59   Resp: 16 SpO2: 94 % O2 Device: None (Room air) Oxygen Delivery: 2 LPM  Weight: 136 lbs 0 oz  Constitutional: awake, alert, cooperative, no apparent distress  Neurologic: Awake, alert, oriented to name, place  and time.  Cranial nerves II-XII are grossly intact.  Motor is 5 out of 5 bilaterally.        Primary Care Physician   Luis Feliciano    Discharge Orders      Reason for your hospital stay    You were hospitalized for a stroke. You had some weakness in your left wrist hand/wrist which improved spontaneously.     Follow-up and recommended labs and tests     Follow up with primary care provider, Luis Feliciano, within 7 days for hospital follow- up.  No follow up labs or test are needed.    - Follow up with neurology in about one month     Activity    Your activity upon discharge: activity as tolerated     Diet    Follow this diet upon discharge:     Regular Diet Adult       Significant Results and Procedures   Most Recent 3 CBC's:  Recent Labs   Lab Test 08/27/21  0755 08/26/21  2320 09/15/20  1918   WBC 5.4 6.3 6.3   HGB 11.1* 12.7 12.8   MCV 91 92 91    270 300     Most Recent 3 BMP's:  Recent Labs   Lab Test 08/30/21  0734 08/29/21  0901 08/28/21  0826 08/28/21  0647 08/28/21  0159 08/27/21  0755 08/26/21  2320 09/15/20  1918   NA  --   --   --   --   --  141 136 137   POTASSIUM 3.4 3.5  --   --   --  3.7 3.3* 3.4   CHLORIDE  --   --   --   --   --  109 103 102   CO2  --   --   --   --   --  25 30 29   BUN  --   --   --   --   --  12 15 18   CR  --   --   --   --   --  0.70 0.76 0.80   ANIONGAP  --   --   --   --   --  7 3 6   ARIN  --   --   --   --   --  8.8 8.9 8.6   GLC  --   --  111* 115* 112* 113* 155* 108*   ,   Results for orders placed or performed during the hospital encounter of 08/26/21   CT Head w/o Contrast    Narrative    EXAM: CT HEAD W/O CONTRAST, CTA  HEAD NECK WITH CONTRAST  LOCATION: [facility]  DATE/TIME: 8/26/2021 11:43 PM    INDICATION: Left-sided weakness.    COMPARISON: Head CT of 10/23/2013, CT neck 7/2/2019.    CONTRAST: 70 mL Isovue-370.    TECHNIQUE: Head and neck CT angiogram with IV contrast. Noncontrast head CT followed by axial helical CT images of the head  and neck vessels obtained during the arterial phase of intravenous contrast administration. Axial 2D reconstructed images and   multiplanar 3D MIP reconstructed images of the head and neck vessels were performed by the technologist. Dose reduction techniques were used. All stenosis measurements made according to NASCET criteria unless otherwise specified.    FINDINGS:   NONCONTRAST HEAD CT:   INTRACRANIAL CONTENTS: No intracranial hemorrhage, extra-axial collection, or mass effect. No CT evidence of acute infarct. Mild volume loss and presumed chronic small vessel ischemia is similar to 2013.    VISUALIZED ORBITS/SINUSES/MASTOIDS: No intraorbital abnormality. No paranasal sinus mucosal disease. No middle ear or mastoid effusion.    BONES/SOFT TISSUES: No acute abnormality.    HEAD CTA:  Moderate intracranial atheromatous disease predominantly involving the posterior circulation.    ANTERIOR CIRCULATION: Mild multifocal narrowing of the right M1 segment and M2 branches, though no branch occlusion or vascular cut-off. Mild narrowing of the left M1/M2 junction. Mild to moderate narrowing of the carotid siphons with moderate focal   narrowing of the left paraclinoid ICA.    POSTERIOR CIRCULATION: Significant atheromatous disease involving the nondominant left vertebral artery intracranially which is occluded from the level of the skull base up to the junction with the basilar artery. There was significant atheromatous   disease noted on the soft tissue neck CT of 2019, though there was some mild flow on that study. This is technically age-indeterminate, though favored to be chronic. Mild to moderate multifocal narrowing of the distal dominant right vertebral artery   which supplies the basilar artery. There is moderate to marked focal narrowing of the proximal to mid basilar artery, though the vessel remains patent.    DURAL VENOUS SINUSES: Expected enhancement of the major dural venous sinuses.    NECK CTA:  RIGHT  CAROTID: No measurable stenosis or dissection.    LEFT CAROTID: No measurable stenosis or dissection.    VERTEBRAL ARTERIES: Dominant right vertebral artery is widely patent throughout its cervical course. Mild multisegmental narrowing of the left vertebral artery which is noted to occlude at the level of the skull base and extending intracranially as   detailed above.    AORTIC ARCH: Classic aortic arch anatomy with no significant stenosis at the origin of the great vessels.    NONVASCULAR STRUCTURES: Unremarkable.      Impression    IMPRESSION:   HEAD CT:  1.  No acute intracranial abnormality.    2.  Stable age-related change.    HEAD CTA:   1.  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 This is technically age-indeterminate, though favored to be chronic.    2.  Mild to moderate multifocal narrowing of the distal dominant right vertebral artery and moderate to marked focal narrowing of the basilar artery. The vessel remains patent.    3.  Mild atheromatous disease of the anterior circulation without high grade stenosis or branch occlusion.    NECK CTA:  1.  Mild multifocal segmental stenosis of the left vertebral artery which is patent throughout its cervical course, though occludes at the skull base.    2.  Dominant right vertebral artery is patent.    3.  No high grade carotid stenosis.    CT head findings discussed with Dr. Mac at 11:40 p.m. and CTA findings discussed with Dr. Mac at 11:55 p.m. 8/26/2021.   CTA Head Neck with Contrast    Narrative    EXAM: CT HEAD W/O CONTRAST, CTA  HEAD NECK WITH CONTRAST  LOCATION: [facility]  DATE/TIME: 8/26/2021 11:43 PM    INDICATION: Left-sided weakness.    COMPARISON: Head CT of 10/23/2013, CT neck 7/2/2019.    CONTRAST: 70 mL Isovue-370.    TECHNIQUE: Head and neck CT angiogram with IV contrast. Noncontrast head CT followed by axial helical CT images of the head and neck vessels obtained during the arterial phase of  intravenous contrast administration. Axial 2D reconstructed images and   multiplanar 3D MIP reconstructed images of the head and neck vessels were performed by the technologist. Dose reduction techniques were used. All stenosis measurements made according to NASCET criteria unless otherwise specified.    FINDINGS:   NONCONTRAST HEAD CT:   INTRACRANIAL CONTENTS: No intracranial hemorrhage, extra-axial collection, or mass effect. No CT evidence of acute infarct. Mild volume loss and presumed chronic small vessel ischemia is similar to 2013.    VISUALIZED ORBITS/SINUSES/MASTOIDS: No intraorbital abnormality. No paranasal sinus mucosal disease. No middle ear or mastoid effusion.    BONES/SOFT TISSUES: No acute abnormality.    HEAD CTA:  Moderate intracranial atheromatous disease predominantly involving the posterior circulation.    ANTERIOR CIRCULATION: Mild multifocal narrowing of the right M1 segment and M2 branches, though no branch occlusion or vascular cut-off. Mild narrowing of the left M1/M2 junction. Mild to moderate narrowing of the carotid siphons with moderate focal   narrowing of the left paraclinoid ICA.    POSTERIOR CIRCULATION: Significant atheromatous disease involving the nondominant left vertebral artery intracranially which is occluded from the level of the skull base up to the junction with the basilar artery. There was significant atheromatous   disease noted on the soft tissue neck CT of 2019, though there was some mild flow on that study. This is technically age-indeterminate, though favored to be chronic. Mild to moderate multifocal narrowing of the distal dominant right vertebral artery   which supplies the basilar artery. There is moderate to marked focal narrowing of the proximal to mid basilar artery, though the vessel remains patent.    DURAL VENOUS SINUSES: Expected enhancement of the major dural venous sinuses.    NECK CTA:  RIGHT CAROTID: No measurable stenosis or dissection.    LEFT  CAROTID: No measurable stenosis or dissection.    VERTEBRAL ARTERIES: Dominant right vertebral artery is widely patent throughout its cervical course. Mild multisegmental narrowing of the left vertebral artery which is noted to occlude at the level of the skull base and extending intracranially as   detailed above.    AORTIC ARCH: Classic aortic arch anatomy with no significant stenosis at the origin of the great vessels.    NONVASCULAR STRUCTURES: Unremarkable.      Impression    IMPRESSION:   HEAD CT:  1.  No acute intracranial abnormality.    2.  Stable age-related change.    HEAD CTA:   1.  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 This is technically age-indeterminate, though favored to be chronic.    2.  Mild to moderate multifocal narrowing of the distal dominant right vertebral artery and moderate to marked focal narrowing of the basilar artery. The vessel remains patent.    3.  Mild atheromatous disease of the anterior circulation without high grade stenosis or branch occlusion.    NECK CTA:  1.  Mild multifocal segmental stenosis of the left vertebral artery which is patent throughout its cervical course, though occludes at the skull base.    2.  Dominant right vertebral artery is patent.    3.  No high grade carotid stenosis.    CT head findings discussed with Dr. Mac at 11:40 p.m. and CTA findings discussed with Dr. Mac at 11:55 p.m. 8/26/2021.   MR Brain w/o & w Contrast    Narrative    EXAM: MR BRAIN W/O and W CONTRAST  LOCATION: [facility]  DATE/TIME: 8/27/2021 2:41 AM    INDICATION: Neuro deficit, acute, stroke suspected. Left-sided weakness.  COMPARISON: CT head 08/26/2021  CONTRAST: 6mL Gadavist  TECHNIQUE: Routine multiplanar multisequence head MRI without and with intravenous contrast.    FINDINGS:  INTRACRANIAL CONTENTS: Right frontal and parietal region demonstrates multiple small acute infarcts predominantly within the watershed  distribution. (Restricted diffusion, positive FLAIR). This includes within the right precentral gyrus. No additional   acute infarcts. No mass, acute hemorrhage, or extra-axial fluid collections. Scattered nonspecific T2/FLAIR hyperintensities within the cerebral white matter most consistent with mild chronic microvascular ischemic change. Mild generalized cerebral   atrophy. No hydrocephalus. Normal position of the cerebellar tonsils. No pathologic contrast enhancement.    SELLA: No abnormality accounting for technique.    OSSEOUS STRUCTURES/SOFT TISSUES: Normal marrow signal. Left distal vertebral artery flow void absent may reflect slow flow or occlusion. Please defer to recent CTA head and neck 2021.     ORBITS: No abnormality accounting for technique.     SINUSES/MASTOIDS: Mild to moderate mucosal thickening scattered about the paranasal sinuses. No middle ear or mastoid effusion.       Impression    IMPRESSION:  1.  Right frontal and parietal regions demonstrate multiple small acute infarcts predominantly within the watershed distribution.  2.  Age-related changes described above.       Echocardiogram Complete - For age > 60 yrs     Value    LVEF  65-70%    Narrative    [id number]  [id number]  [id number]  [id number]^LESTER^LOUIE^IGLESIA     [facility]  Echocardiography Laboratory  [address] [place] [address]     Name: FRANCISCO GARCÍA  MRN: [id number]  : 1940  Study Date: 2021 01:45 PM  Age: 81 yrs  Gender: Female  Patient Location: [facility]  Reason For Study: Cerebrovascular Incident  Ordering Physician: LOUIE BATISTA  Referring Physician: Luis Feliciano  Performed By: Israel Iqbal     BSA: 1.6 m2  Height: 62 in  Weight: 136 lb  HR: 71  BP: 186/81 mmHg  ______________________________________________________________________________  Procedure  Complete Portable Echo Adult. Technically difficult study. Poor  acoustic  windows.  ______________________________________________________________________________  Interpretation Summary     Technically difficult study.  The visual ejection fraction is 65-70%. No regional wall motion abnormalities  noted. Mild LVH.  The right ventricle is normal in size and function.  There is significnat calcification of the PML. There is a mobile echodensity  on the PML, likely a fibrinous strand vs vegetation vs. mobile calcification.  There is moderate trileaflet aortic sclerosis without hemodynamically  significant stenosis.  Dilation of the inferior vena cava is present with normal respiratory  variation in diameter.  Small pericardial effusion without tamponade features.     Patient is severely hypertensive.  ______________________________________________________________________________  Left Ventricle  The left ventricle is normal in size. There is mild concentric left  ventricular hypertrophy. Proximal septal thickening is noted. Left ventricular  systolic function is normal. The visual ejection fraction is 65-70%. Diastolic  Doppler findings (E/E' ratio and/or other parameters) suggest left ventricular  filling pressures are increased. No regional wall motion abnormalities noted.     Right Ventricle  The right ventricle is normal in size and function.     Atria  Normal left atrial size. Right atrial size is normal. Right atrium not well  visualized.     Mitral Valve  Thickened mitral valve posterior leaflet. There is mild to moderate mitral  annular calcification. There is trace to mild mitral regurgitation. The mean  mitral valve gradient is 2.9 mmHg. The peak mitral valve gradient is 7.8 mmHg.     Tricuspid Valve  The right ventricular systolic pressure is approximated at 19.8 mmHg plus the  right atrial pressure. There is trace to mild tricuspid regurgitation.     Aortic Valve  There is moderate trileaflet aortic sclerosis. There is trace to mild aortic  regurgitation. No  hemodynamically significant valvular aortic stenosis.     Pulmonic Valve  The pulmonic valve is not well visualized.     Vessels  Dilation of the inferior vena cava is present with normal respiratory  variation in diameter. IVC diameter and respiratory changes fall into an  intermediate range suggesting an RA pressure of 8 mmHg.     Pericardium  Small pericardial effusion.     ______________________________________________________________________________  MMode/2D Measurements & Calculations  IVSd: 1.3 cm  LVIDd: 4.8 cm  LVIDs: 2.9 cm  LVPWd: 1.0 cm  FS: 39.2 %  LV mass(C)d: 213.0 grams  LV mass(C)dI: 131.3 grams/m2     Ao root diam: 3.4 cm  LA dimension: 3.2 cm  asc Aorta Diam: 3.6 cm  LA/Ao: 0.92  LVOT diam: 2.2 cm  LVOT area: 3.9 cm2  LA Volume (BP): 50.8 ml  LA Volume Index (BP): 31.4 ml/m2  RWT: 0.43     Doppler Measurements & Calculations  MV E max brent: 76.6 cm/sec  MV A max brent: 119.8 cm/sec  MV E/A: 0.64  MV max P.8 mmHg  MV mean P.9 mmHg  MV V2 VTI: 33.8 cm  MVA(VTI): 3.6 cm2     MV dec slope: 218.7 cm/sec2  Ao V2 max: 203.6 cm/sec  Ao max P.0 mmHg  Ao V2 mean: 130.4 cm/sec  Ao mean P.0 mmHg  Ao V2 VTI: 43.1 cm  MIMA(I,D): 2.8 cm2  MIMA(V,D): 2.9 cm2  LV V1 max P.9 mmHg  LV V1 max: 149.4 cm/sec  LV V1 VTI: 31.3 cm  SV(LVOT): 122.2 ml  SI(LVOT): 75.3 ml/m2  PA acc time: 0.09 sec  TR max brent: 222.6 cm/sec  TR max P.8 mmHg  AV Brent Ratio (DI): 0.73  MIMA Index (cm2/m2): 1.7  E/E' av.4  Lateral E/e': 16.8  Medial E/e': 20.1     ______________________________________________________________________________  Report approved by: Sheryl Miguel 2021 03:51 PM         Transesophageal Echocardiogram    Narrative    [id number]  [id number]  [id number]^KENAN^CATALINO^DON     [facility]  Echocardiography Laboratory  [address] [place] [id number]     Name: FRANCISCO GARCÍA  MRN: [id number]  : 1940  Study Date: 2021 09:01 AM  Age: 81  yrs  Gender: Female  Patient Location: [facility]  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 JOE Probe serial #[id number] was used during the procedure.  The heart rate, respiratory rate and response to care were monitored  throughout the procedure with the assistance of the nurse. JOE Probe #[id number]  was also used during the procedure.  ______________________________________________________________________________  Interpretation Summary     1. The left ventricle is normal in size. Left ventricular systolic function is  normal.  2. The right ventricle is normal size. The right ventricular systolic function  is normal.  3. Normal left atrial size. No left atrial mass or thrombus visualized. Right  atrial size is normal. No evidence of right atrial mass/thrombus. There is no  color Doppler evidence of an atrial shunt. A contrast injection (Bubble Study)  was performed that was negative for flow across the interatrial septum. No  thrombus is detected in the left atrial appendage.  4. The mitral valve leaflets are mildly thickened. There is moderate mitral  annular calcification. There is no vegetation seen on the mitral valve. There  is trace mitral regurgitation.  5. Small pericardial effusion.  6. Upon review of the previous two echocardiograms dated 08/27/2021 and  04/07/2016 the mobile echodensity associated with the posterior leaflet of the  mitral valve appears similar and most likely represents mobile calcification.  ______________________________________________________________________________  JOE  JOE done with general anesthesia. The transesophageal probe was passed without  difficulty. There were no complications associated with this procedure.     Left Ventricle  The  left ventricle is normal in size. Left ventricular systolic function is  normal.     Right Ventricle  The right ventricle is normal size. The right ventricular systolic function is  normal.     Atria  Normal left atrial size. No left atrial mass or thrombus visualized. Right  atrial size is normal. No evidence of right atrial mass/thrombus. There is no  color Doppler evidence of an atrial shunt. A contrast injection (Bubble Study)  was performed that was negative for flow across the interatrial septum. No  thrombus is detected in the left atrial appendage.     Mitral Valve  The mitral valve leaflets are mildly thickened. There is moderate mitral  annular calcification. There is no vegetation seen on the mitral valve. There  is trace mitral regurgitation.     Aortic Valve  There is moderate trileaflet aortic sclerosis. There is trace to mild aortic  regurgitation.     Pulmonic Valve  The pulmonic valve is not well visualized.     Vessels  Mild atherosclerotic plaque(s) in the descending aorta.     Pericardial/Pleural  Small pericardial effusion.     Rhythm  Sinus rhythm was noted.  ______________________________________________________________________________  Report approved by: Sheryl Rodriguez 08/30/2021 11:05 AM     ______________________________________________________________________________            Discharge Medications   Current Discharge Medication List      START taking these medications    Details   atorvastatin (LIPITOR) 40 MG tablet Take 1 tablet (40 mg) by mouth daily  Qty: 30 tablet, Refills: 0    Associated Diagnoses: Cerebrovascular accident (CVA), unspecified mechanism (H)      clopidogrel (PLAVIX) 75 MG tablet Take 1 tablet (75 mg) by mouth daily  Qty: 30 tablet, Refills: 0    Associated Diagnoses: Cerebrovascular accident (CVA), unspecified mechanism (H)         CONTINUE these medications which have NOT CHANGED    Details   aspirin (ASA) 81 MG chewable tablet Take 81 mg by mouth daily       calcium carbonate (TUMS) 500 MG chewable tablet Take 1 tablet (500 mg) by mouth 3 times daily as needed for heartburn  Qty: 90 tablet, Refills: 0    Associated Diagnoses: Recurrent intestinal obstruction (H)      KRILL OIL PO Take 1 capsule by mouth daily (OTC: Unsure of strength)      lifitegrast (XIIDRA) 5 % opthalmic solution Place 1 drop into both eyes 2 times daily      losartan-hydrochlorothiazide (HYZAAR) 100-25 MG tablet Take 1 tablet by mouth daily  Qty: 90 tablet, Refills: 3    Associated Diagnoses: Benign essential hypertension      NONFORMULARY Take 5 mLs by mouth every morning Perilla seed oil 1 tsp in smoothie every morning.      !! UNKNOWN TO PATIENT Place 1-2 sprays into both eyes every evening Patient uses an OTC eye spray to help with dry eyes in the evening but is unsure of the name.      !! UNKNOWN TO PATIENT Take 1 tablet by mouth daily (Patient takes an eye supplement but is unsure of the name)      !! UNKNOWN TO PATIENT Place onto the skin daily as needed (leg cramps) Cream from [place] for leg cramps      VITAMIN D PO Take 1 tablet by mouth daily (OTC: Unsure of strength)       !! - Potential duplicate medications found. Please discuss with provider.      STOP taking these medications       naproxen sodium (ANAPROX) 220 MG tablet Comments:   Reason for Stopping:         simvastatin (ZOCOR) 10 MG tablet Comments:   Reason for Stopping:             Allergies   Allergies   Allergen Reactions     Ace Inhibitors      Lactose GI Disturbance     Reduced fat dairy- Diarrhea     Sorbitan Trioleate      Sulfonylureas      Vancomycin Hives

## 2021-08-30 NOTE — DISCHARGE INSTRUCTIONS
Your risk factors for stroke or TIA (transient ischemic attack):    Your Risk Factors Your Results Normal Ranges   High blood pressure BP Readings from Last 1 Encounters:   08/30/21 (!) 145/79    Less than 120/80   Cholesterol              Total Lab Results   Component Value Date    CHOL 188 08/27/2021    CHOL 248 09/29/2020      Less than 150    Triglycerides   Lab Results   Component Value Date    TRIG 317 08/27/2021    TRIG 371 09/29/2020    Less than 150   LDL Lab Results   Component Value Date    LDL 83 08/27/2021     09/29/2020       Less than 70   HDL Lab Results   Component Value Date    HDL 42 08/27/2021    HDL 48 09/29/2020            Greater than 40 (men)  Greater than 50 (women)   Diabetes Recent Labs   Lab 08/28/21  0826   *    Fasting blood glucose    Smoking/tobacco use  Quit smoking and tobacco   Overweight  Lose 1-2 pounds a week   Lack of exercise  30 minutes moderate activity each day   Other risk factors include carotid (neck) artery disease, atrial fibrillation and stress. You may be on new medicine to treat high blood pressure, cholesterol, diabetes or atrial fibrillation.    Understanding Stroke Booklet given to patient. Please refer to booklet for further information.    Stroke warning signs and symptoms - CALL 911 right away for:  - Sudden numbness or weakness in the face, arm or leg (often on one side of the body).  - Sudden confusion or trouble understanding what is going on.  - Sudden blurred or decreased vision in one or both eyes.  - Sudden trouble speaking, loss of balance, dizziness or problems with coordination.  - Sudden, severe headache for no reason.  - Fainting or seizures.  - Symptoms may go away then come back suddenly.    Follow up with Neurology - 12 weeks with stroke clinic STEFANO (any stroke STEFANO) at [facility] stroke clinic. Patient will be contacted by virtual stroke clinic team after discharge. Seen by Dr. Espitia & Lalito Vallecillo, vascular neurology fellow at  [facility].

## 2021-08-30 NOTE — ANESTHESIA POSTPROCEDURE EVALUATION
Patient: Francois Ly    * No procedures listed *    Diagnosis:* No pre-op diagnosis entered *  Diagnosis Additional Information: No value filed.    Anesthesia Type:  General    Note:  Disposition: Inpatient   Postop Pain Control: Uneventful            Sign Out: Well controlled pain   PONV: No   Neuro/Psych: Uneventful            Sign Out: Acceptable/Baseline neuro status   Airway/Respiratory: Uneventful            Sign Out: Acceptable/Baseline resp. status   CV/Hemodynamics: Uneventful            Sign Out: Acceptable CV status   Other NRE: NONE   DID A NON-ROUTINE EVENT OCCUR? No           Last vitals:  Vitals Value Taken Time   BP     Temp     Pulse     Resp     SpO2         Electronically Signed By: Fahad German MD  August 30, 2021  4:04 PM

## 2021-08-30 NOTE — ANESTHESIA PREPROCEDURE EVALUATION
"Anesthesia Pre-Procedure Evaluation    Patient: Francois Ly   MRN: [id number] : 1940        Preoperative Diagnosis: * No pre-op diagnosis entered *   Procedure : * No procedures listed *     Past Medical History:   Diagnosis Date     Benign essential hypertension 10/12/2016     Bilateral low back pain without sciatica, unspecified chronicity 2017     Blunt trauma of rib, initial encounter 2016     Dyslipidemia      Gallstones      Hypertension      PAD (peripheral artery disease) (H) 2017     Pancreatic mass 2016     SBO (small bowel obstruction) (H)      Slipped intervertebral disc       Past Surgical History:   Procedure Laterality Date     GYN SURGERY      hysterectomy     ORTHOPEDIC SURGERY      Slipped disc with chronic back pain      Allergies   Allergen Reactions     Ace Inhibitors      Lactose GI Disturbance     Reduced fat dairy- Diarrhea     Sorbitan Trioleate      Sulfonylureas      Vancomycin Hives      Social History     Tobacco Use     Smoking status: Former Smoker     Smokeless tobacco: Former User     Tobacco comment: Former \"social\" smoker, quit in .   Substance Use Topics     Alcohol use: Yes     Alcohol/week: 0.0 standard drinks     Comment: occ wine, twice a week      Wt Readings from Last 1 Encounters:   21 61.7 kg (136 lb)        Anesthesia Evaluation            ROS/MED HX  ENT/Pulmonary:    (-) tobacco use and sleep apnea   Neurologic:     (+) CVA, without deficits,     Cardiovascular:     (+) Dyslipidemia hypertension-Peripheral Vascular Disease----    METS/Exercise Tolerance:     Hematologic:       Musculoskeletal:       GI/Hepatic:    (-) GERD   Renal/Genitourinary:       Endo:       Psychiatric/Substance Use:       Infectious Disease:       Malignancy:       Other:            Physical Exam    Airway        Mallampati: II   TM distance: > 3 FB   Neck ROM: full   Mouth opening: > 3 cm    Respiratory Devices and Support         Dental  no notable " dental history         Cardiovascular   cardiovascular exam normal          Pulmonary   pulmonary exam normal                OUTSIDE LABS:  CBC:   Lab Results   Component Value Date    WBC 5.4 08/27/2021    WBC 6.3 08/26/2021    HGB 11.1 (L) 08/27/2021    HGB 12.7 08/26/2021    HCT 34.2 (L) 08/27/2021    HCT 39.0 08/26/2021     08/27/2021     08/26/2021     BMP:   Lab Results   Component Value Date     08/27/2021     08/26/2021    POTASSIUM 3.4 08/30/2021    POTASSIUM 3.5 08/29/2021    CHLORIDE 109 08/27/2021    CHLORIDE 103 08/26/2021    CO2 25 08/27/2021    CO2 30 08/26/2021    BUN 12 08/27/2021    BUN 15 08/26/2021    CR 0.70 08/27/2021    CR 0.76 08/26/2021     (H) 08/28/2021     (H) 08/28/2021     COAGS:   Lab Results   Component Value Date    PTT 36 08/26/2021    INR 0.90 08/26/2021     POC: No results found for: BGM, HCG, HCGS  HEPATIC:   Lab Results   Component Value Date    ALBUMIN 3.7 09/15/2020    PROTTOTAL 7.8 09/15/2020    ALT 33 09/15/2020    AST 27 09/15/2020    ALKPHOS 85 09/15/2020    BILITOTAL 0.4 09/15/2020     OTHER:   Lab Results   Component Value Date    LACT 1.0 01/02/2020    A1C 6.2 (H) 08/27/2021    ARIN 8.8 08/27/2021    LIPASE 76 09/15/2020    TSH 2.51 12/28/2018    CRP <2.9 01/01/2020       Anesthesia Plan    ASA Status:  2   NPO Status:  NPO Appropriate    Anesthesia Type: General.   Induction: Intravenous.           Consents    Anesthesia Plan(s) and associated risks, benefits, and realistic alternatives discussed. Questions answered and patient/representative(s) expressed understanding.     - Discussed with:  Patient         Postoperative Care            Comments:                Fahad German MD

## 2021-08-30 NOTE — PROGRESS NOTES
"Pt here with R MCA CVA. A&O x4. Neuros intact. VSS on RA ex hypertension but within parameters. Tele SR with frequent PAC. NPO at midnight for JOE today. Up with SBA GB. Denies pain. C/o \"heaviness\" on her left rib area. Hot pack applied. MD paged- EKG ordered, no changes. Pt scoring green on the Aggression Stop Light Tool. Plan for JOE this morning. Probable discharge home after procedure.    "

## 2021-08-30 NOTE — TELEPHONE ENCOUNTER
----- Message from Lalito Vallecillo MD sent at 8/30/2021  2:44 PM CDT -----  Regarding: Hospital Follow up  Stroke Hospital Follow Up    Please schedule the patient for hospital follow up with: - in 12 weeks with stroke clinic STEFANO (any stroke STEFANO) at [facility] stroke clinic    Diagnosis: ICAD vs ESUS R Hand knob stroke    Contact: patient him/herself    For any questions, or if this time frame does not work, please write to P STROKE STEFANO    Thank you    Lalito Vallecillo MD  2:44 PM 08/30/2021

## 2021-08-30 NOTE — ANESTHESIA CARE TRANSFER NOTE
Patient: Francois Ly    * No procedures listed *    Diagnosis: * No pre-op diagnosis entered *  Diagnosis Additional Information: No value filed.    Anesthesia Type:   General     Note:    Oropharynx: oropharynx clear of all foreign objects  Level of Consciousness: awake  Oxygen Supplementation: nasal cannula    Independent Airway: airway patency satisfactory and stable  Dentition: dentition unchanged  Vital Signs Stable: post-procedure vital signs reviewed and stable  Report to RN Given: handoff report given  Patient transferred to: PACU    Handoff Report: Identifed the Patient, Identified the Reponsible Provider, Reviewed the pertinent medical history, Discussed the surgical course, Reviewed Intra-OP anesthesia mangement and issues during anesthesia, Set expectations for post-procedure period and Allowed opportunity for questions and acknowledgement of understanding      Vitals:  Vitals Value Taken Time   /82    Temp     Pulse 72    Resp     SpO2 100        Electronically Signed By: CHERELLE Bain CRNA  August 30, 2021  10:22 AM

## 2021-08-30 NOTE — PROVIDER NOTIFICATION
Paged Hospitalist re: cultures. blood cultures still pending, do you want cultures drawn and patient sent before results? wondering if heart monitor needed at discharge.  Will await orders/clarification from MD.    MD responded.  No cultures needed.  Heart monitor needed/ordered.

## 2021-08-30 NOTE — PROVIDER NOTIFICATION
"Pt complaining of \"heaviness\" on the left side of her ribs.     Will get EKG  Tylenol ordered.     Donna Newman MD    "

## 2021-08-30 NOTE — PROVIDER NOTIFICATION
Paged HOspitalist re: update.  cultures not drawn yet, patient off floor when order placed this am. spoke to lab now and will be up in 30 minutes - are you ok for patient to leave after labs drawn, please advise on how to proceed with discharge.  Neuro planning to see patient in next 30 minutes also.  Will await order clarification.    MD Hospitalist phoned back, did not order cultures.  Will contact ordering physician for discharge instructions and advisement.

## 2021-08-30 NOTE — PRE-PROCEDURE
GENERAL PRE-PROCEDURE:   Procedure:  JOE    Written consent obtained?: Yes    Risks and benefits: Risks, benefits and alternatives were discussed    Consent given by:  Patient  Patient states understanding of procedure being performed: Yes    Patient's understanding of procedure matches consent: Yes    Procedure consent matches procedure scheduled: Yes    Expected level of sedation:  Moderate  Appropriately NPO:  Yes  Mallampati  :  Grade 2- soft palate, base of uvula, tonsillar pillars, and portion of posterior pharyngeal wall visible  Lungs:  Lungs clear with good breath sounds bilaterally  Heart:  Normal heart sounds and rate  History & Physical reviewed:  History and physical reviewed and no updates needed  Statement of review:  I have reviewed the lab findings, diagnostic data, medications, and the plan for sedation

## 2021-08-30 NOTE — PROGRESS NOTES
JOE Pt A&O able to sign consent. Pt received total of 4 mg IV Versed and 62.5 mcg IV fentanyl for sedation. Pt unable to tolerate passing the probe. Very agitated when Dr. Thompson attempted to pass probe. Snoring when pt allowed to rest. VSS. Pt had a small amount of bleeding from upper lip on right side with agitation. Bleeding stopped and there is a small bruise at the site. Pt once awake denied pain. Anesthesia called and JOE successful with anesthesia assisted sedation.  Pt awakened well, VSS. Denies pain, just irritated sore throat. Report called to Kojo CROOK and transferred back to room per 73 staff.

## 2021-08-30 NOTE — PROVIDER NOTIFICATION
Paged Hospitalist re: update.  patient will be off floor at 0945 this am for JOE. do you want all am meds held.  Will await order clarification and will update patient.    MD phoned right back, ok to give am meds after she returns to floor from test.

## 2021-08-31 ENCOUNTER — PATIENT OUTREACH (OUTPATIENT)
Dept: CARE COORDINATION | Facility: CLINIC | Age: 81
End: 2021-08-31

## 2021-08-31 DIAGNOSIS — Z71.89 OTHER SPECIFIED COUNSELING: ICD-10-CM

## 2021-08-31 LAB
ATRIAL RATE - MUSE: 71 BPM
DIASTOLIC BLOOD PRESSURE - MUSE: NORMAL MMHG
INTERPRETATION ECG - MUSE: NORMAL
P AXIS - MUSE: 14 DEGREES
PR INTERVAL - MUSE: 206 MS
QRS DURATION - MUSE: 98 MS
QT - MUSE: 442 MS
QTC - MUSE: 480 MS
R AXIS - MUSE: -60 DEGREES
SYSTOLIC BLOOD PRESSURE - MUSE: NORMAL MMHG
T AXIS - MUSE: 106 DEGREES
VENTRICULAR RATE- MUSE: 71 BPM

## 2021-08-31 NOTE — PROGRESS NOTES
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 She reports that she was taking a shower when she suddenly notices weakness of her left hand. She reports that she couldn't do anything with it, not even hold a towel. She also noted some visual disturbance at the time as well. She reports looking at the tile in her bathroom and it looking like broken glass. By the time of my evaluation, her symptoms are currently resolved other than some mild numbness in the lateral aspect of her left hand.   She did endorse some muscle cramping in her legs just before I got to the room, but this is reportedly a recurrent problem for her. The cramping has now resolved.\"     Acute ischemic right MCA stroke  Dyslipidemia   Possible lesion on the posterior mitral valve leaflet  Pt presented to the ED with left hand weakness and visual disturbance. This occurred about one hour prior to presentation. Stroke code was called. CT/CTA did not demonstrate any acute intracranial pathology.   MRI: Right frontal and parietal regions demonstrate multiple small acute infarcts predominantly within the watershed distribution.  Transthoracic echocardiogram: There is significnat calcification of the PML. There is a mobile echodensity  on the PML, likely a fibrinous strand vs vegetation vs. mobile calcification.  Transesophageal echocardiogram: Upon review of the previous two echocardiograms dated 08/27/2021 and 04/07/2016 the mobile echodensity associated with the posterior leaflet " of the mitral valve appears similar and most likely represents mobile calcification.     Currently non-focal neurologically and stable for discharge.    Clopidogrel and aspirin x 90 days then aspirin     Continue antihypertensive medications    Continue statin     Discharge with a cardiac monitor     Follow up in neurology clinic     ASSESSMENT      Enrollment  Primary Care Care Coordination Status: Declined    Discharge Assessment  How are you doing now that you are home?: Doing good  How are your symptoms? (Red Flag symptoms escalate to triage hotline per guidelines): Improved  Do you feel your condition is stable enough to be safe at home until your provider visit?: Yes  Does the patient have their discharge instructions? : Yes  Does the patient have questions regarding their discharge instructions? : No  Were you started on any new medications or were there changes to any of your previous medications? : Yes (no questions)  Does the patient have all of their medications?: Yes  Do you have questions regarding any of your medications? : No  Discharge follow-up appointment scheduled within 14 calendar days? : No  Is patient agreeable to assistance with scheduling? : No    Post-op ([facility] Only)  If the patient had a surgery or procedure, do they have any questions for a nurse?: No    Post-op (Clinicians Only)  Fever: No  Chills: No  Eating & Drinking: eating and drinking without complaints/concerns  PO Intake: regular diet  Bowel Function: normal  Date of last BM: 08/30/21  Urinary Status: voiding without complaint/concerns      PLAN                                                      Outpatient Plan:  Follow up with primary care provider, Luis Feliciano, within 7 days for   hospital follow- up.  No follow up labs or test are needed.    - Follow up with neurology in about one month       No future appointments.    All questions answered by  Sahil with patient in the room, (patient has a hard time hearing on the  phone)  Current consent to communicate on chart.      For any urgent concerns, please contact our 24 hour nurse triage line: [phone] ([phone])         Halley Sears MA

## 2021-09-01 LAB
ATRIAL RATE - MUSE: 60 BPM
DIASTOLIC BLOOD PRESSURE - MUSE: NORMAL MMHG
INTERPRETATION ECG - MUSE: NORMAL
P AXIS - MUSE: 18 DEGREES
PR INTERVAL - MUSE: 202 MS
QRS DURATION - MUSE: 98 MS
QT - MUSE: 458 MS
QTC - MUSE: 458 MS
R AXIS - MUSE: -39 DEGREES
SYSTOLIC BLOOD PRESSURE - MUSE: NORMAL MMHG
T AXIS - MUSE: 119 DEGREES
VENTRICULAR RATE- MUSE: 60 BPM

## 2021-09-18 ENCOUNTER — HEALTH MAINTENANCE LETTER (OUTPATIENT)
Age: 81
End: 2021-09-18

## 2021-09-27 NOTE — PROGRESS NOTES
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 Are there any additional questions or concerns you'd like to review with your provider during your visit? Yes: would like to discuss covid #3 booster at this time ineligible for booster based on      Visit is not preventive.    Meds  Is there anything on your medication list that needs to be updated? No    Current Outpatient Medications   Medication     aspirin (ASA) 81 MG chewable tablet     atorvastatin (LIPITOR) 40 MG tablet     calcium carbonate (TUMS) 500 MG chewable tablet     clopidogrel (PLAVIX) 75 MG tablet     KRILL OIL PO     lifitegrast (XIIDRA) 5 % opthalmic solution     losartan-hydrochlorothiazide (HYZAAR) 100-25 MG tablet     NONFORMULARY     UNKNOWN TO PATIENT     UNKNOWN TO PATIENT     UNKNOWN TO PATIENT     VITAMIN D PO     No current facility-administered medications for this visit.     Which pharmacy do you prefer to use for medications during this visit if needed? [facility]     Do you need refills on any of your medications? Yes: RN pended     Patient is due for:  HM due pended   Health Maintenance Due   Topic Date Due     ANNUAL REVIEW OF HM ORDERS   "11/26/2020     INFLUENZA VACCINE (1) 09/01/2021     MEDICARE ANNUAL WELLNESS VISIT  09/29/2021     FALL RISK ASSESSMENT  09/29/2021     MyChart  Patient is active on [facility].    Call Summary  \"Thank you for your time today.  If anything comes up before your appointment, please feel free to contact us at [phone].\"    Tonia Juan Registered Nurse, PAL (Patient Advocate Liason)   [facility]   [phone]               "

## 2021-09-30 ENCOUNTER — OFFICE VISIT (OUTPATIENT)
Dept: FAMILY MEDICINE | Facility: CLINIC | Age: 81
End: 2021-09-30
Payer: MEDICARE

## 2021-09-30 VITALS
HEIGHT: 62 IN | BODY MASS INDEX: 23.41 KG/M2 | OXYGEN SATURATION: 98 % | DIASTOLIC BLOOD PRESSURE: 75 MMHG | RESPIRATION RATE: 14 BRPM | SYSTOLIC BLOOD PRESSURE: 135 MMHG | HEART RATE: 60 BPM | WEIGHT: 127.2 LBS

## 2021-09-30 DIAGNOSIS — I10 BENIGN ESSENTIAL HYPERTENSION: ICD-10-CM

## 2021-09-30 DIAGNOSIS — I63.321 CEREBROVASCULAR ACCIDENT (CVA) DUE TO THROMBOSIS OF RIGHT ANTERIOR CEREBRAL ARTERY (H): ICD-10-CM

## 2021-09-30 PROBLEM — K56.609 SMALL BOWEL OBSTRUCTION (H): Status: RESOLVED | Noted: 2017-04-13 | Resolved: 2021-09-30

## 2021-09-30 PROCEDURE — 90662 IIV NO PRSV INCREASED AG IM: CPT | Performed by: FAMILY MEDICINE

## 2021-09-30 PROCEDURE — G0008 ADMIN INFLUENZA VIRUS VAC: HCPCS | Performed by: FAMILY MEDICINE

## 2021-09-30 PROCEDURE — 99214 OFFICE O/P EST MOD 30 MIN: CPT | Mod: 25 | Performed by: FAMILY MEDICINE

## 2021-09-30 RX ORDER — ASPIRIN 81 MG/1
162 TABLET, CHEWABLE ORAL DAILY
COMMUNITY
Start: 2021-09-30

## 2021-09-30 RX ORDER — ATORVASTATIN CALCIUM 40 MG/1
40 TABLET, FILM COATED ORAL DAILY
Qty: 90 TABLET | Refills: 3 | Status: SHIPPED | OUTPATIENT
Start: 2021-09-30 | End: 2022-10-21

## 2021-09-30 RX ORDER — LOSARTAN POTASSIUM AND HYDROCHLOROTHIAZIDE 25; 100 MG/1; MG/1
1 TABLET ORAL DAILY
Qty: 90 TABLET | Refills: 3 | Status: ON HOLD | OUTPATIENT
Start: 2021-09-30 | End: 2021-11-24

## 2021-09-30 RX ORDER — CLOPIDOGREL BISULFATE 75 MG/1
75 TABLET ORAL DAILY
Qty: 90 TABLET | Refills: 0 | Status: SHIPPED | OUTPATIENT
Start: 2021-09-30 | End: 2021-12-16

## 2021-09-30 ASSESSMENT — MIFFLIN-ST. JEOR: SCORE: 987.29

## 2021-09-30 NOTE — PATIENT INSTRUCTIONS
Food that is high in potassium:  Banana ,papaya, prune juice, raisins ,sandy or kiwi, orange juice, cantaloupe, honeydew melon and  Pear.

## 2021-09-30 NOTE — PROGRESS NOTES
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 Continue on DAPT for 3 months, then  mg daily after that.   Refer to Neurology within 1 to 2 months.   - clopidogrel (PLAVIX) 75 MG tablet; Take 1 tablet (75 mg) by mouth daily  - atorvastatin (LIPITOR) 40 MG tablet; Take 1 tablet (40 mg) by mouth daily  - aspirin (ASA) 81 MG chewable tablet; Take 2 tablets (162 mg) by mouth daily  - Adult Neurology Referral; Future                 Return in about 3 months (around 12/30/2021) for Routine physical..    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 No   Problems taking medications regularly:  None  Medication changes since discharge: none  Problems adhering to non-medication therapy:  None    Summary of hospitalization:  [facility] discharge summary reviewed  Diagnostic Tests/Treatments reviewed.  Follow up needed: with neurology.  Other Healthcare Providers Involved in Patient s Care:         None  Update since discharge: improved, her sympotms resolved completely, denies any symptoms in thhe left hand, or visual problems. . Post Discharge Medication Reconciliation: discharge medications  "reconciled, continue medications without change.  Plan of care communicated with patient              Review of Systems   CONSTITUTIONAL: NEGATIVE for fever, chills, change in weight  EYES: watery Rt eye after she had a fall.      Objective    BP (!) 162/80 (BP Location: Right arm, Patient Position: Sitting, Cuff Size: Adult Regular)   Pulse 60   Resp 14   Ht 1.562 m (5' 1.5\")   Wt 57.7 kg (127 lb 3.2 oz)   LMP  (LMP Unknown)   SpO2 98%   BMI 23.65 kg/m    Body mass index is 23.65 kg/m .  Physical Exam   GENERAL: healthy, alert and no distress  Eye exam : within normal, no redness, EMOI, MINNA.  NECK: no adenopathy, no asymmetry, masses, or scars and thyroid normal to palpation  RESP: lungs clear to auscultation - no rales, rhonchi or wheezes  CV: regular rate and rhythm, normal S1 S2, no S3 or S4, no murmur, click or rub, no peripheral edema and peripheral pulses strong  NEURO: Normal strength and tone, sensory exam grossly normal, mentation intact, speech normal, cranial nerves 2-12 intact, DTR's normal and symmetric  and gait is normal.                  "

## 2021-10-20 ENCOUNTER — PATIENT OUTREACH (OUTPATIENT)
Dept: FAMILY MEDICINE | Facility: CLINIC | Age: 81
End: 2021-10-20

## 2021-10-20 NOTE — TELEPHONE ENCOUNTER
RN spoke to  Sahil     Advised okay to complete booster now, options given for within [facility]     Sahil plans to call the [facility] pharmacy to get an appt set up     Tonia Juan, Registered Nurse, PAL (Patient Advocate Liason)   [facility]   [phone]

## 2021-10-20 NOTE — TELEPHONE ENCOUNTER
No need to wait, she can take her booster vaccine anytime.  Luis Feliciano MD  [facility]  [phone]

## 2021-10-20 NOTE — TELEPHONE ENCOUNTER
Dr. Feliciano     Patient is inquiring about Covid Booster     She received Pfizer 2/4 and 2/25 and would like her booster as soon as possible however she was advised she had to wait?     Is there a reason that she would have to wait due to recent CVA?     Tonia Juan, Registered Nurse, PAL (Patient Advocate Liason)   [facility]   [phone]

## 2021-11-10 ENCOUNTER — PATIENT OUTREACH (OUTPATIENT)
Dept: FAMILY MEDICINE | Facility: CLINIC | Age: 81
End: 2021-11-10
Payer: MEDICARE

## 2021-11-10 NOTE — TELEPHONE ENCOUNTER
Dr. Feliciano     Patient was hospitalized 8/26/2021 for CVA   Sent home with 30 day heart monitor - they are calling for results   They were not advised to f/u with cardiology - just pcp     Can you review the cardiac event monitor from 8/26/2021 and advise     Thank you,   Tonia Juan, Registered Nurse, PAL (Patient Advocate Liason)   [facility]   [phone]

## 2021-11-10 NOTE — TELEPHONE ENCOUNTER
So far it is not showing any concerning arrhythmias, just PAC (ectopic beats)  I don't have the official cardiology comment on it.  Luis Feliciano MD  [facility]  [phone]

## 2021-11-10 NOTE — TELEPHONE ENCOUNTER
Discussed note below from Dr. Feliciano     At this time patient is not having any symptoms, she does not feel any palpitations and does not wish to see cardiology     If they change their mind they are to call PAL RN to have pcp place referral to cardiology     Tonia Juan Registered Nurse, PAL (Patient [facility]ocate [facility])   [facility]   [phone]

## 2021-11-13 ENCOUNTER — HEALTH MAINTENANCE LETTER (OUTPATIENT)
Age: 81
End: 2021-11-13

## 2021-11-22 ENCOUNTER — HOSPITAL ENCOUNTER (INPATIENT)
Facility: CLINIC | Age: 81
LOS: 2 days | Discharge: HOME | DRG: 389 | End: 2021-11-24
Attending: EMERGENCY MEDICINE | Admitting: INTERNAL MEDICINE
Payer: MEDICARE

## 2021-11-22 ENCOUNTER — APPOINTMENT (OUTPATIENT)
Dept: GENERAL RADIOLOGY | Facility: CLINIC | Age: 81
DRG: 389 | End: 2021-11-22
Attending: PHYSICIAN ASSISTANT
Payer: MEDICARE

## 2021-11-22 ENCOUNTER — APPOINTMENT (OUTPATIENT)
Dept: CT IMAGING | Facility: CLINIC | Age: 81
DRG: 389 | End: 2021-11-22
Attending: EMERGENCY MEDICINE
Payer: MEDICARE

## 2021-11-22 ENCOUNTER — APPOINTMENT (OUTPATIENT)
Dept: GENERAL RADIOLOGY | Facility: CLINIC | Age: 81
DRG: 389 | End: 2021-11-22
Attending: INTERNAL MEDICINE
Payer: MEDICARE

## 2021-11-22 DIAGNOSIS — K56.609 SMALL BOWEL OBSTRUCTION (H): ICD-10-CM

## 2021-11-22 DIAGNOSIS — I10 BENIGN ESSENTIAL HYPERTENSION: Primary | ICD-10-CM

## 2021-11-22 LAB
ALBUMIN SERPL-MCNC: 3.8 G/DL (ref 3.4–5)
ALP SERPL-CCNC: 98 U/L (ref 40–150)
ALT SERPL W P-5'-P-CCNC: 32 U/L (ref 0–50)
ANION GAP SERPL CALCULATED.3IONS-SCNC: 8 MMOL/L (ref 3–14)
AST SERPL W P-5'-P-CCNC: 25 U/L (ref 0–45)
BASOPHILS # BLD AUTO: 0 10E3/UL (ref 0–0.2)
BASOPHILS NFR BLD AUTO: 0 %
BILIRUB SERPL-MCNC: 0.6 MG/DL (ref 0.2–1.3)
BUN SERPL-MCNC: 15 MG/DL (ref 7–30)
CALCIUM SERPL-MCNC: 9.5 MG/DL (ref 8.5–10.1)
CHLORIDE BLD-SCNC: 97 MMOL/L (ref 94–109)
CO2 SERPL-SCNC: 31 MMOL/L (ref 20–32)
CREAT SERPL-MCNC: 0.79 MG/DL (ref 0.52–1.04)
EOSINOPHIL # BLD AUTO: 0.2 10E3/UL (ref 0–0.7)
EOSINOPHIL NFR BLD AUTO: 2 %
ERYTHROCYTE [DISTWIDTH] IN BLOOD BY AUTOMATED COUNT: 12.5 % (ref 10–15)
GFR SERPL CREATININE-BSD FRML MDRD: 70 ML/MIN/1.73M2
GLUCOSE BLD-MCNC: 150 MG/DL (ref 70–99)
HCT VFR BLD AUTO: 38.6 % (ref 35–47)
HGB BLD-MCNC: 12.9 G/DL (ref 11.7–15.7)
IMM GRANULOCYTES # BLD: 0.1 10E3/UL
IMM GRANULOCYTES NFR BLD: 1 %
LIPASE SERPL-CCNC: 59 U/L (ref 73–393)
LYMPHOCYTES # BLD AUTO: 2.1 10E3/UL (ref 0.8–5.3)
LYMPHOCYTES NFR BLD AUTO: 20 %
MCH RBC QN AUTO: 30 PG (ref 26.5–33)
MCHC RBC AUTO-ENTMCNC: 33.4 G/DL (ref 31.5–36.5)
MCV RBC AUTO: 90 FL (ref 78–100)
MONOCYTES # BLD AUTO: 0.5 10E3/UL (ref 0–1.3)
MONOCYTES NFR BLD AUTO: 5 %
NEUTROPHILS # BLD AUTO: 7.4 10E3/UL (ref 1.6–8.3)
NEUTROPHILS NFR BLD AUTO: 72 %
NRBC # BLD AUTO: 0 10E3/UL
NRBC BLD AUTO-RTO: 0 /100
PLATELET # BLD AUTO: 270 10E3/UL (ref 150–450)
POTASSIUM BLD-SCNC: 3.2 MMOL/L (ref 3.4–5.3)
POTASSIUM BLD-SCNC: 3.7 MMOL/L (ref 3.4–5.3)
PROT SERPL-MCNC: 8.1 G/DL (ref 6.8–8.8)
RBC # BLD AUTO: 4.3 10E6/UL (ref 3.8–5.2)
SARS-COV-2 RNA RESP QL NAA+PROBE: NEGATIVE
SODIUM SERPL-SCNC: 136 MMOL/L (ref 133–144)
WBC # BLD AUTO: 10.3 10E3/UL (ref 4–11)

## 2021-11-22 PROCEDURE — 99285 EMERGENCY DEPT VISIT HI MDM: CPT | Mod: 25

## 2021-11-22 PROCEDURE — 96365 THER/PROPH/DIAG IV INF INIT: CPT

## 2021-11-22 PROCEDURE — 74018 RADEX ABDOMEN 1 VIEW: CPT

## 2021-11-22 PROCEDURE — 96361 HYDRATE IV INFUSION ADD-ON: CPT

## 2021-11-22 PROCEDURE — 83690 ASSAY OF LIPASE: CPT | Performed by: EMERGENCY MEDICINE

## 2021-11-22 PROCEDURE — 84132 ASSAY OF SERUM POTASSIUM: CPT | Performed by: INTERNAL MEDICINE

## 2021-11-22 PROCEDURE — 258N000001 HC RX 258: Performed by: INTERNAL MEDICINE

## 2021-11-22 PROCEDURE — 210N000002 HC R&B HEART CARE

## 2021-11-22 PROCEDURE — C9113 INJ PANTOPRAZOLE SODIUM, VIA: HCPCS | Performed by: INTERNAL MEDICINE

## 2021-11-22 PROCEDURE — 99223 1ST HOSP IP/OBS HIGH 75: CPT | Mod: AI | Performed by: INTERNAL MEDICINE

## 2021-11-22 PROCEDURE — 250N000011 HC RX IP 250 OP 636: Performed by: INTERNAL MEDICINE

## 2021-11-22 PROCEDURE — 250N000013 HC RX MED GY IP 250 OP 250 PS 637: Performed by: INTERNAL MEDICINE

## 2021-11-22 PROCEDURE — 99221 1ST HOSP IP/OBS SF/LOW 40: CPT | Performed by: PHYSICIAN ASSISTANT

## 2021-11-22 PROCEDURE — 96366 THER/PROPH/DIAG IV INF ADDON: CPT

## 2021-11-22 PROCEDURE — 36415 COLL VENOUS BLD VENIPUNCTURE: CPT | Performed by: INTERNAL MEDICINE

## 2021-11-22 PROCEDURE — 82040 ASSAY OF SERUM ALBUMIN: CPT | Performed by: EMERGENCY MEDICINE

## 2021-11-22 PROCEDURE — 87635 SARS-COV-2 COVID-19 AMP PRB: CPT | Performed by: EMERGENCY MEDICINE

## 2021-11-22 PROCEDURE — 250N000011 HC RX IP 250 OP 636: Performed by: EMERGENCY MEDICINE

## 2021-11-22 PROCEDURE — C9803 HOPD COVID-19 SPEC COLLECT: HCPCS

## 2021-11-22 PROCEDURE — 96375 TX/PRO/DX INJ NEW DRUG ADDON: CPT

## 2021-11-22 PROCEDURE — 99207 PR NO CHARGE LOS: CPT | Performed by: INTERNAL MEDICINE

## 2021-11-22 PROCEDURE — 74176 CT ABD & PELVIS W/O CONTRAST: CPT | Mod: ME

## 2021-11-22 PROCEDURE — 250N000011 HC RX IP 250 OP 636

## 2021-11-22 PROCEDURE — 36415 COLL VENOUS BLD VENIPUNCTURE: CPT | Performed by: EMERGENCY MEDICINE

## 2021-11-22 PROCEDURE — 85025 COMPLETE CBC W/AUTO DIFF WBC: CPT | Performed by: EMERGENCY MEDICINE

## 2021-11-22 PROCEDURE — 258N000003 HC RX IP 258 OP 636: Performed by: INTERNAL MEDICINE

## 2021-11-22 RX ORDER — SODIUM CHLORIDE 9 MG/ML
INJECTION, SOLUTION INTRAVENOUS CONTINUOUS
Status: DISCONTINUED | OUTPATIENT
Start: 2021-11-22 | End: 2021-11-22

## 2021-11-22 RX ORDER — ONDANSETRON 2 MG/ML
INJECTION INTRAMUSCULAR; INTRAVENOUS
Status: COMPLETED
Start: 2021-11-22 | End: 2021-11-22

## 2021-11-22 RX ORDER — DEXTROSE MONOHYDRATE, SODIUM CHLORIDE, AND POTASSIUM CHLORIDE 50; 1.49; 9 G/1000ML; G/1000ML; G/1000ML
INJECTION, SOLUTION INTRAVENOUS CONTINUOUS
Status: DISCONTINUED | OUTPATIENT
Start: 2021-11-22 | End: 2021-11-23

## 2021-11-22 RX ORDER — POTASSIUM CHLORIDE 7.45 MG/ML
10 INJECTION INTRAVENOUS
Status: ACTIVE | OUTPATIENT
Start: 2021-11-22 | End: 2021-11-22

## 2021-11-22 RX ORDER — LIDOCAINE 40 MG/G
CREAM TOPICAL
Status: DISCONTINUED | OUTPATIENT
Start: 2021-11-22 | End: 2021-11-24 | Stop reason: HOSPADM

## 2021-11-22 RX ORDER — LOSARTAN POTASSIUM 50 MG/1
50 TABLET ORAL DAILY
Status: DISCONTINUED | OUTPATIENT
Start: 2021-11-22 | End: 2021-11-23

## 2021-11-22 RX ORDER — MORPHINE SULFATE 2 MG/ML
2 INJECTION, SOLUTION INTRAMUSCULAR; INTRAVENOUS ONCE
Status: COMPLETED | OUTPATIENT
Start: 2021-11-22 | End: 2021-11-22

## 2021-11-22 RX ORDER — PROCHLORPERAZINE 25 MG
12.5 SUPPOSITORY, RECTAL RECTAL EVERY 12 HOURS PRN
Status: DISCONTINUED | OUTPATIENT
Start: 2021-11-22 | End: 2021-11-24 | Stop reason: HOSPADM

## 2021-11-22 RX ORDER — HYDRALAZINE HYDROCHLORIDE 20 MG/ML
10 INJECTION INTRAMUSCULAR; INTRAVENOUS EVERY 4 HOURS PRN
Status: DISCONTINUED | OUTPATIENT
Start: 2021-11-22 | End: 2021-11-24 | Stop reason: HOSPADM

## 2021-11-22 RX ORDER — CHOLECALCIFEROL (VITAMIN D3) 50 MCG
1 TABLET ORAL DAILY
COMMUNITY

## 2021-11-22 RX ORDER — ONDANSETRON 2 MG/ML
4 INJECTION INTRAMUSCULAR; INTRAVENOUS EVERY 6 HOURS PRN
Status: DISCONTINUED | OUTPATIENT
Start: 2021-11-22 | End: 2021-11-24 | Stop reason: HOSPADM

## 2021-11-22 RX ORDER — LABETALOL HYDROCHLORIDE 5 MG/ML
10 INJECTION, SOLUTION INTRAVENOUS
Status: DISCONTINUED | OUTPATIENT
Start: 2021-11-22 | End: 2021-11-24 | Stop reason: HOSPADM

## 2021-11-22 RX ORDER — ERYTHROMYCIN 5 MG/G
0.5 OINTMENT OPHTHALMIC AT BEDTIME
COMMUNITY
End: 2021-12-08

## 2021-11-22 RX ORDER — PROCHLORPERAZINE MALEATE 5 MG
5 TABLET ORAL EVERY 6 HOURS PRN
Status: DISCONTINUED | OUTPATIENT
Start: 2021-11-22 | End: 2021-11-24 | Stop reason: HOSPADM

## 2021-11-22 RX ORDER — HYDROMORPHONE HCL IN WATER/PF 6 MG/30 ML
0.2 PATIENT CONTROLLED ANALGESIA SYRINGE INTRAVENOUS
Status: DISCONTINUED | OUTPATIENT
Start: 2021-11-22 | End: 2021-11-24 | Stop reason: HOSPADM

## 2021-11-22 RX ORDER — ONDANSETRON 4 MG/1
4 TABLET, ORALLY DISINTEGRATING ORAL EVERY 6 HOURS PRN
Status: DISCONTINUED | OUTPATIENT
Start: 2021-11-22 | End: 2021-11-24 | Stop reason: HOSPADM

## 2021-11-22 RX ADMIN — MORPHINE SULFATE 2 MG: 2 INJECTION, SOLUTION INTRAMUSCULAR; INTRAVENOUS at 03:18

## 2021-11-22 RX ADMIN — PANTOPRAZOLE SODIUM 40 MG: 40 INJECTION, POWDER, FOR SOLUTION INTRAVENOUS at 06:26

## 2021-11-22 RX ADMIN — PANTOPRAZOLE SODIUM 40 MG: 40 INJECTION, POWDER, FOR SOLUTION INTRAVENOUS at 20:12

## 2021-11-22 RX ADMIN — POTASSIUM CHLORIDE, DEXTROSE MONOHYDRATE AND SODIUM CHLORIDE: 150; 5; 900 INJECTION, SOLUTION INTRAVENOUS at 21:44

## 2021-11-22 RX ADMIN — SODIUM CHLORIDE: 9 INJECTION, SOLUTION INTRAVENOUS at 06:27

## 2021-11-22 RX ADMIN — DIATRIZOATE MEGLUMINE AND DIATRIZOATE SODIUM 75 ML: 660; 100 SOLUTION ORAL; RECTAL at 16:33

## 2021-11-22 RX ADMIN — HYDROMORPHONE HYDROCHLORIDE 0.2 MG: 0.2 INJECTION, SOLUTION INTRAMUSCULAR; INTRAVENOUS; SUBCUTANEOUS at 18:31

## 2021-11-22 RX ADMIN — LOSARTAN POTASSIUM 50 MG: 50 TABLET, FILM COATED ORAL at 18:29

## 2021-11-22 RX ADMIN — ONDANSETRON 4 MG: 2 INJECTION INTRAMUSCULAR; INTRAVENOUS at 02:47

## 2021-11-22 RX ADMIN — POTASSIUM CHLORIDE, DEXTROSE MONOHYDRATE AND SODIUM CHLORIDE: 150; 5; 900 INJECTION, SOLUTION INTRAVENOUS at 08:39

## 2021-11-22 ASSESSMENT — ACTIVITIES OF DAILY LIVING (ADL)
ADLS_ACUITY_SCORE: 6

## 2021-11-22 ASSESSMENT — ENCOUNTER SYMPTOMS
NAUSEA: 1
ABDOMINAL DISTENTION: 1
VOMITING: 0
ABDOMINAL PAIN: 1

## 2021-11-22 ASSESSMENT — MIFFLIN-ST. JEOR
SCORE: 1003.16
SCORE: 986.38

## 2021-11-22 NOTE — PROGRESS NOTES
[facility]    Hospitalist Progress Note    Assessment & Plan   Francois Ly is a 81 year old female who presents with abdominal pain     Small bowel obstruction  Hx ex lap 1960 2/2 uterine perforation from IUD and FARHAT/BSO 1994. Has had recurrent SBOs x 5, most recently 1/2020.  Developed pain after eating dinner, progressively worsening. Some nausea and abdominal distension. Vitals stable. Labs normal. CT abdomen with mid small bowel obstruction.   -pain, nausea, distension resolved in ED this am  Seen by Gen surgery  Tolerated water and ice chips.   Advanced to full liquids then developed waves of abd pain  No vomiting  Now pain free  Cont to admit to inpatient  -abd soft.     Plan;   - back down on diet to clears  - Gen surgery following  - gastrograffin study now. Am abd xray ordered  -cont ivfs  - restart asa, plavix for recent stroke tomorrow if ok with surgery.   - prn analgesics  - pt request hold NG for now  - IV pantoprazole BID  -cont to admit to inpatient. No discharge home.     Discussed care plan with RN, pt, surgery team     Hypokalemia  K at 3.2 on presentation   - replaced per protocol  -follow     Pericardial effusion   On admission CT noted small pericardial effusion which has increased ini size from comparison 8/4. BP/ vitals stable.   - Could consider echo but unlikely to act on it, consider follow up echo in the future as outpatient  -no cardiopulmonary sxs.      Recent CVA  [needs rec- atorvastatin 40 mg daily, aspirin, plavix through ~12/1]  Hospitalized in 8/2021 with L sided weakness. MRI with watershed strokes in R frontal and parietal regions. Sx resolved. On plavix/ ASA x 90 days then ASA.  nonfocal neuro exam    - resume meds with rec- hold asa, plavix today should patient need procedure. Had dose yesterday.   - reeval in am.   - hold statin with SBO     Hypertension  [needs rec- losartan-hydrochlorothiazide 100-25 one daily]  Mildly hypertensive in ED to  173/93 on arrival, slightly improved over time.   Restart losartan, hold hydrochlorothiazide    - prn hydralazine, labetalol SBP>180  - hold losartan-hydrochlorothiazide for now     Pancreatic cystic lesion  Present and stable x 5 years. Has seen GI, no further monitoring needed.      COVID-19 negative     DVT Prophylaxis: Ambulate every shift  Code Status: Full Code     Disposition: TBD, 1-2 days once SBO resolved and tolerating diet       Abdulkadir Delgado MD  Text Page  (7am to 6pm)  Interval History   Seen by Gen surgery  Tolerated water and ice chips.   Advanced to full liquids then developed waves of abd pain  No vomiting  Now pain free  Cont to admit to inpatient    -Data reviewed today: I reviewed all new labs and imaging results over the last 24 hours. I personally reviewed labs and imaging since admission    Physical Exam   Temp: 97.5  F (36.4  C) Temp src: Temporal BP: (!) 154/68 Pulse: 65   Resp: 18 SpO2: 95 % O2 Device: None (Room air)    Vitals:    11/22/21 0231   Weight: 57.6 kg (127 lb)     Vital Signs with Ranges  Temp:  [97.5  F (36.4  C)] 97.5  F (36.4  C)  Pulse:  [57-93] 65  Resp:  [12-21] 18  BP: (138-187)/() 154/68  SpO2:  [92 %-98 %] 95 %  No intake/output data recorded.    Constitutional: nad  Respiratory: cTAB  Cardiovascular: RRR no r/g/m  GI: soft, nt, nd  Skin/Integumen: no rash or edema  Neuro: nl speech and mentation. Grossly nonfocal  Psych; nl affect    Medications     dextrose 5% and 0.9% NaCl with potassium chloride 20 mEq 100 mL/hr at 11/22/21 0839       diatrizoate meglumine-sodium  75 mL Oral Once     pantoprazole (PROTONIX) IV  40 mg Intravenous BID       Data   Recent Labs   Lab 11/22/21  0900 11/22/21  0317   WBC  --  10.3   HGB  --  12.9   MCV  --  90   PLT  --  270   NA  --  136   POTASSIUM 3.7 3.2*   CHLORIDE  --  97   CO2  --  31   BUN  --  15   CR  --  0.79   ANIONGAP  --  8   ARIN  --  9.5   GLC  --  150*   ALBUMIN  --  3.8   PROTTOTAL  --  8.1   BILITOTAL  --   0.6   ALKPHOS  --  98   ALT  --  32   AST  --  25   LIPASE  --  59*       Imaging:   Recent Results (from the past 24 hour(s))   CT Abdomen Pelvis without Contrast (stone protocol)    Narrative    EXAM: CT ABDOMEN AND PELVIS WITHOUT CONTRAST  LOCATION: [facility]  DATE/TIME: 11/22/2021 3:17 AM    INDICATION: Abdominal pain.  COMPARISON: 08/04/2021.    TECHNIQUE: CT scan of the abdomen and pelvis was performed without IV contrast. Multiplanar reformats were obtained. Dose reduction techniques were used.  CONTRAST: None.    FINDINGS:    LOWER CHEST: Partial visualization of a small pericardial effusion. This has increased in size since 08/04/2021. Coronary artery calcification.    HEPATOBILIARY: Two cysts within the liver, the largest a 5 cm cyst in the right lobe. A few small gallstones within a nondistended gallbladder.    SPLEEN: Unremarkable.    PANCREAS: Unremarkable. The small cyst in the uncinate process of the pancreas described on the prior study is not clearly visualized on this study.     ADRENAL GLANDS: Small low-attenuation left adrenal nodule, likely of benign etiology.    KIDNEYS/BLADDER: 0.8 cm indeterminate intermediate attenuation lesion extending from the posterolateral aspect of the upper pole of the right kidney, unchanged in size since the recent comparison study.    BOWEL: The stomach is moderately distended with fluid. There is a long segment of moderately dilated fluid-filled small bowel in the abdomen and pelvis and an abrupt transition to nondilated small bowel in the anterior aspect of the mid abdomen in the   supraumbilical region (for example, series 4 image 136). Several colonic diverticula are present without evidence of diverticulitis. Normal appendix. No visualized bowel wall thickening, pneumatosis or free intraperitoneal gas.    LYMPH NODES: Unremarkable.    PELVIC ORGANS: No acute findings.    MUSCULOSKELETAL: Fusion hardware in the lumbar spine.    OTHER:  Atherosclerotic calcification in the abdominal aorta.      Impression    IMPRESSION:   1. Mid small bowel obstruction: The stomach is moderately distended with fluid and there is a long segment of moderately dilated fluid-filled small bowel in the abdomen and pelvis with an abrupt transition to nondilated small bowel in the mid abdomen.  2. Small pericardial effusion, increased in size since 08/04/2021.

## 2021-11-22 NOTE — CONSULTS
"  General Surgery Consultation    Francois Ly MRN# [id number]   Age: 81 year old YOB: 1940     Date of Admission:  11/22/2021    Reason for consult:            Recurrent small bowel obstruction       Requesting physician:            Dr. Tim Block                  Chief Complaint:   Abdominal pain  SBO     History is obtained from the patient and EMR         History of Present Illness:   This patient is a 81 year old woman who presents to the [facility] ED with symptoms consistent with a small bowel obstruction. She has had multiple previous episodes of small bowel obstruction, most recently 1/2020. This episode began yesterday after eating. Symptoms included nausea and abdominal distension.  CT findings were reviewed. She has a PSH of exploratory laparotomy for uterine perforation 2  to IUD and FARHAT/BSO.      Upon introduction to the patient and family, they report her symptoms have resolved.  She had a BM this morning and the abdominal pain has resolved. They anticipate being discharged this morning.          Past Medical History:    has a past medical history of Benign essential hypertension (10/12/2016), Bilateral low back pain without sciatica, unspecified chronicity (12/19/2017), Blunt trauma of rib, initial encounter (11/25/2016), Dyslipidemia, Gallstones, Hypertension, PAD (peripheral artery disease) (H) (6/19/2017), Pancreatic mass (8/17/2016), SBO (small bowel obstruction) (H), and Slipped intervertebral disc.          Past Surgical History:     Past Surgical History:   Procedure Laterality Date     GYN SURGERY      hysterectomy     ORTHOPEDIC SURGERY      Slipped disc with chronic back pain             Social History:     Social History     Tobacco Use     Smoking status: Former Smoker     Smokeless tobacco: Former User     Tobacco comment: Former \"social\" smoker, quit in 1978.   Substance Use Topics     Alcohol use: Yes     Alcohol/week: 0.0 standard drinks     Comment: occ wine, twice a " week             Family History:   Reviewed, not relevant to current visit complaint         Allergies:     Allergies   Allergen Reactions     Ace Inhibitors      Lactose GI Disturbance     Reduced fat dairy- Diarrhea     Sorbitan Trioleate      Sulfonylureas      Vancomycin Hives             Medications:   No current facility-administered medications on file prior to encounter.  aspirin (ASA) 81 MG chewable tablet, Take 2 tablets (162 mg) by mouth daily  atorvastatin (LIPITOR) 40 MG tablet, Take 1 tablet (40 mg) by mouth daily  calcium carbonate (TUMS) 500 MG chewable tablet, Take 1 tablet (500 mg) by mouth 3 times daily as needed for heartburn  clopidogrel (PLAVIX) 75 MG tablet, Take 1 tablet (75 mg) by mouth daily  erythromycin (ROMYCIN) 5 MG/GM ophthalmic ointment, Place 0.5 inches into both eyes At Bedtime  KRILL OIL PO, Take 1 capsule by mouth daily (OTC: Unsure of strength)  lifitegrast (XIIDRA) 5 % opthalmic solution, Place 1 drop into both eyes 2 times daily  losartan-hydrochlorothiazide (HYZAAR) 100-25 MG tablet, Take 1 tablet by mouth daily  NONFORMULARY, Take 5 mLs by mouth every morning Perilla seed oil 1 tsp in smoothie every morning.  UNKNOWN TO PATIENT, Place 1-2 sprays into both eyes every evening Patient uses an OTC eye spray to help with dry eyes in the evening but is unsure of the name.  UNKNOWN TO PATIENT, Take 1 tablet by mouth daily (Patient takes an eye supplement but is unsure of the name)  vitamin D3 (CHOLECALCIFEROL) 50 mcg (2000 units) tablet, Take 1 tablet by mouth daily  UNKNOWN TO PATIENT, Place onto the skin daily as needed (leg cramps) Cream from [place] for leg cramps        diatrizoate meglumine-sodium  75 mL Oral Once     pantoprazole (PROTONIX) IV  40 mg Intravenous BID            Review of Systems:   A 10 point Review of Systems is negative other than noted in the HPI.          Physical Exam:   BP (!) 152/71   Pulse 62   Temp 97.5  F (36.4  C) (Temporal)   Resp 15   Ht 1.562 m  "(5' 1.5\")   Wt 57.6 kg (127 lb)   LMP  (LMP Unknown)   SpO2 96%   BMI 23.61 kg/m    General - Pleasant woman, comfortable.  HEENT:  Head normocephalic and atraumatic, pupils equal and round, conjunctivae clear, no scleral icterus, external ears and nose normal  Respiratory: non labored  Neurologic: nonfocal  Psychiatric: Mood and affect appropriate         Data:     Lab Results   Component Value Date    WBC 10.3 11/22/2021    WBC 6.3 09/15/2020     Lab Results   Component Value Date    HGB 12.9 11/22/2021    HGB 12.8 09/15/2020     Lab Results   Component Value Date     11/22/2021     09/15/2020     Last Basic Metabolic Panel:  Lab Results   Component Value Date     11/22/2021     09/15/2020      Lab Results   Component Value Date    POTASSIUM 3.7 11/22/2021    POTASSIUM 3.4 09/15/2020     Lab Results   Component Value Date    CHLORIDE 97 11/22/2021    CHLORIDE 102 09/15/2020     Lab Results   Component Value Date    ARIN 9.5 11/22/2021    ARIN 8.6 09/15/2020     Lab Results   Component Value Date    CO2 31 11/22/2021    CO2 29 09/15/2020     Lab Results   Component Value Date    BUN 15 11/22/2021    BUN 18 09/15/2020     Lab Results   Component Value Date    CR 0.79 11/22/2021    CR 0.80 09/15/2020     Lab Results   Component Value Date     11/22/2021     09/15/2020       Liver Function Studies -   Recent Labs   Lab Test 11/22/21  0317   PROTTOTAL 8.1   ALBUMIN 3.8   BILITOTAL 0.6   ALKPHOS 98   AST 25   ALT 32       All labs and imaging was personally reviewed.     Imaging:    Results for orders placed or performed during the hospital encounter of 11/22/21   CT Abdomen Pelvis without Contrast (stone protocol)    Narrative    EXAM: CT ABDOMEN AND PELVIS WITHOUT CONTRAST  LOCATION: [facility]  DATE/TIME: 11/22/2021 3:17 AM    INDICATION: Abdominal pain.  COMPARISON: 08/04/2021.    TECHNIQUE: CT scan of the abdomen and pelvis was performed without IV " contrast. Multiplanar reformats were obtained. Dose reduction techniques were used.  CONTRAST: None.    FINDINGS:    LOWER CHEST: Partial visualization of a small pericardial effusion. This has increased in size since 08/04/2021. Coronary artery calcification.    HEPATOBILIARY: Two cysts within the liver, the largest a 5 cm cyst in the right lobe. A few small gallstones within a nondistended gallbladder.    SPLEEN: Unremarkable.    PANCREAS: Unremarkable. The small cyst in the uncinate process of the pancreas described on the prior study is not clearly visualized on this study.     ADRENAL GLANDS: Small low-attenuation left adrenal nodule, likely of benign etiology.    KIDNEYS/BLADDER: 0.8 cm indeterminate intermediate attenuation lesion extending from the posterolateral aspect of the upper pole of the right kidney, unchanged in size since the recent comparison study.    BOWEL: The stomach is moderately distended with fluid. There is a long segment of moderately dilated fluid-filled small bowel in the abdomen and pelvis and an abrupt transition to nondilated small bowel in the anterior aspect of the mid abdomen in the   supraumbilical region (for example, series 4 image 136). Several colonic diverticula are present without evidence of diverticulitis. Normal appendix. No visualized bowel wall thickening, pneumatosis or free intraperitoneal gas.    LYMPH NODES: Unremarkable.    PELVIC ORGANS: No acute findings.    MUSCULOSKELETAL: Fusion hardware in the lumbar spine.    OTHER: Atherosclerotic calcification in the abdominal aorta.      Impression    IMPRESSION:   1. Mid small bowel obstruction: The stomach is moderately distended with fluid and there is a long segment of moderately dilated fluid-filled small bowel in the abdomen and pelvis with an abrupt transition to nondilated small bowel in the mid abdomen.  2. Small pericardial effusion, increased in size since 08/04/2021.             Assessment and Plan:    Assessment:   Francois Ly is a 81 year old with a recurrent small bowel obstruction - resolving      Plan:   - Ok from surgery standpoint to discharge from ED if she tolerates oral fluids.   - If patient regresses and needs admission, administer gastrografin challenge (already ordered but no need to give it if patient is discharged).   - Provided diet education.   - Recommend office consultation with general surgery as outpatient.               Laci Heck PA-C  Office: [phone]  Pager: [phone]

## 2021-11-22 NOTE — ED TRIAGE NOTES
Patient complaints abdominal pain, nausea to mid abdomen.  Started around 530pm-6 after drinking tea.  Hx of bowel obstruction.  Had 3 normal BM yesterday.  Sharp pains vary in intensity.

## 2021-11-22 NOTE — ED NOTES
[facility]  ED Nurse Handoff Report    ED Chief complaint: Abdominal Pain      ED Diagnosis:   Final diagnoses:   Small bowel obstruction (H)       Code Status: Full Code    Allergies:   Allergies   Allergen Reactions     Ace Inhibitors      Lactose GI Disturbance     Reduced fat dairy- Diarrhea     Sorbitan Trioleate      Sulfonylureas      Vancomycin Hives       Patient Story: Pt arrived at ED via home due to abdominal pain. Pt has a hx of bowel obstructions. Pt was experiencing intermittent upper abdominal pain that was getting progressively worse. Pt is also experiencing nausea and abdominal distention. Pt was feeling better after several bowel movements in the ER. Pt was given liquid diet. Pt stated return of upper abdominal pain about 1 hour after liquid diet. XR Gastrografin Challenge ordered.   Focused Assessment:  Pt is awake, A&Ox4    Treatments and/or interventions provided: Potassium replacement via IV, liquid diet, and stomach X-ray  Patient's response to treatments and/or interventions: Pt tolerated IV well. Pt experienced abdominal cramping after liquid diet.     To be done/followed up on inpatient unit:  N/A    Does this patient have any cognitive concerns?: None    Activity level - Baseline/Home:  Independent  Activity Level - Current:   Independent    Patient's Preferred language: English   Needed?: No    Isolation: None  Infection: Not Applicable  Patient tested for COVID 19 prior to admission: YES  Bariatric?: No    Vital Signs:   Vitals:    11/22/21 1330 11/22/21 1505 11/22/21 1510 11/22/21 1635   BP: (!) 148/75 (!) 154/68 (!) 154/68 (!) 158/73   Pulse: 65  61    Resp: 16 18  16   Temp:       TempSrc:       SpO2:  95% 95% 97%   Weight:       Height:           Cardiac Rhythm:     Was the PSS-3 completed:   Yes  What interventions are required if any?               Family Comments: N/A  OBS brochure/video discussed/provided to patient/family: N/A              Name of  person given brochure if not patient: N/A              Relationship to patient: N/A    For the majority of the shift this patient's behavior was Green.   Behavioral interventions performed were N/A.    ED NURSE PHONE NUMBER: [phone]

## 2021-11-22 NOTE — PROGRESS NOTES
RECEIVING UNIT ED HANDOFF REVIEW    ED Nurse Handoff Report was reviewed by: Marylou Washburn RN on November 22, 2021 at 5:15 PM

## 2021-11-22 NOTE — ED PROVIDER NOTES
"  History   Chief Complaint:  Abdominal Pain       The history is provided by the patient.      Francois Ly is a 81 year old female with history of small bowel obstruction, pancreatic mass, large intestine diverticulosis, kidney cyst, gallstones who presents with abdominal pain. The patient reports that she began experiencing abdominal pain about 9.5 hours ago, after eating dinner. She describes upper abdominal pain that is intermittent and progressively worsening. Her pain is currently at a severity of 3 or 4/10. The patient also notes nausea and abdominal distention. Her  reports that she has been \"burping a lot.\" Of note, he states that her last bowel obstruction occurred a couple years ago. The patient notes that she had 3 bowel movements yesterday, but none since the onset of her pain. She also denies vomiting.    Review of Systems   Gastrointestinal: Positive for abdominal distention, abdominal pain (upper) and nausea. Negative for vomiting.   All other systems reviewed and are negative.      Allergies:  Ace Inhibitors  Lactose  Sorbitan Trioleate  Sulfonylureas  Vancomycin  Sulfonamide Derivatives    Medications:  Aspirin  Atorvastatin  Calcium carbonate  Plavix  Hyzaar    Past Medical History:     Arthritis  BPH  Blunt rib trauma  CVA  Dyslipidemia  Gallstones  Hypertension  Hyperlipidemia  Kidney cyst  Large intestine diverticulosis  Peripheral artery disease  Pancreatic mass  Small bowel obstruction  Thrombosis of right anterior cerebral artery      Past Surgical History:    Hysterectomy  Spinal fusion  Right bunionectomy  Femoral head allograft  Iliac crest autograft  Left cataract surgery with IOL    Family History:    The patient denies past family history.     Social History:  Presents to the emergency department with her   Walked to the ED    Physical Exam     Patient Vitals for the past 24 hrs:   BP Temp Temp src Pulse Resp SpO2 Height Weight   11/22/21 0430 (!) 164/95 -- -- 85 -- 93 " "% -- --   11/22/21 0415 -- -- -- -- -- 94 % -- --   11/22/21 0400 -- -- -- -- -- 92 % -- --   11/22/21 0345 -- -- -- -- -- 95 % -- --   11/22/21 0330 -- -- -- -- -- 93 % -- --   11/22/21 0315 -- -- -- -- -- 97 % -- --   11/22/21 0245 (!) 141/101 -- -- -- -- -- -- --   11/22/21 0231 (!) 173/93 97.5  F (36.4  C) Temporal 93 14 97 % 1.562 m (5' 1.5\") 57.6 kg (127 lb)       Physical Exam    Eye:  Pupils are equal, round, and reactive.  Extraocular movements intact.    ENT:  No rhinorrhea.  Moist mucus membranes.  Normal tongue and tonsil.    Cardiac:  Regular rate and rhythm.  No murmurs, gallops, or rubs.    Pulmonary:  Clear to auscultation bilaterally.  No wheezes, rales, or rhonchi.    Abdomen:  Mild abdominal distention. Diffuse discomfort to palpation without rebound.    Musculoskeletal:  Normal movement of all extremities without evidence for deficit.    Skin:  Warm and dry without rashes.    Neurologic:  Non-focal exam without asymmetric weakness or numbness.     Psychiatric:  Normal affect with appropriate interaction with examiner.      Emergency Department Course     Imaging:  CT Abdomen Pelvis without Contrast (stone protocol)   1. Mid small bowel obstruction: The stomach is moderately distended with fluid and there is a long segment of moderately dilated fluid-filled small bowel in the abdomen and pelvis with an abrupt transition to nondilated small bowel in the mid abdomen.    2. Small pericardial effusion, increased in size since 08/04/2021.  Report per radiology    Laboratory:     CBC: WBC 10.3, HGB 12.9,      CMP: Potassium 3.2 (L) Glucose 150 (H) o/w WNL (Creatinine 0.79)     Lipase: 59 (L)    Asymptomatic COVID19 Virus PCR by nasopharyngeal swab Pending      Emergency Department Course:  Reviewed:  I reviewed nursing notes, vitals, past medical history and Care Everywhere    Assessments:  0253 I obtained history and examined the patient as noted above.   0401 I rechecked the patient and " explained findings.    Consults:  0426 I spoke with Dr. Tim Block, hospitalist, who agreed to admit the patient.     Interventions:  0247 Zofran 4 mg IV  0318 Morphine 2 mg IV    Disposition:  The patient was admitted to the hospital under the care of Dr. Block.     Impression & Plan     CMS Diagnoses: None    Medical Decision Making:  This 81-year-old woman with 2 prior bowel obstructions presents to us with very similar symptoms over the past 12 hours.  CT confirms the diagnosis.  The remainder the labs are reassuring.  Pain is improved with morphine and she is not actively vomiting.  I do not believe she requires an NG tube.  Plan will be for admission to Dr. Block of the hospitalist service for bowel rest in hopes of resolution with surgical intervention if needed.  The patient's questions are answered and she is comfortable with the plan for admission.    Diagnosis:    ICD-10-CM    1. Small bowel obstruction (H)  K56.609        Scribe Disclosure:  I, Sudhir Dick, am serving as a scribe at 2:51 AM on 11/22/2021 to document services personally performed by Trierweiler, Chad A, MD based on my observations and the provider's statements to me.              Trierweiler, Chad A, MD  11/22/21 0554

## 2021-11-22 NOTE — PHARMACY-ADMISSION MEDICATION HISTORY
Pharmacy Medication History  Admission medication history interview status for the 11/22/2021  admission is complete. See EPIC admission navigator for prior to admission medications     Location of Interview: Patient room  Medication history sources: Patient, Patient's family/friend () and Surescripts    Significant changes made to the medication list:  Clarified vit D dose  Added erythromycin eye ointment    In the past week, patient estimated taking medication this percent of the time: greater than 90%    Additional medication history information:   none    Medication reconciliation completed by provider prior to medication history? No    Time spent in this activity: 20 minutes    Prior to Admission medications    Medication Sig Last Dose Taking? Auth Provider   aspirin (ASA) 81 MG chewable tablet Take 2 tablets (162 mg) by mouth daily 11/21/2021 at am Yes Luis Feliciano MD   atorvastatin (LIPITOR) 40 MG tablet Take 1 tablet (40 mg) by mouth daily 11/20/2021 at pm Yes Luis Feliciano MD   calcium carbonate (TUMS) 500 MG chewable tablet Take 1 tablet (500 mg) by mouth 3 times daily as needed for heartburn prn Yes Tim Downey,    clopidogrel (PLAVIX) 75 MG tablet Take 1 tablet (75 mg) by mouth daily 11/21/2021 at am Yes Luis Feliciano MD   KRILL OIL PO Take 1 capsule by mouth daily (OTC: Unsure of strength) 11/21/2021 at am Yes Unknown, Entered By History   lifitegrast (XIIDRA) 5 % opthalmic solution Place 1 drop into both eyes 2 times daily 11/21/2021 at am Yes Unknown, Entered By History   losartan-hydrochlorothiazide (HYZAAR) 100-25 MG tablet Take 1 tablet by mouth daily 11/21/2021 at am Yes Luis Feliciano MD   NONFORMULARY Take 5 mLs by mouth every morning Perilla seed oil 1 tsp in smoothie every morning. 11/21/2021 at am Yes Unknown, Entered By History   UNKNOWN TO PATIENT Place 1-2 sprays into both eyes every evening Patient uses an OTC eye spray to help with dry eyes in the evening but is unsure of  the name. 11/21/2021 at Unknown time Yes Unknown, Entered By History   UNKNOWN TO PATIENT Take 1 tablet by mouth daily (Patient takes an eye supplement but is unsure of the name) 11/21/2021 at am Yes Unknown, Entered By History   vitamin D3 (CHOLECALCIFEROL) 50 mcg (2000 units) tablet Take 1 tablet by mouth daily 11/21/2021 at am Yes Unknown, Entered By History   UNKNOWN TO PATIENT Place onto the skin daily as needed (leg cramps) Cream from [place] for leg cramps prn  Unknown, Entered By History       The information provided in this note is only as accurate as the sources available at the time of update(s)

## 2021-11-22 NOTE — ED NOTES
[facility]  ED Nurse Handoff Report    ED Chief complaint: Abdominal Pain      ED Diagnosis:   Final diagnoses:   Small bowel obstruction (H)       Code Status: Admitting provider to address.     Allergies:   Allergies   Allergen Reactions     Ace Inhibitors      Lactose GI Disturbance     Reduced fat dairy- Diarrhea     Sorbitan Trioleate      Sulfonylureas      Vancomycin Hives       Patient Story: Patient complaints abdominal pain, nausea to mid abdomen.  Started around 530pm-6 after drinking tea.  Hx of bowel obstruction.  Had 3 normal BM yesterday.  Sharp pains vary in intensity.      Focused Assessment:  Pt is awake, alert and orientated X 4.    Treatments and/or interventions provided: Labs, CT, IV medications.       Patient's response to treatments and/or interventions: Pt tolerated well     To be done/followed up on inpatient unit:      Does this patient have any cognitive concerns?: AOX 4    Activity level - Baseline/Home:  Independent  Activity Level - Current:   Stand with Assist    Patient's Preferred language: English   Needed?: No    Isolation: None  Infection: Not Applicable  Patient tested for COVID 19 prior to admission: YES  Bariatric?: No    Vital Signs:   Vitals:    11/22/21 0345 11/22/21 0400 11/22/21 0415 11/22/21 0430   BP:    (!) 164/95   Pulse:    85   Resp:       Temp:       TempSrc:       SpO2: 95% 92% 94% 93%   Weight:       Height:           Cardiac Rhythm:     Was the PSS-3 completed:   Yes  What interventions are required if any?               Family Comments:  at bedside  OBS brochure/video discussed/provided to patient/family: No              Name of person given brochure if not patient:               Relationship to patient:     For the majority of the shift this patient's behavior was Green.   Behavioral interventions performed were .    ED NURSE PHONE NUMBER: *[phone]

## 2021-11-22 NOTE — H&P
[facility]    History and Physical  Hospitalist       Date of Admission:  11/22/2021  Date of Service (when I saw the patient): 11/22/21    Assessment & Plan   Francois Ly is a 81 year old female who presents with abdominal pain    Small bowel obstruction  Hx ex lap 1960 2/2 uterine perforation from IUD and FARHAT/BSO 1994. Has had recurrent SBOs x 5, most recently 1/2020.  Developed pain after eating dinner, progressively worsening. Some nausea and abdominal distension. Vitals stable. Labs normal. CT abdomen with mid small bowel obstruction.   - NPO, IV fluids  - prn analgesics  - pt request hold NG for now  - IV pantoprazole BID  - general surgery consult    Hypokalemia  K at 3.2 on presentation   - replace per protocol    Pericardial effusion   On admission CT noted small pericardial effusion which has increased ini size from comparison 8/4. BP/ vitals stable.   - Could consider echo but unlikely to act on it, consider follow up echo in the future as outpatient    Recent CVA  [needs rec- atorvastatin 40 mg daily, aspirin, plavix through ~12/1]  Hospitalized in 8/2021 with L sided weakness. MRI with watershed strokes in R frontal and parietal regions. Sx resolved. On plavix/ ASA x 90 days then ASA.   - resume meds with rec  - hold statin with SBO    Hypertension  [needs rec- losartan-hydrochlorothiazide 100-25 one daily]  Mildly hypertensive in ED to 173/93 on arrival, slightly improved over time.   - prn hydralazine, labetalol SBP>180  - hold losartan-hydrochlorothiazide for now    Pancreatic cystic lesion  Present and stable x 5 years. Has seen GI, no further monitoring needed.     COVID-19 negative    DVT Prophylaxis: Ambulate every shift  Code Status: Full Code    Disposition: Expected discharge in 2-4 days     Tim Block MD  [phone] (P)  Text Page     Primary Care Physician   Luis Feliciano    Chief Complaint   Abdominal pain    History is obtained from the patient and  medical records    History of Present Illness   Francois Ly is a 81 year old female who presents with abdominal pain.  She has a history of small bowel obstructions with the most recent being in January 2020.  She was also just hospitalized here at [facility] for stroke in August for which she fully recovered.  She states she is doing well and that day of admission until dinner when she had some food and then drank some tea and had onset of abdominal pain.  She states the pain waxed and waned but progressively worsened.  She has some nausea and try to make herself throw up but she could not.  The pain is mainly in her mid abdomen but did radiate down to her left lower quadrant.  Her last bowel movement was a day prior.  Her pain is improved after morphine in the emergency department.  She denies any chest pain or shortness of breath and is otherwise doing very well.    Past Medical History    I have reviewed this patient's medical history and updated it with pertinent information if needed.   Past Medical History:   Diagnosis Date     Benign essential hypertension 10/12/2016     Bilateral low back pain without sciatica, unspecified chronicity 12/19/2017     Blunt trauma of rib, initial encounter 11/25/2016     Dyslipidemia      Gallstones      Hypertension      PAD (peripheral artery disease) (H) 6/19/2017     Pancreatic mass 8/17/2016     SBO (small bowel obstruction) (H)      Slipped intervertebral disc        Past Surgical History   I have reviewed this patient's surgical history and updated it with pertinent information if needed.  Past Surgical History:   Procedure Laterality Date     GYN SURGERY      hysterectomy     ORTHOPEDIC SURGERY      Slipped disc with chronic back pain       Prior to Admission Medications   Prior to Admission Medications   Prescriptions Last Dose Informant Patient Reported? Taking?   KRILL OIL PO   Yes No   Sig: Take 1 capsule by mouth daily (OTC: Unsure of strength)   NONFORMULARY   Self Yes No   Sig: Take 5 mLs by mouth every morning Perilla seed oil 1 tsp in smoothie every morning.   UNKNOWN TO PATIENT  Self Yes No   Sig: Place onto the skin daily as needed (leg cramps) Cream from [place] for leg cramps   UNKNOWN TO PATIENT   Yes No   Sig: Place 1-2 sprays into both eyes every evening Patient uses an OTC eye spray to help with dry eyes in the evening but is unsure of the name.   UNKNOWN TO PATIENT   Yes No   Sig: Take 1 tablet by mouth daily (Patient takes an eye supplement but is unsure of the name)   VITAMIN D PO 11/21/2021 at Unknown time  Yes Yes   Sig: Take 1 tablet by mouth daily (OTC: Unsure of strength)   aspirin (ASA) 81 MG chewable tablet 11/21/2021 at Unknown time  Yes Yes   Sig: Take 2 tablets (162 mg) by mouth daily   atorvastatin (LIPITOR) 40 MG tablet 11/21/2021 at Unknown time  No Yes   Sig: Take 1 tablet (40 mg) by mouth daily   calcium carbonate (TUMS) 500 MG chewable tablet  Self No No   Sig: Take 1 tablet (500 mg) by mouth 3 times daily as needed for heartburn   clopidogrel (PLAVIX) 75 MG tablet 11/21/2021 at Unknown time  No Yes   Sig: Take 1 tablet (75 mg) by mouth daily   lifitegrast (XIIDRA) 5 % opthalmic solution   Yes No   Sig: Place 1 drop into both eyes 2 times daily   losartan-hydrochlorothiazide (HYZAAR) 100-25 MG tablet 11/21/2021 at Unknown time  No Yes   Sig: Take 1 tablet by mouth daily      Facility-Administered Medications: None     Allergies   Allergies   Allergen Reactions     Ace Inhibitors      Lactose GI Disturbance     Reduced fat dairy- Diarrhea     Sorbitan Trioleate      Sulfonylureas      Vancomycin Hives       Social History   I have reviewed this patient's social history and updated it with pertinent information if needed. Francois Ly  reports that she has quit smoking. She has quit using smokeless tobacco. She reports current alcohol use. She reports that she does not use drugs.    Family History   I have reviewed this patient's family history  and updated it with pertinent information if needed.   Family History   Problem Relation Age of Onset     Family History Negative Other        Review of Systems   The 10 point Review of Systems is negative other than noted in the HPI or here.     Physical Exam   Temp: 97.5  F (36.4  C) Temp src: Temporal BP: (!) 141/101 Pulse: 93   Resp: 14 SpO2: 94 % O2 Device: None (Room air)    Vital Signs with Ranges  127 lbs 0 oz    Constitutional: alert, oriented and in no acute distress  Eyes: EOMI, PERRL  HEENT: OP clear  Respiratory: CTA B without w/c  Cardiovascular: RRR soft RICK. no edema.  GI: soft, mildly tender to light palpation, nondistended, BS present, sl hypactive  Lymph/Hematologic: no cervical LAD  Genitourinary: deferred  Skin: no rashes or lesions grossly  Musculoskeletal: no deformities or arthritis  Neurologic: CN II-XII, NERI  Psychiatric: mood and affect wnl    Data   Data reviewed today:  I personally reviewed the abdominal CT image(s) showing small bowel obstruction.  Recent Labs   Lab 11/22/21  0317   WBC 10.3   HGB 12.9   MCV 90         POTASSIUM 3.2*   CHLORIDE 97   CO2 31   BUN 15   CR 0.79   ANIONGAP 8   ARIN 9.5   *   ALBUMIN 3.8   PROTTOTAL 8.1   BILITOTAL 0.6   ALKPHOS 98   ALT 32   AST 25   LIPASE 59*       Recent Results (from the past 24 hour(s))   CT Abdomen Pelvis without Contrast (stone protocol)    Narrative    EXAM: CT ABDOMEN AND PELVIS WITHOUT CONTRAST  LOCATION: [facility]  DATE/TIME: 11/22/2021 3:17 AM    INDICATION: Abdominal pain.  COMPARISON: 08/04/2021.    TECHNIQUE: CT scan of the abdomen and pelvis was performed without IV contrast. Multiplanar reformats were obtained. Dose reduction techniques were used.  CONTRAST: None.    FINDINGS:    LOWER CHEST: Partial visualization of a small pericardial effusion. This has increased in size since 08/04/2021. Coronary artery calcification.    HEPATOBILIARY: Two cysts within the liver, the largest  a 5 cm cyst in the right lobe. A few small gallstones within a nondistended gallbladder.    SPLEEN: Unremarkable.    PANCREAS: Unremarkable. The small cyst in the uncinate process of the pancreas described on the prior study is not clearly visualized on this study.     ADRENAL GLANDS: Small low-attenuation left adrenal nodule, likely of benign etiology.    KIDNEYS/BLADDER: 0.8 cm indeterminate intermediate attenuation lesion extending from the posterolateral aspect of the upper pole of the right kidney, unchanged in size since the recent comparison study.    BOWEL: The stomach is moderately distended with fluid. There is a long segment of moderately dilated fluid-filled small bowel in the abdomen and pelvis and an abrupt transition to nondilated small bowel in the anterior aspect of the mid abdomen in the   supraumbilical region (for example, series 4 image 136). Several colonic diverticula are present without evidence of diverticulitis. Normal appendix. No visualized bowel wall thickening, pneumatosis or free intraperitoneal gas.    LYMPH NODES: Unremarkable.    PELVIC ORGANS: No acute findings.    MUSCULOSKELETAL: Fusion hardware in the lumbar spine.    OTHER: Atherosclerotic calcification in the abdominal aorta.      Impression    IMPRESSION:   1. Mid small bowel obstruction: The stomach is moderately distended with fluid and there is a long segment of moderately dilated fluid-filled small bowel in the abdomen and pelvis with an abrupt transition to nondilated small bowel in the mid abdomen.  2. Small pericardial effusion, increased in size since 08/04/2021.

## 2021-11-23 LAB
ANION GAP SERPL CALCULATED.3IONS-SCNC: 1 MMOL/L (ref 3–14)
BUN SERPL-MCNC: 10 MG/DL (ref 7–30)
CALCIUM SERPL-MCNC: 8 MG/DL (ref 8.5–10.1)
CHLORIDE BLD-SCNC: 114 MMOL/L (ref 94–109)
CO2 SERPL-SCNC: 27 MMOL/L (ref 20–32)
CREAT SERPL-MCNC: 0.7 MG/DL (ref 0.52–1.04)
ERYTHROCYTE [DISTWIDTH] IN BLOOD BY AUTOMATED COUNT: 12.8 % (ref 10–15)
GFR SERPL CREATININE-BSD FRML MDRD: 82 ML/MIN/1.73M2
GLUCOSE BLD-MCNC: 133 MG/DL (ref 70–99)
HCT VFR BLD AUTO: 33.4 % (ref 35–47)
HGB BLD-MCNC: 10.8 G/DL (ref 11.7–15.7)
HGB BLD-MCNC: 11.3 G/DL (ref 11.7–15.7)
MCH RBC QN AUTO: 30.1 PG (ref 26.5–33)
MCHC RBC AUTO-ENTMCNC: 32.3 G/DL (ref 31.5–36.5)
MCV RBC AUTO: 93 FL (ref 78–100)
PLATELET # BLD AUTO: 216 10E3/UL (ref 150–450)
POTASSIUM BLD-SCNC: 4 MMOL/L (ref 3.4–5.3)
RBC # BLD AUTO: 3.59 10E6/UL (ref 3.8–5.2)
SODIUM SERPL-SCNC: 142 MMOL/L (ref 133–144)
WBC # BLD AUTO: 5.1 10E3/UL (ref 4–11)

## 2021-11-23 PROCEDURE — 250N000013 HC RX MED GY IP 250 OP 250 PS 637: Performed by: INTERNAL MEDICINE

## 2021-11-23 PROCEDURE — 120N000001 HC R&B MED SURG/OB

## 2021-11-23 PROCEDURE — C9113 INJ PANTOPRAZOLE SODIUM, VIA: HCPCS | Performed by: INTERNAL MEDICINE

## 2021-11-23 PROCEDURE — 99232 SBSQ HOSP IP/OBS MODERATE 35: CPT | Performed by: SURGERY

## 2021-11-23 PROCEDURE — 85027 COMPLETE CBC AUTOMATED: CPT | Performed by: INTERNAL MEDICINE

## 2021-11-23 PROCEDURE — 99233 SBSQ HOSP IP/OBS HIGH 50: CPT | Performed by: INTERNAL MEDICINE

## 2021-11-23 PROCEDURE — 85018 HEMOGLOBIN: CPT | Performed by: INTERNAL MEDICINE

## 2021-11-23 PROCEDURE — 250N000011 HC RX IP 250 OP 636: Performed by: INTERNAL MEDICINE

## 2021-11-23 PROCEDURE — 258N000003 HC RX IP 258 OP 636: Performed by: INTERNAL MEDICINE

## 2021-11-23 PROCEDURE — 36415 COLL VENOUS BLD VENIPUNCTURE: CPT | Performed by: INTERNAL MEDICINE

## 2021-11-23 PROCEDURE — 80048 BASIC METABOLIC PNL TOTAL CA: CPT | Performed by: INTERNAL MEDICINE

## 2021-11-23 PROCEDURE — 250N000013 HC RX MED GY IP 250 OP 250 PS 637: Performed by: PHYSICIAN ASSISTANT

## 2021-11-23 RX ORDER — ATORVASTATIN CALCIUM 40 MG/1
40 TABLET, FILM COATED ORAL DAILY
Status: DISCONTINUED | OUTPATIENT
Start: 2021-11-23 | End: 2021-11-24 | Stop reason: HOSPADM

## 2021-11-23 RX ORDER — CLOPIDOGREL BISULFATE 75 MG/1
75 TABLET ORAL DAILY
Status: DISCONTINUED | OUTPATIENT
Start: 2021-11-23 | End: 2021-11-24 | Stop reason: HOSPADM

## 2021-11-23 RX ORDER — SODIUM CHLORIDE, SODIUM LACTATE, POTASSIUM CHLORIDE, CALCIUM CHLORIDE 600; 310; 30; 20 MG/100ML; MG/100ML; MG/100ML; MG/100ML
INJECTION, SOLUTION INTRAVENOUS CONTINUOUS
Status: DISCONTINUED | OUTPATIENT
Start: 2021-11-23 | End: 2021-11-24 | Stop reason: HOSPADM

## 2021-11-23 RX ORDER — LOSARTAN POTASSIUM 100 MG/1
100 TABLET ORAL DAILY
Status: DISCONTINUED | OUTPATIENT
Start: 2021-11-23 | End: 2021-11-24 | Stop reason: HOSPADM

## 2021-11-23 RX ORDER — CALCIUM CARBONATE 500 MG/1
500 TABLET, CHEWABLE ORAL 3 TIMES DAILY PRN
Status: DISCONTINUED | OUTPATIENT
Start: 2021-11-23 | End: 2021-11-24 | Stop reason: HOSPADM

## 2021-11-23 RX ORDER — ASPIRIN 81 MG/1
162 TABLET, CHEWABLE ORAL DAILY
Status: DISCONTINUED | OUTPATIENT
Start: 2021-11-23 | End: 2021-11-24 | Stop reason: HOSPADM

## 2021-11-23 RX ADMIN — SODIUM CHLORIDE, POTASSIUM CHLORIDE, SODIUM LACTATE AND CALCIUM CHLORIDE: 600; 310; 30; 20 INJECTION, SOLUTION INTRAVENOUS at 07:50

## 2021-11-23 RX ADMIN — CLOPIDOGREL BISULFATE 75 MG: 75 TABLET ORAL at 13:23

## 2021-11-23 RX ADMIN — ATORVASTATIN CALCIUM 40 MG: 40 TABLET, FILM COATED ORAL at 14:12

## 2021-11-23 RX ADMIN — PANTOPRAZOLE SODIUM 40 MG: 40 INJECTION, POWDER, FOR SOLUTION INTRAVENOUS at 08:13

## 2021-11-23 RX ADMIN — PANTOPRAZOLE SODIUM 40 MG: 40 INJECTION, POWDER, FOR SOLUTION INTRAVENOUS at 19:45

## 2021-11-23 RX ADMIN — ASPIRIN 81 MG CHEWABLE TABLET 162 MG: 81 TABLET CHEWABLE at 13:23

## 2021-11-23 RX ADMIN — LOSARTAN POTASSIUM 100 MG: 100 TABLET, FILM COATED ORAL at 08:13

## 2021-11-23 ASSESSMENT — ACTIVITIES OF DAILY LIVING (ADL)
ADLS_ACUITY_SCORE: 6

## 2021-11-23 ASSESSMENT — MIFFLIN-ST. JEOR: SCORE: 999.53

## 2021-11-23 NOTE — PROGRESS NOTES
[facility]    Hospitalist Progress Note    Assessment & Plan   Francois Ly is a 81 year old female who presents with abdominal pain     Small bowel obstruction  Hx ex lap 1960 2/2 uterine perforation from IUD and FARHAT/BSO 1994. Has had recurrent SBOs x 5, most recently 1/2020.  Developed pain after eating dinner, progressively worsening. Some nausea and abdominal distension. Vitals stable. Labs normal. CT abdomen with mid small bowel obstruction.   -pain, nausea, distension resolved in ED this am  Seen by Gen surgery  Tolerated water and ice chips.   Advanced to full liquids then developed waves of abd pain  No vomiting  Now pain free  Cont to admit to inpatient  -abd soft.     Today. gastrograffen challenge last night. Contrast throughout colon on follow up xray  Passing stool and gas  No abd bloating. No pain overnight but had mild discomfort X 2 this am. None currently.     Plan;   - will try full liquids for now, surgery to adjust if needed.   - Gen surgery following  -cont ivfs  - restart asa, plavix for recent stroke tomorrow if ok with surgery. Surgery to comment  - prn analgesics  - pt request hold NG for now  - IV pantoprazole BID    Mild normocytic anemia  Hx of hemmoroidal bleeding  Colonoscopy 5 years ago. Had banding of hemorrhoids  rec at time was no further colonoscopy needed.   Recheck Hb 1200  Denies bleeding other than intermittent rectal bleeding  On asa, plavix  pcp follow up with Hb     Hypokalemia- resolved  K at 3.2 on presentation   - replaced per protocol  -follow     Pericardial effusion   On admission CT noted small pericardial effusion which has increased ini size from comparison 8/4. BP/ vitals stable.   - Could consider echo but unlikely to act on it, consider follow up echo in the future as outpatient  -no cardiopulmonary sxs.   -pcp and follow up echo     Recent CVA  [needs rec- atorvastatin 40 mg daily, aspirin, plavix through ~12/1]  Hospitalized in  8/2021 with L sided weakness. MRI with watershed strokes in R frontal and parietal regions. Sx resolved. On plavix/ ASA x 90 days then ASA.  nonfocal neuro exam    - will ask surgery to see if can resume asa, plavix.        Hypertension  [needs rec- losartan-hydrochlorothiazide 100-25 one daily]  Mildly hypertensive in ED to 173/93 on arrival, slightly improved over time.   Restart losartan, hold hydrochlorothiazide    - prn hydralazine, labetalol SBP>180  - hold hydrochlorothiazide for now  -resumed losartan     Pancreatic cystic lesion  Present and stable x 5 years. Has seen GI, no further monitoring needed.      COVID-19 negative     DVT Prophylaxis: Ambulate every shift  Code Status: Full Code     Disposition: TBD, 1-2 days once SBO resolved and tolerating diet       Abdulkadir Delgado MD  Text Page  (7am to 6pm)  Interval History   Tolerated clears last night  Passing gas and stool  No abd pain or nausea overnight.   This am since 0630 had two bouts 10 min apart of abd pain but now resolved.   Some occasoinal hemmoroidal bleeding with wiping for several years.     -Data reviewed today: I reviewed all new labs and imaging results over the last 24 hours. I personally reviewed labs and imaging since admission    Physical Exam   Temp: 98.4  F (36.9  C) Temp src: Oral BP: (!) 163/63 Pulse: 55   Resp: 17 SpO2: 98 % O2 Device: None (Room air)    Vitals:    11/22/21 0231 11/22/21 1856 11/23/21 0602   Weight: 57.6 kg (127 lb) 59.3 kg (130 lb 11.2 oz) 58.9 kg (129 lb 14.4 oz)     Vital Signs with Ranges  Temp:  [98.1  F (36.7  C)-98.4  F (36.9  C)] 98.4  F (36.9  C)  Pulse:  [55-73] 55  Resp:  [12-21] 17  BP: (138-187)/() 163/63  SpO2:  [93 %-98 %] 98 %  No intake/output data recorded.    Constitutional: nad  Respiratory: cTAB  Cardiovascular: RRR no r/g/m  GI: soft, nt, nd, nl bs  Skin/Integumen: no rash or edema  Neuro: nl speech and mentation. Grossly nonfocal  Psych; nl affect    Medications     lactated  ringers         losartan  100 mg Oral Daily     pantoprazole (PROTONIX) IV  40 mg Intravenous BID     sodium chloride (PF)  3 mL Intracatheter Q8H       Data   Recent Labs   Lab 11/23/21  0614 11/22/21  0900 11/22/21  0317   WBC 5.1  --  10.3   HGB 10.8*  --  12.9   MCV 93  --  90     --  270     --  136   POTASSIUM 4.0 3.7 3.2*   CHLORIDE 114*  --  97   CO2 27  --  31   BUN 10  --  15   CR 0.70  --  0.79   ANIONGAP 1*  --  8   ARIN 8.0*  --  9.5   *  --  150*   ALBUMIN  --   --  3.8   PROTTOTAL  --   --  8.1   BILITOTAL  --   --  0.6   ALKPHOS  --   --  98   ALT  --   --  32   AST  --   --  25   LIPASE  --   --  59*       Imaging:   Recent Results (from the past 24 hour(s))   XR Abdomen 1 View    Narrative    EXAM: XR ABDOMEN 1 VIEW  LOCATION: [facility]  DATE/TIME: 11/22/2021 5:52 PM    INDICATION: Gastrografin UGI challenge.    COMPARISON: None.      Impression    IMPRESSION: Contrast is present in a mildly dilated small bowel and throughout the ascending and transverse colon.    XR Abdomen 1 View    Narrative    EXAM: XR ABDOMEN 1 VIEW  LOCATION: [facility]  DATE/TIME: 11/22/2021 10:12 PM    INDICATION: sbo, gastrografin challenge follow up  COMPARISON: 11/22/2021      Impression    IMPRESSION: Contrast is seen throughout the colon. Dilated small bowel right lower abdomen and scattered air-fluid levels. Postsurgical change spine.

## 2021-11-23 NOTE — PROGRESS NOTES
"Surgery    Continues to have intermittent abd discomfort.   Seems to occur in the epigastric area shortly after drinking  Multiple loose bm's with some solid pellets of stool.   Gastrografin transitioned throughout colon.  Denies chest pain or dyspnea.    Gen:  Awake, Alert, NAD  Blood pressure (!) 185/68, pulse 54, temperature 97.6  F (36.4  C), temperature source Oral, resp. rate 18, height 1.562 m (5' 1.5\"), weight 58.9 kg (129 lb 14.4 oz), SpO2 95 %  Resp - clear to ascultation.    Cardiac - Regular rate & rhythm without murmur  Abdomen - soft, mild discomfort with palpation in the epigastric area.   Extremities - no lower extremity edema.    Wbc 5.1  Hgb 10.8    EXAM: XR ABDOMEN 1 VIEW  LOCATION: [facility]  DATE/TIME: 11/22/2021 10:12 PM     INDICATION: sbo, gastrografin challenge follow up  COMPARISON: 11/22/2021                                                                    IMPRESSION: Contrast is seen throughout the colon. Dilated small bowel right lower abdomen and scattered air-fluid levels. Postsurgical change spine.    A/P 81 year old woman with remote history of uterine perforation and exploratory laparotomy, FARHAT/SBO and 5 subsequent small bowel obstructions now presenting with a recurrent partial small bowel obstruction.     - Given ongoing mild abdominal pain, limit diet to clear liquids for now.   - Patient requests heating pad for abdominal pain.   - No surgery anticipated.  Ok to resume ASA, Plavix.   - Continue PPI add tums for possible gastritis (and hypocalcemia).  - Hypertensive, defer to Primary service    Laci MARLEN Heck PA-C      "

## 2021-11-23 NOTE — CONSULTS
"BRIEF NUTRITION ASSESSMENT    REASON FOR ASSESSMENT:  Francois Ly is a 81 year old female seen by Registered Dietitian for RN consult - \"Pt would like vanilla protien shake with meals\"     - Pt is on a Clear Liquid diet (Comment \"may have protein shakes\" does not interface - there is no way to send full liquids on a clear liquid diet). Will order clear supplements for now (d/w patient's spouse) and add vanilla shakes once advanced to Full liquids. Attempted to d/w RN though she was busy with other cares.     NUTRITION HISTORY:  - Per pt's spouse she had been eating normally prior to this event. He suspects that due to her abnormal GI anatomy (scarring) a high fiber dinner caused bowel obstruction.     CURRENT DIET AND INTAKE:  Diet: Clear Liquid diet     N/A    ANTHROPOMETRICS:  Height: 5' 1.5\"  Weight:  129 lbs 14.4 oz (58.9 kg)   Body mass index is 24.15 kg/m .   Weight Status: Normal BMI  IBW:  48.9 kg   %IBW: 120%  Weight History: No significant changes   Wt Readings from Last 10 Encounters:   11/23/21 58.9 kg (129 lb 14.4 oz)   09/30/21 57.7 kg (127 lb 3.2 oz)   08/26/21 61.7 kg (136 lb)   08/11/21 59.4 kg (131 lb)   08/11/21 59.4 kg (131 lb)   08/02/21 59.4 kg (131 lb)   07/26/21 59.4 kg (131 lb)   09/29/20 60.8 kg (134 lb)   09/16/20 60.3 kg (133 lb)   03/10/20 59.9 kg (132 lb)       LABS:  Reviewed     MALNUTRITION:  Visual Nutrition Focused Physical Assessment (NFPA) completed visually.   Patient does not meet two of the following criteria necessary for diagnosing malnutrition.     % Weight Loss:  None noted  % Intake:  Decreased intake does not meet criteria for malnutrition   Subcutaneous Fat Loss:  None observed  Muscle Loss:  None observed  Fluid Retention:  None noted    NUTRITION INTERVENTION:  Nutrition Diagnosis:  No nutrition diagnosis at this time.    Implementation:  Nutrition Education:  Per Provider order if indicated    FOLLOW UP/MONITORING:   Will add shakes as able and re-evaluate in 7 - " 10 days, or sooner, if re-consulted.      Mable Sosa RD, LD  [facility], Orth[facility], [facility]  Pager: [phone]  Weekend Pager: [phone]

## 2021-11-23 NOTE — PLAN OF CARE
VSS ex HTN, no PRNs given. Pt sating well on RA. Up SBA. Complaining of mild ABD cramping, heating pad applied, declined any PRNS. No complaints of nausea.

## 2021-11-23 NOTE — UTILIZATION REVIEW
Admission Status; Secondary Review Determination       Under the authority of the Utilization Management Committee, the utilization review process indicated a secondary review on the above patient. The review outcome is based on review of the medical records, discussions with staff, and applying clinical experience noted on the date of the review.     (x) Inpatient Status Appropriate - This patient's medical care is consistent with medical management for inpatient care and reasonable inpatient medical practice.     RATIONALE FOR DETERMINATION     81-year-old Medicare patient presenting with small bowel obstruction, patient has prior history of abdominal surgery and has had small bowel obstruction 5 times before, she was kept n.p.o. on IV fluids IV Protonix General surgery was consulted, she has a past medical history that is complex which includes hypertension, CVA.  This is a conditional review for a Medicare patient, at the time of this review patient is anticipated to require at least 1 more night of hospital care; however if plan changes and discharges before 2 midnights please send for post discharge review.      This document was produced using voice recognition software       The information on this document is developed by the utilization review team in order for the business office to ensure compliance. This only denotes the appropriateness of proper admission status and does not reflect the quality of care rendered.   The definitions of Inpatient Status and Observation Status used in making the determination above are those provided in the CMS Coverage Manual, Chapter 1 and Chapter 6, section 70.4.   Sincerely,   SHELLIE ODOM MD   System Medical Director   Utilization Management   [facility].

## 2021-11-23 NOTE — PLAN OF CARE
Pt A x O x 4. Reports intermittent abd pain, declined interventions. Oxygen saturation >90% on RA. Ambulating sba w/ staff. D5 NS 20 meq K infusing @ 100 ml/hr. Clear liquid diet. Frequent loose stools. Pt educated on poc, cares, medications and safety. Will continue to monitor.

## 2021-11-23 NOTE — PLAN OF CARE
"A7Ox4. BP (!) 165/79 (BP Location: Left arm)   Pulse 69   Temp 98.1  F (36.7  C) (Oral)   Resp 16   Ht 1.562 m (5' 1.5\")   Wt 59.3 kg (130 lb 11.2 oz)   LMP  (LMP Unknown)   SpO2 94%   BMI 24.30 kg/m   No telemetry ordered. Clear liquid diet. C/o of abdominal pain but declined medication. Up with SBA. Frequent loose stools. Post gastrografin abdominal X-ray completed. Gen-surgery following.   "

## 2021-11-24 VITALS
BODY MASS INDEX: 23.9 KG/M2 | TEMPERATURE: 97.4 F | WEIGHT: 129.9 LBS | DIASTOLIC BLOOD PRESSURE: 76 MMHG | HEART RATE: 55 BPM | HEIGHT: 62 IN | RESPIRATION RATE: 16 BRPM | OXYGEN SATURATION: 95 % | SYSTOLIC BLOOD PRESSURE: 166 MMHG

## 2021-11-24 LAB
ANION GAP SERPL CALCULATED.3IONS-SCNC: 7 MMOL/L (ref 3–14)
BUN SERPL-MCNC: 6 MG/DL (ref 7–30)
CALCIUM SERPL-MCNC: 8.5 MG/DL (ref 8.5–10.1)
CHLORIDE BLD-SCNC: 111 MMOL/L (ref 94–109)
CO2 SERPL-SCNC: 26 MMOL/L (ref 20–32)
CREAT SERPL-MCNC: 0.61 MG/DL (ref 0.52–1.04)
ERYTHROCYTE [DISTWIDTH] IN BLOOD BY AUTOMATED COUNT: 12.7 % (ref 10–15)
GFR SERPL CREATININE-BSD FRML MDRD: 85 ML/MIN/1.73M2
GLUCOSE BLD-MCNC: 104 MG/DL (ref 70–99)
HCT VFR BLD AUTO: 33.8 % (ref 35–47)
HGB BLD-MCNC: 11.1 G/DL (ref 11.7–15.7)
MCH RBC QN AUTO: 30.2 PG (ref 26.5–33)
MCHC RBC AUTO-ENTMCNC: 32.8 G/DL (ref 31.5–36.5)
MCV RBC AUTO: 92 FL (ref 78–100)
PLATELET # BLD AUTO: 227 10E3/UL (ref 150–450)
POTASSIUM BLD-SCNC: 3.4 MMOL/L (ref 3.4–5.3)
RBC # BLD AUTO: 3.67 10E6/UL (ref 3.8–5.2)
SODIUM SERPL-SCNC: 144 MMOL/L (ref 133–144)
WBC # BLD AUTO: 5.5 10E3/UL (ref 4–11)

## 2021-11-24 PROCEDURE — 36415 COLL VENOUS BLD VENIPUNCTURE: CPT | Performed by: INTERNAL MEDICINE

## 2021-11-24 PROCEDURE — 99231 SBSQ HOSP IP/OBS SF/LOW 25: CPT | Performed by: SURGERY

## 2021-11-24 PROCEDURE — 250N000013 HC RX MED GY IP 250 OP 250 PS 637: Performed by: INTERNAL MEDICINE

## 2021-11-24 PROCEDURE — C9113 INJ PANTOPRAZOLE SODIUM, VIA: HCPCS | Performed by: INTERNAL MEDICINE

## 2021-11-24 PROCEDURE — 99238 HOSP IP/OBS DSCHRG MGMT 30/<: CPT | Performed by: INTERNAL MEDICINE

## 2021-11-24 PROCEDURE — 250N000013 HC RX MED GY IP 250 OP 250 PS 637: Performed by: PHYSICIAN ASSISTANT

## 2021-11-24 PROCEDURE — 85027 COMPLETE CBC AUTOMATED: CPT | Performed by: INTERNAL MEDICINE

## 2021-11-24 PROCEDURE — 80048 BASIC METABOLIC PNL TOTAL CA: CPT | Performed by: INTERNAL MEDICINE

## 2021-11-24 PROCEDURE — 99207 PR CDG-CODE INCORRECT PER BILLING BASED ON TIME: CPT | Performed by: INTERNAL MEDICINE

## 2021-11-24 PROCEDURE — 250N000011 HC RX IP 250 OP 636: Performed by: INTERNAL MEDICINE

## 2021-11-24 PROCEDURE — 258N000003 HC RX IP 258 OP 636: Performed by: INTERNAL MEDICINE

## 2021-11-24 RX ORDER — LOSARTAN POTASSIUM 100 MG/1
100 TABLET ORAL DAILY
Qty: 30 TABLET | Refills: 0 | Status: SHIPPED | OUTPATIENT
Start: 2021-11-25 | End: 2021-12-01

## 2021-11-24 RX ORDER — POTASSIUM CHLORIDE 1500 MG/1
20 TABLET, EXTENDED RELEASE ORAL ONCE
Status: DISCONTINUED | OUTPATIENT
Start: 2021-11-24 | End: 2021-11-24 | Stop reason: HOSPADM

## 2021-11-24 RX ORDER — POTASSIUM CHLORIDE 1500 MG/1
20 TABLET, EXTENDED RELEASE ORAL ONCE
Status: COMPLETED | OUTPATIENT
Start: 2021-11-24 | End: 2021-11-24

## 2021-11-24 RX ORDER — HYDRALAZINE HYDROCHLORIDE 10 MG/1
10 TABLET, FILM COATED ORAL 3 TIMES DAILY
Qty: 90 TABLET | Refills: 0 | Status: SHIPPED | OUTPATIENT
Start: 2021-11-24 | End: 2021-12-01

## 2021-11-24 RX ADMIN — LOSARTAN POTASSIUM 100 MG: 100 TABLET, FILM COATED ORAL at 09:21

## 2021-11-24 RX ADMIN — CLOPIDOGREL BISULFATE 75 MG: 75 TABLET ORAL at 09:21

## 2021-11-24 RX ADMIN — POTASSIUM CHLORIDE 20 MEQ: 1500 TABLET, EXTENDED RELEASE ORAL at 09:21

## 2021-11-24 RX ADMIN — SODIUM CHLORIDE, POTASSIUM CHLORIDE, SODIUM LACTATE AND CALCIUM CHLORIDE: 600; 310; 30; 20 INJECTION, SOLUTION INTRAVENOUS at 04:01

## 2021-11-24 RX ADMIN — ATORVASTATIN CALCIUM 40 MG: 40 TABLET, FILM COATED ORAL at 09:21

## 2021-11-24 RX ADMIN — ASPIRIN 81 MG CHEWABLE TABLET 162 MG: 81 TABLET CHEWABLE at 09:21

## 2021-11-24 RX ADMIN — PANTOPRAZOLE SODIUM 40 MG: 40 INJECTION, POWDER, FOR SOLUTION INTRAVENOUS at 09:21

## 2021-11-24 ASSESSMENT — ACTIVITIES OF DAILY LIVING (ADL)
ADLS_ACUITY_SCORE: 6

## 2021-11-24 NOTE — PROGRESS NOTES
Surgery    Feeling improved today.  Passing flatus.  Minimal residual tenderness.  Feels hungry and has tolerated clears.    Abdomen much less distended and soft.  Minimal tenderness.    A/P  Resolving partial small bowel obstruction.  Greatly improved today.  Okay to advance to low residue diet.  Advised patient to stick to this diet for the next 4 to 6 weeks to avoid reoccurrence.  She does not eat a very high fiber diet  now and can resume her regular diet when she is back to normal.  She can be discharged later today if tolerates diet.  No need for surgical follow-up.    Augustin Ann M.D.

## 2021-11-24 NOTE — PROGRESS NOTES
Summary:  Abd pain, nausea  Primary Diagnosis: SBO   Orientation: AOx4, fluent english   Aggression Stop Light: Green  Mobility: Ind  Pain Management: heating pad  Diet: Clear liquid   Bowel/Bladder: BR  Abnormal Lab/Assessments:   Drain/Device/Wound: R PIV LR@50  Consults: Gen surg  D/C Day/Goals/Place: pending diet advancement    Shift Note:     Pt denies pain/nausea. IV fluids infusing. Independent. Plan to discharge today if appropriate.

## 2021-11-24 NOTE — DISCHARGE SUMMARY
[facility]    Discharge Summary  Hospitalist    Date of Admission:  11/22/2021  Date of Discharge:  11/24/2021  Discharging Provider: Abdulkadir Delgado MD    Discharge Diagnoses   Small bowel obstruction, resolved with conservative measures    History of Present Illness   Francois Ly is a 81 year old female who presents with abdominal pain.  She has a history of small bowel obstructions with the most recent being in January 2020.  She was also just hospitalized here at [facility] for stroke in August for which she fully recovered.  She states she is doing well and that day of admission until dinner when she had some food and then drank some tea and had onset of abdominal pain.  She states the pain waxed and waned but progressively worsened.  She has some nausea and try to make herself throw up but she could not.  The pain is mainly in her mid abdomen but did radiate down to her left lower quadrant.  Her last bowel movement was a day prior.  Her pain is improved after morphine in the emergency department.  She denies any chest pain or shortness of breath and is otherwise doing very well.       Hospital Course   Francois Ly was admitted on 11/22/2021.  The following problems were addressed during her hospitalization:    Active Problems:    Small bowel obstruction (H)  Francois Ly is a 81 year old female who presents with abdominal pain     Small bowel obstruction  Hx ex lap 1960 2/2 uterine perforation from IUD and FARHAT/BSO 1994. Has had recurrent SBOs x 5, most recently 1/2020.  Developed pain after eating dinner, progressively worsening. Some nausea and abdominal distension. Vitals stable. Labs normal. CT abdomen with mid small bowel obstruction.   -pain, nausea, distension resolved in ED this am  Seen by Gen surgery  -pt placed on clear liquids and tolerated  - she did not initially tolerate full liquids.    -gastrograffen challenge last night. Contrast throughout colon on follow  up xray  Passing stool and gas  Pt not tolerate full liquids on retrial. Remained overnight and diet advanced the day of discharge as she was still passing flattus and feeling well with no abd pain or nausea/distension.   Diet advanced to soft, low fiber diet which she tolerated well.   Followed by Gen surgery during stay.   Pt will discharge home on low fiber diet. Provided with dietary information/direction on low fiber diet.   Follow up with surgery prn  Follow up with pcp with bmp and cbc next week.        Mild normocytic anemia  Hx of hemmoroidal bleeding  Colonoscopy 5 years ago. Had banding of hemorrhoids  rec at time was no further colonoscopy needed.   Denies bleeding other than intermittent rectal bleeding  On asa, plavix  Hb 12.9 admission ---> with fluids down to 11 range and stable.   Pt will follow up with pcp with CBC.        Hypokalemia- resolved, replaced per protocol. 2/2 to decreased po intake.        Pericardial effusion - asymptomatic  On admission CT noted small pericardial effusion which has increased ini size from comparison 8/4. BP/ vitals stable.   - Could consider echo but unlikely to act on it, consider follow up echo in the future as outpatient  -no cardiopulmonary sxs.   -pcp and follow up echo outpatient. Discussed with patient.      Recent CVA  [needs rec- atorvastatin 40 mg daily, aspirin, plavix through ~12/1]  Hospitalized in 8/2021 with L sided weakness. MRI with watershed strokes in R frontal and parietal regions. Sx resolved. On plavix/ ASA x 90 days then ASA.  nonfocal neuro exam    Pt resumed on asa, plavix and other PtA meds  F/u pcp        Hypertension  [needs rec- losartan-hydrochlorothiazide 100-25 one daily]  Mildly hypertensive in ED to 173/93 on arrival, slightly improved over time.   Restart losartan, hold hydrochlorothiazide  -pt hypertensive during hospital stay in 150-160s/-  She was maintained on losartan during stay.   For now hold hydrochlorothiazide at discharge  given potential risk for recurrent SBO  - discharge on losartan 100mg every day  - start hydralazine 10mg tid. If proves stable from GI standpoint could change hydralazine to Norvasc 10mg every day (can be constipating). No BBer as HR in 50s at baseline  - check blood pressure at home and bring in results to pcp visit.         Pancreatic cystic lesion  Present and stable x 5 years. Has seen GI, no further monitoring needed.      COVID-19 negative     DVT Prophylaxis: Ambulate every shift  Code Status: Full Code     Disposition: discharge home.     Abdulkadir Delgado MD, MD    Significant Results and Procedures   See hospital course    Pending Results   none  Unresulted Labs Ordered in the Past 30 Days of this Admission     No orders found from 10/23/2021 to 11/23/2021.          Code Status   Full Code       Primary Care Physician   Luis Feliciano    Physical Exam   Temp: 97.4  F (36.3  C) Temp src: Oral BP: (!) 166/76 Pulse: 55   Resp: 16 SpO2: 95 % O2 Device: None (Room air)    Vitals:    11/22/21 0231 11/22/21 1856 11/23/21 0602   Weight: 57.6 kg (127 lb) 59.3 kg (130 lb 11.2 oz) 58.9 kg (129 lb 14.4 oz)     Vital Signs with Ranges  Temp:  [96.7  F (35.9  C)-98.1  F (36.7  C)] 97.4  F (36.3  C)  Pulse:  [55-66] 55  Resp:  [16-18] 16  BP: (155-166)/(58-76) 166/76  SpO2:  [95 %-97 %] 95 %  I/O last 3 completed shifts:  In: 2442 [P.O.:360; I.V.:2082]  Out: -      Constitutional: nad  Respiratory:     cTAB  Cardiovascular: RRR no r/g/m  GI: soft, nt, nd, nl bs  Skin/Integumen: no rash or edema  Neuro: nl speech and mentation. Grossly nonfocal  Psych; nl affect       Discharge Disposition   Discharged to home  Condition at discharge: Good    Consultations This Hospital Stay   SURGERY GENERAL IP CONSULT  NUTRITION SERVICES ADULT IP CONSULT    Time Spent on this Encounter   I, Abdulkadir Delgado MD, personally saw the patient today and spent less than or equal to 30 minutes discharging this patient.    Discharge Orders       Reason for your hospital stay    Small bowel obstruction. Resolved with conservative measures     Follow-up and recommended labs and tests     1. Follow up with primary care doctor early next week to follow up small bowel obstruction, high blood pressure, new hydralazine medication and review medications. Obtain CBC with platelet count and BMP test with visit. Hb stable in 11 range in hospital  2. As needed follow up with surgeon     Activity    Your activity upon discharge: activity as tolerated     Diet    Follow this diet upon discharge: Orders Placed This Encounter      Snacks/Supplements Adult: Other; 10: ensure clear; 2: boost soothe (while restricted to CLD); Between Meals      Low Fiber Diet     Discharge Medications   Discharge Medication List as of 11/24/2021  2:30 PM      START taking these medications    Details   hydrALAZINE (APRESOLINE) 10 MG tablet Take 1 tablet (10 mg) by mouth 3 times daily, Disp-90 tablet, R-0, E-Prescribe      losartan (COZAAR) 100 MG tablet Take 1 tablet (100 mg) by mouth daily, Disp-30 tablet, R-0, E-PrescribeFuture refills by PCP Dr. Luis Feliciano with phone number [phone].         CONTINUE these medications which have NOT CHANGED    Details   aspirin (ASA) 81 MG chewable tablet Take 2 tablets (162 mg) by mouth daily, Historical      atorvastatin (LIPITOR) 40 MG tablet Take 1 tablet (40 mg) by mouth daily, Disp-90 tablet, R-3, E-Prescribe      calcium carbonate (TUMS) 500 MG chewable tablet Take 1 tablet (500 mg) by mouth 3 times daily as needed for heartburn, Disp-90 tablet,R-0, E-Prescribe      clopidogrel (PLAVIX) 75 MG tablet Take 1 tablet (75 mg) by mouth daily, Disp-90 tablet, R-0, E-Prescribe      erythromycin (ROMYCIN) 5 MG/GM ophthalmic ointment Place 0.5 inches into both eyes At BedtimeHistorical      KRILL OIL PO Take 1 capsule by mouth daily (OTC: Unsure of strength), Historical      lifitegrast (XIIDRA) 5 % opthalmic solution Place 1 drop into both eyes 2 times  daily, Historical      NONFORMULARY Take 5 mLs by mouth every morning Perilla seed oil 1 tsp in smoothie every morning., Historical      !! UNKNOWN TO PATIENT Place 1-2 sprays into both eyes every evening Patient uses an OTC eye spray to help with dry eyes in the evening but is unsure of the name., Historical      !! UNKNOWN TO PATIENT Take 1 tablet by mouth daily (Patient takes an eye supplement but is unsure of the name), Historical      vitamin D3 (CHOLECALCIFEROL) 50 mcg (2000 units) tablet Take 1 tablet by mouth daily, Historical      !! UNKNOWN TO PATIENT Place onto the skin daily as needed (leg cramps) Cream from [place] for leg cramps, Historical       !! - Potential duplicate medications found. Please discuss with provider.      STOP taking these medications       losartan-hydrochlorothiazide (HYZAAR) 100-25 MG tablet Comments:   Reason for Stopping:             Allergies   Allergies   Allergen Reactions     Ace Inhibitors      Sorbitan Trioleate      Sulfonylureas      Vancomycin Hives     Data   Most Recent 3 CBC's:Recent Labs   Lab Test 11/24/21  0730 11/23/21  1206 11/23/21  0614 11/22/21  0317   WBC 5.5  --  5.1 10.3   HGB 11.1* 11.3* 10.8* 12.9   MCV 92  --  93 90     --  216 270      Most Recent 3 BMP's:  Recent Labs   Lab Test 11/24/21  0730 11/23/21  0614 11/22/21  0900 11/22/21  0317    142  --  136   POTASSIUM 3.4 4.0 3.7 3.2*   CHLORIDE 111* 114*  --  97   CO2 26 27  --  31   BUN 6* 10  --  15   CR 0.61 0.70  --  0.79   ANIONGAP 7 1*  --  8   ARIN 8.5 8.0*  --  9.5   * 133*  --  150*     Most Recent 2 LFT's:  Recent Labs   Lab Test 11/22/21  0317 09/15/20  1918   AST 25 27   ALT 32 33   ALKPHOS 98 85   BILITOTAL 0.6 0.4     Most Recent INR's and Anticoagulation Dosing History:  Anticoagulation Dose History     Recent Dosing and Labs Latest Ref Rng & Units 8/26/2021    INR 0.85 - 1.15 0.90        Most Recent 3 Troponin's:  Recent Labs   Lab Test 08/26/21  2320 09/16/20  0425  09/16/20  0100 09/15/20  2209   TROPI  --  0.040 0.044 0.042   TROPONIN 0.038  --   --   --      Most Recent Cholesterol Panel:  Recent Labs   Lab Test 08/27/21  0755   CHOL 188   LDL 83   HDL 42*   TRIG 317*     Most Recent 6 Bacteria Isolates From Any Culture (See EPIC Reports for Culture Details):No lab results found.  Most Recent TSH, T4 and A1c Labs:  Recent Labs   Lab Test 08/27/21  0755 12/28/18  0956   TSH  --  2.51   A1C 6.2*  --      Results for orders placed or performed during the hospital encounter of 11/22/21   CT Abdomen Pelvis without Contrast (stone protocol)    Narrative    EXAM: CT ABDOMEN AND PELVIS WITHOUT CONTRAST  LOCATION: [facility]  DATE/TIME: 11/22/2021 3:17 AM    INDICATION: Abdominal pain.  COMPARISON: 08/04/2021.    TECHNIQUE: CT scan of the abdomen and pelvis was performed without IV contrast. Multiplanar reformats were obtained. Dose reduction techniques were used.  CONTRAST: None.    FINDINGS:    LOWER CHEST: Partial visualization of a small pericardial effusion. This has increased in size since 08/04/2021. Coronary artery calcification.    HEPATOBILIARY: Two cysts within the liver, the largest a 5 cm cyst in the right lobe. A few small gallstones within a nondistended gallbladder.    SPLEEN: Unremarkable.    PANCREAS: Unremarkable. The small cyst in the uncinate process of the pancreas described on the prior study is not clearly visualized on this study.     ADRENAL GLANDS: Small low-attenuation left adrenal nodule, likely of benign etiology.    KIDNEYS/BLADDER: 0.8 cm indeterminate intermediate attenuation lesion extending from the posterolateral aspect of the upper pole of the right kidney, unchanged in size since the recent comparison study.    BOWEL: The stomach is moderately distended with fluid. There is a long segment of moderately dilated fluid-filled small bowel in the abdomen and pelvis and an abrupt transition to nondilated small bowel in the  anterior aspect of the mid abdomen in the   supraumbilical region (for example, series 4 image 136). Several colonic diverticula are present without evidence of diverticulitis. Normal appendix. No visualized bowel wall thickening, pneumatosis or free intraperitoneal gas.    LYMPH NODES: Unremarkable.    PELVIC ORGANS: No acute findings.    MUSCULOSKELETAL: Fusion hardware in the lumbar spine.    OTHER: Atherosclerotic calcification in the abdominal aorta.      Impression    IMPRESSION:   1. Mid small bowel obstruction: The stomach is moderately distended with fluid and there is a long segment of moderately dilated fluid-filled small bowel in the abdomen and pelvis with an abrupt transition to nondilated small bowel in the mid abdomen.  2. Small pericardial effusion, increased in size since 08/04/2021.   XR Abdomen 1 View    Narrative    EXAM: XR ABDOMEN 1 VIEW  LOCATION: [facility]  DATE/TIME: 11/22/2021 5:52 PM    INDICATION: Gastrografin UGI challenge.    COMPARISON: None.      Impression    IMPRESSION: Contrast is present in a mildly dilated small bowel and throughout the ascending and transverse colon.    XR Abdomen 1 View    Narrative    EXAM: XR ABDOMEN 1 VIEW  LOCATION: [facility]  DATE/TIME: 11/22/2021 10:12 PM    INDICATION: sbo, gastrografin challenge follow up  COMPARISON: 11/22/2021      Impression    IMPRESSION: Contrast is seen throughout the colon. Dilated small bowel right lower abdomen and scattered air-fluid levels. Postsurgical change spine.

## 2021-11-24 NOTE — PLAN OF CARE
Pt arrived from [facility] [place] at 1430. AOx4. BP slightly elevated, didn't meet PRN parameters. OVSS on RA. BS: active, abd slightly distended. Passing gas. BMs today. Clear liquid diet, tolerating well. Up IND, ambulating halls frequently.  R PIV infusing LR@50. Plan pending diet advancement.

## 2021-11-24 NOTE — PLAN OF CARE
Patient is A&Ox4. VSS ex hypertensive. Denies pain and nausea. Tolerating clear liquid diet. Active bowel sounds, passing gas, had BM during day shift. R PIV infusing LR at 50 ml/hr. Up independently. Possible discharge 11/24. Calls appropriately.

## 2021-11-26 ENCOUNTER — PATIENT OUTREACH (OUTPATIENT)
Dept: CARE COORDINATION | Facility: CLINIC | Age: 81
End: 2021-11-26
Payer: MEDICARE

## 2021-11-26 ENCOUNTER — PATIENT OUTREACH (OUTPATIENT)
Dept: FAMILY MEDICINE | Facility: CLINIC | Age: 81
End: 2021-11-26
Payer: MEDICARE

## 2021-11-26 DIAGNOSIS — Z71.89 OTHER SPECIFIED COUNSELING: ICD-10-CM

## 2021-11-26 NOTE — PROGRESS NOTES
Background: Care Coordination referral placed from [facility] discharge report for reason of patient meeting criteria for a TCM outreach call by [facility] team.    Assessment: Upon chart review, CCR[facility] Team member will cancel/close the referral for TCM outreach due to reason below:    Patient has been contacted by a clinic RN or provider within [facility] for reason of discussing hospital follow up plan of care and answering questions patient may have related to discharge instructions.     Plan: Care Coordination referral for TCM outreach canceled to minimize duplicative efforts.    Heather Piper MA  [facility] [place], [facility]   No

## 2021-11-26 NOTE — TELEPHONE ENCOUNTER
"Dr. Feliciano   Patient notified of pericardial effusion after discharge noted on CT (not new but enlarged since August 2021)   RN scheduled hosp f/u 12/1/2021   If anything needs to be done prior to follow-up appt with pericardial effusion please advise and route back (no chest pressure/pain/sob at this time)     ARMAAN DIAS received incoming call from [facility]/TCU/ED for chronic condition Discharge Protocol    Admission from 11/22-11/24 Reason for your hospital stay  Small bowel obstruction. Resolved with conservative measures    \"Hi, my name is Tonia Juan RN, a registered nurse, and I am calling from [facility].  I am calling to follow up and see how things are going for you after your recent emergency visit/hospital/TCU stay.\"    Tell me how you are doing now that you are home?\"   Feeling well since discharge - no abdominal pain or symptoms to report     Gilbert Baxter advised that Dr. Delgado called after discharge to advise that pericardial effusion was noted (not new was noted in 08/2021 as well however has enlarged)   Advised to follow up right away   Patient is not having any chest tightness/pressure/pain and denies shortness of breath     Discharge Instructions    \"Let's review your discharge instructions.  What is/are the follow-up recommendations?  Pt. Response: f/u with pcp within 7 days, f/u with surgeon as needed     \"Has an appointment with your primary care provider been scheduled?\"   No (schedule appointment)   RN scheduled for 12/1/2021 with Dr. Feliciano     \"When you see the provider, I would recommend that you bring your medications with you.\"    Medications    \"Tell me what changed about your medicines when you discharged?\"    Changes to chronic meds?    0-1   Losartan hydralazine - changed to separate meds instead of combo patient was on     \"What questions do you have about your medications?\"    None     New diagnoses of heart failure, COPD, diabetes, or " "MI?    No    Post Discharge Medication Reconciliation Status: discharge medications reconciled, continue medications without change.    Was MTM referral placed (*Make sure to put transitions as reason for referral)?   No    Call Summary    \"What questions or concerns do you have about your recent visit and your follow-up care?\"     continue to monitor symptoms - if chest tightness/pressure/pain/shortness of breath to call for recommendations due to pericardial effusion noted on CT scan - routing to pcp as FYI . Advice given: discussed how to call 24 hour nurse line if questions/concerns or new symptoms arise     \"If you have questions or things don't continue to improve, we encourage you contact us through the main clinic number (give number).  Even if the clinic is not open, triage nurses are available 24/7 to help you.     We would like you to know that our clinic has extended hours (provide information).  We also have urgent care (provide details on closest location and hours/contact info)\"    \"Thank you for your time and take care!\"    Tonia Juan, Registered Nurse, PAL (Patient Advocate Liason)   [facility]   [phone]                "

## 2021-12-01 ENCOUNTER — TELEPHONE (OUTPATIENT)
Dept: FAMILY MEDICINE | Facility: CLINIC | Age: 81
End: 2021-12-01

## 2021-12-01 ENCOUNTER — OFFICE VISIT (OUTPATIENT)
Dept: FAMILY MEDICINE | Facility: CLINIC | Age: 81
End: 2021-12-01
Payer: MEDICARE

## 2021-12-01 VITALS
BODY MASS INDEX: 23.74 KG/M2 | HEIGHT: 62 IN | DIASTOLIC BLOOD PRESSURE: 69 MMHG | WEIGHT: 129 LBS | SYSTOLIC BLOOD PRESSURE: 162 MMHG | OXYGEN SATURATION: 99 % | TEMPERATURE: 97.5 F | HEART RATE: 59 BPM

## 2021-12-01 DIAGNOSIS — I10 BENIGN ESSENTIAL HYPERTENSION: ICD-10-CM

## 2021-12-01 DIAGNOSIS — I31.39 PERICARDIAL EFFUSION: Primary | ICD-10-CM

## 2021-12-01 DIAGNOSIS — I10 BENIGN ESSENTIAL HYPERTENSION: Primary | ICD-10-CM

## 2021-12-01 DIAGNOSIS — K56.609 SBO (SMALL BOWEL OBSTRUCTION) (H): ICD-10-CM

## 2021-12-01 LAB
ERYTHROCYTE [DISTWIDTH] IN BLOOD BY AUTOMATED COUNT: 12.8 % (ref 10–15)
HCT VFR BLD AUTO: 35.7 % (ref 35–47)
HGB BLD-MCNC: 11.7 G/DL (ref 11.7–15.7)
MCH RBC QN AUTO: 30.6 PG (ref 26.5–33)
MCHC RBC AUTO-ENTMCNC: 32.8 G/DL (ref 31.5–36.5)
MCV RBC AUTO: 94 FL (ref 78–100)
PLATELET # BLD AUTO: 246 10E3/UL (ref 150–450)
RBC # BLD AUTO: 3.82 10E6/UL (ref 3.8–5.2)
WBC # BLD AUTO: 5.3 10E3/UL (ref 4–11)

## 2021-12-01 PROCEDURE — 36415 COLL VENOUS BLD VENIPUNCTURE: CPT | Performed by: FAMILY MEDICINE

## 2021-12-01 PROCEDURE — 80048 BASIC METABOLIC PNL TOTAL CA: CPT | Performed by: FAMILY MEDICINE

## 2021-12-01 PROCEDURE — 85027 COMPLETE CBC AUTOMATED: CPT | Performed by: FAMILY MEDICINE

## 2021-12-01 PROCEDURE — 99214 OFFICE O/P EST MOD 30 MIN: CPT | Performed by: FAMILY MEDICINE

## 2021-12-01 RX ORDER — SPIRONOLACTONE 25 MG/1
12.5 TABLET ORAL DAILY
Qty: 30 TABLET | Refills: 0 | Status: SHIPPED | OUTPATIENT
Start: 2021-12-01 | End: 2021-12-08

## 2021-12-01 RX ORDER — LOSARTAN POTASSIUM 100 MG/1
100 TABLET ORAL DAILY
Qty: 90 TABLET | Refills: 3 | Status: SHIPPED | OUTPATIENT
Start: 2021-12-01 | End: 2022-03-02

## 2021-12-01 ASSESSMENT — MIFFLIN-ST. JEOR: SCORE: 1003.39

## 2021-12-01 NOTE — PROGRESS NOTES
Assessment & Plan     Benign essential hypertension  Stop hydralazine, start on spironolactone 12.5 mg daily, along with losartan 100 mg daily. Check BMP today.   - losartan (COZAAR) 100 MG tablet; Take 1 tablet (100 mg) by mouth daily  - Basic metabolic panel  (Ca, Cl, CO2, Creat, Gluc, K, Na, BUN); Future  - CBC with platelets; Future  - Basic metabolic panel  (Ca, Cl, CO2, Creat, Gluc, K, Na, BUN)  - CBC with platelets    Pericardial effusion  Refer to cardiology for further evaluation  - Adult Cardiology Eval Referral; Future    SBO (small bowel obstruction) (H)  Resolved, continue on low fiber diet.                    Return in about 4 weeks (around 12/29/2021) for Routine physical..    Luis Feliciano MD  [facility] GUMARO Parrish is a 81 year old who presents for the following health issues     HPI       Hospital Follow-up Visit:    Hospital/Nursing Home/ Rehab Facility: [facility]  Date of Admission: 11/22/21  Date of Discharge: 11/24/21  Reason(s) for Admission:  Benign essential hypertension Small bowel obstruction, resolved with conservative measures.      Was your hospitalization related to COVID-19? No   Problems taking medications regularly:  None  Medication changes since discharge: losartan and hydrAlazine  Problems adhering to non-medication therapy:  None    Summary of hospitalization:  [facility] discharge summary reviewed  Diagnostic Tests/Treatments reviewed.  Follow up needed: BMP and cbc, also BP recheck.  Other Healthcare Providers Involved in Patient s Care:         None  Update since discharge: improved. Post Discharge Medication Reconciliation: discharge medications reconciled, continue medications without change.  Plan of care communicated with patient          Pt had CT chest that showed slightly worsening of the pericardial effusion noticed in the past. Pt admits that sometimes she gets mild pain under the left  "side of the lower ribs at night while sleeping, but no other symptoms.         Review of Systems   CONSTITUTIONAL: NEGATIVE for fever, chills, change in weight  ENT/MOUTH: NEGATIVE for ear, mouth and throat problems  RESP: NEGATIVE for significant cough or SOB      Objective    BP (!) 162/69 (BP Location: Right arm, Patient Position: Chair, Cuff Size: Adult Regular)   Pulse 59   Temp 97.5  F (36.4  C) (Oral)   Ht 1.575 m (5' 2\")   Wt 58.5 kg (129 lb)   LMP  (LMP Unknown)   SpO2 99%   BMI 23.59 kg/m    Body mass index is 23.59 kg/m .  Physical Exam   GENERAL: healthy, alert and no distress  HENT: bruise over the Rt cheek bone on the face, tender to touch,   Eye : EMOI.  NECK: no adenopathy, no asymmetry, masses, or scars and thyroid normal to palpation  RESP: lungs clear to auscultation - no rales, rhonchi or wheezes  CV: regular rate and rhythm, normal S1 S2, no S3 or S4, no murmur, click or rub, no peripheral edema and peripheral pulses strong  ABDOMEN: soft, nontender, no hepatosplenomegaly, no masses and bowel sounds normal                "

## 2021-12-01 NOTE — TELEPHONE ENCOUNTER
"I\"m sending new presciprition for Spironolactone, 12.5 mg daily, take it once daily, then check BP at home please.  See me in 1 week, for BP recheck and blood test too.    "

## 2021-12-01 NOTE — TELEPHONE ENCOUNTER
Rayray Duron :  I wonder what to add for BP medicine along with Losartan for BP management?  hydrochlorothiazide caused low potassium.  Amlodipine caused muscle cramps.

## 2021-12-01 NOTE — TELEPHONE ENCOUNTER
I would recommend spironolactone 12.5-25 mg daily which would help increase her potassium as well or could do hydrochlorothiazide again with potassium supplement.    Domi Sanders, PharmD  Medication Therapy Management Provider, [facility]

## 2021-12-02 LAB
ANION GAP SERPL CALCULATED.3IONS-SCNC: 4 MMOL/L (ref 3–14)
BUN SERPL-MCNC: 17 MG/DL (ref 7–30)
CALCIUM SERPL-MCNC: 9 MG/DL (ref 8.5–10.1)
CHLORIDE BLD-SCNC: 107 MMOL/L (ref 94–109)
CO2 SERPL-SCNC: 27 MMOL/L (ref 20–32)
CREAT SERPL-MCNC: 0.78 MG/DL (ref 0.52–1.04)
GFR SERPL CREATININE-BSD FRML MDRD: 72 ML/MIN/1.73M2
GLUCOSE BLD-MCNC: 110 MG/DL (ref 70–99)
POTASSIUM BLD-SCNC: 4 MMOL/L (ref 3.4–5.3)
SODIUM SERPL-SCNC: 138 MMOL/L (ref 133–144)

## 2021-12-07 ENCOUNTER — HOSPITAL ENCOUNTER (INPATIENT)
Facility: CLINIC | Age: 81
LOS: 2 days | Discharge: HOME | DRG: 389 | End: 2021-12-10
Attending: EMERGENCY MEDICINE | Admitting: STUDENT IN AN ORGANIZED HEALTH CARE EDUCATION/TRAINING PROGRAM
Payer: MEDICARE

## 2021-12-07 ENCOUNTER — APPOINTMENT (OUTPATIENT)
Dept: CT IMAGING | Facility: CLINIC | Age: 81
DRG: 389 | End: 2021-12-07
Attending: EMERGENCY MEDICINE
Payer: MEDICARE

## 2021-12-07 DIAGNOSIS — I10 BENIGN ESSENTIAL HYPERTENSION: ICD-10-CM

## 2021-12-07 DIAGNOSIS — K56.609 SBO (SMALL BOWEL OBSTRUCTION) (H): ICD-10-CM

## 2021-12-07 DIAGNOSIS — K56.609 SMALL BOWEL OBSTRUCTION (H): Primary | ICD-10-CM

## 2021-12-07 LAB
ALBUMIN SERPL-MCNC: 4 G/DL (ref 3.4–5)
ALP SERPL-CCNC: 87 U/L (ref 40–150)
ALT SERPL W P-5'-P-CCNC: 38 U/L (ref 0–50)
ANION GAP SERPL CALCULATED.3IONS-SCNC: 3 MMOL/L (ref 3–14)
AST SERPL W P-5'-P-CCNC: 22 U/L (ref 0–45)
BASOPHILS # BLD AUTO: 0 10E3/UL (ref 0–0.2)
BASOPHILS NFR BLD AUTO: 1 %
BILIRUB DIRECT SERPL-MCNC: 0.1 MG/DL (ref 0–0.2)
BILIRUB SERPL-MCNC: 0.4 MG/DL (ref 0.2–1.3)
BUN SERPL-MCNC: 15 MG/DL (ref 7–30)
CALCIUM SERPL-MCNC: 9.9 MG/DL (ref 8.5–10.1)
CHLORIDE BLD-SCNC: 104 MMOL/L (ref 94–109)
CO2 SERPL-SCNC: 33 MMOL/L (ref 20–32)
CREAT SERPL-MCNC: 0.73 MG/DL (ref 0.52–1.04)
EOSINOPHIL # BLD AUTO: 0.2 10E3/UL (ref 0–0.7)
EOSINOPHIL NFR BLD AUTO: 4 %
ERYTHROCYTE [DISTWIDTH] IN BLOOD BY AUTOMATED COUNT: 13 % (ref 10–15)
GFR SERPL CREATININE-BSD FRML MDRD: 77 ML/MIN/1.73M2
GLUCOSE BLD-MCNC: 129 MG/DL (ref 70–99)
HCT VFR BLD AUTO: 39 % (ref 35–47)
HGB BLD-MCNC: 12.8 G/DL (ref 11.7–15.7)
HOLD SPECIMEN: NORMAL
IMM GRANULOCYTES # BLD: 0 10E3/UL
IMM GRANULOCYTES NFR BLD: 1 %
LACTATE SERPL-SCNC: 1.1 MMOL/L (ref 0.7–2)
LIPASE SERPL-CCNC: 70 U/L (ref 73–393)
LYMPHOCYTES # BLD AUTO: 1.8 10E3/UL (ref 0.8–5.3)
LYMPHOCYTES NFR BLD AUTO: 27 %
MCH RBC QN AUTO: 30.4 PG (ref 26.5–33)
MCHC RBC AUTO-ENTMCNC: 32.8 G/DL (ref 31.5–36.5)
MCV RBC AUTO: 93 FL (ref 78–100)
MONOCYTES # BLD AUTO: 0.3 10E3/UL (ref 0–1.3)
MONOCYTES NFR BLD AUTO: 5 %
NEUTROPHILS # BLD AUTO: 4.2 10E3/UL (ref 1.6–8.3)
NEUTROPHILS NFR BLD AUTO: 62 %
NRBC # BLD AUTO: 0 10E3/UL
NRBC BLD AUTO-RTO: 0 /100
PLATELET # BLD AUTO: 316 10E3/UL (ref 150–450)
POTASSIUM BLD-SCNC: 4.1 MMOL/L (ref 3.4–5.3)
PROT SERPL-MCNC: 8.3 G/DL (ref 6.8–8.8)
RBC # BLD AUTO: 4.21 10E6/UL (ref 3.8–5.2)
SARS-COV-2 RNA RESP QL NAA+PROBE: NEGATIVE
SODIUM SERPL-SCNC: 140 MMOL/L (ref 133–144)
WBC # BLD AUTO: 6.6 10E3/UL (ref 4–11)

## 2021-12-07 PROCEDURE — 96361 HYDRATE IV INFUSION ADD-ON: CPT

## 2021-12-07 PROCEDURE — 83690 ASSAY OF LIPASE: CPT | Performed by: EMERGENCY MEDICINE

## 2021-12-07 PROCEDURE — 87635 SARS-COV-2 COVID-19 AMP PRB: CPT | Performed by: EMERGENCY MEDICINE

## 2021-12-07 PROCEDURE — 93005 ELECTROCARDIOGRAM TRACING: CPT

## 2021-12-07 PROCEDURE — 250N000009 HC RX 250: Performed by: EMERGENCY MEDICINE

## 2021-12-07 PROCEDURE — 74177 CT ABD & PELVIS W/CONTRAST: CPT | Mod: MG

## 2021-12-07 PROCEDURE — 250N000011 HC RX IP 250 OP 636: Performed by: EMERGENCY MEDICINE

## 2021-12-07 PROCEDURE — C9803 HOPD COVID-19 SPEC COLLECT: HCPCS

## 2021-12-07 PROCEDURE — 85025 COMPLETE CBC W/AUTO DIFF WBC: CPT | Performed by: EMERGENCY MEDICINE

## 2021-12-07 PROCEDURE — 99285 EMERGENCY DEPT VISIT HI MDM: CPT | Mod: 25

## 2021-12-07 PROCEDURE — 83605 ASSAY OF LACTIC ACID: CPT | Performed by: EMERGENCY MEDICINE

## 2021-12-07 PROCEDURE — 82248 BILIRUBIN DIRECT: CPT | Performed by: EMERGENCY MEDICINE

## 2021-12-07 PROCEDURE — 96360 HYDRATION IV INFUSION INIT: CPT | Mod: 59

## 2021-12-07 PROCEDURE — 36415 COLL VENOUS BLD VENIPUNCTURE: CPT | Performed by: EMERGENCY MEDICINE

## 2021-12-07 PROCEDURE — 80053 COMPREHEN METABOLIC PANEL: CPT | Performed by: EMERGENCY MEDICINE

## 2021-12-07 RX ORDER — IOPAMIDOL 755 MG/ML
68 INJECTION, SOLUTION INTRAVASCULAR ONCE
Status: COMPLETED | OUTPATIENT
Start: 2021-12-07 | End: 2021-12-07

## 2021-12-07 RX ADMIN — SODIUM CHLORIDE 60 ML: 9 INJECTION, SOLUTION INTRAVENOUS at 22:46

## 2021-12-07 RX ADMIN — IOPAMIDOL 68 ML: 755 INJECTION, SOLUTION INTRAVENOUS at 22:46

## 2021-12-07 ASSESSMENT — ENCOUNTER SYMPTOMS
ABDOMINAL DISTENTION: 1
SORE THROAT: 0
NAUSEA: 1
COUGH: 0
ABDOMINAL PAIN: 1
VOMITING: 0
SHORTNESS OF BREATH: 0
DIARRHEA: 0
DYSURIA: 0
FEVER: 0

## 2021-12-07 ASSESSMENT — MIFFLIN-ST. JEOR: SCORE: 1030.61

## 2021-12-08 ENCOUNTER — APPOINTMENT (OUTPATIENT)
Dept: GENERAL RADIOLOGY | Facility: CLINIC | Age: 81
DRG: 389 | End: 2021-12-08
Attending: PHYSICIAN ASSISTANT
Payer: MEDICARE

## 2021-12-08 LAB
ALBUMIN UR-MCNC: NEGATIVE MG/DL
APPEARANCE UR: CLEAR
BILIRUB UR QL STRIP: NEGATIVE
COLOR UR AUTO: ABNORMAL
GLUCOSE UR STRIP-MCNC: NEGATIVE MG/DL
HGB UR QL STRIP: NEGATIVE
KETONES UR STRIP-MCNC: NEGATIVE MG/DL
LEUKOCYTE ESTERASE UR QL STRIP: NEGATIVE
NITRATE UR QL: NEGATIVE
PH UR STRIP: 7.5 [PH] (ref 5–7)
RBC URINE: 1 /HPF
SP GR UR STRIP: 1.02 (ref 1–1.03)
SQUAMOUS EPITHELIAL: <1 /HPF
UROBILINOGEN UR STRIP-MCNC: NORMAL MG/DL
WBC URINE: 1 /HPF

## 2021-12-08 PROCEDURE — G0378 HOSPITAL OBSERVATION PER HR: HCPCS

## 2021-12-08 PROCEDURE — 250N000009 HC RX 250: Performed by: PHYSICIAN ASSISTANT

## 2021-12-08 PROCEDURE — 258N000003 HC RX IP 258 OP 636: Performed by: STUDENT IN AN ORGANIZED HEALTH CARE EDUCATION/TRAINING PROGRAM

## 2021-12-08 PROCEDURE — 99223 1ST HOSP IP/OBS HIGH 75: CPT | Mod: AI | Performed by: STUDENT IN AN ORGANIZED HEALTH CARE EDUCATION/TRAINING PROGRAM

## 2021-12-08 PROCEDURE — 120N000004 HC R&B MS OVERFLOW

## 2021-12-08 PROCEDURE — 96374 THER/PROPH/DIAG INJ IV PUSH: CPT

## 2021-12-08 PROCEDURE — 81001 URINALYSIS AUTO W/SCOPE: CPT | Performed by: EMERGENCY MEDICINE

## 2021-12-08 PROCEDURE — 250N000013 HC RX MED GY IP 250 OP 250 PS 637: Performed by: HOSPITALIST

## 2021-12-08 PROCEDURE — 250N000011 HC RX IP 250 OP 636: Performed by: STUDENT IN AN ORGANIZED HEALTH CARE EDUCATION/TRAINING PROGRAM

## 2021-12-08 PROCEDURE — 250N000013 HC RX MED GY IP 250 OP 250 PS 637: Performed by: STUDENT IN AN ORGANIZED HEALTH CARE EDUCATION/TRAINING PROGRAM

## 2021-12-08 PROCEDURE — 99222 1ST HOSP IP/OBS MODERATE 55: CPT | Performed by: SURGERY

## 2021-12-08 PROCEDURE — 74018 RADEX ABDOMEN 1 VIEW: CPT

## 2021-12-08 RX ORDER — ASPIRIN 81 MG/1
162 TABLET, CHEWABLE ORAL DAILY
Status: DISCONTINUED | OUTPATIENT
Start: 2021-12-08 | End: 2021-12-10 | Stop reason: HOSPADM

## 2021-12-08 RX ORDER — HYDROMORPHONE HYDROCHLORIDE 1 MG/ML
0.3 INJECTION, SOLUTION INTRAMUSCULAR; INTRAVENOUS; SUBCUTANEOUS EVERY 4 HOURS PRN
Status: DISCONTINUED | OUTPATIENT
Start: 2021-12-08 | End: 2021-12-10 | Stop reason: HOSPADM

## 2021-12-08 RX ORDER — SODIUM CHLORIDE, SODIUM LACTATE, POTASSIUM CHLORIDE, CALCIUM CHLORIDE 600; 310; 30; 20 MG/100ML; MG/100ML; MG/100ML; MG/100ML
INJECTION, SOLUTION INTRAVENOUS CONTINUOUS
Status: DISCONTINUED | OUTPATIENT
Start: 2021-12-08 | End: 2021-12-10 | Stop reason: HOSPADM

## 2021-12-08 RX ORDER — ATORVASTATIN CALCIUM 40 MG/1
40 TABLET, FILM COATED ORAL DAILY
Status: DISCONTINUED | OUTPATIENT
Start: 2021-12-08 | End: 2021-12-10 | Stop reason: HOSPADM

## 2021-12-08 RX ORDER — LIDOCAINE 40 MG/G
CREAM TOPICAL
Status: DISCONTINUED | OUTPATIENT
Start: 2021-12-08 | End: 2021-12-10 | Stop reason: HOSPADM

## 2021-12-08 RX ORDER — ONDANSETRON 2 MG/ML
4 INJECTION INTRAMUSCULAR; INTRAVENOUS EVERY 6 HOURS PRN
Status: DISCONTINUED | OUTPATIENT
Start: 2021-12-08 | End: 2021-12-10 | Stop reason: HOSPADM

## 2021-12-08 RX ORDER — PROCHLORPERAZINE MALEATE 5 MG
5 TABLET ORAL EVERY 6 HOURS PRN
Status: DISCONTINUED | OUTPATIENT
Start: 2021-12-08 | End: 2021-12-10 | Stop reason: HOSPADM

## 2021-12-08 RX ORDER — ONDANSETRON 4 MG/1
4 TABLET, ORALLY DISINTEGRATING ORAL EVERY 6 HOURS PRN
Status: DISCONTINUED | OUTPATIENT
Start: 2021-12-08 | End: 2021-12-10 | Stop reason: HOSPADM

## 2021-12-08 RX ORDER — OXYCODONE HYDROCHLORIDE 5 MG/1
5 TABLET ORAL EVERY 4 HOURS PRN
Status: DISCONTINUED | OUTPATIENT
Start: 2021-12-08 | End: 2021-12-10 | Stop reason: HOSPADM

## 2021-12-08 RX ORDER — PROCHLORPERAZINE 25 MG
12.5 SUPPOSITORY, RECTAL RECTAL EVERY 12 HOURS PRN
Status: DISCONTINUED | OUTPATIENT
Start: 2021-12-08 | End: 2021-12-10 | Stop reason: HOSPADM

## 2021-12-08 RX ORDER — LOSARTAN POTASSIUM 100 MG/1
100 TABLET ORAL DAILY
Status: DISCONTINUED | OUTPATIENT
Start: 2021-12-08 | End: 2021-12-10 | Stop reason: HOSPADM

## 2021-12-08 RX ORDER — CLOPIDOGREL BISULFATE 75 MG/1
75 TABLET ORAL DAILY
Status: DISCONTINUED | OUTPATIENT
Start: 2021-12-08 | End: 2021-12-10 | Stop reason: HOSPADM

## 2021-12-08 RX ORDER — LABETALOL HYDROCHLORIDE 5 MG/ML
10 INJECTION, SOLUTION INTRAVENOUS EVERY 6 HOURS PRN
Status: DISCONTINUED | OUTPATIENT
Start: 2021-12-08 | End: 2021-12-10 | Stop reason: HOSPADM

## 2021-12-08 RX ORDER — SPIRONOLACTONE 25 MG
12.5 TABLET ORAL DAILY
Status: DISCONTINUED | OUTPATIENT
Start: 2021-12-08 | End: 2021-12-09

## 2021-12-08 RX ADMIN — CLOPIDOGREL BISULFATE 75 MG: 75 TABLET ORAL at 14:16

## 2021-12-08 RX ADMIN — ASPIRIN 81 MG CHEWABLE TABLET 162 MG: 81 TABLET CHEWABLE at 14:16

## 2021-12-08 RX ADMIN — LOSARTAN POTASSIUM 100 MG: 100 TABLET, FILM COATED ORAL at 08:46

## 2021-12-08 RX ADMIN — SODIUM CHLORIDE, POTASSIUM CHLORIDE, SODIUM LACTATE AND CALCIUM CHLORIDE: 600; 310; 30; 20 INJECTION, SOLUTION INTRAVENOUS at 22:25

## 2021-12-08 RX ADMIN — SPIRONOLACTONE 12.5 MG: 25 TABLET ORAL at 14:16

## 2021-12-08 RX ADMIN — SODIUM CHLORIDE, POTASSIUM CHLORIDE, SODIUM LACTATE AND CALCIUM CHLORIDE: 600; 310; 30; 20 INJECTION, SOLUTION INTRAVENOUS at 02:25

## 2021-12-08 RX ADMIN — FAMOTIDINE 20 MG: 10 INJECTION, SOLUTION INTRAVENOUS at 14:16

## 2021-12-08 RX ADMIN — LABETALOL HYDROCHLORIDE 10 MG: 5 INJECTION, SOLUTION INTRAVENOUS at 20:59

## 2021-12-08 RX ADMIN — ATORVASTATIN CALCIUM 40 MG: 40 TABLET, FILM COATED ORAL at 08:46

## 2021-12-08 ASSESSMENT — ACTIVITIES OF DAILY LIVING (ADL)
ADLS_ACUITY_SCORE: 9

## 2021-12-08 NOTE — ED NOTES
Pt ambulates to the bathroom with SBA. Pt reports she has had 6 diarrhea stools since her arrival in the ER

## 2021-12-08 NOTE — ED NOTES
"[facility]  ED Nurse Handoff Report    ED Chief complaint: Abdominal Pain (Possible recurrent SBO)      ED Diagnosis:   Final diagnoses:   None       Code Status: Full Code    Allergies:   Allergies   Allergen Reactions     Ace Inhibitors      Sorbitan Trioleate      Sulfonylureas      Vancomycin Hives       Patient Story: Patient presents to the ED with complaints of severe abdominal pain that began about 1700, similar to the pain she felt with a recent bowel obstruction. Patient denied nausea, vomiting, diarrhea. Last Bm was around 1740 and she says it was normal  Focused Assessment:  abd pain, hx of SBO. Pt has been having BMs    Treatments and/or interventions provided: see MAR  Patient's response to treatments and/or interventions:     To be done/followed up on inpatient unit:      Does this patient have any cognitive concerns?: alert and oriented    Activity level - Baseline/Home:  Independent  Activity Level - Current:   Stand with Assist    Patient's Preferred language: English   Needed?: No    Isolation: None  Infection: Not Applicable  Patient tested for COVID 19 prior to admission: YES  Bariatric?: No    Vital Signs:   Vitals:    12/07/21 2114   BP: (!) 178/87   Pulse: 91   Resp: 16   Temp: 97.7  F (36.5  C)   TempSrc: Temporal   SpO2: 98%   Weight: 61.2 kg (135 lb)   Height: 1.575 m (5' 2\")       Cardiac Rhythm:     Was the PSS-3 completed:   Yes  What interventions are required if any?               Family Comments:   OBS brochure/video discussed/provided to patient/family: Yes              Name of person given brochure if not patient:               Relationship to patient:     For the majority of the shift this patient's behavior was Green.   Behavioral interventions performed were .    ED NURSE PHONE NUMBER: [phone]       "

## 2021-12-08 NOTE — PLAN OF CARE
"PRIMARY DIAGNOSIS: \"GENERIC\" NURSING  OUTPATIENT/OBSERVATION GOALS TO BE MET BEFORE DISCHARGE:  ADLs back to baseline: Yes     Activity and level of assistance: Ambulating independently.     Pain status: declining meds at this time     Return to near baseline physical activity: Yes          Discharge Planner Nurse   Safe discharge environment identified: No  Barriers to discharge: Yes       Entered by: Yudith Oakes 12/08/2021 2:26 PM    Please review provider order for any additional goals.   Nurse to notify provider when observation goals have been met and patient is ready for discharge.          "

## 2021-12-08 NOTE — PROGRESS NOTES
[facility]  Medicine Progress Note - Hospitalist Service       Date of Admission:  12/7/2021    Assessment & Plan           Francois Ly is a 81 year old female admitted on 12/7/2021. She presents with abdominal pain.      Abdominal Pain   Small bowel obstruction  Hx ex lap 1960 2/2 uterine perforation from IUD and FARHAT/BSO 1994. Has had recurrent SBOs x 6, most recently 11/2021. CT on admission shows a moderately long segment of mildly distended fluid-filled small bowel is present in the pelvis and the more distal bowel is relatively decompressed. Mild haziness is present within the mesentery associated with a distended small bowel. No distinct transition point from distended to nondistended small bowel. These findings could relate to an early or partial distal small bowel obstruction, a small bowel ileus and/or enteritis.  NG tube felt not warranted given she has no vomiting and her symptoms have been slowly subsiding since her admission.  Reports she has had a few small loose bowel movement since her arrival to the ER.     - General surgery consult appreciated, Abd XR, possibly initiating diet after XR  - continue with IVF  - Pain control and anti-emetics as needed     Hx of CVA  [needs rec- atorvastatin 40 mg daily, aspirin, plavix through ~12/1]  Hospitalized in 8/2021 with L sided weakness. MRI with watershed strokes in R frontal and parietal regions.. On plavix/ ASA x 90 days then ASA.  - resume asa, plavix   - resume statin when diet advanced     Hypertension  [ losartan 100mg every day]  - Continue PTA Losartan     Pancreatic cystic lesion  Present and stable x 5 years. Has seen GI, no further monitoring needed.      COVID-19 negative       Diet: NPO for Medical/Clinical Reasons Except for: Ice Chips, Meds    DVT Prophylaxis: Pneumatic Compression Devices  Collins Catheter: Not present  Central Lines: None  Code Status: Full Code      Disposition Plan   Expected Discharge:  12/10/2021     Anticipated discharge location:  Awaiting care coordination huddle  Delays:            The patient's care was discussed with the Bedside Nurse, Patient and Patient's Family.    Dayami Munoz MD  Hospitalist Service  [facility]  Securely message with the Vocera Web Console (learn more here)  Text page via [employer] Paging/Directory        Clinically Significant Risk Factors Present on Admission              # Platelet Defect: home medication list includes an antiplatelet medication      ______________________________________________________________________    Interval History   Patient reports abdominal pain has improved.  Currently pain-free but reports episodic abdominal discomfort.  No nausea.    Data reviewed today: I reviewed all medications, new labs and imaging results over the last 24 hours. I personally reviewed no images or EKG's today.    Physical Exam   Vital Signs: Temp: 97.6  F (36.4  C) Temp src: Oral BP: (!) 147/72 Pulse: 58   Resp: 16 SpO2: 98 % O2 Device: None (Room air)    Weight: 135 lbs 0 oz    General: AAOx3, appears comfortable.  HEENT: PERRLA EOMI. Mucosa moist. Ecchymosis Rt cheek  Lungs: Bilateral equal air entry. Clear to auscultation, normal work of breathing.   CVS: S1S2 regular, no tachycardia or murmur.   Abdomen: Soft, NT, ND. BS heard.  MSK: No edema or deformities.  Neuro: AAOX3. CN 2-12 normal. Strength symmetrical.  Skin: No rash.       Data   Recent Labs   Lab 12/07/21 2126 12/07/21 2125 12/01/21  1527   WBC 6.6  --  5.3   HGB 12.8  --  11.7   MCV 93  --  94     --  246   NA  --  140 138   POTASSIUM  --  4.1 4.0   CHLORIDE  --  104 107   CO2  --  33* 27   BUN  --  15 17   CR  --  0.73 0.78   ANIONGAP  --  3 4   ARIN  --  9.9 9.0   GLC  --  129* 110*   ALBUMIN  --  4.0  --    PROTTOTAL  --  8.3  --    BILITOTAL  --  0.4  --    ALKPHOS  --  87  --    ALT  --  38  --    AST  --  22  --    LIPASE  --  70*  --      Recent Results (from  the past 24 hour(s))   CT Abdomen Pelvis w Contrast    Narrative    EXAM: CT ABDOMEN AND PELVIS WITH CONTRAST  LOCATION: [facility]  DATE/TIME: 12/7/2021 10:30 PM    INDICATION: Abdominal distention.  COMPARISON: 11/22/2021.    TECHNIQUE: CT scan of the abdomen and pelvis was performed following injection of IV contrast. Multiplanar reformats were obtained. Dose reduction techniques were used.  CONTRAST: 68mL Isovue 370.    FINDINGS:    LOWER CHEST: Partial visualization of a very small pericardial effusion, similar in appearance to the comparison study dated 11/22/2021.    HEPATOBILIARY: A few cysts scattered within the liver, the largest in the right lobe and measuring 5 cm in diameter. A few small gallstones in the gallbladder.    SPLEEN: Unremarkable.    PANCREAS: 1.4 x 1.0 cm cyst in the uncinate process of the pancreas (series 3 and image 74). This has not convincingly changed since the comparison study dated 01/01/2020. Given the stability and the patient's age, this is of unlikely clinical   significance.    ADRENAL GLANDS: 1.1 cm left adrenal nodule, unchanged since 01/01/2020. This is most likely an adenoma.    KIDNEYS/BLADDER: No suspicious renal lesions.    BOWEL: The stomach is mildly distended with fluid. A moderately long segment of fluid-filled mildly distended small bowel is present in the central pelvis. The more distal small bowel is relatively decompressed. Slight haziness is present within the   mesentery associated with the dilated small bowel (for example, series 3 image 137). Numerous colonic diverticula are present without evidence of diverticulitis. The appearance of the appendix is within normal limits. No visualized bowel wall thickening,   pneumatosis or free intraperitoneal gas.    LYMPH NODES: Unremarkable.    PELVIC ORGANS: No acute findings.    MUSCULOSKELETAL: Fusion hardware in the lumbar spine.    OTHER: Atherosclerotic calcification in the abdominal  aorta. A very small amount of free fluid in the pelvis.      Impression    IMPRESSION:   1. A moderately long segment of mildly distended fluid-filled small bowel is present in the pelvis and the more distal bowel is relatively decompressed. Mild haziness is present within the mesentery associated with a distended small bowel and there is a   very small amount of free fluid in the pelvis. No distinct transition point from distended to nondistended small bowel. These findings could relate to an early or partial distal small bowel obstruction, a small bowel ileus and/or enteritis.  2. No other cause of acute pain identified in the abdomen or pelvis.  3. Colonic diverticulosis without evidence of diverticulitis.  4. Cholelithiasis.     Medications     lactated ringers 100 mL/hr at 12/08/21 0842       [START ON 12/9/2021] aspirin  162 mg Oral Daily     [Held by provider] atorvastatin  40 mg Oral Daily     [START ON 12/9/2021] clopidogrel  75 mg Oral Daily     famotidine  20 mg Intravenous Q12H     losartan  100 mg Oral Daily     sodium chloride (PF)  3 mL Intracatheter Q8H     spironolactone  12.5 mg Oral Daily

## 2021-12-08 NOTE — PROVIDER NOTIFICATION
"MD Notification    Notified Person: PA    Notified Person Name: Rafaela Gil    Notification Date/Time: 12/08/21 5:03 PM     Notification Interaction: text page    Purpose of Notification:\"509: Abd xray done. Can pt be started on sips of clears? Please advise. Thanks. -ARMAAN Zurita *[id number]\"    Orders Received:    Comments:      "

## 2021-12-08 NOTE — ED PROVIDER NOTES
"  History   Chief Complaint:  Abdominal Pain (Possible recurrent SBO)     HPI   Francois Ly is a 81 year old female with a history of exploratory laparotomy in 1960 for uterine perforation secondary to IUD, status post FARHAT/SBO in 1994 with recurrent small bowel obstructions. She was most recently hospitalized November 22 to November 24 with SBO that resolved with conservative measures. She also has a history of CVA, hypertension, pericardial effusion. Today she presents with abdominal pain that is reminiscent of her most recent small bowel obstruction..    She states that she woke up this morning and was feeling fine she has had multiple bowel movements today. However soon after eating dinner around 5 PM she began to have very crampy paroxysmal pain. She also states her abdomen is very distended to the point where she cannot put her pants on. Pain episodes would last about 3 minutes. Nothing clearly brought these episodes on or made it worse or better. She states this is identical pain that she had with her previous bowel obstruction describing it as \"childbirth\". She has had nausea but no vomiting. She has not had any fever, cough, shortness of breath, chest pain.    ROS:  Review of Systems   Constitutional: Negative for fever.   HENT: Negative for sore throat.    Respiratory: Negative for cough and shortness of breath.    Cardiovascular: Negative for chest pain.   Gastrointestinal: Positive for abdominal distention, abdominal pain and nausea. Negative for diarrhea and vomiting.   Genitourinary: Negative for dysuria.   All other systems reviewed and are negative.     Allergies:  Ace Inhibitors  Sorbitan Trioleate  Sulfonylureas  Vancomycin     Medications:    ASA  Lipitor  Plavix  Romycin  Cozaar  Aldactone    Past Medical History:    Hypertension  Dyslipidemia  Gallstones  Peripheral artery disease  Pancreatic mass  Small bowel obstruction  Hypertensive urgency  Diverticulosis of large " "intestine  Cerebrovascular accident due to thrombosis of right anterior cerebral artery      Past Surgical History:    Hysterectomy  Back surgery     Family History:    family history includes Family History Negative in an other family member.    Social History:   reports that she has quit smoking. She has quit using smokeless tobacco. She reports current alcohol use. She reports that she does not use drugs.  PCP: Luis Feliciano     Physical Exam     Patient Vitals for the past 24 hrs:   BP Temp Temp src Pulse Resp SpO2 Height Weight   12/07/21 2114 (!) 178/87 97.7  F (36.5  C) Temporal 91 16 98 % 1.575 m (5' 2\") 61.2 kg (135 lb)      Physical Exam  Physical Exam   Constitutional:  Patient is oriented to person, place, and time. They appear well-developed and well-nourished. M   HENT:   Mouth/Throat:   Oropharynx is clear and moist.   Eyes:    Conjunctivae normal and EOM are normal. Pupils are equal, round, and reactive to light. Patient has an aging ecchymosis on the right side of her face from falling on 1129.  Neck:    Normal range of motion.   Cardiovascular: Normal rate, regular rhythm and normal heart sounds.  Exam reveals no gallop and no friction rub.  No murmur heard.  Pulmonary/Chest:  Effort normal and breath sounds normal. Patient has no wheezes. Patient has no rales.   Abdominal:   Soft. Bowel sounds are very hypoactive. Patient exhibits no mass. There is no tenderness on my exam at this time. There is no rebound and no guarding.   Musculoskeletal:  Normal range of motion. Patient exhibits no edema.   Neurological:   Patient is alert and oriented to person, place, and time. Patient has normal strength. No cranial nerve deficit or sensory deficit. GCS 15  Skin:   Skin is warm and dry. No rash noted. No erythema.   Psychiatric:   Patient has a normal mood and affect. Patient's behavior is normal. Judgment and thought content normal.     Emergency Department Course   ECG:  ECG obtained at 2139, ECG read at " 2205  Sinus rhythm with premature atrial complexes  Left axis deviation  Septal infarct, age undetermined  Possible Lateral infarct, age undetermined   Rate 80 bpm. NE interval 194 ms. QRS duration 96 ms. QT/QTc 382/440 ms. P-R-T axes 36 -33 95.     Imaging:  CT Abdomen Pelvis w Contrast   Preliminary Result   IMPRESSION:    1. A moderately long segment of mildly distended fluid-filled small bowel is present in the pelvis is decompressed. Mild haziness is present within the mesentery associated with a distended small bowel and there is a very small amount of free fluid in    the pelvis. No distinct transition point from distended to nondistended small bowel. These findings could relate to an early or partial distal small bowel obstruction, ileus and/or enteritis.   2. No other cause of acute pain identified in the abdomen or pelvis.   3. Colonic diverticulosis without evidence of diverticulitis.   4. Cholelithiasis.       Report per radiology    Laboratory:  Labs Ordered and Resulted from Time of ED Arrival to Time of ED Departure   BASIC METABOLIC PANEL - Abnormal       Result Value    Sodium 140      Potassium 4.1      Chloride 104      Carbon Dioxide (CO2) 33 (*)     Anion Gap 3      Urea Nitrogen 15      Creatinine 0.73      Calcium 9.9      Glucose 129 (*)     GFR Estimate 77     LIPASE - Abnormal    Lipase 70 (*)    HEPATIC FUNCTION PANEL - Normal    Bilirubin Total 0.4      Bilirubin Direct 0.1      Protein Total 8.3      Albumin 4.0      Alkaline Phosphatase 87      AST 22      ALT 38     LACTIC ACID WHOLE BLOOD - Normal    Lactic Acid 1.1     COVID-19 VIRUS (CORONAVIRUS) BY PCR - Normal    SARS CoV2 PCR Negative     CBC WITH PLATELETS AND DIFFERENTIAL    WBC Count 6.6      RBC Count 4.21      Hemoglobin 12.8      Hematocrit 39.0      MCV 93      MCH 30.4      MCHC 32.8      RDW 13.0      Platelet Count 316      % Neutrophils 62      % Lymphocytes 27      % Monocytes 5      % Eosinophils 4      % Basophils 1       % Immature Granulocytes 1      NRBCs per 100 WBC 0      Absolute Neutrophils 4.2      Absolute Lymphocytes 1.8      Absolute Monocytes 0.3      Absolute Eosinophils 0.2      Absolute Basophils 0.0      Absolute Immature Granulocytes 0.0      Absolute NRBCs 0.0     ROUTINE UA WITH MICROSCOPIC REFLEX TO CULTURE       Emergency Department Course:  Reviewed:  I reviewed nursing notes, vitals and past medical history    Assessments:  2210 I obtained history and examined the patient as noted above.  2140 I rechecked the patient and explained findings.  I repeated the abdominal exam.  She does have improved bowel sounds.  She does have pain in the midline below the umbilicus mild.        Interventions:  Medications   Saline Flush - CT (60 mLs Intravenous Given 12/7/21 2246)   iopamidol (ISOVUE-370) solution 68 mL (68 mLs Intravenous Given 12/7/21 2246)        Disposition:  The patient was admitted to the hospital under the care of Dr. Little.     Impression & Plan      Covid-19  Kassidy Ly was evaluated during a global COVID-19 pandemic, which necessitated consideration that the patient might be at risk for infection with the SARS-CoV-2 virus that causes COVID-19.   Applicable protocols for evaluation were followed during the patient's care.   COVID-19 was considered as part of the patient's evaluation. The plan for testing is:  a test was obtained during this visit.    Medical Decision Making:  Kassidy Ly is an 81-year-old female presenting with abdominal pain that is reminiscent of her last bowel obstruction.  On her physical exam she had absent bowel sounds and pain right around the umbilicus.  It was much improved from what she had experienced earlier in the evening.  Blood work was performed which looks reassuringly normal as well as a lactic acid.  CT of the abdomen pelvis does show a segment of small bowel obstruction.  She has had multiple bowel movements today and this is most likely stool that was  distal to this obstruction.  I did repeat her abdominal exam after the CAT scan was performed and read she has mild amount of discomfort I think she will likely resolve with conservative measures.  I do not feel she needs an NG tube if she is not had any vomiting but she has had some nausea.  At this time I will bring her in under observation for bowel rest.    Diagnosis:    ICD-10-CM    1. SBO (small bowel obstruction) (H)  K56.609              Domi Mckeon MD  12/08/21 0029

## 2021-12-08 NOTE — UTILIZATION REVIEW
Admission Status; Secondary Review Determination    Under the authority of the Utilization Management Committee, the utilization review process indicated a secondary review on the above patient. The review outcome is based on review of the medical records, discussions with staff, and applying clinical experience noted on the date of the review.     (x) Inpatient Status Appropriate - This patient's medical care is consistent with medical management for inpatient care and reasonable inpatient medical practice.    RATIONALE FOR DETERMINATION: 81-year-old female with history of exploratory laparotomy 1960 due to uterine perforation, hysterectomy with BSO and 94 with subsequent recurrent small bowel obstructions x6.  Most recent episode was last month.  Patient presents with significant increase abdominal pain consistent with prior obstructions.  Imaging reveals changes of small bowel obstruction.  With patient's comorbidities of history of stroke, hypertension and now requiring n.p.o. with IV fluids and pain control with expectation greater than 2 nights in the hospital appropriate for inpatient care.    At the time of admission with the information available to the attending physician more than 2 nights Hospital complex care was anticipated, based on patient risk of adverse outcome if treated as outpatient and complex care required. Inpatient admission is appropriate based on the Medicare guidelines.    This document was produced using voice recognition software    The information on this document is developed by the utilization review team in order for the business office to ensure compliance. This only denotes the appropriateness of proper admission status and does not reflect the quality of care rendered.    The definitions of Inpatient Status and Observation Status used in making the determination above are those provided in the CMS Coverage Manual, Chapter 1 and Chapter 6, section 70.4.    Sincerely,    Ramon  MD Abiel  Utilization Review  Physician Advisor  [facility].

## 2021-12-08 NOTE — ED TRIAGE NOTES
Patient presents to the ED with complaints of severe abdominal pain that began about 1700, similar to the pain she felt with a recent bowel obstruction. Patient denied nausea, vomiting, diarrhea. Last Bm was around 1740 and she says it was normal.

## 2021-12-08 NOTE — CONSULTS
[facility] General Surgery Consultation    Francois Ly MRN# [id number]   YOB: 1940 Age: 81 year old      Date of Admission:  12/7/2021  Date of Consult: 12/8/2021         Assessment and Plan:   Patient is a 81 year old female with small bowel obstruction. Patient has hx ex lap 1960 2/2 uterine perforation from IUD and FARHAT/BSO 1994. Has had recurrent SBOs x 6, most recently 11/2021. For hx CVA she takes asa and plavix.     PLAN:  - Abdominal film this afternoon. Will discuss with Dr. Cortez as patient would like to avoid GGC given 15 bouts of diarrhea in the ED this morning.  - Pending abdominal film results, could consider some sips of clear liquids.   - Patient and  again educated on the importance of low fiber diet given recurrent SBO.  - Continue IV fluids and pepcid ordered for GI prophylaxis.  - DVT prophylaxis per primary team. Encourage ambulate QID to assist in bowel function.  - General Surgery will continue to follow.         Requesting Physician:      Dr. Danny Little        Chief Complaint:     Chief Complaint   Patient presents with     Abdominal Pain     Possible recurrent SBO          History of Present Illness:   Francois Ly is a 81 year old female who presented to the ED last night for abdominal pain similar to pain from previous SBO's. She has hx ex lap 1960 2/2 uterine perforation from IUD and FARHAT/BSO 1994. She's had recurrent SBOs x 6, most recently 11/2021 and was doing well following this hospitalization until last night. She was previously following a low fiber diet but states she had cooked carrots and some type of radish for dinner and less than 30 minutes later developed constant abdominal pain. She took Maalox and Miralax which did not improve the pain. The pain actually worsened in this time and she developed nausea and abdominal bloating. After presenting to the ED she had 15 bouts of diarrhea which improved the pain. She now only gets waves of  "pain that go away quickly but denies nausea and abdominal bloating.     Labs remarkable for normal white count and unremarkable CMP. CT on admission shows a moderately long segment of mildly distended fluid-filled small bowel is present in the pelvis and the more distal bowel is relatively decompressed. Mild haziness is present within the mesentery associated with a distended small bowel. No distinct transition point from distended to nondistended small bowel. These findings could relate to an early or partial distal small bowel obstruction, a small bowel ileus and/or enteritis.           Physical Exam:   Blood pressure (!) 163/80, pulse 61, temperature 97.7  F (36.5  C), temperature source Temporal, resp. rate 16, height 1.575 m (5' 2\"), weight 61.2 kg (135 lb), SpO2 94 %, not currently breastfeeding.  135 lbs 0 oz  General: Vital signs reviewed, in no apparent distress  Eyes: Anicteric  HENT: Normocephalic, atraumatic, trachea midline   Respiratory: Breathing nonlabored  Cardiovascular: Regular rate and rhythm  GI: Abdomen nondistended and soft, nontender throughout, good bowel sounds  Musculoskeletal: No gross deformities  Neurologic: Grossly nonfocal exam  Psychiatric: Normal mood, affect and insight  Integumentary: Warm and dry         Past Medical History:     Past Medical History:   Diagnosis Date     Benign essential hypertension 10/12/2016     Bilateral low back pain without sciatica, unspecified chronicity 12/19/2017     Blunt trauma of rib, initial encounter 11/25/2016     Dyslipidemia      Gallstones      Hypertension      PAD (peripheral artery disease) (H) 6/19/2017     Pancreatic mass 8/17/2016     SBO (small bowel obstruction) (H)      Slipped intervertebral disc             Past Surgical History:     Past Surgical History:   Procedure Laterality Date     GYN SURGERY      hysterectomy     ORTHOPEDIC SURGERY      Slipped disc with chronic back pain            Current Medications:           " atorvastatin  40 mg Oral Daily     losartan  100 mg Oral Daily     spironolactone  12.5 mg Oral Daily       HYDROmorphone, oxyCODONE         Home Medications:     Prior to Admission medications    Medication Sig Last Dose Taking? Auth Provider   aspirin (ASA) 81 MG chewable tablet Take 2 tablets (162 mg) by mouth daily   Luis Feliciano MD   atorvastatin (LIPITOR) 40 MG tablet Take 1 tablet (40 mg) by mouth daily   Luis Feliciano MD   clopidogrel (PLAVIX) 75 MG tablet Take 1 tablet (75 mg) by mouth daily   Luis Feliciano MD   KRILL OIL PO Take 1 capsule by mouth daily (OTC: Unsure of strength)   Unknown, Entered By History   lifitegrast (XIIDRA) 5 % opthalmic solution Place 1 drop into both eyes 2 times daily   Unknown, Entered By History   losartan (COZAAR) 100 MG tablet Take 1 tablet (100 mg) by mouth daily   Luis Feliciano MD   NONFORMULARY Take 5 mLs by mouth every morning [place] seed oil 1 tsp in smoothie every morning.   Unknown, Entered By History   UNKNOWN TO PATIENT Place 1-2 sprays into both eyes every evening Patient uses an OTC eye spray to help with dry eyes in the evening but is unsure of the name.   Unknown, Entered By History   UNKNOWN TO PATIENT Take 1 tablet by mouth daily (Patient takes an eye supplement but is unsure of the name)   Unknown, Entered By History   UNKNOWN TO PATIENT Place onto the skin daily as needed (leg cramps) Cream from [place] for leg cramps   Unknown, Entered By History   vitamin D3 (CHOLECALCIFEROL) 50 mcg (2000 units) tablet Take 1 tablet by mouth daily   Unknown, Entered By History            Allergies:     Allergies   Allergen Reactions     Ace Inhibitors      Sorbitan Trioleate      Sulfonylureas      Vancomycin Hives            Family History:     Family History   Problem Relation Age of Onset     Family History Negative Other            Social History:   Francois Ly  reports that she has quit smoking. She has quit using smokeless tobacco. She reports current alcohol use. She  reports that she does not use drugs.          Review of Systems:   The 12 point Review of Systems is negative other than noted in the HPI.         Labs/Imaging   All new lab and imaging data was reviewed.   Recent Labs   Lab 12/07/21 2126 12/01/21  1527   WBC 6.6 5.3   HGB 12.8 11.7   HCT 39.0 35.7   MCV 93 94    246     Recent Labs   Lab 12/07/21 2125 12/01/21  1527    138   POTASSIUM 4.1 4.0   CHLORIDE 104 107   CO2 33* 27   ANIONGAP 3 4   * 110*   BUN 15 17   CR 0.73 0.78   GFRESTIMATED 77 72   ARIN 9.9 9.0   PROTTOTAL 8.3  --    ALBUMIN 4.0  --    BILITOTAL 0.4  --    ALKPHOS 87  --    AST 22  --    ALT 38  --        I have personally reviewed the imaging studies-   CT ABD PELVIS  IMPRESSION:   1. A moderately long segment of mildly distended fluid-filled small bowel is present in the pelvis and the more distal bowel is relatively decompressed. Mild haziness is present within the mesentery associated with a distended small bowel and there is a   very small amount of free fluid in the pelvis. No distinct transition point from distended to nondistended small bowel. These findings could relate to an early or partial distal small bowel obstruction, a small bowel ileus and/or enteritis.  2. No other cause of acute pain identified in the abdomen or pelvis.  3. Colonic diverticulosis without evidence of diverticulitis.  4. Cholelithiasis.      Rafaela Gil PA-C    45 minutes spent on date of the encounter doing patient visit, chart review, and documentation.

## 2021-12-08 NOTE — PHARMACY-ADMISSION MEDICATION HISTORY
Pharmacy Medication History  Admission medication history interview status for the 12/7/2021  admission is complete. See EPIC admission navigator for prior to admission medications     Location of Interview: Patient room  Medication history sources: Patient    Significant changes made to the medication list:  none    In the past week, patient estimated taking medication this percent of the time: greater than 90%      Medication reconciliation completed by provider prior to medication history? No    Time spent in this activity: 15    Prior to Admission medications    Medication Sig Last Dose Taking? Auth Provider   aspirin (ASA) 81 MG chewable tablet Take 2 tablets (162 mg) by mouth daily 12/7/2021 at Unknown time Yes Luis Feliciano MD   atorvastatin (LIPITOR) 40 MG tablet Take 1 tablet (40 mg) by mouth daily 12/7/2021 at Unknown time Yes Luis Feliciano MD   clopidogrel (PLAVIX) 75 MG tablet Take 1 tablet (75 mg) by mouth daily 12/7/2021 at Unknown time Yes Luis Feliciano MD   KRILL OIL PO Take 1 capsule by mouth daily (OTC: Unsure of strength) Past Week at Unknown time Yes Unknown, Entered By History   lifitegrast (XIIDRA) 5 % opthalmic solution Place 1 drop into both eyes 2 times daily 12/7/2021 at Unknown time Yes Unknown, Entered By History   losartan (COZAAR) 100 MG tablet Take 1 tablet (100 mg) by mouth daily 12/7/2021 at Unknown time Yes Luis Feliciano MD   vitamin D3 (CHOLECALCIFEROL) 50 mcg (2000 units) tablet Take 1 tablet by mouth daily 12/7/2021 at Unknown time Yes Unknown, Entered By History       The information provided in this note is only as accurate as the sources available at the time of update(s)

## 2021-12-08 NOTE — PLAN OF CARE
"PRIMARY DIAGNOSIS: \"GENERIC\" NURSING  OUTPATIENT/OBSERVATION GOALS TO BE MET BEFORE DISCHARGE:  ADLs back to baseline: Yes    Activity and level of assistance: Ambulating independently.    Pain status: declining meds at this time    Return to near baseline physical activity: Yes     Discharge Planner Nurse   Safe discharge environment identified: No  Barriers to discharge: Yes       Entered by: Yudith Oakes 12/08/2021 11:14 AM     Please review provider order for any additional goals.   Nurse to notify provider when observation goals have been met and patient is ready for discharge.  "

## 2021-12-09 LAB
ANION GAP SERPL CALCULATED.3IONS-SCNC: 5 MMOL/L (ref 3–14)
ATRIAL RATE - MUSE: 80 BPM
BUN SERPL-MCNC: 9 MG/DL (ref 7–30)
CALCIUM SERPL-MCNC: 9.3 MG/DL (ref 8.5–10.1)
CHLORIDE BLD-SCNC: 108 MMOL/L (ref 94–109)
CO2 SERPL-SCNC: 28 MMOL/L (ref 20–32)
CREAT SERPL-MCNC: 0.7 MG/DL (ref 0.52–1.04)
DIASTOLIC BLOOD PRESSURE - MUSE: NORMAL MMHG
ERYTHROCYTE [DISTWIDTH] IN BLOOD BY AUTOMATED COUNT: 13.2 % (ref 10–15)
GFR SERPL CREATININE-BSD FRML MDRD: 82 ML/MIN/1.73M2
GLUCOSE BLD-MCNC: 98 MG/DL (ref 70–99)
HCT VFR BLD AUTO: 36.6 % (ref 35–47)
HGB BLD-MCNC: 12 G/DL (ref 11.7–15.7)
INTERPRETATION ECG - MUSE: NORMAL
MCH RBC QN AUTO: 30.6 PG (ref 26.5–33)
MCHC RBC AUTO-ENTMCNC: 32.8 G/DL (ref 31.5–36.5)
MCV RBC AUTO: 93 FL (ref 78–100)
P AXIS - MUSE: 36 DEGREES
PLATELET # BLD AUTO: 278 10E3/UL (ref 150–450)
POTASSIUM BLD-SCNC: 3.9 MMOL/L (ref 3.4–5.3)
PR INTERVAL - MUSE: 194 MS
QRS DURATION - MUSE: 96 MS
QT - MUSE: 382 MS
QTC - MUSE: 440 MS
R AXIS - MUSE: -33 DEGREES
RBC # BLD AUTO: 3.92 10E6/UL (ref 3.8–5.2)
SODIUM SERPL-SCNC: 141 MMOL/L (ref 133–144)
SYSTOLIC BLOOD PRESSURE - MUSE: NORMAL MMHG
T AXIS - MUSE: 95 DEGREES
VENTRICULAR RATE- MUSE: 80 BPM
WBC # BLD AUTO: 5.3 10E3/UL (ref 4–11)

## 2021-12-09 PROCEDURE — 99232 SBSQ HOSP IP/OBS MODERATE 35: CPT | Performed by: HOSPITALIST

## 2021-12-09 PROCEDURE — 80048 BASIC METABOLIC PNL TOTAL CA: CPT | Performed by: STUDENT IN AN ORGANIZED HEALTH CARE EDUCATION/TRAINING PROGRAM

## 2021-12-09 PROCEDURE — 36415 COLL VENOUS BLD VENIPUNCTURE: CPT | Performed by: STUDENT IN AN ORGANIZED HEALTH CARE EDUCATION/TRAINING PROGRAM

## 2021-12-09 PROCEDURE — 258N000003 HC RX IP 258 OP 636: Performed by: HOSPITALIST

## 2021-12-09 PROCEDURE — 250N000009 HC RX 250: Performed by: PHYSICIAN ASSISTANT

## 2021-12-09 PROCEDURE — 99231 SBSQ HOSP IP/OBS SF/LOW 25: CPT | Performed by: SURGERY

## 2021-12-09 PROCEDURE — 250N000013 HC RX MED GY IP 250 OP 250 PS 637: Performed by: STUDENT IN AN ORGANIZED HEALTH CARE EDUCATION/TRAINING PROGRAM

## 2021-12-09 PROCEDURE — 85014 HEMATOCRIT: CPT | Performed by: STUDENT IN AN ORGANIZED HEALTH CARE EDUCATION/TRAINING PROGRAM

## 2021-12-09 PROCEDURE — 120N000004 HC R&B MS OVERFLOW

## 2021-12-09 PROCEDURE — 250N000013 HC RX MED GY IP 250 OP 250 PS 637: Performed by: HOSPITALIST

## 2021-12-09 RX ORDER — SPIRONOLACTONE 25 MG
12.5 TABLET ORAL ONCE
Status: DISCONTINUED | OUTPATIENT
Start: 2021-12-09 | End: 2021-12-09

## 2021-12-09 RX ORDER — AMLODIPINE BESYLATE 2.5 MG/1
2.5 TABLET ORAL DAILY
Status: DISCONTINUED | OUTPATIENT
Start: 2021-12-09 | End: 2021-12-10

## 2021-12-09 RX ORDER — SPIRONOLACTONE 25 MG/1
25 TABLET ORAL DAILY
Status: DISCONTINUED | OUTPATIENT
Start: 2021-12-10 | End: 2021-12-09

## 2021-12-09 RX ADMIN — SODIUM CHLORIDE, POTASSIUM CHLORIDE, SODIUM LACTATE AND CALCIUM CHLORIDE: 600; 310; 30; 20 INJECTION, SOLUTION INTRAVENOUS at 23:42

## 2021-12-09 RX ADMIN — ASPIRIN 81 MG CHEWABLE TABLET 162 MG: 81 TABLET CHEWABLE at 09:02

## 2021-12-09 RX ADMIN — AMLODIPINE BESYLATE 2.5 MG: 2.5 TABLET ORAL at 14:36

## 2021-12-09 RX ADMIN — LOSARTAN POTASSIUM 100 MG: 100 TABLET, FILM COATED ORAL at 09:02

## 2021-12-09 RX ADMIN — FAMOTIDINE 20 MG: 10 INJECTION, SOLUTION INTRAVENOUS at 00:30

## 2021-12-09 RX ADMIN — CLOPIDOGREL BISULFATE 75 MG: 75 TABLET ORAL at 09:02

## 2021-12-09 RX ADMIN — FAMOTIDINE 20 MG: 10 INJECTION, SOLUTION INTRAVENOUS at 13:09

## 2021-12-09 RX ADMIN — SPIRONOLACTONE 12.5 MG: 25 TABLET ORAL at 09:02

## 2021-12-09 ASSESSMENT — ACTIVITIES OF DAILY LIVING (ADL)
ADLS_ACUITY_SCORE: 11
ADLS_ACUITY_SCORE: 9
ADLS_ACUITY_SCORE: 11
ADLS_ACUITY_SCORE: 9
ADLS_ACUITY_SCORE: 11
ADLS_ACUITY_SCORE: 9
ADLS_ACUITY_SCORE: 11
ADLS_ACUITY_SCORE: 9
ADLS_ACUITY_SCORE: 11
ADLS_ACUITY_SCORE: 9
ADLS_ACUITY_SCORE: 11

## 2021-12-09 ASSESSMENT — MIFFLIN-ST. JEOR: SCORE: 999.25

## 2021-12-09 NOTE — PROGRESS NOTES
[facility]  Medicine Progress Note - Hospitalist Service       Date of Admission:  12/7/2021    Assessment & Plan           Francois Ly is a 81 year old female admitted on 12/7/2021. She presents with abdominal pain.      Abdominal Pain   Small bowel obstruction  Hx ex lap 1960 2/2 uterine perforation from IUD and FARHAT/BSO 1994. Has had recurrent SBOs x 6, most recently 11/2021. CT on admission shows a moderately long segment of mildly distended fluid-filled small bowel is present in the pelvis and the more distal bowel is relatively decompressed. Mild haziness is present within the mesentery associated with a distended small bowel. No distinct transition point from distended to nondistended small bowel. These findings could relate to an early or partial distal small bowel obstruction, a small bowel ileus and/or enteritis.  NG tube felt not warranted given she has no vomiting and her symptoms have been slowly subsiding since her admission.  Reports she has had a few small loose bowel movement since her arrival to the ER.     - General surgery consult appreciated, starting clear liquid diet. Diet per surgery.   - continue with IVF, decrease rate now that initiating diet.   - Pain control and anti-emetics as needed     Hx of CVA  [needs rec- atorvastatin 40 mg daily, aspirin, plavix through ~12/1]  Hospitalized in 8/2021 with L sided weakness. MRI with watershed strokes in R frontal and parietal regions.. On plavix/ ASA x 90 days then ASA.  - resumed asa, plavix   - resume statin when diet advanced     Hypertension  [ losartan 100mg every day]  - Continue PTA Losartan  - BP uncontrolled, amlodipine added.     Pancreatic cystic lesion  Present and stable x 5 years. Has seen GI, no further monitoring needed.      COVID-19 negative       Diet: Full Liquid Diet    DVT Prophylaxis: Pneumatic Compression Devices  Collins Catheter: Not present  Central Lines: None  Code Status: Full Code       Disposition Plan   Expected Discharge: 12/10/2021   Likely home in am if tolerates diet and when ok with surgery.   Anticipated discharge location:  Awaiting care coordination huddle  Delays:            The patient's care was discussed with the Bedside Nurse, Patient and Patient's Family.    Dayami Munoz MD  Hospitalist Service  [facility]  Securely message with the Vocera Web Console (learn more here)  Text page via [employer] Paging/Directory        Clinically Significant Risk Factors Present on Admission              # Platelet Defect: home medication list includes an antiplatelet medication      ______________________________________________________________________    Interval History   Patient reports intermittent abd discomfort, overall much better since admission. No nausea. Feels hungry.    Remains hypertensive although BP has improved from 190s to 160s    Data reviewed today: I reviewed all medications, new labs and imaging results over the last 24 hours. I personally reviewed no images or EKG's today.    Physical Exam   Vital Signs: Temp: 97.3  F (36.3  C) Temp src: Oral BP: (!) 155/66 Pulse: 57   Resp: 16 SpO2: 97 % O2 Device: None (Room air)    Weight: 128 lbs 1.4 oz    General: AAOx3, very pleasant, appears comfortable.  HEENT: PERRLA EOMI. Mucosa moist. Ecchymosis Rt cheek  Lungs: Bilateral equal air entry. Clear to auscultation, normal work of breathing.   CVS: S1S2 regular, no tachycardia or murmur.   Abdomen: Soft, NT, ND. BS heard.  MSK: No edema or deformities.  Neuro: AAOX3. CN 2-12 normal. Strength symmetrical.  Skin: No rash.       Data   Recent Labs   Lab 12/09/21  0635 12/07/21 2126 12/07/21 2125   WBC 5.3 6.6  --    HGB 12.0 12.8  --    MCV 93 93  --     316  --      --  140   POTASSIUM 3.9  --  4.1   CHLORIDE 108  --  104   CO2 28  --  33*   BUN 9  --  15   CR 0.70  --  0.73   ANIONGAP 5  --  3   ARIN 9.3  --  9.9   GLC 98  --  129*   ALBUMIN  --    --  4.0   PROTTOTAL  --   --  8.3   BILITOTAL  --   --  0.4   ALKPHOS  --   --  87   ALT  --   --  38   AST  --   --  22   LIPASE  --   --  70*     Recent Results (from the past 24 hour(s))   XR Abdomen 1 View    Narrative    XR ABDOMEN 1 VIEW 12/8/2021 4:19 PM    HISTORY: SBO    COMPARISON: 12/7/2021      Impression    IMPRESSION: Mildly dilated loops of small bowel with air-fluid levels  in the mid abdomen, suspicious for ongoing small bowel obstruction.    RAMBO BARRAZA MD         SYSTEM ID:  [id number]     Medications     lactated ringers 100 mL/hr at 12/09/21 1033       aspirin  162 mg Oral Daily     [Held by provider] atorvastatin  40 mg Oral Daily     clopidogrel  75 mg Oral Daily     famotidine  20 mg Intravenous Q12H     losartan  100 mg Oral Daily     sodium chloride (PF)  3 mL Intracatheter Q8H     spironolactone  12.5 mg Oral Once     [START ON 12/10/2021] spironolactone  25 mg Oral Daily

## 2021-12-09 NOTE — PROGRESS NOTES
[facility]    General Surgery  Daily Note       Assessment and Plan:   Francois Ly is a 81 year old female with recurrent small bowel obstruction    -Medical management per primary team  -Okay to start clear liquid diet as tolerated  -No indication for urgent surgical intervention  -Surgery to follow        Interval History:   Patient reports she is doing well.  She complains of some abdominal pain but believes she is having the pain because she is hungry.  Denies nausea.           Physical Exam:   Temp: 97.3  F (36.3  C) Temp src: Oral BP: (!) 155/66 Pulse: 57   Resp: 16 SpO2: 97 % O2 Device: None (Room air)      I/O last 3 completed shifts:  In: -   Out: 800 [Urine:800]      Constitutional: healthy, alert and no distress   Respiratory: Breathing unlabored  Abdomen: Soft, nondistended, nontender      Data   Recent Labs   Lab 12/09/21  0635 12/07/21 2126 12/07/21 2125   WBC 5.3 6.6  --    HGB 12.0 12.8  --    MCV 93 93  --     316  --      --  140   POTASSIUM 3.9  --  4.1   CHLORIDE 108  --  104   CO2 28  --  33*   BUN 9  --  15   CR 0.70  --  0.73   ANIONGAP 5  --  3   ARIN 9.3  --  9.9   GLC 98  --  129*   ALBUMIN  --   --  4.0   PROTTOTAL  --   --  8.3   BILITOTAL  --   --  0.4   ALKPHOS  --   --  87   ALT  --   --  38   AST  --   --  22       Lavonne Cortez MD

## 2021-12-09 NOTE — PLAN OF CARE
Inpatient. Alert and oriented x4. VSS on room air ex HTN. Infusing  ml/hr. NPO. Independent. Denies pain, N&V or sob. Prn labetalol available for elevated SBP>180. + bruised on right face. General surgery is following, reassess today AM. Continue to monitor.

## 2021-12-09 NOTE — PROVIDER NOTIFICATION
"MD Notification    Notified Person: PA    Notified Person Name: Rafaela Gil    Notification Date/Time: 12/09/21 5:11 PM     Notification Interaction: text page    Purpose of Notification: \"509: Pt very insistent on d/c today before the snow storm tomorrow morning. Full liquids went well. Please advise. -Yudith Oakes RN *[id number]\"    Orders Received: Discharge not recommended tonight. Preferred that pt stay and try low fiber diet tomorrow morning. However if pt insists, advise her to begin low fiber diet at home tomorrow.     Comments:      "

## 2021-12-09 NOTE — PROGRESS NOTES
A&Ox4. VSS on RA. Up independently. IV infusing. NPO ex ice chips and meds. Reports some abdominal discomfort; declines medication intervention. Denies nausea, diarrhea. Gen surg following. Plan to remain NPO overnight and reassess in AM.

## 2021-12-09 NOTE — PROGRESS NOTES
MD Notification    Notified Person: MD    Notified Person Name: Danny Little     Notification Date/Time: 12/8/21 @ 8:13 pm    Notification Interaction: Page    Purpose of Notification: 4281 AK. Pt BP is 196/81, orders for IV hydralazine please? Thank you.    Orders Received: IV Labetalol PRN q6hrs received.     Comments:

## 2021-12-09 NOTE — PLAN OF CARE
AxOx4. VSS on RA, except for HTN. /81, MD paged for IV Labetalol PRN q6hrs, BP now 170/74. Independent in room, voiding using BR. LR infusing at 100 ml/hr. NPO except for ice chips and meds. C/o mild abdominal discomfort that is intermittent but tolerable, declining pain medications at this time. Denies nausea, vomiting, or diarrhea. General surgery following. Plan to remain NPO overnight and reassess in AM. Continue to monitor.

## 2021-12-10 VITALS
DIASTOLIC BLOOD PRESSURE: 85 MMHG | WEIGHT: 128.09 LBS | RESPIRATION RATE: 16 BRPM | SYSTOLIC BLOOD PRESSURE: 160 MMHG | OXYGEN SATURATION: 98 % | HEART RATE: 60 BPM | HEIGHT: 62 IN | TEMPERATURE: 97.5 F | BODY MASS INDEX: 23.57 KG/M2

## 2021-12-10 PROCEDURE — 99238 HOSP IP/OBS DSCHRG MGMT 30/<: CPT | Performed by: HOSPITALIST

## 2021-12-10 PROCEDURE — 250N000013 HC RX MED GY IP 250 OP 250 PS 637: Performed by: HOSPITALIST

## 2021-12-10 PROCEDURE — 250N000009 HC RX 250: Performed by: PHYSICIAN ASSISTANT

## 2021-12-10 PROCEDURE — 250N000013 HC RX MED GY IP 250 OP 250 PS 637: Performed by: STUDENT IN AN ORGANIZED HEALTH CARE EDUCATION/TRAINING PROGRAM

## 2021-12-10 PROCEDURE — 99231 SBSQ HOSP IP/OBS SF/LOW 25: CPT | Performed by: SURGERY

## 2021-12-10 RX ORDER — AMLODIPINE BESYLATE 5 MG/1
5 TABLET ORAL DAILY
Status: DISCONTINUED | OUTPATIENT
Start: 2021-12-10 | End: 2021-12-10 | Stop reason: HOSPADM

## 2021-12-10 RX ORDER — AMLODIPINE BESYLATE 5 MG/1
5 TABLET ORAL DAILY
Qty: 30 TABLET | Refills: 0 | Status: SHIPPED | OUTPATIENT
Start: 2021-12-10 | End: 2021-12-14

## 2021-12-10 RX ADMIN — LOSARTAN POTASSIUM 100 MG: 100 TABLET, FILM COATED ORAL at 08:11

## 2021-12-10 RX ADMIN — ATORVASTATIN CALCIUM 40 MG: 40 TABLET, FILM COATED ORAL at 08:11

## 2021-12-10 RX ADMIN — CLOPIDOGREL BISULFATE 75 MG: 75 TABLET ORAL at 08:11

## 2021-12-10 RX ADMIN — ASPIRIN 81 MG CHEWABLE TABLET 162 MG: 81 TABLET CHEWABLE at 08:12

## 2021-12-10 RX ADMIN — AMLODIPINE BESYLATE 5 MG: 5 TABLET ORAL at 08:11

## 2021-12-10 RX ADMIN — FAMOTIDINE 20 MG: 10 INJECTION, SOLUTION INTRAVENOUS at 02:23

## 2021-12-10 ASSESSMENT — ACTIVITIES OF DAILY LIVING (ADL)
ADLS_ACUITY_SCORE: 11

## 2021-12-10 NOTE — PLAN OF CARE
Inpatient. Alert and oriented x4. VSS on room air ex HTN. Infusing LR 50 ml/hr. NPO. Independent. Low fiber diet.  Denies pain, N&V or sob. Prn labetalol available for elevated SBP>180. + bruised on right face. General surgery is following. Plan to discharge today if diet is tolerated. Continue to monitor.

## 2021-12-10 NOTE — PLAN OF CARE
A&Ox4. VSS on RA; hypertensive. Amlodipine newly added today; prn labetalol available for SBP>180. Up independently. IV infusing LR. Denies pain or nausea. Reports feeling much improved. Tolerated clear liquids this AM then advanced to full liquids this afternoon. Plan to begin low fiber diet tomorrow morning. Discharge pending ability to tolerate diet. Supportive spouse at the bedside.

## 2021-12-10 NOTE — PROGRESS NOTES
[facility]    General Surgery  Daily Note       Assessment and Plan:   Francois Ly is a 81 year old female with recurrent small bowel obstruction    -Medical management per primary team  -Okay to advance to low fiber diet as tolerated  -Okay to discharge to home from general surgery standpoint        Interval History:   Patient doing well.  Reports she is passing flatus.  Denies abdominal pain.  No nausea.  She has been tolerating full liquid diet.           Physical Exam:   Temp: 97.5  F (36.4  C) Temp src: Oral BP: (!) 160/85 Pulse: 60   Resp: 16 SpO2: 98 % O2 Device: None (Room air)      No intake/output data recorded.      Constitutional: healthy, alert and no distress   Respiratory: Breathing unlabored  Abdomen: Soft, nondistended, nontender      Data   Recent Labs   Lab 12/09/21  0635 12/07/21 2126 12/07/21 2125   WBC 5.3 6.6  --    HGB 12.0 12.8  --    MCV 93 93  --     316  --      --  140   POTASSIUM 3.9  --  4.1   CHLORIDE 108  --  104   CO2 28  --  33*   BUN 9  --  15   CR 0.70  --  0.73   ANIONGAP 5  --  3   ARIN 9.3  --  9.9   GLC 98  --  129*   ALBUMIN  --   --  4.0   PROTTOTAL  --   --  8.3   BILITOTAL  --   --  0.4   ALKPHOS  --   --  87   ALT  --   --  38   AST  --   --  22       Lavonne Cortez MD

## 2021-12-10 NOTE — DISCHARGE SUMMARY
[facility]  Hospitalist Discharge Summary      Date of Admission:  12/7/2021  Date of Discharge:  12/10/2021  Discharging Provider: Dayami Munoz MD      Discharge Diagnoses   Small bowel obstruction    Follow-ups Needed After Discharge   Follow-up Appointments     Follow-up and recommended labs and tests       Follow up with primary care provider, Luis Feliciano, within 7 days to   evaluate medication change and for hospital follow- up.               Unresulted Labs Ordered in the Past 30 Days of this Admission     No orders found from 11/7/2021 to 12/8/2021.      These results will be followed up by none    Discharge Disposition   Discharged to home  Condition at discharge: Stable     Hospital Course              Francois Ly is a 81 year old female admitted on 12/7/2021. She presents with abdominal pain.        Small bowel obstruction suspect due to adhesions  Hx ex lap 1960 2/2 uterine perforation from IUD and FARHAT/BSO 1994. Has had recurrent SBOs x 6, most recently 11/2021. CT on admission shows a moderately long segment of mildly distended fluid-filled small bowel is present in the pelvis and the more distal bowel is relatively decompressed. Mild haziness is present within the mesentery associated with a distended small bowel. No distinct transition point from distended to nondistended small bowel. These findings could relate to an early or partial distal small bowel obstruction, a small bowel ileus and/or enteritis.  NG tube felt not warranted given she has no vomiting and her symptoms have been slowly subsiding since her admission.  Reports she has had a few small loose bowel movement since her arrival to the ER.     - Admitted with NPO/IVF, pain meds and antiemetic as needed. General surgery consult appreciated, started on clear liquid diet and advanced to low fiber diet as recommended per surgery. Patient tolerated diet. Pain resolved. Discharging home.       Hx of CVA  [needs  rec- atorvastatin 40 mg daily, aspirin, plavix through ~12/1]  Hospitalized in 8/2021 with L sided weakness. MRI with watershed strokes in R frontal and parietal regions.. On plavix/ ASA x 90 days then ASA.  - resumed asa, plavix   - resumed statin when diet advanced     Hypertension  [ losartan 100mg every day]  - Continued PTA Losartan  - BP uncontrolled, amlodipine added.     Pancreatic cystic lesion  Present and stable x 5 years. Has seen GI, no further monitoring needed.      COVID-19 negative      Consultations This Hospital Stay   SURGERY GENERAL IP CONSULT    Code Status   Full Code    Time Spent on this Encounter   I, Dayami Munoz MD, personally saw the patient today and spent less than or equal to 30 minutes discharging this patient.       Dayami Munoz MD  [facility] EXTENDED RECOVERY AND SHORT STAY  [address] [facility] [address]  Phone: [phone]  ______________________________________________________________________    Physical Exam   Vital Signs: Temp: 97.6  F (36.4  C) Temp src: Oral BP: (!) 161/80 Pulse: 79   Resp: 16 SpO2: 98 % O2 Device: None (Room air)    Weight: 128 lbs 1.4 oz    General: AAOx3, appears comfortable.  HEENT: PERRLA EOMI. Mucosa moist.   Lungs: Bilateral equal air entry. Clear to auscultation, normal work of breathing.   CVS: S1S2 regular, no tachycardia or murmur.   Abdomen: Soft, NT, ND. BS heard.  MSK: No edema or deformities.  Neuro: AAOX3. CN 2-12 normal. Strength symmetrical.  Skin: No rash.          Primary Care Physician   Luis Feliciano    Discharge Orders      Reason for your hospital stay    Small bowel obstruction     Follow-up and recommended labs and tests     Follow up with primary care provider, Luis Feliciano, within 7 days to evaluate medication change and for hospital follow- up.     Activity    Your activity upon discharge: activity as tolerated     Diet    Follow this diet upon discharge: Orders Placed This Encounter      Low  Fiber Diet       Significant Results and Procedures   Most Recent 3 CBC's:Recent Labs   Lab Test 12/09/21  0635 12/07/21 2126 12/01/21  1527   WBC 5.3 6.6 5.3   HGB 12.0 12.8 11.7   MCV 93 93 94    316 246     Most Recent 3 BMP's:Recent Labs   Lab Test 12/09/21  0635 12/07/21 2125 12/01/21  1527    140 138   POTASSIUM 3.9 4.1 4.0   CHLORIDE 108 104 107   CO2 28 33* 27   BUN 9 15 17   CR 0.70 0.73 0.78   ANIONGAP 5 3 4   ARIN 9.3 9.9 9.0   GLC 98 129* 110*     Most Recent 2 LFT's:Recent Labs   Lab Test 12/07/21 2125 11/22/21  0317   AST 22 25   ALT 38 32   ALKPHOS 87 98   BILITOTAL 0.4 0.6     Most Recent TSH and T4:Recent Labs   Lab Test 12/28/18  0956   TSH 2.51     Most Recent 6 glucoses:Recent Labs   Lab Test 12/09/21  0635 12/07/21 2125 12/01/21  1527 11/24/21  0730 11/23/21  0614 11/22/21  0317   GLC 98 129* 110* 104* 133* 150*   ,   Results for orders placed or performed during the hospital encounter of 12/07/21   CT Abdomen Pelvis w Contrast    Narrative    EXAM: CT ABDOMEN AND PELVIS WITH CONTRAST  LOCATION: [facility]  DATE/TIME: 12/7/2021 10:30 PM    INDICATION: Abdominal distention.  COMPARISON: 11/22/2021.    TECHNIQUE: CT scan of the abdomen and pelvis was performed following injection of IV contrast. Multiplanar reformats were obtained. Dose reduction techniques were used.  CONTRAST: 68mL Isovue 370.    FINDINGS:    LOWER CHEST: Partial visualization of a very small pericardial effusion, similar in appearance to the comparison study dated 11/22/2021.    HEPATOBILIARY: A few cysts scattered within the liver, the largest in the right lobe and measuring 5 cm in diameter. A few small gallstones in the gallbladder.    SPLEEN: Unremarkable.    PANCREAS: 1.4 x 1.0 cm cyst in the uncinate process of the pancreas (series 3 and image 74). This has not convincingly changed since the comparison study dated 01/01/2020. Given the stability and the patient's age, this is of  unlikely clinical   significance.    ADRENAL GLANDS: 1.1 cm left adrenal nodule, unchanged since 01/01/2020. This is most likely an adenoma.    KIDNEYS/BLADDER: No suspicious renal lesions.    BOWEL: The stomach is mildly distended with fluid. A moderately long segment of fluid-filled mildly distended small bowel is present in the central pelvis. The more distal small bowel is relatively decompressed. Slight haziness is present within the   mesentery associated with the dilated small bowel (for example, series 3 image 137). Numerous colonic diverticula are present without evidence of diverticulitis. The appearance of the appendix is within normal limits. No visualized bowel wall thickening,   pneumatosis or free intraperitoneal gas.    LYMPH NODES: Unremarkable.    PELVIC ORGANS: No acute findings.    MUSCULOSKELETAL: Fusion hardware in the lumbar spine.    OTHER: Atherosclerotic calcification in the abdominal aorta. A very small amount of free fluid in the pelvis.      Impression    IMPRESSION:   1. A moderately long segment of mildly distended fluid-filled small bowel is present in the pelvis and the more distal bowel is relatively decompressed. Mild haziness is present within the mesentery associated with a distended small bowel and there is a   very small amount of free fluid in the pelvis. No distinct transition point from distended to nondistended small bowel. These findings could relate to an early or partial distal small bowel obstruction, a small bowel ileus and/or enteritis.  2. No other cause of acute pain identified in the abdomen or pelvis.  3. Colonic diverticulosis without evidence of diverticulitis.  4. Cholelithiasis.   XR Abdomen 1 View    Narrative    XR ABDOMEN 1 VIEW 12/8/2021 4:19 PM    HISTORY: SBO    COMPARISON: 12/7/2021      Impression    IMPRESSION: Mildly dilated loops of small bowel with air-fluid levels  in the mid abdomen, suspicious for ongoing small bowel obstruction.    RAMBO VARGAS  MD CHRISTI         SYSTEM ID:  [id number]       Discharge Medications   Current Discharge Medication List      START taking these medications    Details   amLODIPine (NORVASC) 5 MG tablet Take 1 tablet (5 mg) by mouth daily  Qty: 30 tablet, Refills: 0    Comments: Future refills by PCP Dr. Luis Feliciano with phone number [phone].  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 (H)      clopidogrel (PLAVIX) 75 MG tablet Take 1 tablet (75 mg) by mouth daily  Qty: 90 tablet, Refills: 0    Associated Diagnoses: 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

## 2021-12-11 DIAGNOSIS — Z71.89 OTHER SPECIFIED COUNSELING: ICD-10-CM

## 2021-12-12 ENCOUNTER — PATIENT OUTREACH (OUTPATIENT)
Dept: CARE COORDINATION | Facility: CLINIC | Age: 81
End: 2021-12-12
Payer: MEDICARE

## 2021-12-12 NOTE — PROGRESS NOTES
Clinic Care Coordination Contact    Background: Care Coordination referral placed from [facility] [place] [facility] discharge report for reason of patient meeting criteria for a TCM outreach call by [facility] [place] team.    Assessment: Upon chart review, [facility] Team member will cancel/close the referral for TCM outreach due to reason below:    Patient isn't interested in speaking with care team today and disconnected call    Plan: Care Coordination referral for TCM outreach canceled.    Misty Villa MA  [facility]cted [facility] [place], [facility]

## 2021-12-14 RX ORDER — CLOPIDOGREL BISULFATE 75 MG/1
75 TABLET ORAL DAILY
Qty: 90 TABLET | Refills: 1 | Status: CANCELLED | OUTPATIENT
Start: 2021-12-14

## 2021-12-14 NOTE — PROGRESS NOTES
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               Return in about 2 weeks (around 12/30/2021) for Routine physical..    Luis Feliciano MD  [facility]    Jamir Parrish is a 81 year old who presents for the following health issues  accompanied by her spouse.    [facility]       Hospital Follow-up Visit:    Hospital/Nursing Home/ Rehab Facility: [facility]  Date of Admission: 12/7/2021  Date of Discharge: 12/10/2021  Reason(s) for Admission: Small Bowel Obstruction      Was your hospitalization related to COVID-19? No   Problems taking medications regularly:  None  Medication changes since discharge: None  Problems adhering to non-medication therapy:  None    Summary of hospitalization:  [facility] discharge summary reviewed  Diagnostic Tests/Treatments reviewed.  Follow up needed: With PCP  Pt pain has much improved.  Her blood pressure is better around 140/70's, and she was started on Amlodipine 5 mg daily.  Other Healthcare Providers Involved in Patient s Care:         BP recheck today, BP was high, on arrival but went down gradually.   Update since discharge: improved. Post Discharge Medication Reconciliation: discharge medications reconciled, continue medications without change.  Plan of care communicated  with patient and family            Review of Systems   CONSTITUTIONAL: NEGATIVE for fever, chills, change in weight  GI: occasional abdominal pain on the lower part of the abdomen bilaterally, especially when she moves quickly      Objective    LMP  (LMP Unknown)   There is no height or weight on file to calculate BMI.  Physical Exam   GENERAL: healthy, alert and no distress  NECK: no adenopathy, no asymmetry, masses, or scars and thyroid normal to palpation  RESP: lungs clear to auscultation - no rales, rhonchi or wheezes  CV: regular rate and rhythm, normal S1 S2, no S3 or S4, no murmur, click or rub, no peripheral edema and peripheral pulses strong  ABDOMEN: soft, nontender, no hepatosplenomegaly, no masses and bowel sounds normal  MS: no gross musculoskeletal defects noted, no edema              Pre-Visit Planning   Next 5 appointments (look out 90 days)    Dec 16, 2021 10:30 AM  (Arrive by 10:10 AM)  Provider Visit with Luis Feliciano MD  [facility] ([facility] ) [address] [place] [id number] [phone]   Dec 30, 2021 10:00 AM  (Arrive by 9:35 AM)  Adult Preventative Visit with Luis Feliciano MD  [facility] ([facility] ) [address] [facility] [address] [phone]        Appointment Notes for this encounter:   bp and labs - hospital follow up     Recent hospital admission 12/7 - 12/10 small bowel obstruction   Discharged with amlodipine     Questionnaires Reviewed/Assigned  No additional questionnaires are needed     Health Maintenance Due   Topic Date Due     MEDICARE ANNUAL WELLNESS VISIT  09/29/2021     Patient preferred phone number: [phone]      Contacted patient via phone/[employer]. Are there any additional questions or concerns you'd like to review with your provider during your visit? No    Visit is not preventive.    Meds  medications recently reviewed by  pharmacist during recent hospital stay     Entered patient-preferred pharmacy.     Current Outpatient Medications   Medication     amLODIPine (NORVASC) 5 MG tablet     aspirin (ASA) 81 MG chewable tablet     atorvastatin (LIPITOR) 40 MG tablet     clopidogrel (PLAVIX) 75 MG tablet     KRILL OIL PO     lifitegrast (XIIDRA) 5 % opthalmic solution     losartan (COZAAR) 100 MG tablet     vitamin D3 (CHOLECALCIFEROL) 50 mcg (2000 units) tablet     No current facility-administered medications for this visit.       Health Maintenance   Health Maintenance Due   Topic Date Due     MEDICARE ANNUAL WELLNESS VISIT  09/29/2021     [facility]  Patient is active on [employer].    Tonia Juan, Registered Nurse, PAL (Patient Advocate Liason)   [facility]   [phone]

## 2021-12-16 ENCOUNTER — OFFICE VISIT (OUTPATIENT)
Dept: FAMILY MEDICINE | Facility: CLINIC | Age: 81
End: 2021-12-16
Payer: MEDICARE

## 2021-12-16 VITALS
TEMPERATURE: 98 F | DIASTOLIC BLOOD PRESSURE: 76 MMHG | OXYGEN SATURATION: 100 % | BODY MASS INDEX: 23.66 KG/M2 | RESPIRATION RATE: 16 BRPM | SYSTOLIC BLOOD PRESSURE: 134 MMHG | HEART RATE: 65 BPM | WEIGHT: 128.6 LBS | HEIGHT: 62 IN

## 2021-12-16 DIAGNOSIS — K56.609 SBO (SMALL BOWEL OBSTRUCTION) (H): ICD-10-CM

## 2021-12-16 DIAGNOSIS — I63.321 CEREBROVASCULAR ACCIDENT (CVA) DUE TO THROMBOSIS OF RIGHT ANTERIOR CEREBRAL ARTERY (H): ICD-10-CM

## 2021-12-16 DIAGNOSIS — K40.21 BILATERAL RECURRENT INGUINAL HERNIA WITHOUT OBSTRUCTION OR GANGRENE: Primary | ICD-10-CM

## 2021-12-16 DIAGNOSIS — I10 BENIGN ESSENTIAL HYPERTENSION: ICD-10-CM

## 2021-12-16 PROCEDURE — 99495 TRANSJ CARE MGMT MOD F2F 14D: CPT | Performed by: FAMILY MEDICINE

## 2021-12-16 RX ORDER — AMLODIPINE BESYLATE 10 MG/1
10 TABLET ORAL DAILY
Qty: 90 TABLET | Refills: 3 | Status: SHIPPED | OUTPATIENT
Start: 2021-12-16 | End: 2021-12-16

## 2021-12-16 RX ORDER — AMLODIPINE BESYLATE 5 MG/1
5 TABLET ORAL DAILY
Qty: 90 TABLET | Refills: 1 | Status: SHIPPED | OUTPATIENT
Start: 2021-12-16 | End: 2021-12-30

## 2021-12-16 ASSESSMENT — MIFFLIN-ST. JEOR: SCORE: 1001.58

## 2021-12-16 NOTE — LETTER
Francois Ly  [address] [address]    Thank you for choosing [facility] today for your health care needs.     [facility] is transforming primary care  At [facility], we re dedicated to constantly improve how we serve the health care needs of our patients and communities. We re currently making changes to the way we deliver care.     Changes you ll notice include:    An emphasis on building a relationship with a primary care provider    Access to a PAL (personal advocate and liaison) to help guide you with your care needs    Appointment lengths tailored to your specific needs and greater access to a care team to help you and your provider improve and maintain your health and well-being    Improved online access to your care team    Benefits of a primary care provider  If you don t have a designated primary care provider, we encourage you to get to know our care team online and find a provider you d like to see. Most of our providers have a short video on their online provider page. Visit [email or web address] to explore our providers and locations.    Benefits of having a primary care provider include:      They get to know you - your health history, family history and goals, making it easier to make a health plan together.     You get to know them - making health-related conversations and decisions easier      Primary care doctors help you when you re sick or hurt - but also focus on keeping you healthy with preventive care and screenings.      A doctor who sees you regularly is more likely to notice changes in your health.     You ll be connected to a broad care team who partners with your provider to support you.    Patient Advocate Liaison (PAL)   To help make sure you get the right care, at the right time, we include PALs, or Patient Advocate Liaisons, as part of your care team. Your PAL will be your first line of contact. They ll advocate for your needs and help you  navigate our services, connecting you with care team members and community resources to ensure your care is well coordinated. You ll be introduced to a PAL in an upcoming visit.     Expanded care team access with tailored appointment lengths  Depending on your health care needs, you may have longer or shorter appointments and see additional care team providers - including Medication Therapy Management (MTM) pharmacists, diabetes educators, behavioral health clinicians, or social workers. At times, they may be included in your visit with your provider, or you may see them individually.     Online access to your health care records and care team  [employer] is our online tool that makes it easy to see your health care information and communicate with your care team.     [employer] allows you to:     View your health maintenance plan so you know when you re due for a preventive screening    Send secure messages to your care team    View your health history and visit summaries     Schedule appointments     Complete questionnaires and eCheck-in before appointments      Get care from your provider with an e-visit      View and pay your bill     Sign up at [employer]. Once you have an account, you also can download the mobile kenna.     Connecting to fast and convenient care  When you need fast, convenient care - consider one of the following options:       Video Visit: A convenient care option for visiting with your provider out of the comfort of your own home. Most of the things you come to the clinic to address with your provider can now be done virtually through a video. This includes your chronic medication follow up, questions or concerns you may have, and even your annual Medicare Wellness Visit.       Phone Visit: Another convenient option for follow up of common problems that may require a more in-depth discussion with your provider.       E-visit: When you need acute care quickly, or have a quick question about  your medication, an E-visit is completed through [facility] and your provider will respond within one business day.      Tonia Juan, Registered Nurse, PAL (Patient Advocate Liason)   [facility]   [phone]

## 2021-12-16 NOTE — PATIENT INSTRUCTIONS
Take 2 pills of Amlodipine 5 mg until they are done, then 1 tab of amlodipine 10 mg daily after.  Stop Plavix, and continue on ASA 2 pills daily.  Please call the number below for your inguinal hernia surgery consult.

## 2021-12-20 ENCOUNTER — PATIENT OUTREACH (OUTPATIENT)
Dept: FAMILY MEDICINE | Facility: CLINIC | Age: 81
End: 2021-12-20
Payer: MEDICARE

## 2021-12-20 NOTE — TELEPHONE ENCOUNTER
Dr. Feliciano   You ordered cardiology due to pericardial effusion 12/1/21    Tonia Juan, Registered Nurse, PAL (Patient Advocate Liason)   [facility]   [phone]

## 2021-12-20 NOTE — TELEPHONE ENCOUNTER
RN spoke to patient     Discussed message below from Dr. Feliciano     Patient advised if chest pain prior to that appt to call and have nurse assess symptoms and advise on recommendations   Discussed how to reach 24 hour nurse line     Patient states understanding     Tonia Juan Registered Nurse, PAL (Patient Advocate Liason)   [facility]   [phone]

## 2021-12-20 NOTE — TELEPHONE ENCOUNTER
Dr. Feliciano     Update from patient's  Sahil     Cardiology appt scheduled for 2/7/2021 soonest available - is this okay? If this is okay, RN will advise okay to wait     If patient needs to be seen sooner please advise on reasoning and RN will call to see if able to change     Thank you,   Tonia Juan, Registered Nurse, PAL (Patient Advocate Liason)   [facility]   [phone]

## 2021-12-23 NOTE — PROGRESS NOTES
Pre-Visit Planning   Next 5 appointments (look out 90 days)    Dec 30, 2021 10:00 AM  (Arrive by 9:35 AM)  Adult Preventative Visit with Luis Feliciano MD  [facility] ([facility] ) [address] [facility] [address] [phone]        Appointment Notes for this encounter:   Adult preventative/cardiology referral     Questionnaires Reviewed/Assigned  No additional questionnaires are needed    Patient preferred phone number: [phone]    Contacted patient via phone/[employer]. Are there any additional questions or concerns you'd like to review with your provider during your visit? Yes: cardiology referral, patient scheduled in Feb 2022 would like sooner appt,  Sahil will call to see if can get in sooner at different location (RN discussed that if chest pain/tightness any symptoms that could be cardiac related patient needs to call triage nurse for assessment if after clinic hours, or call clinic nurse for assessment)     Visit is preventative     Meds  will need to review at office visit     Entered patient-preferred pharmacy.     Current Outpatient Medications   Medication     amLODIPine (NORVASC) 5 MG tablet     aspirin (ASA) 81 MG chewable tablet     atorvastatin (LIPITOR) 40 MG tablet     KRILL OIL PO     lifitegrast (XIIDRA) 5 % opthalmic solution     losartan (COZAAR) 100 MG tablet     vitamin D3 (CHOLECALCIFEROL) 50 mcg (2000 units) tablet     No current facility-administered medications for this visit.       Health Maintenance   Health Maintenance Due   Topic Date Due     MEDICARE ANNUAL WELLNESS VISIT  09/29/2021     [employer]  Patient is active on [employer].    Tonia Juan, Registered Nurse, PAL (Patient Advocate Liason)   [facility]   [phone]

## 2021-12-30 ENCOUNTER — OFFICE VISIT (OUTPATIENT)
Dept: FAMILY MEDICINE | Facility: CLINIC | Age: 81
End: 2021-12-30
Payer: MEDICARE

## 2021-12-30 VITALS
BODY MASS INDEX: 24.51 KG/M2 | HEART RATE: 61 BPM | RESPIRATION RATE: 16 BRPM | OXYGEN SATURATION: 98 % | HEIGHT: 61 IN | DIASTOLIC BLOOD PRESSURE: 62 MMHG | TEMPERATURE: 98.1 F | WEIGHT: 129.8 LBS | SYSTOLIC BLOOD PRESSURE: 150 MMHG

## 2021-12-30 DIAGNOSIS — Z00.00 ENCOUNTER FOR MEDICARE ANNUAL WELLNESS EXAM: Primary | ICD-10-CM

## 2021-12-30 DIAGNOSIS — I10 BENIGN ESSENTIAL HYPERTENSION: ICD-10-CM

## 2021-12-30 PROCEDURE — 99214 OFFICE O/P EST MOD 30 MIN: CPT | Mod: 25 | Performed by: FAMILY MEDICINE

## 2021-12-30 PROCEDURE — G0439 PPPS, SUBSEQ VISIT: HCPCS | Performed by: FAMILY MEDICINE

## 2021-12-30 RX ORDER — AMLODIPINE BESYLATE 10 MG/1
10 TABLET ORAL DAILY
Qty: 90 TABLET | Refills: 3 | Status: SHIPPED | OUTPATIENT
Start: 2021-12-30 | End: 2022-03-02

## 2021-12-30 SDOH — ECONOMIC STABILITY: TRANSPORTATION INSECURITY
IN THE PAST 12 MONTHS, HAS THE LACK OF TRANSPORTATION KEPT YOU FROM MEDICAL APPOINTMENTS OR FROM GETTING MEDICATIONS?: NO

## 2021-12-30 SDOH — ECONOMIC STABILITY: INCOME INSECURITY: HOW HARD IS IT FOR YOU TO PAY FOR THE VERY BASICS LIKE FOOD, HOUSING, MEDICAL CARE, AND HEATING?: NOT HARD AT ALL

## 2021-12-30 SDOH — ECONOMIC STABILITY: FOOD INSECURITY: WITHIN THE PAST 12 MONTHS, THE FOOD YOU BOUGHT JUST DIDN'T LAST AND YOU DIDN'T HAVE MONEY TO GET MORE.: NEVER TRUE

## 2021-12-30 SDOH — ECONOMIC STABILITY: INCOME INSECURITY: IN THE LAST 12 MONTHS, WAS THERE A TIME WHEN YOU WERE NOT ABLE TO PAY THE MORTGAGE OR RENT ON TIME?: NO

## 2021-12-30 SDOH — HEALTH STABILITY: PHYSICAL HEALTH: ON AVERAGE, HOW MANY DAYS PER WEEK DO YOU ENGAGE IN MODERATE TO STRENUOUS EXERCISE (LIKE A BRISK WALK)?: 3 DAYS

## 2021-12-30 SDOH — ECONOMIC STABILITY: FOOD INSECURITY: WITHIN THE PAST 12 MONTHS, YOU WORRIED THAT YOUR FOOD WOULD RUN OUT BEFORE YOU GOT MONEY TO BUY MORE.: NEVER TRUE

## 2021-12-30 SDOH — ECONOMIC STABILITY: TRANSPORTATION INSECURITY
IN THE PAST 12 MONTHS, HAS LACK OF TRANSPORTATION KEPT YOU FROM MEETINGS, WORK, OR FROM GETTING THINGS NEEDED FOR DAILY LIVING?: NO

## 2021-12-30 SDOH — HEALTH STABILITY: PHYSICAL HEALTH: ON AVERAGE, HOW MANY MINUTES DO YOU ENGAGE IN EXERCISE AT THIS LEVEL?: 30 MIN

## 2021-12-30 ASSESSMENT — ENCOUNTER SYMPTOMS
PALPITATIONS: 0
COUGH: 0
NERVOUS/ANXIOUS: 0
CHILLS: 0
HEARTBURN: 0
EYE PAIN: 0
HEADACHES: 0
CONSTIPATION: 0
BREAST MASS: 0
PARESTHESIAS: 0
ARTHRALGIAS: 0
DYSURIA: 0
HEMATURIA: 0
JOINT SWELLING: 0
FEVER: 0
NAUSEA: 0
SORE THROAT: 0
SHORTNESS OF BREATH: 0
DIZZINESS: 0
ABDOMINAL PAIN: 0
DIARRHEA: 0
FREQUENCY: 0
HEMATOCHEZIA: 0
WEAKNESS: 0
MYALGIAS: 0

## 2021-12-30 ASSESSMENT — LIFESTYLE VARIABLES
HOW OFTEN DO YOU HAVE A DRINK CONTAINING ALCOHOL: 2-3 TIMES A WEEK
HOW OFTEN DO YOU HAVE SIX OR MORE DRINKS ON ONE OCCASION: NEVER
HOW MANY STANDARD DRINKS CONTAINING ALCOHOL DO YOU HAVE ON A TYPICAL DAY: 1 OR 2

## 2021-12-30 ASSESSMENT — ANXIETY QUESTIONNAIRES
5. BEING SO RESTLESS THAT IT IS HARD TO SIT STILL: NOT AT ALL
2. NOT BEING ABLE TO STOP OR CONTROL WORRYING: NOT AT ALL
4. TROUBLE RELAXING: NOT AT ALL
6. BECOMING EASILY ANNOYED OR IRRITABLE: NOT AT ALL
GAD7 TOTAL SCORE: 0
7. FEELING AFRAID AS IF SOMETHING AWFUL MIGHT HAPPEN: NOT AT ALL
7. FEELING AFRAID AS IF SOMETHING AWFUL MIGHT HAPPEN: NOT AT ALL
GAD7 TOTAL SCORE: 0
1. FEELING NERVOUS, ANXIOUS, OR ON EDGE: NOT AT ALL
GAD7 TOTAL SCORE: 0
3. WORRYING TOO MUCH ABOUT DIFFERENT THINGS: NOT AT ALL

## 2021-12-30 ASSESSMENT — SOCIAL DETERMINANTS OF HEALTH (SDOH)
IN A TYPICAL WEEK, HOW MANY TIMES DO YOU TALK ON THE PHONE WITH FAMILY, FRIENDS, OR NEIGHBORS?: TWICE A WEEK
HOW OFTEN DO YOU GET TOGETHER WITH FRIENDS OR RELATIVES?: ONCE A WEEK
DO YOU BELONG TO ANY CLUBS OR ORGANIZATIONS SUCH AS CHURCH GROUPS UNIONS, FRATERNAL OR ATHLETIC GROUPS, OR SCHOOL GROUPS?: YES
HOW OFTEN DO YOU ATTEND CHURCH OR RELIGIOUS SERVICES?: NEVER

## 2021-12-30 ASSESSMENT — ACTIVITIES OF DAILY LIVING (ADL): CURRENT_FUNCTION: NO ASSISTANCE NEEDED

## 2021-12-30 ASSESSMENT — PATIENT HEALTH QUESTIONNAIRE - PHQ9
10. IF YOU CHECKED OFF ANY PROBLEMS, HOW DIFFICULT HAVE THESE PROBLEMS MADE IT FOR YOU TO DO YOUR WORK, TAKE CARE OF THINGS AT HOME, OR GET ALONG WITH OTHER PEOPLE: NOT DIFFICULT AT ALL
SUM OF ALL RESPONSES TO PHQ QUESTIONS 1-9: 0
SUM OF ALL RESPONSES TO PHQ QUESTIONS 1-9: 0

## 2021-12-30 ASSESSMENT — MIFFLIN-ST. JEOR: SCORE: 995.11

## 2021-12-30 NOTE — PROGRESS NOTES
"SUBJECTIVE:   Francois Ly is a 81 year old female who presents for Preventive Visit.      Patient has been advised of split billing requirements and indicates understanding: Yes   Are you in the first 12 months of your Medicare coverage?  No    Healthy Habits:     In general, how would you rate your overall health?  Fair    Frequency of exercise:  2-3 days/week    Duration of exercise:  15-30 minutes    Do you usually eat at least 4 servings of fruit and vegetables a day, include whole grains    & fiber and avoid regularly eating high fat or \"junk\" foods?  Yes    Taking medications regularly:  Yes    Medication side effects:  None    Ability to successfully perform activities of daily living:  No assistance needed    Home Safety:  No safety concerns identified    Hearing Impairment:  No hearing concerns    In the past 6 months, have you been bothered by leaking of urine?  No    In general, how would you rate your overall mental or emotional health?  Good      PHQ-2 Total Score: 0    Additional concerns today:  No    Pt would like to discuss blood pressure, she has been taking it at home and the top number is usually higher. Pt has some readings written down with her.  Hypertension Follow-up      Do you check your blood pressure regularly outside of the clinic? Yes     Are you following a low salt diet? No    Are your blood pressures ever more than 140 on the top number (systolic) OR more   than 90 on the bottom number (diastolic), for example 140/90? Yes, half of the readings are high.      How many servings of fruits and vegetables do you eat daily?  0-1    On average, how many sweetened beverages do you drink each day (Examples: soda, juice, sweet tea, etc.  Do NOT count diet or artificially sweetened beverages)?   1    How many days per week do you exercise enough to make your heart beat faster? 4    How many minutes a day do you exercise enough to make your heart beat faster? 20 - 29    How many days per week " "do you miss taking your medication? 0       Do you feel safe in your environment? Yes    Have you ever done Advance Care Planning? (For example, a Health Directive, POLST, or a discussion with a medical provider or your loved ones about your wishes): No, advance care planning information given to patient to review.  Patient declined advance care planning discussion at this time.       Fall risk  Fallen 2 or more times in the past year?: Yes  Any fall with injury in the past year?: Yes    Cognitive Screening   1) Repeat 3 items (Leader, Season, Table)    2) Clock draw: NORMAL  3) 3 item recall: Recalls 3 objects  Results: 3 items recalled: COGNITIVE IMPAIRMENT LESS LIKELY    Mini-CogTM Copyright S Marycarmen. Licensed by the author for use in [facility]; reprinted with permission (isabel@[facility]). All rights reserved.      Do you have sleep apnea, excessive snoring or daytime drowsiness?: no    Reviewed and updated as needed this visit by clinical staff  Tobacco  Allergies  Meds   Med Hx  Surg Hx  Fam Hx  Soc Hx       Reviewed and updated as needed this visit by Provider               Social History     Tobacco Use     Smoking status: Former Smoker     Smokeless tobacco: Former User     Tobacco comment: Former \"social\" smoker, quit in 1978.   Substance Use Topics     Alcohol use: Yes     Alcohol/week: 0.0 standard drinks     Comment: occ wine, twice a week         Alcohol Use 12/30/2021   Prescreen: >3 drinks/day or >7 drinks/week? No       Answers for HPI/ROS submitted by the patient on 12/30/2021  If you checked off any problems, how difficult have these problems made it for you to do your work, take care of things at home, or get along with other people?: Not difficult at all  PHQ9 TOTAL SCORE: 0  HUDSON 7 TOTAL SCORE: 0              Current providers sharing in care for this patient include:   Patient Care Team:  Luis Feliciano MD as PCP - General (Family Practice)  Luis Feliciano MD as Assigned PCP  Alfa" "Daniela ESPINOZA MD as MD (Vascular Surgery)  Eladia Tavarez MD as Assigned Surgical Provider  Tonia Juan, RN as Personal Advocate & Liaison (PAL) (Nurse)    The following health maintenance items are reviewed in Epic and correct as of today:  There are no preventive care reminders to display for this patient.  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   Substance Use Topics     Alcohol use: Yes     Alcohol/week: 0.0 standard drinks     Comment: occ wine, twice a week     Family History   Problem Relation Age of Onset     Family History Negative Other          Current Outpatient Medications   Medication Sig Dispense Refill     amLODIPine (NORVASC) 10 MG tablet Take 1 tablet (10 mg) by mouth daily 90 tablet 3     aspirin (ASA) 81 MG chewable tablet Take 2 tablets (162 mg) by mouth daily       atorvastatin (LIPITOR) 40 MG tablet Take 1 tablet (40 mg) by mouth daily 90 tablet 3     KRILL OIL PO Take 1 capsule by mouth daily (OTC: Unsure of strength)       lifitegrast (XIIDRA) 5 % opthalmic solution Place 1 " "drop into both eyes 2 times daily       losartan (COZAAR) 100 MG tablet Take 1 tablet (100 mg) by mouth daily 90 tablet 3     vitamin D3 (CHOLECALCIFEROL) 50 mcg (2000 units) tablet Take 1 tablet by mouth daily       Allergies   Allergen Reactions     Ace Inhibitors      Sorbitan Trioleate      Sulfonylureas      Vancomycin Hives         Mammogram Screening - Patient over age 75, has elected to discontinue screenings.  Pertinent mammograms are reviewed under the imaging tab.    Review of Systems   Constitutional: Negative for chills and fever.   HENT: Negative for congestion, ear pain, hearing loss and sore throat.    Eyes: Negative for pain and visual disturbance.   Respiratory: Negative for cough and shortness of breath.    Cardiovascular: Negative for chest pain, palpitations and peripheral edema.   Gastrointestinal: Negative for abdominal pain, constipation, diarrhea, heartburn, hematochezia and nausea.   Breasts:  Negative for tenderness, breast mass and discharge.   Genitourinary: Negative for dysuria, frequency, genital sores, hematuria, pelvic pain, urgency, vaginal bleeding and vaginal discharge.   Musculoskeletal: Negative for arthralgias, joint swelling and myalgias.   Skin: Negative for rash.   Neurological: Negative for dizziness, weakness, headaches and paresthesias.   Psychiatric/Behavioral: Negative for mood changes. The patient is not nervous/anxious.          OBJECTIVE:   BP (!) 162/68 (BP Location: Right arm, Patient Position: Sitting, Cuff Size: Adult Regular)   Pulse 61   Temp 98.1  F (36.7  C) (Oral)   Resp 16   Ht 1.556 m (5' 1.25\")   Wt 58.9 kg (129 lb 12.8 oz)   LMP  (LMP Unknown)   SpO2 98%   BMI 24.33 kg/m   Estimated body mass index is 24.33 kg/m  as calculated from the following:    Height as of this encounter: 1.556 m (5' 1.25\").    Weight as of this encounter: 58.9 kg (129 lb 12.8 oz).  Physical Exam  GENERAL: healthy, alert and no distress  EYES: Eyes grossly normal to " "inspection, PERRL and conjunctivae and sclerae normal  HENT: ear canals and TM's normal, nose and mouth without ulcers or lesions  NECK: no adenopathy, no asymmetry, masses, or scars and thyroid normal to palpation  RESP: lungs clear to auscultation - no rales, rhonchi or wheezes  CV: regular rate and rhythm, normal S1 S2, no S3 or S4, no murmur, click or rub, no peripheral edema and peripheral pulses strong  ABDOMEN: soft, nontender, no hepatosplenomegaly, no masses and bowel sounds normal  MS: no gross musculoskeletal defects noted, no edema  SKIN: no suspicious lesions or rashes  NEURO: Normal strength and tone, mentation intact and speech normal  PSYCH: mentation appears normal, affect normal/bright        ASSESSMENT / PLAN:   (Z00.00) Encounter for Medicare annual wellness exam  (primary encounter diagnosis)  Comment: encourage exercise.  Plan: continue on same medications.     (I10) Benign essential hypertension  Comment: not controlled, increase norvasc to 10 mg daily  Plan: amLODIPine (NORVASC) 10 MG tablet        Recheck BP at home (will check on patient in 2 weeks).      Patient has been advised of split billing requirements and indicates understanding: Yes  COUNSELING:  Reviewed preventive health counseling, as reflected in patient instructions       Regular exercise    Estimated body mass index is 24.33 kg/m  as calculated from the following:    Height as of this encounter: 1.556 m (5' 1.25\").    Weight as of this encounter: 58.9 kg (129 lb 12.8 oz).        She reports that she has quit smoking. She has quit using smokeless tobacco.      Appropriate preventive services were discussed with this patient, including applicable screening as appropriate for cardiovascular disease, diabetes, osteopenia/osteoporosis, and glaucoma.  As appropriate for age/gender, discussed screening for colorectal cancer, prostate cancer, breast cancer, and cervical cancer. Checklist reviewing preventive services available has " been given to the patient.    Reviewed patients plan of care and provided an AVS. The Intermediate Care Plan ( asthma action plan, low back pain action plan, and migraine action plan) for Francois meets the Care Plan requirement. This Care Plan has been established and reviewed with the Patient and spouse.    Counseling Resources:  ATP IV Guidelines  Pooled Cohorts Equation Calculator  Breast Cancer Risk Calculator  Breast Cancer: Medication to Reduce Risk  FRAX Risk Assessment  ICSI Preventive Guidelines  Dietary Guidelines for Americans, 2010  USDA's MyPlate  ASA Prophylaxis  Lung CA Screening    Luis Feliciano MD  [facility]    Identified Health Risks:

## 2021-12-30 NOTE — PATIENT INSTRUCTIONS
Patient Education   Personalized Prevention Plan  You are due for the preventive services outlined below.  Your care team is available to assist you in scheduling these services.  If you have already completed any of these items, please share that information with your care team to update in your medical record.  Health Maintenance Due   Topic Date Due     Annual Wellness Visit  09/29/2021

## 2021-12-31 ENCOUNTER — PATIENT OUTREACH (OUTPATIENT)
Dept: FAMILY MEDICINE | Facility: CLINIC | Age: 81
End: 2021-12-31
Payer: MEDICARE

## 2021-12-31 DIAGNOSIS — I63.321 CEREBROVASCULAR ACCIDENT (CVA) DUE TO THROMBOSIS OF RIGHT ANTERIOR CEREBRAL ARTERY (H): ICD-10-CM

## 2021-12-31 RX ORDER — CLOPIDOGREL BISULFATE 75 MG/1
75 TABLET ORAL DAILY
Qty: 90 TABLET | Refills: 0 | Status: CANCELLED | OUTPATIENT
Start: 2021-12-31

## 2021-12-31 ASSESSMENT — ANXIETY QUESTIONNAIRES: GAD7 TOTAL SCORE: 0

## 2021-12-31 NOTE — TELEPHONE ENCOUNTER
Called to pt and let him know.    Maia Philip. RN, BSN, PAL (Patient Advocate Liaison)  [facility]   [phone]

## 2021-12-31 NOTE — TELEPHONE ENCOUNTER
Dr. Feliciano     Patient's  Sahil is calling for refill of clopidogrel - this medication is discontinued 12/16/2021    Please advise if patient is to continue? And if so refill needs to be sent     Thank you     Tonia Juan, Registered Nurse, PAL (Patient Advocate Liason)   [facility]   [phone]

## 2022-01-13 ENCOUNTER — TELEPHONE (OUTPATIENT)
Dept: FAMILY MEDICINE | Facility: CLINIC | Age: 82
End: 2022-01-13
Payer: MEDICARE

## 2022-01-13 VITALS — DIASTOLIC BLOOD PRESSURE: 60 MMHG | SYSTOLIC BLOOD PRESSURE: 125 MMHG

## 2022-01-13 NOTE — TELEPHONE ENCOUNTER
Dr. Feliciano     Spoke to Sahil patient's      Home BP readings -      1/1 -130/67  1/3 - 152/71  1/9 -129/60   1/10 135/65  1/11 - 152/71  1/12 - 125/60    Tonia Juan, Registered Nurse, PAL (Patient Advocate Liason)   [facility]   [phone]

## 2022-01-13 NOTE — TELEPHONE ENCOUNTER
Discussed with patient's  Sahil     They will continue to monitor and if any concerns with highs/lows will return call to RN PAL to discuss     Tonia Juan Registered Nurse, PAL (Patient Advocate Liason)   [facility]   [phone]

## 2022-01-20 ENCOUNTER — NURSE TRIAGE (OUTPATIENT)
Dept: NURSING | Facility: CLINIC | Age: 82
End: 2022-01-20
Payer: MEDICARE

## 2022-01-20 NOTE — TELEPHONE ENCOUNTER
" calling with patient who verified it's okay to talk to him about her care.    Right leg knee down was swollen including foot.  She had a \"bad pain\" last night, but denies pain now.  She took ES Tylenol.     Caller is now saying at present there is not any swelling in the calf or thigh but she does still have right ankle swelling.    There are no clinic appointments until 1-25-22.  Do you recommend ER or urgency room for an ultra sound to rule out clots?  Please advise.    Routed to provider and will contact clinic per protocol.    Kymberly Jenkins RN  [facility]      Reason for Disposition    Thigh, calf, or ankle swelling in only one leg    Additional Information    Negative: Sounds like a life-threatening emergency to the triager    Negative: Difficulty breathing at rest    Negative: Entire foot is cool or blue in comparison to other side    Negative: SEVERE swelling (e.g., swelling extends above knee, entire leg is swollen, weeping fluid)    Negative: Thigh or calf pain and only 1 side and present > 1 hour    Protocols used: LEG SWELLING AND EDEMA-A-OH      "

## 2022-01-20 NOTE — TELEPHONE ENCOUNTER
Provider at clinic recommend patient be seen in urgency room.  Patient will be seen at urgency room in [place], [place].    Kymberly Jenkisn RN  [facility]

## 2022-01-31 ENCOUNTER — OFFICE VISIT (OUTPATIENT)
Dept: CARDIOLOGY | Facility: CLINIC | Age: 82
End: 2022-01-31
Payer: MEDICARE

## 2022-01-31 VITALS
SYSTOLIC BLOOD PRESSURE: 151 MMHG | BODY MASS INDEX: 25 KG/M2 | DIASTOLIC BLOOD PRESSURE: 78 MMHG | HEART RATE: 73 BPM | WEIGHT: 132.4 LBS | HEIGHT: 61 IN

## 2022-01-31 DIAGNOSIS — I31.39 PERICARDIAL EFFUSION: ICD-10-CM

## 2022-01-31 PROCEDURE — 93000 ELECTROCARDIOGRAM COMPLETE: CPT | Performed by: INTERNAL MEDICINE

## 2022-01-31 PROCEDURE — 99213 OFFICE O/P EST LOW 20 MIN: CPT | Performed by: INTERNAL MEDICINE

## 2022-01-31 ASSESSMENT — MIFFLIN-ST. JEOR: SCORE: 1006.9

## 2022-01-31 NOTE — PROGRESS NOTES
HPI and Plan:   It is a pleasure to see this pleasant 81-year-old lady accompanied by her .    The patient's  says that she is here for follow-up after hospital discharge at the end of August.  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  I reviewed her medical records from that discharge and there is mention of follow-up with her primary care physician as well as follow-up with neurology.  There was no mention of follow-up with cardiology.    The patient tells me that there is some concerns about fluid in the heart.  I reviewed the transesophageal echocardiogram report which does state small pericardial effusion.  When I reviewed the images I think the pericardial effusion which is anterior is trivial.  It may also be due to a pericardial fat pad.  Likewise I also reviewed her transthoracic echocardiogram.  Again I think the pericardial effusion is trivial and physiological.  There is certainly no signs of raised intrapericardial pressures    She has no cardiac symptoms at this time.  Cardiac examination is unremarkable.    I have reassured her that no further evaluation is necessary from a cardiac point of view.  She is discharged from our clinic.    Orders Placed This Encounter   Procedures     EKG 12-lead complete w/read - Clinics (performed today)       Orders Placed This Encounter   Medications     UNABLE TO FIND     Sig: MEDICATION NAME: Pellila seed oil       Encounter Diagnosis   Name Primary?     Pericardial effusion        CURRENT MEDICATIONS:  Current Outpatient Medications   Medication Sig Dispense Refill     amLODIPine (NORVASC) 10 MG tablet Take 1 tablet (10 mg) by mouth daily 90 tablet 3     aspirin (ASA) 81 MG chewable tablet Take 2 tablets (162 mg) by mouth daily       atorvastatin (LIPITOR) 40 MG tablet Take 1 tablet (40 mg) by mouth daily 90 tablet 3     KRILL OIL PO Take 1 capsule by mouth daily (OTC: Unsure of strength)       lifitegrast  "(XIIDRA) 5 % opthalmic solution Place 1 drop into both eyes 2 times daily       UNABLE TO FIND MEDICATION NAME: Pellila seed oil       vitamin D3 (CHOLECALCIFEROL) 50 mcg (2000 units) tablet Take 1 tablet by mouth daily       losartan (COZAAR) 100 MG tablet Take 1 tablet (100 mg) by mouth daily (Patient not taking: Reported on 1/31/2022) 90 tablet 3       ALLERGIES     Allergies   Allergen Reactions     Ace Inhibitors      Sorbitan Trioleate      Sulfonylureas      Vancomycin Hives       PAST MEDICAL HISTORY:  Past Medical History:   Diagnosis Date     Benign essential hypertension 10/12/2016     Bilateral low back pain without sciatica, unspecified chronicity 12/19/2017     Blunt trauma of rib, initial encounter 11/25/2016     Dyslipidemia      Gallstones      Hypertension      PAD (peripheral artery disease) (H) 6/19/2017     Pancreatic mass 8/17/2016     SBO (small bowel obstruction) (H)      Slipped intervertebral disc        PAST SURGICAL HISTORY:  Past Surgical History:   Procedure Laterality Date     GYN SURGERY      hysterectomy     ORTHOPEDIC SURGERY      Slipped disc with chronic back pain       FAMILY HISTORY:  Family History   Problem Relation Age of Onset     Family History Negative Other        SOCIAL HISTORY:  Social History     Socioeconomic History     Marital status:      Spouse name: None     Number of children: None     Years of education: None     Highest education level: None   Occupational History     None   Tobacco Use     Smoking status: Former Smoker     Smokeless tobacco: Former User     Tobacco comment: Former \"social\" smoker, quit in 1978.   Vaping Use     Vaping Use: Never used   Substance and Sexual Activity     Alcohol use: Yes     Alcohol/week: 0.0 standard drinks     Comment: occ wine, twice a week     Drug use: No     Sexual activity: Not Currently     Partners: Male   Other Topics Concern     Parent/sibling w/ CABG, MI or angioplasty before 65F 55M? Not Asked   Social " "History Narrative     None     Social Determinants of Health     Financial Resource Strain: Low Risk      Difficulty of Paying Living Expenses: Not hard at all   Food Insecurity: No Food Insecurity     Worried About Running Out of Food in the Last Year: Never true     Ran Out of Food in the Last Year: Never true   Transportation Needs: No Transportation Needs     Lack of Transportation (Medical): No     Lack of Transportation (Non-Medical): No   Physical Activity: Insufficiently Active     Days of Exercise per Week: 3 days     Minutes of Exercise per Session: 30 min   Stress: No Stress Concern Present     Feeling of Stress : Not at all   Social Connections: Moderately Integrated     Frequency of Communication with Friends and Family: Twice a week     Frequency of Social Gatherings with Friends and Family: Once a week     Attends [employer] Services: Never     Active Member of Clubs or Organizations: Yes     Attends Club or Organization Meetings: Not on file     Marital Status:    Intimate Partner Violence: Not on file   Housing Stability: Low Risk      Unable to Pay for Housing in the Last Year: No     Number of Places Lived in the Last Year: 1     Unstable Housing in the Last Year: No       Review of Systems:  Skin:  Negative     Eyes:  Negative    ENT:  Negative    Respiratory:  Negative shortness of breath;dyspnea on exertion;cough  Cardiovascular:  palpitations;Negative;lightheadedness Positive for;chest pain;edema;dizziness  Gastroenterology: Negative    Genitourinary:  Negative    Musculoskeletal:  Negative    Neurologic:  Negative headaches  Psychiatric:  Negative    Heme/Lymph/Imm:  Positive for allergies  Endocrine:  Negative      Physical Exam:  Vitals: BP (!) 151/78 (BP Location: Right arm, Patient Position: Sitting)   Pulse 73   Ht 1.556 m (5' 1.25\")   Wt 60.1 kg (132 lb 6.4 oz)   LMP  (LMP Unknown)   BMI 24.81 kg/m      Constitutional:           Skin:             Head:           Eyes:       "     Lymph:      ENT:           Neck:           Respiratory:            Cardiac:                                                           GI:           Extremities and Muscular Skeletal:                 Neurological:           Psych:           Recent Lab Results:  LIPID RESULTS:  Lab Results   Component Value Date    CHOL 188 08/27/2021    CHOL 248 (H) 09/29/2020    HDL 42 (L) 08/27/2021    HDL 48 (L) 09/29/2020    LDL 83 08/27/2021     (H) 09/29/2020    TRIG 317 (H) 08/27/2021    TRIG 371 (H) 09/29/2020       LIVER ENZYME RESULTS:  Lab Results   Component Value Date    AST 22 12/07/2021    AST 27 09/15/2020    ALT 38 12/07/2021    ALT 33 09/15/2020       CBC RESULTS:  Lab Results   Component Value Date    WBC 5.3 12/09/2021    WBC 6.3 09/15/2020    RBC 3.92 12/09/2021    RBC 4.17 09/15/2020    HGB 12.0 12/09/2021    HGB 12.8 09/15/2020    HCT 36.6 12/09/2021    HCT 38.1 09/15/2020    MCV 93 12/09/2021    MCV 91 09/15/2020    MCH 30.6 12/09/2021    MCH 30.7 09/15/2020    MCHC 32.8 12/09/2021    MCHC 33.6 09/15/2020    RDW 13.2 12/09/2021    RDW 12.5 09/15/2020     12/09/2021     09/15/2020       BMP RESULTS:  Lab Results   Component Value Date     12/09/2021     09/15/2020    POTASSIUM 3.9 12/09/2021    POTASSIUM 3.4 09/15/2020    CHLORIDE 108 12/09/2021    CHLORIDE 102 09/15/2020    CO2 28 12/09/2021    CO2 29 09/15/2020    ANIONGAP 5 12/09/2021    ANIONGAP 6 09/15/2020    GLC 98 12/09/2021     (H) 09/15/2020    BUN 9 12/09/2021    BUN 18 09/15/2020    CR 0.70 12/09/2021    CR 0.80 09/15/2020    GFRESTIMATED 82 12/09/2021    GFRESTIMATED 69 09/15/2020    GFRESTBLACK 80 09/15/2020    ARIN 9.3 12/09/2021    ARIN 8.6 09/15/2020        A1C RESULTS:  Lab Results   Component Value Date    A1C 6.2 (H) 08/27/2021    A1C 6.0 08/09/2009       INR RESULTS:  Lab Results   Component Value Date    INR 0.90 08/26/2021           CC  [place]iciano [place]  [address] [place]  [address]

## 2022-01-31 NOTE — LETTER
1/31/2022    Luis Feliciano MD  [address] [facility] [address]    RE: Francois Ly       Dear Colleague,     I had the pleasure of seeing Francois Ly in the [facility].  HPI and Plan:   It is a pleasure to see this pleasant 81-year-old lady accompanied by her .    The patient's  says that she is here for follow-up after hospital discharge at the end of August.  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  I reviewed her medical records from that discharge and there is mention of follow-up with her primary care physician as well as follow-up with neurology.  There was no mention of follow-up with cardiology.    The patient tells me that there is some concerns about fluid in the heart.  I reviewed the transesophageal echocardiogram report which does state small pericardial effusion.  When I reviewed the images I think the pericardial effusion which is anterior is trivial.  It may also be due to a pericardial fat pad.  Likewise I also reviewed her transthoracic echocardiogram.  Again I think the pericardial effusion is trivial and physiological.  There is certainly no signs of raised intrapericardial pressures    She has no cardiac symptoms at this time.  Cardiac examination is unremarkable.    I have reassured her that no further evaluation is necessary from a cardiac point of view.  She is discharged from our clinic.    Orders Placed This Encounter   Procedures     EKG 12-lead complete w/read - Clinics (performed today)       Orders Placed This Encounter   Medications     UNABLE TO FIND     Sig: MEDICATION NAME: Pellila seed oil       Encounter Diagnosis   Name Primary?     Pericardial effusion        CURRENT MEDICATIONS:  Current Outpatient Medications   Medication Sig Dispense Refill     amLODIPine (NORVASC) 10 MG tablet Take 1 tablet (10 mg) by mouth daily 90 tablet 3     aspirin (ASA) 81 MG chewable tablet Take 2 tablets (162 mg)  "by mouth daily       atorvastatin (LIPITOR) 40 MG tablet Take 1 tablet (40 mg) by mouth daily 90 tablet 3     KRILL OIL PO Take 1 capsule by mouth daily (OTC: Unsure of strength)       lifitegrast (XIIDRA) 5 % opthalmic solution Place 1 drop into both eyes 2 times daily       UNABLE TO FIND MEDICATION NAME: Pellila seed oil       vitamin D3 (CHOLECALCIFEROL) 50 mcg (2000 units) tablet Take 1 tablet by mouth daily       losartan (COZAAR) 100 MG tablet Take 1 tablet (100 mg) by mouth daily (Patient not taking: Reported on 1/31/2022) 90 tablet 3       ALLERGIES     Allergies   Allergen Reactions     Ace Inhibitors      Sorbitan Trioleate      Sulfonylureas      Vancomycin Hives       PAST MEDICAL HISTORY:  Past Medical History:   Diagnosis Date     Benign essential hypertension 10/12/2016     Bilateral low back pain without sciatica, unspecified chronicity 12/19/2017     Blunt trauma of rib, initial encounter 11/25/2016     Dyslipidemia      Gallstones      Hypertension      PAD (peripheral artery disease) (H) 6/19/2017     Pancreatic mass 8/17/2016     SBO (small bowel obstruction) (H)      Slipped intervertebral disc        PAST SURGICAL HISTORY:  Past Surgical History:   Procedure Laterality Date     GYN SURGERY      hysterectomy     ORTHOPEDIC SURGERY      Slipped disc with chronic back pain       FAMILY HISTORY:  Family History   Problem Relation Age of Onset     Family History Negative Other        SOCIAL HISTORY:  Social History     Socioeconomic History     Marital status:      Spouse name: None     Number of children: None     Years of education: None     Highest education level: None   Occupational History     None   Tobacco Use     Smoking status: Former Smoker     Smokeless tobacco: Former User     Tobacco comment: Former \"social\" smoker, quit in 1978.   Vaping Use     Vaping Use: Never used   Substance and Sexual Activity     Alcohol use: Yes     Alcohol/week: 0.0 standard drinks     Comment: occ " wine, twice a week     Drug use: No     Sexual activity: Not Currently     Partners: Male   Other Topics Concern     Parent/sibling w/ CABG, MI or angioplasty before 65F 55M? Not Asked   Social History Narrative     None     Social Determinants of Health     Financial Resource Strain: Low Risk      Difficulty of Paying Living Expenses: Not hard at all   Food Insecurity: No Food Insecurity     Worried About Running Out of Food in the Last Year: Never true     Ran Out of Food in the Last Year: Never true   Transportation Needs: No Transportation Needs     Lack of Transportation (Medical): No     Lack of Transportation (Non-Medical): No   Physical Activity: Insufficiently Active     Days of Exercise per Week: 3 days     Minutes of Exercise per Session: 30 min   Stress: No Stress Concern Present     Feeling of Stress : Not at all   Social Connections: Moderately Integrated     Frequency of Communication with Friends and Family: Twice a week     Frequency of Social Gatherings with Friends and Family: Once a week     Attends Worship Services: Never     Active Member of Clubs or Organizations: Yes     Attends Club or Organization Meetings: Not on file     Marital Status:    Intimate Partner Violence: Not on file   Housing Stability: Low Risk      Unable to Pay for Housing in the Last Year: No     Number of Places Lived in the Last Year: 1     Unstable Housing in the Last Year: No       Review of Systems:  Skin:  Negative     Eyes:  Negative    ENT:  Negative    Respiratory:  Negative shortness of breath;dyspnea on exertion;cough  Cardiovascular:  palpitations;Negative;lightheadedness Positive for;chest pain;edema;dizziness  Gastroenterology: Negative    Genitourinary:  Negative    Musculoskeletal:  Negative    Neurologic:  Negative headaches  Psychiatric:  Negative    Heme/Lymph/Imm:  Positive for allergies  Endocrine:  Negative      Physical Exam:  Vitals: BP (!) 151/78 (BP Location: Right arm, Patient Position:  "Sitting)   Pulse 73   Ht 1.556 m (5' 1.25\")   Wt 60.1 kg (132 lb 6.4 oz)   LMP  (LMP Unknown)   BMI 24.81 kg/m      Constitutional:           Skin:             Head:           Eyes:           Lymph:      ENT:           Neck:           Respiratory:            Cardiac:                                                           GI:           Extremities and Muscular Skeletal:                 Neurological:           Psych:           Recent Lab Results:  LIPID RESULTS:  Lab Results   Component Value Date    CHOL 188 08/27/2021    CHOL 248 (H) 09/29/2020    HDL 42 (L) 08/27/2021    HDL 48 (L) 09/29/2020    LDL 83 08/27/2021     (H) 09/29/2020    TRIG 317 (H) 08/27/2021    TRIG 371 (H) 09/29/2020       LIVER ENZYME RESULTS:  Lab Results   Component Value Date    AST 22 12/07/2021    AST 27 09/15/2020    ALT 38 12/07/2021    ALT 33 09/15/2020       CBC RESULTS:  Lab Results   Component Value Date    WBC 5.3 12/09/2021    WBC 6.3 09/15/2020    RBC 3.92 12/09/2021    RBC 4.17 09/15/2020    HGB 12.0 12/09/2021    HGB 12.8 09/15/2020    HCT 36.6 12/09/2021    HCT 38.1 09/15/2020    MCV 93 12/09/2021    MCV 91 09/15/2020    MCH 30.6 12/09/2021    MCH 30.7 09/15/2020    MCHC 32.8 12/09/2021    MCHC 33.6 09/15/2020    RDW 13.2 12/09/2021    RDW 12.5 09/15/2020     12/09/2021     09/15/2020       BMP RESULTS:  Lab Results   Component Value Date     12/09/2021     09/15/2020    POTASSIUM 3.9 12/09/2021    POTASSIUM 3.4 09/15/2020    CHLORIDE 108 12/09/2021    CHLORIDE 102 09/15/2020    CO2 28 12/09/2021    CO2 29 09/15/2020    ANIONGAP 5 12/09/2021    ANIONGAP 6 09/15/2020    GLC 98 12/09/2021     (H) 09/15/2020    BUN 9 12/09/2021    BUN 18 09/15/2020    CR 0.70 12/09/2021    CR 0.80 09/15/2020    GFRESTIMATED 82 12/09/2021    GFRESTIMATED 69 09/15/2020    GFRESTBLACK 80 09/15/2020    ARIN 9.3 12/09/2021    ARIN 8.6 09/15/2020        A1C RESULTS:  Lab Results   Component Value Date    A1C " 6.2 (H) 08/27/2021    A1C 6.0 08/09/2009       INR RESULTS:  Lab Results   Component Value Date    INR 0.90 08/26/2021     CC  Luis Feliciano MD  [id number] [place] [id number]    Thank you for allowing me to participate in the care of your patient.      Sincerely,     DR FAY VALENTE MD     [facility]  cc:   Luis Feliciano MD  [id number] [place] [id number]

## 2022-03-01 ENCOUNTER — PATIENT OUTREACH (OUTPATIENT)
Dept: FAMILY MEDICINE | Facility: CLINIC | Age: 82
End: 2022-03-01
Payer: MEDICARE

## 2022-03-01 NOTE — PROGRESS NOTES
Pre-Visit Planning   Next 5 appointments (look out 90 days)    Mar 02, 2022  2:00 PM  (Arrive by 1:40 PM)  Provider Visit with Luis Feliciano MD  [facility] ([facility] ) [address] [facility] [id number] [phone]   Mar 04, 2022 10:00 AM  (Arrive by 9:45 AM)  Return Visit with Keegan England MD  [facility] Neurology [facility] ([facility] ) [address] [place] [address] [address]  [facility] [address] [phone]        Appointment Notes for this encounter:   right leg edema    Questionnaires Reviewed/Assigned  Additional questionnaires assigned: PHQ 2     Patient preferred phone number: [phone]    Contacted patient via phone/[employer]. Are there any additional questions or concerns you'd like to review with your provider during your visit? No    Visit is not preventive.    Meds  need to review at visit - unable to complete on phone     Entered patient-preferred pharmacy.     Current Outpatient Medications   Medication     amLODIPine (NORVASC) 10 MG tablet     aspirin (ASA) 81 MG chewable tablet     atorvastatin (LIPITOR) 40 MG tablet     KRILL OIL PO     lifitegrast (XIIDRA) 5 % opthalmic solution     losartan (COZAAR) 100 MG tablet     UNABLE TO FIND     vitamin D3 (CHOLECALCIFEROL) 50 mcg (2000 units) tablet     No current facility-administered medications for this visit.       Health Maintenance   Health Maintenance Due   Topic Date Due     PHQ-2  01/01/2022       [facility]  Patient is active on [employer].    Tonia Juan, Registered Nurse, PAL (Patient Advocate Liason)   [facility]   [phone]

## 2022-03-01 NOTE — TELEPHONE ENCOUNTER
S-(situation): phone call from  Sahil     B-(background): history of peripheral artery disease     A-(assessment): right leg below knee, foot is very swollen   Bad pain in ankle area   Feels like worsened after increase of amlodipine on 12/30/21   Edema started to worsen mid February   No known injury   No redness/bruising   No change in breathing status     R-(recommendations): elevate, compression stockings at home will try to get on     appt scheduled with Dr. Feliciano for tomorrow 3/2/2022 feels okay to wait since symptoms started mid feb  If symptoms worsen advised to call back and speak to RN or if severe urgent care after clinic hours     Tonia Juan, Registered Nurse, PAL (Patient Advocate Liason)   [facility]   [phone]

## 2022-03-02 ENCOUNTER — OFFICE VISIT (OUTPATIENT)
Dept: FAMILY MEDICINE | Facility: CLINIC | Age: 82
End: 2022-03-02
Payer: MEDICARE

## 2022-03-02 VITALS
SYSTOLIC BLOOD PRESSURE: 158 MMHG | DIASTOLIC BLOOD PRESSURE: 73 MMHG | OXYGEN SATURATION: 99 % | RESPIRATION RATE: 16 BRPM | WEIGHT: 132 LBS | HEART RATE: 69 BPM | BODY MASS INDEX: 24.29 KG/M2 | TEMPERATURE: 98.3 F | HEIGHT: 62 IN

## 2022-03-02 DIAGNOSIS — I10 BENIGN ESSENTIAL HYPERTENSION: ICD-10-CM

## 2022-03-02 DIAGNOSIS — I73.9 PERIPHERAL VASCULAR DISEASE, UNSPECIFIED (H): ICD-10-CM

## 2022-03-02 PROCEDURE — 99214 OFFICE O/P EST MOD 30 MIN: CPT | Performed by: FAMILY MEDICINE

## 2022-03-02 RX ORDER — AMLODIPINE BESYLATE 5 MG/1
5 TABLET ORAL DAILY
Qty: 90 TABLET | Refills: 3 | Status: SHIPPED | OUTPATIENT
Start: 2022-03-02 | End: 2023-03-22

## 2022-03-02 RX ORDER — HYDROCHLOROTHIAZIDE 25 MG/1
25 TABLET ORAL DAILY
Qty: 90 TABLET | Refills: 3 | Status: SHIPPED | OUTPATIENT
Start: 2022-03-02 | End: 2023-03-22

## 2022-03-02 NOTE — PROGRESS NOTES
"  Assessment & Plan     Peripheral vascular disease, unspecified (H)  Continue on Lipitor and ASA.    Benign essential hypertension  With leg swelling as a side effects of Norvasc, will cut the dose of Amlopidine to 5 mg daily, and add hydrochlorothiazide 12.5 for 3 to 4 days, then 1 pill daily there after. The tests, diagnosis, and treatment plan was discussed with the patient, and agreed on.  - hydrochlorothiazide (HYDRODIURIL) 25 MG tablet; Take 1 tablet (25 mg) by mouth daily  - amLODIPine (NORVASC) 5 MG tablet; Take 1 tablet (5 mg) by mouth daily  - **Basic metabolic panel FUTURE 14d; Future                 Follow up lab work in  2 weeks.  Luis Feliciano MD  [facility] GUMARO Parrish is a 81 year old who presents for the following health issues  accompanied by her son.    HPI     Concern - edema  Onset: 3 weeks  Description: both legs - mainly in right  Intensity: moderate  Progression of Symptoms:  same  Accompanying Signs & Symptoms: right ankle pain  Pt noticed that her blood pressure medicine (norvasc maybe the contributor).       Review of Systems         Objective    BP (!) 158/73 (BP Location: Right arm, Patient Position: Chair, Cuff Size: Adult Regular)   Pulse 69   Temp 98.3  F (36.8  C) (Oral)   Resp 16   Ht 1.575 m (5' 2\")   Wt 59.9 kg (132 lb)   LMP  (LMP Unknown)   SpO2 99%   BMI 24.14 kg/m    Body mass index is 24.14 kg/m .  Physical Exam   GENERAL: healthy, alert and no distress  CV: regular rates and rhythm, normal S1 S2, no S3 or S4, no murmur, click or rub and peripheral pulses strong  MS: mild swelling of the Rt leg more than left, especially around the ankle.                "

## 2022-03-03 NOTE — PROGRESS NOTES
ESTABLISHED PATIENT NEUROLOGY NOTE    DATE OF VISIT: 3/4/2022  CLINIC LOCATION: [facility]  MRN: [id number]  PATIENT NAME: Francois Ly  YOB: 1940    REASON FOR VISIT:   Chief Complaint   Patient presents with     Stroke     follow up     SUBJECTIVE:                                                      HISTORY OF PRESENT ILLNESS: Patient is new to me, but was previously seen by stroke neurology team.  History and prior evaluation are briefly summarized below.  Please refer to previous neurology notes for more detailed information.  The patient is accompanied by her , who participates in interview today.    Per chart review, the patient has history of pancreatic neoplasm, hyperlipidemia, and hypertension.  On 8/26/2021 she presented to [facility] with visual disturbance, left-sided numbness and hand weakness.    Head CT from the same day demonstrated stable age-related changes but no acute intracranial abnormality.  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  Neck CTA demonstrated occlusion of the left vertebral artery at the skull base, but patent artery otherwise with only mild multifocal segmental stenosis. Brain MRI with and without contrast from 8/27/2021 demonstrated right frontal and parietal regions of multiple small acute infarcts predominantly within watershed distribution along with age-related changes.  Images were personally reviewed independently interpreted.    Additional stroke work-up included TTE, which demonstrated mobile echodensity on PML, felt to represent fibrinous strand versus vegetation versus mobile calcification.  JOE showed no abnormality.  Hemoglobin A1C was 6.2 and LDL was 83.  The patient was seen by inpatient  stroke neurology team, who felt that stroke was due to intracranial atherosclerotic disease versus embolism of unknown source.  She was recommended dual antiplatelet therapy for 90 days followed by 81 mg of aspirin lifelong in addition to 40 mg of atorvastatin.  CardioNet was ordered on discharge (Normal sinus rhythm with occasional PACs, no atrial fibrillation detected).    At the present time the patient reports that her symptoms resolved, but occasionally she might experience mild tingling in the left hand when she is exercising.  The patient denies interval development of new focal neurological symptoms.  She is on aspirin and atorvastatin for secondary stroke prevention.  EXAM:                                                    Physical Exam:   Vitals: LMP  (LMP Unknown)     General: pt is in NAD, cooperative.  Skin: normal turgor, moist mucous membranes, no lesions/rashes noticed.  HEENT: ATNC, white sclera, normal conjunctiva.  Respiratory: Symmetric lung excursion, no accessory respiratory muscle use.  Abdomen: Non distended.  Neurological: awake, cooperative, follows commands, no aphasia or dysarthria noted, cranial nerves II-XII: Funduscopic exam is normal bilaterally, no ptosis, extraocular motility is full, face is symmetric, tongue is midline, equally moves all extremities, strength is 5/5 bilaterally in upper and lower extremities, no pronator drift, deep tendon reflexes are equal bilaterally, sensation to the light touch, vibration, and pinprick is intact bilaterally, finger to nose and heel-knee-shin tests are intact bilaterally, casual gait is normal.  ASSESSMENT AND PLAN:                                                    Assessment: 81 year old female patient presents for a follow-up of ***.    Diagnoses:  No diagnosis found.  Plan:  There are no Patient Instructions on file for this visit.    Total Time: *** minutes spent on the date of the encounter doing chart review, history and exam,  documentation and further activities per the note.    Keegan England MD  [facility]  (Chart documentation was completed in part with Dragon voice-recognition software. Even though reviewed, some grammatical, spelling, and word errors may remain.)

## 2022-03-03 NOTE — PATIENT INSTRUCTIONS
AFTER VISIT SUMMARY (AVS):    At today's visit we thoroughly discussed current symptoms, results of stroke evaluation, symptoms and signs of stroke, secondary stroke prevention measures, and the plan.    Symptoms and signs of stroke were discussed.  You should call 911 with any SUDDEN onset of weakness, vision loss, speech problems, numbness, or severe headache of unknown cause.    For secondary stroke prevention:  -Continue 81 mg of aspirin lifelong  -Continue atorvastatin with LDL goals listed below  -Long-term blood pressure goal is less than 130/85  -Long-term LDL goal is 40-70  -Long-term goal of hemoglobin A1C is less than 7.0  -Low-salt low-fat diet  -Regular aerobic exercise 30 minutes 3-4 times per week    Next follow-up appointment is on as needed basis.    Please do not hesitate to call me with any questions or concerns.    Thanks.

## 2022-03-04 ENCOUNTER — OFFICE VISIT (OUTPATIENT)
Dept: NEUROLOGY | Facility: CLINIC | Age: 82
End: 2022-03-04
Attending: PSYCHIATRY & NEUROLOGY
Payer: MEDICARE

## 2022-03-04 VITALS
SYSTOLIC BLOOD PRESSURE: 164 MMHG | HEIGHT: 62 IN | HEART RATE: 66 BPM | WEIGHT: 132.4 LBS | DIASTOLIC BLOOD PRESSURE: 79 MMHG | BODY MASS INDEX: 24.37 KG/M2 | OXYGEN SATURATION: 97 %

## 2022-03-04 DIAGNOSIS — Z86.73 HISTORY OF STROKE: Primary | ICD-10-CM

## 2022-03-04 PROCEDURE — 99215 OFFICE O/P EST HI 40 MIN: CPT | Performed by: PSYCHIATRY & NEUROLOGY

## 2022-03-04 NOTE — PROGRESS NOTES
"Francois Ly is a 81 year old female who presents for:  Chief Complaint   Patient presents with     Stroke     follow up        Initial Vitals:  BP (!) 159/76 (BP Location: Right arm, Patient Position: Sitting, Cuff Size: Adult Regular)   Pulse 69   Ht 1.575 m (5' 2\")   Wt 60.1 kg (132 lb 6.4 oz)   LMP  (LMP Unknown)   SpO2 97%   BMI 24.22 kg/m   Estimated body mass index is 24.22 kg/m  as calculated from the following:    Height as of this encounter: 1.575 m (5' 2\").    Weight as of this encounter: 60.1 kg (132 lb 6.4 oz).. Body surface area is 1.62 meters squared. BP completed using cuff size: regular    Nursing Comments: 2nd set of Vitals taken with Wrist cuff: BP- 164/79, Pulse- 66, O2- 97%     Ofe Cunningham  "

## 2022-03-04 NOTE — PROGRESS NOTES
ESTABLISHED PATIENT NEUROLOGY NOTE    DATE OF VISIT: 3/4/2022  CLINIC LOCATION: [facility]  MRN: [id number]  PATIENT NAME: Francois Ly  YOB: 1940    REASON FOR VISIT:   Chief Complaint   Patient presents with     Stroke     follow up     SUBJECTIVE:                                                      HISTORY OF PRESENT ILLNESS: Patient is new to me, but was previously seen by stroke neurology team.  History and prior evaluation are briefly summarized below.  Please refer to previous neurology notes for more detailed information.  The patient is accompanied by her , who participates in interview today.    Per chart review, the patient has history of pancreatic neoplasm, hyperlipidemia, and hypertension.  On 8/26/2021 she presented to [facility] with visual disturbance, left-sided numbness and hand weakness.    Head CT from the same day demonstrated stable age-related changes, but no acute intracranial abnormality.  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  Neck CTA demonstrated occlusion of the left vertebral artery at the skull base, but patent artery, otherwise (with only mild multifocal segmental stenosis). Brain MRI with and without contrast from 8/27/2021 demonstrated multiple small acute infarcts in the right frontal and parietal regions, predominantly within watershed distribution along with age-related changes.  Images were personally reviewed and independently interpreted.    Additional stroke work-up included TTE, which demonstrated mobile echodensity on PML, felt to represent fibrinous strand versus vegetation versus mobile calcification.  JOE showed no abnormality.  Hemoglobin A1C was 6.2, and LDL was 83.  The patient was  "seen by inpatient stroke neurology team, who felt that stroke was due to intracranial atherosclerotic disease versus embolism of unknown source.  She was recommended dual antiplatelet therapy for 90 days followed by 81 mg of aspirin lifelong in addition to 40 mg of atorvastatin.  CardioNet was ordered on discharge (demonstrated normal sinus rhythm with occasional PAC's, no atrial fibrillation detected).    At the present time the patient reports that her symptoms resolved, but occasionally she might experience mild tingling in the left hand when she is exercising.  The patient denies interval development of new focal neurological symptoms.  She is on aspirin and atorvastatin for secondary stroke prevention.  EXAM:                                                    Physical Exam:   Vitals: BP (!) 164/79 (BP Location: Right arm, Patient Position: Sitting)   Pulse 66   Ht 1.575 m (5' 2\")   Wt 60.1 kg (132 lb 6.4 oz)   LMP  (LMP Unknown)   SpO2 97%   BMI 24.22 kg/m      General: pt is in NAD, cooperative.  Skin: normal turgor, moist mucous membranes, no lesions/rashes noticed.  HEENT: ATNC, white sclera, normal conjunctiva.  Respiratory: Symmetric lung excursion, no accessory respiratory muscle use.  Abdomen: Non distended.  Neurological: awake, cooperative, follows commands, no aphasia or dysarthria noted, cranial nerves II-XII: Funduscopic exam is normal bilaterally, no ptosis, extraocular motility is full, face is symmetric, tongue is midline, equally moves all extremities, strength is 5/5 bilaterally in upper and lower extremities, no pronator drift, deep tendon reflexes are equal bilaterally, sensation to the light touch, vibration, and pinprick is intact bilaterally, finger to nose and heel-knee-shin tests are intact bilaterally, casual gait is normal.  Has mild difficulty with tandem walk.  ASSESSMENT AND PLAN:                                                    Assessment: 81 year old female patient " presents for a follow-up of several right small strokes in August 2021.  She completed the recommended work-up that we reviewed, including review of her key images.  Outpatient cardiac monitoring was negative for atrial fibrillation.  She is on appropriate secondary stroke prevention measures.  No need to follow-up in general neurology clinic unless she develops new neurological symptoms.  The rest of our discussion, recommendations, and the plan are summarized below.    Francois to follow up with Primary Care provider regarding elevated blood pressure.    Diagnoses:    ICD-10-CM    1. History of stroke  Z86.73 Adult Neurology Referral     Plan: At today's visit we thoroughly discussed current symptoms, results of stroke evaluation, symptoms and signs of stroke, secondary stroke prevention measures, and the plan.    Symptoms and signs of stroke were discussed.  The patient clearly understands to call 911 with any SUDDEN onset of weakness, vision loss, speech problems, numbness, or severe headache of unknown cause.    For secondary stroke prevention, I counseled the patient to:  -Continue 81 mg of aspirin lifelong  -Continue atorvastatin with LDL goals listed below  -Long-term blood pressure goal is less than 130/85  -Long-term LDL goal is 40-70  -Long-term goal of hemoglobin A1C is less than 7.0  -Low-salt low-fat diet  -Regular aerobic exercise 30 minutes 3-4 times per week    Next follow-up appointment is on as needed basis.    Total Time: 47 minutes spent on the date of the encounter doing chart review, history and exam, documentation and further activities per the note.    Keegan England MD  [facility] Neurology  (Chart documentation was completed in part with Dragon voice-recognition software. Even though reviewed, some grammatical, spelling, and word errors may remain.)

## 2022-03-04 NOTE — LETTER
"    3/4/2022         RE: Francois Ly  [address] [address] [facility] [address] [facility] [address]        Dear Colleague,    Thank you for referring your patient, Francois Ly, to the [facility] NEUROLOGY CL[facility]ICS [facility]. Please see a copy of my visit note below.    Francois Ly is a 81 year old female who presents for:  Chief Complaint   Patient presents with     Stroke     follow up        Initial Vitals:  BP (!) 159/76 (BP Location: Right arm, Patient Position: Sitting, Cuff Size: Adult Regular)   Pulse 69   Ht 1.575 m (5' 2\")   Wt 60.1 kg (132 lb 6.4 oz)   LMP  (LMP Unknown)   SpO2 97%   BMI 24.22 kg/m   Estimated body mass index is 24.22 kg/m  as calculated from the following:    Height as of this encounter: 1.575 m (5' 2\").    Weight as of this encounter: 60.1 kg (132 lb 6.4 oz).. Body surface area is 1.62 meters squared. BP completed using cuff size: regular    Nursing Comments: 2nd set of Vitals taken with Wrist cuff: BP- 164/79, Pulse- 66, O2- 97%     Ofe Cunningham    ESTABLISHED PATIENT NEUROLOGY NOTE    DATE OF VISIT: 3/4/2022  CLINIC LOCATION: [facility]  MRN: [id number]  PATIENT NAME: Francois Ly  YOB: 1940    REASON FOR VISIT:   Chief Complaint   Patient presents with     Stroke     follow up     SUBJECTIVE:                                                      HISTORY OF PRESENT ILLNESS: Patient is new to me, but was previously seen by stroke neurology team.  History and prior evaluation are briefly summarized below.  Please refer to previous neurology notes for more detailed information.  The patient is accompanied by her , who participates in interview today.    Per chart review, the patient has history of pancreatic neoplasm, hyperlipidemia, and hypertension.  On 8/26/2021 she presented to [facility] with visual disturbance, left-sided numbness and hand weakness.    Head CT from the same day demonstrated stable age-related " "changes, but no acute intracranial abnormality.  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  Neck CTA demonstrated occlusion of the left vertebral artery at the skull base, but patent artery, otherwise (with only mild multifocal segmental stenosis). Brain MRI with and without contrast from 8/27/2021 demonstrated multiple small acute infarcts in the right frontal and parietal regions, predominantly within watershed distribution along with age-related changes.  Images were personally reviewed and independently interpreted.    Additional stroke work-up included TTE, which demonstrated mobile echodensity on PML, felt to represent fibrinous strand versus vegetation versus mobile calcification.  JOE showed no abnormality.  Hemoglobin A1C was 6.2, and LDL was 83.  The patient was seen by inpatient stroke neurology team, who felt that stroke was due to intracranial atherosclerotic disease versus embolism of unknown source.  She was recommended dual antiplatelet therapy for 90 days followed by 81 mg of aspirin lifelong in addition to 40 mg of atorvastatin.  CardioNet was ordered on discharge (demonstrated normal sinus rhythm with occasional PAC's, no atrial fibrillation detected).    At the present time the patient reports that her symptoms resolved, but occasionally she might experience mild tingling in the left hand when she is exercising.  The patient denies interval development of new focal neurological symptoms.  She is on aspirin and atorvastatin for secondary stroke prevention.  EXAM:                                                    Physical Exam:   Vitals: BP (!) 164/79 (BP Location: Right arm, Patient Position: Sitting)   Pulse 66   Ht 1.575 m (5' 2\")   Wt 60.1 kg (132 lb 6.4 oz)   LMP  (LMP " Unknown)   SpO2 97%   BMI 24.22 kg/m      General: pt is in NAD, cooperative.  Skin: normal turgor, moist mucous membranes, no lesions/rashes noticed.  HEENT: ATNC, white sclera, normal conjunctiva.  Respiratory: Symmetric lung excursion, no accessory respiratory muscle use.  Abdomen: Non distended.  Neurological: awake, cooperative, follows commands, no aphasia or dysarthria noted, cranial nerves II-XII: Funduscopic exam is normal bilaterally, no ptosis, extraocular motility is full, face is symmetric, tongue is midline, equally moves all extremities, strength is 5/5 bilaterally in upper and lower extremities, no pronator drift, deep tendon reflexes are equal bilaterally, sensation to the light touch, vibration, and pinprick is intact bilaterally, finger to nose and heel-knee-shin tests are intact bilaterally, casual gait is normal.  Has mild difficulty with tandem walk.  ASSESSMENT AND PLAN:                                                    Assessment: 81 year old female patient presents for a follow-up of several right small strokes in August 2021.  She completed the recommended work-up that we reviewed, including review of her key images.  Outpatient cardiac monitoring was negative for atrial fibrillation.  She is on appropriate secondary stroke prevention measures.  No need to follow-up in general neurology clinic unless she develops new neurological symptoms.  The rest of our discussion, recommendations, and the plan are summarized below.    Francois to follow up with Primary Care provider regarding elevated blood pressure.    Diagnoses:    ICD-10-CM    1. History of stroke  Z86.73 Adult Neurology Referral     Plan: At today's visit we thoroughly discussed current symptoms, results of stroke evaluation, symptoms and signs of stroke, secondary stroke prevention measures, and the plan.    Symptoms and signs of stroke were discussed.  The patient clearly understands to call 911 with any SUDDEN onset of weakness,  vision loss, speech problems, numbness, or severe headache of unknown cause.    For secondary stroke prevention, I counseled the patient to:  -Continue 81 mg of aspirin lifelong  -Continue atorvastatin with LDL goals listed below  -Long-term blood pressure goal is less than 130/85  -Long-term LDL goal is 40-70  -Long-term goal of hemoglobin A1C is less than 7.0  -Low-salt low-fat diet  -Regular aerobic exercise 30 minutes 3-4 times per week    Next follow-up appointment is on as needed basis.    Total Time: 47 minutes spent on the date of the encounter doing chart review, history and exam, documentation and further activities per the note.    Keegan England MD  [facility] Neurology  (Chart documentation was completed in part with Dragon voice-recognition software. Even though reviewed, some grammatical, spelling, and word errors may remain.)       Again, thank you for allowing me to participate in the care of your patient.        Sincerely,        Keegan England MD

## 2022-03-10 ENCOUNTER — LAB (OUTPATIENT)
Dept: LAB | Facility: CLINIC | Age: 82
End: 2022-03-10
Payer: MEDICARE

## 2022-03-10 DIAGNOSIS — I10 BENIGN ESSENTIAL HYPERTENSION: ICD-10-CM

## 2022-03-10 LAB
ANION GAP SERPL CALCULATED.3IONS-SCNC: 8 MMOL/L (ref 3–14)
BUN SERPL-MCNC: 12 MG/DL (ref 7–30)
CALCIUM SERPL-MCNC: 9.4 MG/DL (ref 8.5–10.1)
CHLORIDE BLD-SCNC: 104 MMOL/L (ref 94–109)
CO2 SERPL-SCNC: 28 MMOL/L (ref 20–32)
CREAT SERPL-MCNC: 0.75 MG/DL (ref 0.52–1.04)
GFR SERPL CREATININE-BSD FRML MDRD: 80 ML/MIN/1.73M2
GLUCOSE BLD-MCNC: 117 MG/DL (ref 70–99)
POTASSIUM BLD-SCNC: 3 MMOL/L (ref 3.4–5.3)
SODIUM SERPL-SCNC: 140 MMOL/L (ref 133–144)

## 2022-03-10 PROCEDURE — 36415 COLL VENOUS BLD VENIPUNCTURE: CPT

## 2022-03-10 PROCEDURE — 80048 BASIC METABOLIC PNL TOTAL CA: CPT

## 2022-03-11 ENCOUNTER — TELEPHONE (OUTPATIENT)
Dept: FAMILY MEDICINE | Facility: CLINIC | Age: 82
End: 2022-03-11
Payer: MEDICARE

## 2022-03-11 DIAGNOSIS — I10 BENIGN ESSENTIAL HYPERTENSION: Primary | ICD-10-CM

## 2022-03-11 NOTE — TELEPHONE ENCOUNTER
As we expected, her potassium is low, so I recommend increase in oral intake of potassium:   Banana ,papaya, prune juice, raisins ,sandy or kiwi, orange juice, cantaloupe, honeydew melon and  Pear.  But if that doesn't cut it, we may have to add potassium pills.   Repeat BMP in 2 weeks.  Luis Feliciano MD  [facility]  [phone]

## 2022-03-11 NOTE — TELEPHONE ENCOUNTER
RN ARUN spoke to  Sahil, reviewed result note below     Sent food recommendations via my chart per  request     Lab f/u scheduled 3/25/2022     Tonia Juan, Registered Nurse, PAL (Patient Advocate Liason)   [facility]   [phone]

## 2022-03-15 ENCOUNTER — PATIENT OUTREACH (OUTPATIENT)
Dept: FAMILY MEDICINE | Facility: CLINIC | Age: 82
End: 2022-03-15
Payer: MEDICARE

## 2022-03-15 NOTE — TELEPHONE ENCOUNTER
Sahil advised of message below and states understanding     Tonia Juan, Registered Nurse, PAL (Patient Advocate Liason)   [facility]   [phone]

## 2022-03-15 NOTE — TELEPHONE ENCOUNTER
Dr. Feliciano     Patient/ are wondering if it is okay for her to eat a 1/2 of large grapefruit per day or 1 small grapefruit with atorvastatin     Micromedex - information below   Instruct patient not to eat large amounts of grapefruit or drink more than 1 L/day of grapefruit juice with this drug     Tonia Juan, Registered Nurse, PAL (Patient Advocate Vahid)   [facility]   [phone]

## 2022-03-16 ENCOUNTER — TELEPHONE (OUTPATIENT)
Dept: FAMILY MEDICINE | Facility: CLINIC | Age: 82
End: 2022-03-16
Payer: MEDICARE

## 2022-03-16 VITALS — DIASTOLIC BLOOD PRESSURE: 71 MMHG | SYSTOLIC BLOOD PRESSURE: 132 MMHG

## 2022-03-16 NOTE — TELEPHONE ENCOUNTER
Dr. Feliciano     Last /71 a few days ago     Right leg swelling has gone considerably   Left leg - normal no swelling at all     Patient's  will continue to monitor and if swelling increases or BP is elevated or low to call RN PAL to advise     Tonia Juan Registered Nurse, PAL (Patient Advocate Liason)   [facility]   [phone]

## 2022-03-25 ENCOUNTER — LAB (OUTPATIENT)
Dept: LAB | Facility: CLINIC | Age: 82
End: 2022-03-25
Payer: MEDICARE

## 2022-03-25 DIAGNOSIS — I10 BENIGN ESSENTIAL HYPERTENSION: ICD-10-CM

## 2022-03-25 LAB
ANION GAP SERPL CALCULATED.3IONS-SCNC: 8 MMOL/L (ref 3–14)
BUN SERPL-MCNC: 13 MG/DL (ref 7–30)
CALCIUM SERPL-MCNC: 9.5 MG/DL (ref 8.5–10.1)
CHLORIDE BLD-SCNC: 104 MMOL/L (ref 94–109)
CO2 SERPL-SCNC: 26 MMOL/L (ref 20–32)
CREAT SERPL-MCNC: 0.76 MG/DL (ref 0.52–1.04)
GFR SERPL CREATININE-BSD FRML MDRD: 78 ML/MIN/1.73M2
GLUCOSE BLD-MCNC: 123 MG/DL (ref 70–99)
POTASSIUM BLD-SCNC: 3.3 MMOL/L (ref 3.4–5.3)
SODIUM SERPL-SCNC: 138 MMOL/L (ref 133–144)

## 2022-03-25 PROCEDURE — 80048 BASIC METABOLIC PNL TOTAL CA: CPT

## 2022-03-25 PROCEDURE — 36415 COLL VENOUS BLD VENIPUNCTURE: CPT

## 2022-03-29 ENCOUNTER — PATIENT OUTREACH (OUTPATIENT)
Dept: FAMILY MEDICINE | Facility: CLINIC | Age: 82
End: 2022-03-29
Payer: MEDICARE

## 2022-03-29 NOTE — TELEPHONE ENCOUNTER
RN spoke to  Sahil - who will schedule appt in September 2022 for f/u and lab     If any other concerns arise to return call prior to that time to discuss     Tonia Juan, Registered Nurse, PAL (Patient Advocate Liason)   [facility]   [phone]

## 2022-03-29 NOTE — TELEPHONE ENCOUNTER
Dr. Feliciano      Sahil called with response to result note below - patient does not wish to take K+ supplement and would like to continue eating natural foods to increase K+    Mayco Parrish   Your results are all within normal including; basic profile.  But your potassium is still low, and I wonder if you're willing to start on potassium pills daily, will that be ok with you?  Can you please let me know?     Luis Feliciano MD  [facility]  [phone]    Tonia Juan, Registered Nurse, PAL (Patient Advocate Liason)   [facility]   [phone]

## 2022-03-29 NOTE — TELEPHONE ENCOUNTER
No problem, we can keep monitoring her potassium regularly. (once every 6 months)  Luis Feliciano MD  [facility]  [phone]

## 2022-06-23 ENCOUNTER — TELEPHONE (OUTPATIENT)
Dept: FAMILY MEDICINE | Facility: CLINIC | Age: 82
End: 2022-06-23

## 2022-06-23 ENCOUNTER — OFFICE VISIT (OUTPATIENT)
Dept: URGENT CARE | Facility: URGENT CARE | Age: 82
End: 2022-06-23
Payer: MEDICARE

## 2022-06-23 VITALS
DIASTOLIC BLOOD PRESSURE: 73 MMHG | SYSTOLIC BLOOD PRESSURE: 158 MMHG | OXYGEN SATURATION: 98 % | TEMPERATURE: 97.7 F | HEART RATE: 68 BPM

## 2022-06-23 DIAGNOSIS — S80.02XA TRAUMATIC ECCHYMOSIS OF LEFT KNEE, INITIAL ENCOUNTER: ICD-10-CM

## 2022-06-23 DIAGNOSIS — R07.89 CHEST WALL PAIN: Primary | ICD-10-CM

## 2022-06-23 PROCEDURE — 99214 OFFICE O/P EST MOD 30 MIN: CPT | Performed by: FAMILY MEDICINE

## 2022-06-23 NOTE — TELEPHONE ENCOUNTER
calling for appt.  Patient fell on 6/18/22.  Hurt knees and elbows.  Did not hit ribs but hard to take a deep breath.  If coughs or laughs really hurts.  No clinic appts.  They will go EA .  Uma Magaña RN

## 2022-06-23 NOTE — PATIENT INSTRUCTIONS
Okay to take ibuprofen 200 mg - 3 tablets (600 mg) every 8 hours as needed.  Okay to take tylenol 500 mg - 2 tablets (1000 mg) every 6-8 hours as needed, do not exceed 3000 mg in 24 hours.  Okay to apply topical lidocaine patches to area of discomfort for 12 hours on, then 12 hours off  Ice to area of discomfort

## 2022-06-23 NOTE — PROGRESS NOTES
"SUBJECTIVE:  Chief Complaint   Patient presents with     Urgent Care     Fell and hurt both her knees/elbows now it hurts underneath her ribs.think her elbows jammed her rib cage hurts to cough, and breath in deep      Atso ALICIA Ly is a 82 year old female presents with a chief complaint of fall injury.    Tripped and fell forwards outside on 6/18, ended up hitting both elbows and knees and has bruising and swelling.  Able to get up and states that was okay.    The following day, developed more right sided anterior chest wall pain which has progressively gotten worse.  Endorse pain with deep breaths      Past Medical History:   Diagnosis Date     Benign essential hypertension 10/12/2016     Bilateral low back pain without sciatica, unspecified chronicity 12/19/2017     Blunt trauma of rib, initial encounter 11/25/2016     Dyslipidemia      Gallstones      Hypertension      PAD (peripheral artery disease) (H) 6/19/2017     Pancreatic mass 8/17/2016     SBO (small bowel obstruction) (H)      Slipped intervertebral disc      Current Outpatient Medications   Medication Sig Dispense Refill     amLODIPine (NORVASC) 5 MG tablet Take 1 tablet (5 mg) by mouth daily 90 tablet 3     aspirin (ASA) 81 MG chewable tablet Take 2 tablets (162 mg) by mouth daily       atorvastatin (LIPITOR) 40 MG tablet Take 1 tablet (40 mg) by mouth daily 90 tablet 3     hydrochlorothiazide (HYDRODIURIL) 25 MG tablet Take 1 tablet (25 mg) by mouth daily 90 tablet 3     KRILL OIL PO Take 1 capsule by mouth daily (OTC: Unsure of strength)       lifitegrast (XIIDRA) 5 % opthalmic solution Place 1 drop into both eyes 2 times daily       UNABLE TO FIND MEDICATION NAME: Pellila seed oil       vitamin D3 (CHOLECALCIFEROL) 50 mcg (2000 units) tablet Take 1 tablet by mouth daily       Social History     Tobacco Use     Smoking status: Former Smoker     Smokeless tobacco: Former User     Tobacco comment: Former \"social\" smoker, quit in 1978.   Substance Use " Topics     Alcohol use: Yes     Alcohol/week: 0.0 standard drinks     Comment: occ wine, twice a week       ROS:  Review of systems negative except as stated above.    EXAM:   BP (!) 158/73 (BP Location: Right arm, Patient Position: Sitting, Cuff Size: Adult Large)   Pulse 68   Temp 97.7  F (36.5  C)   LMP  (LMP Unknown)   SpO2 98%   Gen: healthy,alert,no distress  Extremity: left knee has ecchymosis, faint swelling, ROM intact.  Left elbow with mild ecchymosis, ROM intact, right elbow with small residual ecchymosis, ROM intact   There is not compromise to the distal circulation.  Pulses are +2 and CRT is brisk  CHEST: clear to auscultation, tenderness on right anterior chest wall with palpation  CV: regular rate and rhythm  PSYCH:alert, affect bright    X-RAY - chest and right ribs - no acute infiltrate, no pleural effusion, no pneumothorax, no rib fractures      ASSESSMENT/PLAN:   (R07.89) Chest wall pain  (primary encounter diagnosis)  Comment: s/p fall injury  Plan: XR Ribs & Chest Right G/E 3 Views            (S80.02XA) Traumatic ecchymosis of left knee, initial encounter  Comment: s/p fall injury  Plan: symptomatic treatment      Reassurance given, reviewed that right sided chest wall pain most likely musculoskeletal etiology from fall injury causing costochondritis.  Encourage ice, tylenol, ibuprofen, lidocaine patches or salonpas.  Will follow up on formal Xray report and notify if any abnormalities.    Follow up with primary provider if no improvement of symptoms in 1-2 weeks    Jb Hyatt MD  June 23, 2022 2:13 PM               "Oliver Hopkins MD  Cardiac Electrophysiology

## 2022-09-16 ENCOUNTER — TELEPHONE (OUTPATIENT)
Dept: FAMILY MEDICINE | Facility: CLINIC | Age: 82
End: 2022-09-16

## 2022-09-16 NOTE — TELEPHONE ENCOUNTER
calling, CTC on file and states Francois has hernia and wants surgery but does not want to do at [facility] and wants to do at [place].  Gave [facility] referral information from 12/16/21.  Advised to call and ask to be scheduled with provider in [place].   agrees with plan.  Uma Magaña RN

## 2022-09-29 ENCOUNTER — OFFICE VISIT (OUTPATIENT)
Dept: SURGERY | Facility: CLINIC | Age: 82
End: 2022-09-29
Payer: MEDICARE

## 2022-09-29 VITALS
HEART RATE: 69 BPM | WEIGHT: 125 LBS | DIASTOLIC BLOOD PRESSURE: 68 MMHG | SYSTOLIC BLOOD PRESSURE: 160 MMHG | HEIGHT: 62 IN | BODY MASS INDEX: 23 KG/M2

## 2022-09-29 DIAGNOSIS — K40.20 BILATERAL INGUINAL HERNIA WITHOUT OBSTRUCTION OR GANGRENE, RECURRENCE NOT SPECIFIED: ICD-10-CM

## 2022-09-29 PROCEDURE — 99214 OFFICE O/P EST MOD 30 MIN: CPT | Performed by: SURGERY

## 2022-09-29 NOTE — LETTER
September 30, 2022          Luis Feliciano MD  [address] [place] [address]      RE:   Francois Ly 1940      Dear Colleague,    Thank you for referring your patient, Francois Ly, to [facility] at [place] location. Please see a copy of my visit note below.     Francois Ly is a 82 year old female who presented with a an intermittent bulge near the left groin. The bulge comes and goes but has gotten larger over time. It is uncomfortable when the patient is engaging in exertional activity.  She was seen last year for this and a CT scan of the abdomen was obtained.  This revealed a small left inguinal hernia containing small bowel.  Also noted was a probable fat-containing hernia on the right.  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  No abdominal surgery since that time.  The patient is here to discuss possible surgical repair of the bilateral inguinal hernia.     PMH:  Francois Ly  has a past medical history of Benign essential hypertension (10/12/2016), Bilateral low back pain without sciatica, unspecified chronicity (12/19/2017), Blunt trauma of rib, initial encounter (11/25/2016), Dyslipidemia, Gallstones, Hypertension, PAD (peripheral artery disease) (H) (6/19/2017), Pancreatic mass (8/17/2016), SBO (small bowel obstruction) (H), and Slipped intervertebral disc.  PSH:  Francois Ly  has a past surgical history that includes GYN surgery and orthopedic surgery.     Home medications and allergies reviewed.     Social History:  Francois Ly  reports that she has quit smoking. She has quit using smokeless tobacco. She reports current alcohol use. She reports that she does not use drugs.  Family History:  Francois Ly family history includes Family History Negative in an other family member.     ROS:  The 10 point Review of Systems is negative other than noted in the HPI.     Physical Exam:  Blood pressure (!)  "160/68, pulse 69, height 1.575 m (5' 2\"), weight 56.7 kg (125 lb), not currently breastfeeding.  125 lbs 0 oz  Small thin older female in no distress.  Converses normally, affect unremarkable  Pupils equal round and reactive to light.  Moist mucous membranes, tongue midline   No cervical lymphadenopathy or thyromegaly.   Lung fields clear, breathing comfortably.   Heart normal sinus rhythm.  No murmurs rubs or gallops.  No obvious neurological deficits  Abdomen soft, nontender, nondistended.  Easily palpable left inguinal hernia, mildly tender.  Less obvious hernia on the right but able to be appreciated.         Assessment/plan:  Pleasant female with what appears to be an inguinal hernia. I explained that these are usually not a cause for small bowel incarceration or obstruction, but do become larger over time. They can certainly be uncomfortable and repair is warranted. I recommended a robot-assisted bilateral inguinal hernia repair with mesh. This would normally be done under a general anesthetic. Risks of surgery were explained to the patient, including hernia recurrence, wound infection, chronic pain, or mesh removal.  Patient understands that with her surgical history, there is a greater than normal likelihood of conversion to open repair.  Patient was going to notify the office when they were ready to schedule surgery.           Again, thank you for allowing me to participate in the care of your patient.      Sincerely,      Lalito Strange MD      "

## 2022-09-30 ENCOUNTER — TELEPHONE (OUTPATIENT)
Dept: SURGERY | Facility: CLINIC | Age: 82
End: 2022-09-30

## 2022-09-30 PROBLEM — K40.20 BILATERAL INGUINAL HERNIA WITHOUT OBSTRUCTION OR GANGRENE, RECURRENCE NOT SPECIFIED: Status: ACTIVE | Noted: 2022-09-30

## 2022-09-30 NOTE — PROGRESS NOTES
Surgery Consultation, [facility], ROSE MARIE Strange MD, MD    Francois Ly MRN# [id number]   YOB: 1940 Age: 82 year old     PCP:  Luis Feliciano [phone]    Chief Complaint: Bilateral inguinal hernia    History of Present Illness:  Francois Ly is a 82 year old female who presented with a an intermittent bulge near the left groin. The bulge comes and goes but has gotten larger over time. It is uncomfortable when the patient is engaging in exertional activity.  She was seen last year for this and a CT scan of the abdomen was obtained.  This revealed a small left inguinal hernia containing small bowel.  Also noted was a probable fat-containing hernia on the right.  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  No abdominal surgery since that time.  The patient is here to discuss possible surgical repair of the bilateral inguinal hernia.    PMH:  Francois Ly  has a past medical history of Benign essential hypertension (10/12/2016), Bilateral low back pain without sciatica, unspecified chronicity (12/19/2017), Blunt trauma of rib, initial encounter (11/25/2016), Dyslipidemia, Gallstones, Hypertension, PAD (peripheral artery disease) (H) (6/19/2017), Pancreatic mass (8/17/2016), SBO (small bowel obstruction) (H), and Slipped intervertebral disc.  PSH:  Francois Ly  has a past surgical history that includes GYN surgery and orthopedic surgery.    Home medications and allergies reviewed.    Social History:  Francois Ly  reports that she has quit smoking. She has quit using smokeless tobacco. She reports current alcohol use. She reports that she does not use drugs.  Family History:  Francois Ly family history includes Family History Negative in an other family member.    ROS:  The 10 point Review of Systems is negative other than noted in the HPI.    Physical Exam:  Blood pressure (!) 160/68, pulse 69, height  "1.575 m (5' 2\"), weight 56.7 kg (125 lb), not currently breastfeeding.  125 lbs 0 oz  Small thin older female in no distress.  Converses normally, affect unremarkable  Pupils equal round and reactive to light.  Moist mucous membranes, tongue midline   No cervical lymphadenopathy or thyromegaly.   Lung fields clear, breathing comfortably.   Heart normal sinus rhythm.  No murmurs rubs or gallops.  No obvious neurological deficits  Abdomen soft, nontender, nondistended.  Easily palpable left inguinal hernia, mildly tender.  Less obvious hernia on the right but able to be appreciated.       Assessment/plan:  Pleasant female with what appears to be an inguinal hernia. I explained that these are usually not a cause for small bowel incarceration or obstruction, but do become larger over time. They can certainly be uncomfortable and repair is warranted. I recommended a robot-assisted bilateral inguinal hernia repair with mesh. This would normally be done under a general anesthetic. Risks of surgery were explained to the patient, including hernia recurrence, wound infection, chronic pain, or mesh removal.  Patient understands that with her surgical history, there is a greater than normal likelihood of conversion to open repair.  Patient was going to notify the office when they were ready to schedule surgery.    Jayant Strange M.D.  [facility]  [phone]    Please route or send letter to:  Primary Care Provider (PCP) and Referring Provider    "

## 2022-09-30 NOTE — TELEPHONE ENCOUNTER
Type of surgery: robotic assisted bilateral inguinal hernia repair with mesh  Location of surgery: [facility]  Date and time of surgery: 11/29/22 12:30pm  Surgeon: Dr Strange  Pre-Op Appt Date: pt to schedule  Post-Op Appt Date: pt to schedule   Packet sent out: Yes  Pre-cert/Authorization completed:  Not Applicable  Date: 9/30/22

## 2022-10-20 DIAGNOSIS — I63.321 CEREBROVASCULAR ACCIDENT (CVA) DUE TO THROMBOSIS OF RIGHT ANTERIOR CEREBRAL ARTERY (H): ICD-10-CM

## 2022-10-21 RX ORDER — ATORVASTATIN CALCIUM 40 MG/1
40 TABLET, FILM COATED ORAL DAILY
Qty: 90 TABLET | Refills: 0 | Status: SHIPPED | OUTPATIENT
Start: 2022-10-21 | End: 2023-01-25

## 2022-10-21 NOTE — TELEPHONE ENCOUNTER
Medication is being filled for 1 time refill only due to:  Patient needs to be seen because it has been more than one year since last visit.     Pt has appointment with Dr. Feliciano on 11/15/22    Ofe Arredondo RN, BSN, PHN  [facility]

## 2022-10-29 ENCOUNTER — HOSPITAL ENCOUNTER (EMERGENCY)
Facility: CLINIC | Age: 82
Discharge: HOME | End: 2022-10-29
Attending: EMERGENCY MEDICINE | Admitting: EMERGENCY MEDICINE
Payer: MEDICARE

## 2022-10-29 ENCOUNTER — APPOINTMENT (OUTPATIENT)
Dept: CT IMAGING | Facility: CLINIC | Age: 82
End: 2022-10-29
Attending: EMERGENCY MEDICINE
Payer: MEDICARE

## 2022-10-29 VITALS
DIASTOLIC BLOOD PRESSURE: 80 MMHG | TEMPERATURE: 98.1 F | HEART RATE: 84 BPM | SYSTOLIC BLOOD PRESSURE: 135 MMHG | RESPIRATION RATE: 16 BRPM | OXYGEN SATURATION: 96 % | BODY MASS INDEX: 22.86 KG/M2 | WEIGHT: 125 LBS

## 2022-10-29 DIAGNOSIS — R10.84 ABDOMINAL PAIN, GENERALIZED: ICD-10-CM

## 2022-10-29 DIAGNOSIS — Z87.19 HISTORY OF SMALL BOWEL OBSTRUCTION: ICD-10-CM

## 2022-10-29 LAB
ALBUMIN SERPL-MCNC: 4.5 G/DL (ref 3.4–5)
ALP SERPL-CCNC: 110 U/L (ref 40–150)
ALT SERPL W P-5'-P-CCNC: 32 U/L (ref 0–50)
ANION GAP SERPL CALCULATED.3IONS-SCNC: 10 MMOL/L (ref 3–14)
AST SERPL W P-5'-P-CCNC: 21 U/L (ref 0–45)
BASOPHILS # BLD AUTO: 0.1 10E3/UL (ref 0–0.2)
BASOPHILS NFR BLD AUTO: 1 %
BILIRUB SERPL-MCNC: 0.8 MG/DL (ref 0.2–1.3)
BUN SERPL-MCNC: 15 MG/DL (ref 7–30)
CALCIUM SERPL-MCNC: 10.6 MG/DL (ref 8.5–10.1)
CHLORIDE BLD-SCNC: 93 MMOL/L (ref 94–109)
CO2 SERPL-SCNC: 32 MMOL/L (ref 20–32)
CREAT SERPL-MCNC: 1.01 MG/DL (ref 0.52–1.04)
EOSINOPHIL # BLD AUTO: 0.1 10E3/UL (ref 0–0.7)
EOSINOPHIL NFR BLD AUTO: 1 %
ERYTHROCYTE [DISTWIDTH] IN BLOOD BY AUTOMATED COUNT: 12.6 % (ref 10–15)
GFR SERPL CREATININE-BSD FRML MDRD: 55 ML/MIN/1.73M2
GLUCOSE BLD-MCNC: 193 MG/DL (ref 70–99)
HCT VFR BLD AUTO: 43.5 % (ref 35–47)
HGB BLD-MCNC: 15 G/DL (ref 11.7–15.7)
IMM GRANULOCYTES # BLD: 0.1 10E3/UL
IMM GRANULOCYTES NFR BLD: 0 %
LIPASE SERPL-CCNC: 88 U/L (ref 73–393)
LYMPHOCYTES # BLD AUTO: 3.3 10E3/UL (ref 0.8–5.3)
LYMPHOCYTES NFR BLD AUTO: 22 %
MCH RBC QN AUTO: 30.9 PG (ref 26.5–33)
MCHC RBC AUTO-ENTMCNC: 34.5 G/DL (ref 31.5–36.5)
MCV RBC AUTO: 90 FL (ref 78–100)
MONOCYTES # BLD AUTO: 0.6 10E3/UL (ref 0–1.3)
MONOCYTES NFR BLD AUTO: 4 %
NEUTROPHILS # BLD AUTO: 10.6 10E3/UL (ref 1.6–8.3)
NEUTROPHILS NFR BLD AUTO: 72 %
NRBC # BLD AUTO: 0 10E3/UL
NRBC BLD AUTO-RTO: 0 /100
PLATELET # BLD AUTO: 332 10E3/UL (ref 150–450)
POTASSIUM BLD-SCNC: 3 MMOL/L (ref 3.4–5.3)
PROT SERPL-MCNC: 9.1 G/DL (ref 6.8–8.8)
RBC # BLD AUTO: 4.86 10E6/UL (ref 3.8–5.2)
SODIUM SERPL-SCNC: 135 MMOL/L (ref 133–144)
WBC # BLD AUTO: 14.7 10E3/UL (ref 4–11)

## 2022-10-29 PROCEDURE — 74176 CT ABD & PELVIS W/O CONTRAST: CPT | Mod: MA

## 2022-10-29 PROCEDURE — 36415 COLL VENOUS BLD VENIPUNCTURE: CPT | Performed by: EMERGENCY MEDICINE

## 2022-10-29 PROCEDURE — 85025 COMPLETE CBC W/AUTO DIFF WBC: CPT | Performed by: EMERGENCY MEDICINE

## 2022-10-29 PROCEDURE — 99284 EMERGENCY DEPT VISIT MOD MDM: CPT | Mod: 25

## 2022-10-29 PROCEDURE — 80053 COMPREHEN METABOLIC PANEL: CPT | Performed by: EMERGENCY MEDICINE

## 2022-10-29 PROCEDURE — 83690 ASSAY OF LIPASE: CPT | Performed by: EMERGENCY MEDICINE

## 2022-10-29 ASSESSMENT — ENCOUNTER SYMPTOMS
ABDOMINAL PAIN: 1
VOMITING: 1

## 2022-10-29 ASSESSMENT — ACTIVITIES OF DAILY LIVING (ADL): ADLS_ACUITY_SCORE: 35

## 2022-10-30 NOTE — ED PROVIDER NOTES
History   Chief Complaint:  Abdominal Pain       HPI   Francois Ly is a 82 year old female with history of bowel obstruction who presents with abdominal pain. The patient reports that she has been having abdominal pain since she woke up at 7 today. She reports having similar symptoms last week too which went away on its own. The pain is located around the center of abdomin. Today, she reports vomiting also which is new to her previous symptoms.    Review of Systems   Gastrointestinal: Positive for abdominal pain and vomiting.   All other systems reviewed and are negative.    Allergies:  Ace Inhibitors  Sorbitan Trioleate  Sulfonylureas  Vancomycin    Medications:  Norvasc  Lipitor  Hydrodiuril  Cozaar  Zocor    Past Medical History:     Back pain  Hypertensive urgency  Benign essential hypertension  SBO  PAD  Bilateral low back pain  Hyperlipidemia  Degenerative arthritis of thumb  Intraductal papillary mucinous neoplasm of pancreas  Cyst of right kidney  Diverticulosis of large intensive  SBO  CVA     Past Surgical History:    Gyn surgery  orthopedic surgery     Family History:    No family history on file.    Social History:  The patient presents to the ED with her .  PCP: Luis Feliciano       Physical Exam     Patient Vitals for the past 24 hrs:   BP Temp Temp src Pulse Resp SpO2 Weight   10/29/22 2025 (!) 145/89 -- -- -- -- -- --   10/29/22 2023 -- 98.1  F (36.7  C) Temporal 105 16 98 % 56.7 kg (125 lb)       Physical Exam  Eye:  Pupils are equal, round, and reactive.  Extraocular movements intact.    ENT:  No rhinorrhea.  Moist mucus membranes.  Normal tongue and tonsil.    Cardiac:  Regular rate and rhythm.  No murmurs, gallops, or rubs.    Pulmonary:  Clear to auscultation bilaterally.  No wheezes, rales, or rhonchi.    Abdomen:  Positive bowel sounds.  Abdomen is soft and non-distended, without focal tenderness.    Musculoskeletal:  Normal movement of all extremities without evidence for  deficit.    Skin:  Warm and dry without rashes.    Neurologic:  Non-focal exam without asymmetric weakness or numbness.     Psychiatric:  Normal affect with appropriate interaction with examiner.    Emergency Department Course     Imaging:  CT Abdomen Pelvis without Contrast (stone protocol)   Final Result   IMPRESSION:    1.  Distended fluid-filled small bowel and colon. This could represent an ileus, or developing bowel obstruction.   2.  Cholelithiasis without evidence of cholecystitis.        Report per radiology    Laboratory:  Labs Ordered and Resulted from Time of ED Arrival to Time of ED Departure   COMPREHENSIVE METABOLIC PANEL - Abnormal       Result Value    Sodium 135      Potassium 3.0 (*)     Chloride 93 (*)     Carbon Dioxide (CO2) 32      Anion Gap 10      Urea Nitrogen 15      Creatinine 1.01      Calcium 10.6 (*)     Glucose 193 (*)     Alkaline Phosphatase 110      AST 21      ALT 32      Protein Total 9.1 (*)     Albumin 4.5      Bilirubin Total 0.8      GFR Estimate 55 (*)    CBC WITH PLATELETS AND DIFFERENTIAL - Abnormal    WBC Count 14.7 (*)     RBC Count 4.86      Hemoglobin 15.0      Hematocrit 43.5      MCV 90      MCH 30.9      MCHC 34.5      RDW 12.6      Platelet Count 332      % Neutrophils 72      % Lymphocytes 22      % Monocytes 4      % Eosinophils 1      % Basophils 1      % Immature Granulocytes 0      NRBCs per 100 WBC 0      Absolute Neutrophils 10.6 (*)     Absolute Lymphocytes 3.3      Absolute Monocytes 0.6      Absolute Eosinophils 0.1      Absolute Basophils 0.1      Absolute Immature Granulocytes 0.1      Absolute NRBCs 0.0     LIPASE - Normal    Lipase 88        Emergency Department Course:     Reviewed:  I reviewed nursing notes, vitals, past medical history and Care Everywhere    Assessments:  2032 I obtained history and examined the patient as noted above.   2147 I rechecked the patient and explained findings.     Disposition:  The patient was discharged to home.      Impression & Plan     Medical Decision Making:  This elderly woman with a history of recurrent bowel obstructions presents to us with typical symptoms of the same.  She notes that she typically rides this out at home, having some discomfort for a day or 2 that is crampy in nature and central to the abdomen.  However, will typically resolve if she reduces her dietary intake.  She developed a very typical symptoms for her earlier on today, though she then suffered 1 episode of a significant amount of emesis which is atypical for her.  However, after vomiting in the car on the way here, she noted that she felt much improved.  Her abdomen is now soft and benign.  Labs are obtained which are reassuring outside of a mildly elevated white blood cell count.  A CT of the abdomen does show evidence of a probable ileus versus early small bowel obstruction.  I actually favor resolving small bowel obstruction as she then had a large bowel movement here and even feels improved from that.  At this point, I feel that she has a very good understanding of her own pathology.  She does not want to stay in the hospital and I see no absolute indication to admit her.  I explained that she may still have an obstruction and that symptoms may worsen and that she may need to come back for admission.  She fully understands this as does her  and they are comfortable to plan for discharge.  Otherwise, she will continue to work with her primary team on these issues        Diagnosis:    ICD-10-CM    1. Abdominal pain, generalized  R10.84     RESOLVED      2. History of small bowel obstruction  Z87.19           Discharge Medications:  New Prescriptions    No medications on file       Scribe Disclosure:  I, ARMEN OLIVARES, am serving as a scribe at 8:32 PM on 10/29/2022 to document services personally performed by Trierweiler, Chad A, MD, based on my observations and the provider's statements to me.              Trierweiler, Chad A,  MD  10/30/22 [id number]

## 2022-10-30 NOTE — ED TRIAGE NOTES
Pt with hx of bowel obstruction reports upper abdominal pain since this am - reports two small, hard stools today. Vomited prior to arrival and feels relief from abdominal pain.      Triage Assessment     Row Name 10/29/22 2024       Respiratory WDL    Respiratory WDL WDL       Cardiac WDL    Cardiac WDL WDL       Cognitive/Neuro/Behavioral WDL    Cognitive/Neuro/Behavioral WDL WDL

## 2022-10-30 NOTE — DISCHARGE INSTRUCTIONS
As discussed, you likely are dealing with early signs of a small bowel obstruction, though may have already reduced it on your own.  You have elected to go home.  I support this.  I recommend you stick with a easy liquid diet and advance as tolerated.  You should not hesitate to return to the emergency department if you have increasing of your pain, any further vomiting, lack of stool output, or any other emergent concerns.

## 2022-11-10 NOTE — MR AVS SNAPSHOT
"              After Visit Summary   5/19/2017    Francois Ly    MRN: [id number]           Patient Information     Date Of Birth          1940        Visit Information        Provider Department      5/19/2017 1:00 PM Luis Feliciano MD [facility]        Today's Diagnoses     Pain in both lower legs    -  1    Need for hepatitis C screening test        Peripheral vascular disease (H)         Need for hepatitis B screening test           Follow-ups after your visit        Future tests that were ordered for you today     Open Future Orders        Priority Expected Expires Ordered    US SAULO Doppler No Exercise Routine  5/19/2018 5/19/2017            Who to contact     If you have questions or need follow up information about today's clinic visit or your schedule please contact [facility] directly at [phone].  Normal or non-critical lab and imaging results will be communicated to you by MyChart, letter or phone within 4 business days after the clinic has received the results. If you do not hear from us within 7 days, please contact the clinic through MyChart or phone. If you have a critical or abnormal lab result, we will notify you by phone as soon as possible.  Submit refill requests through appsplit or call your pharmacy and they will forward the refill request to us. Please allow 3 business days for your refill to be completed.          Additional Information About Your Visit        MyChart Information     appsplit lets you send messages to your doctor, view your test results, renew your prescriptions, schedule appointments and more. To sign up, go to [email or web address] . Click on \"Log in\" on the left side of the screen, which will take you to the Welcome page. Then click on \"Sign up Now\" on the right side of the page.     You will be asked to enter the access code listed below, as well as some personal information. Please follow the directions to create your username " "and password.     Your access code is: [email or web address]  Expires: 2017 11:43 AM     Your access code will  in 90 days. If you need help or a new code, please call your [facility] or [phone].        Care EveryWhere ID     This is your Care EveryWhere ID. This could be used by other organizations to access your [facility] medical records  [id number]        Your Vitals Were     Pulse Temperature Respirations Height Pulse Oximetry BMI (Body Mass Index)    62 97.7  F (36.5  C) (Oral) 16 5' 2\" (1.575 m) 97% 24.14 kg/m2       Blood Pressure from Last 3 Encounters:   17 136/76   17 142/78   17 156/67    Weight from Last 3 Encounters:   17 132 lb (59.9 kg)   17 132 lb 12.8 oz (60.2 kg)   17 135 lb 12.9 oz (61.6 kg)              We Performed the Following     **Hepatitis C Screen Reflex to RNA FUTURE anytime     Hepatitis B core antibody     Hepatitis B Surface Antibody     Hepatitis B surface antigen        Primary Care Provider Office Phone # Fax #    Luis Feliciano [phone]       [facility] [address] [place] [id number]        Thank you!     Thank you for choosing [facility]  for your care. Our goal is always to provide you with excellent care. Hearing back from our patients is one way we can continue to improve our services. Please take a few minutes to complete the written survey that you may receive in the mail after your visit with us. Thank you!             Your Updated Medication List - Protect others around you: Learn how to safely use, store and throw away your medicines at www.disposemymeds.org.          This list is accurate as of: 17  3:11 PM.  Always use your most recent med list.                   Brand Name Dispense Instructions for use    aspirin EC 81 MG EC tablet      Take 1 tablet (81 mg) by mouth daily       chlorthalidone 25 MG tablet    HYGROTON    45 tablet    Take 0.5 tablets (12.5 mg) by " mouth daily       cyanocobalamin 1000 MCG tablet    vitamin  B-12     Take 1,000 mcg by mouth daily as needed (leg cramps)       KRILL OIL PO      Take 500 mg by mouth daily       losartan 100 MG tablet    COZAAR    90 tablet    Take 0.5 tablets (50 mg) by mouth 2 times daily       MULTIVITAMINS PO      Take 1 tablet by mouth daily as needed       NONFORMULARY      Pain cream from [place] 30mg/g. Pt uses on legs for pain prn       order for DME     1 Device    Equipment being ordered: rib strap       VITAMIN D (CHOLECALCIFEROL) PO      Take 1 tablet by mouth daily as needed (leg cramps)          98.7

## 2022-11-15 ENCOUNTER — OFFICE VISIT (OUTPATIENT)
Dept: FAMILY MEDICINE | Facility: CLINIC | Age: 82
End: 2022-11-15
Payer: MEDICARE

## 2022-11-15 VITALS
WEIGHT: 128.4 LBS | SYSTOLIC BLOOD PRESSURE: 144 MMHG | HEART RATE: 67 BPM | BODY MASS INDEX: 23.63 KG/M2 | DIASTOLIC BLOOD PRESSURE: 78 MMHG | HEIGHT: 62 IN | OXYGEN SATURATION: 98 %

## 2022-11-15 DIAGNOSIS — K86.89 PANCREATIC MASS: ICD-10-CM

## 2022-11-15 DIAGNOSIS — Z01.818 PREOP GENERAL PHYSICAL EXAM: Primary | ICD-10-CM

## 2022-11-15 LAB
ANION GAP SERPL CALCULATED.3IONS-SCNC: 13 MMOL/L (ref 7–15)
BUN SERPL-MCNC: 14.4 MG/DL (ref 8–23)
CALCIUM SERPL-MCNC: 9 MG/DL (ref 8.8–10.2)
CHLORIDE SERPL-SCNC: 100 MMOL/L (ref 98–107)
CREAT SERPL-MCNC: 0.7 MG/DL (ref 0.51–0.95)
DEPRECATED HCO3 PLAS-SCNC: 26 MMOL/L (ref 22–29)
GFR SERPL CREATININE-BSD FRML MDRD: 86 ML/MIN/1.73M2
GLUCOSE SERPL-MCNC: 118 MG/DL (ref 70–99)
POTASSIUM SERPL-SCNC: 3 MMOL/L (ref 3.4–5.3)
SODIUM SERPL-SCNC: 139 MMOL/L (ref 136–145)

## 2022-11-15 PROCEDURE — 36415 COLL VENOUS BLD VENIPUNCTURE: CPT | Performed by: FAMILY MEDICINE

## 2022-11-15 PROCEDURE — 93000 ELECTROCARDIOGRAM COMPLETE: CPT | Performed by: FAMILY MEDICINE

## 2022-11-15 PROCEDURE — 99214 OFFICE O/P EST MOD 30 MIN: CPT | Performed by: FAMILY MEDICINE

## 2022-11-15 PROCEDURE — 80048 BASIC METABOLIC PNL TOTAL CA: CPT | Performed by: FAMILY MEDICINE

## 2022-11-15 NOTE — PROGRESS NOTES
[facility]  [id number] [place] [id number]  Phone: [phone]  Primary Provider: Luis Feliciano  Pre-op Performing Provider: LUIS FELICIANO      PREOPERATIVE EVALUATION:  Today's date: 11/15/2022    Francois Ly is a 82 year old female who presents for a preoperative evaluation.    Surgical Information:  Surgery/Procedure: Robot-assisted bilateral inguinal hernia repair with mesh  Surgery Location: [facility]   Surgeon: Lalito Strange MD  Surgery Date: 11/29/22  Time of Surgery: 11:30  Where patient plans to recover: At home with family  Fax number for surgical facility: Note does not need to be faxed, will be available electronically in Epic.    Type of Anesthesia Anticipated: General    Assessment & Plan     The proposed surgical procedure is considered INTERMEDIATE risk.    Pancreatic mass  Stable.     HTN : will call to recheck BP at home (pt does check it regularly at home).    Preop general physical exam  Check below for recommendations.  - EKG 12-lead complete w/read - Clinics  - Basic metabolic panel  (Ca, Cl, CO2, Creat, Gluc, K, Na, BUN); Future  - Basic metabolic panel  (Ca, Cl, CO2, Creat, Gluc, K, Na, BUN)           Risks and Recommendations:  The patient has the following additional risks and recommendations for perioperative complications:  Cardiovascular:   - hx of CVA and PVD.    Medication Instructions:   - aspirin: Discontinue aspirin 7-10 days prior to procedure to reduce bleeding risk. It should be resumed postoperatively.    - Calcium Channel Blockers: May be continued on the day of surgery.   - Diuretics: May continue due to heart failure.   - Statins: Continue taking on the day of surgery.     RECOMMENDATION:  APPROVAL GIVEN to proceed with proposed procedure, without further diagnostic evaluation.                      Subjective     HPI related to upcoming procedure:  Robot-assisted bilateral inguinal hernia repair with mesh      Preop  Questions 11/15/2022   1. Have you ever had a heart attack or stroke? YES - mild stroke August 2021   2. Have you ever had surgery on your heart or blood vessels, such as a stent placement, a coronary artery bypass, or surgery on an artery in your head, neck, heart, or legs? No   3. Do you have chest pain with activity? No   4. Do you have a history of  heart failure? No   5. Do you currently have a cold, bronchitis or symptoms of other infection? No   6. Do you have a cough, shortness of breath, or wheezing? No   7. Do you or anyone in your family have previous history of blood clots? No   8. Do you or does anyone in your family have a serious bleeding problem such as prolonged bleeding following surgeries or cuts? No   9. Have you ever had problems with anemia or been told to take iron pills? No   10. Have you had any abnormal blood loss such as black, tarry or bloody stools, or abnormal vaginal bleeding? No   11. Have you ever had a blood transfusion? YES - 1965, 1994   11a. Have you ever had a transfusion reaction? No   12. Are you willing to have a blood transfusion if it is medically needed before, during, or after your surgery? Yes   13. Have you or any of your relatives ever had problems with anesthesia? No   14. Do you have sleep apnea, excessive snoring or daytime drowsiness? No   15. Do you have any artifical heart valves or other implanted medical devices like a pacemaker, defibrillator, or continuous glucose monitor? No   16. Do you have artificial joints? No   17. Are you allergic to latex? No     Health Care Directive:  Patient does not have a Health Care Directive or Living Will: Discussed advance care planning with patient; however, patient declined at this time.    Preoperative Review of :   reviewed - no record of controlled substances prescribed.      Status of Chronic Conditions:  See problem list for active medical problems.  Problems all longstanding and stable, except as  noted/documented.  See ROS for pertinent symptoms related to these conditions.    HYPERLIPIDEMIA - Patient has a long history of significant Hyperlipidemia requiring medication for treatment with recent good control. Patient reports no problems or side effects with the medication.     HYPERTENSION - Patient has longstanding history of HTN , currently denies any symptoms referable to elevated blood pressure. Specifically denies chest pain, palpitations, dyspnea, orthopnea, PND or peripheral edema. Blood pressure readings have been in normal range when at home.. Current medication regimen is as listed below. Patient denies any side effects of medication.       Review of Systems  CONSTITUTIONAL: NEGATIVE for fever, chills, change in weight  INTEGUMENTARY/SKIN: NEGATIVE for worrisome rashes, moles or lesions  EYES: NEGATIVE for vision changes or irritation  ENT/MOUTH: NEGATIVE for ear, mouth and throat problems  RESP: NEGATIVE for significant cough or SOB  CV: NEGATIVE for chest pain, palpitations or peripheral edema  GI: NEGATIVE for nausea, abdominal pain, heartburn, or change in bowel habits  NEURO: NEGATIVE for weakness, dizziness or paresthesias  ENDOCRINE: NEGATIVE for temperature intolerance, skin/hair changes  HEME: NEGATIVE for bleeding problems  PSYCHIATRIC: NEGATIVE for changes in mood or affect    Patient Active Problem List    Diagnosis Date Noted     Bilateral inguinal hernia without obstruction or gangrene, recurrence not specified 09/30/2022     Priority: Medium     Small bowel obstruction (H) 11/22/2021     Priority: Medium     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 (H) 08/27/2021     Priority: Medium     Small multiple infarct of the Rt parietal and frontal (watershed distribution).       Intraductal papillary mucinous neoplasm of pancreas 08/11/2021     Priority: Medium     Cyst of right kidney 08/11/2021     Priority: Medium     Diverticulosis of large  intestine without hemorrhage 08/11/2021     Priority: Medium     Degenerative arthritis of thumb, right 02/14/2020     Priority: Medium     Post-nasal drip 12/17/2019     Priority: Medium     Hyperlipidemia LDL goal <130 01/08/2019     Priority: Medium     Status post lumbar spinal fusion 08/13/2018     Priority: Medium     Bilateral low back pain without sciatica, unspecified chronicity 12/19/2017     Priority: Medium     PAD (peripheral artery disease) (H) 06/19/2017     Priority: Medium     SBO (small bowel obstruction) (H) 04/12/2017     Priority: Medium     Benign essential hypertension 10/12/2016     Priority: Medium     Hypertensive urgency 10/02/2016     Priority: Medium     Back pain 09/22/2012     Priority: Medium      Past Medical History:   Diagnosis Date     Benign essential hypertension 10/12/2016     Bilateral low back pain without sciatica, unspecified chronicity 12/19/2017     Blunt trauma of rib, initial encounter 11/25/2016     Dyslipidemia      Gallstones      Hypertension      PAD (peripheral artery disease) (H) 6/19/2017     Pancreatic mass 8/17/2016     SBO (small bowel obstruction) (H)      Slipped intervertebral disc      Past Surgical History:   Procedure Laterality Date     GYN SURGERY      hysterectomy     ORTHOPEDIC SURGERY      Slipped disc with chronic back pain     Current Outpatient Medications   Medication Sig Dispense Refill     amLODIPine (NORVASC) 5 MG tablet Take 1 tablet (5 mg) by mouth daily 90 tablet 3     aspirin (ASA) 81 MG chewable tablet Take 2 tablets (162 mg) by mouth daily       atorvastatin (LIPITOR) 40 MG tablet TAKE 1 TABLET (40 MG) BY MOUTH DAILY 90 tablet 0     hydrochlorothiazide (HYDRODIURIL) 25 MG tablet Take 1 tablet (25 mg) by mouth daily 90 tablet 3     KRILL OIL PO Take 1 capsule by mouth daily (OTC: Unsure of strength)       lifitegrast (XIIDRA) 5 % opthalmic solution Place 1 drop into both eyes 2 times daily       UNABLE TO FIND MEDICATION NAME: Vishnu  "seed oil       vitamin D3 (CHOLECALCIFEROL) 50 mcg (2000 units) tablet Take 1 tablet by mouth daily         Allergies   Allergen Reactions     Ace Inhibitors      Sorbitan Trioleate      Sulfonylureas      Vancomycin Hives        Social History     Tobacco Use     Smoking status: Former     Smokeless tobacco: Former     Tobacco comments:     Former \"social\" smoker, quit in 1978.   Substance Use Topics     Alcohol use: Yes     Alcohol/week: 0.0 standard drinks     Comment: occ wine, twice a week       History   Drug Use No         Objective     BP (!) 158/68 (BP Location: Right arm, Patient Position: Sitting, Cuff Size: Adult Regular)   Pulse 67   Ht 1.575 m (5' 2\")   Wt 58.2 kg (128 lb 6.4 oz)   LMP  (LMP Unknown)   SpO2 98%   BMI 23.48 kg/m      Physical Exam    GENERAL APPEARANCE: elderly, alert, no distress.      EYES: EOMI,  PERRL     HENT: ear canals and TM's normal and nose and mouth without ulcers or lesions     NECK: no adenopathy, no asymmetry, masses, or scars and thyroid normal to palpation     RESP: lungs clear to auscultation - no rales, rhonchi or wheezes     CV: regular rates and rhythm, normal S1 S2, no S3 or S4 and no murmur, click or rub     ABDOMEN:  soft, nontender, no HSM or masses and bowel sounds normal     MS: deformities of the fingers, otherwise, no edema.     SKIN: no suspicious lesions or rashes     NEURO: Normal strength and tone, sensory exam grossly normal, mentation intact and speech normal     PSYCH: mentation appears normal. and affect normal/bright     LYMPHATICS: No cervical adenopathy    Recent Labs   Lab Test 10/29/22  2036 03/25/22  0931 03/10/22  0920 12/09/21  0635 08/29/21  0901 08/27/21  0755 08/26/21  2320   HGB 15.0  --   --  12.0   < > 11.1* 12.7     --   --  278   < > 235 270   INR  --   --   --   --   --   --  0.90    138   < > 141   < > 141 136   POTASSIUM 3.0* 3.3*   < > 3.9   < > 3.7 3.3*   CR 1.01 0.76   < > 0.70   < > 0.70 0.76   A1C  --   --   " --   --   --  6.2*  --     < > = values in this interval not displayed.        Diagnostics:  Labs pending at this time.  Results will be reviewed when available.   EKG: appears normal, NSR, Q waves in anterior leads, V1 to V4, chronic changes., unchanged from previous tracings    Revised Cardiac Risk Index (RCRI):  The patient has the following serious cardiovascular risks for perioperative complications:   - Cerebrovascular Disease (TIA or CVA) = 1 point     RCRI Interpretation: 1 point: Class II (low risk - 0.9% complication rate)           Signed Electronically by: Luis Feliciano MD  Copy of this evaluation report is provided to requesting physician.

## 2022-11-15 NOTE — PATIENT INSTRUCTIONS
Preparing for Your Surgery  Getting started  A nurse will call you to review your health history and instructions. They will give you an arrival time based on your scheduled surgery time. Please be ready to share:    Your doctor s clinic name and phone number    Your medical, surgical, and anesthesia history    A list of allergies and sensitivities    A list of medicines, including herbal treatments and over-the-counter drugs    Whether the patient has a legal guardian (ask how to send us the papers in advance)  Please tell us if you re pregnant--or if there s any chance you might be pregnant. Some surgeries may injure a fetus (unborn baby), so they require a pregnancy test. Surgeries that are safe for a fetus don t always need a test, and you can choose whether to have one.   If you have a child who s having surgery, please ask for a copy of Preparing for Your Child s Surgery.    Preparing for surgery    Within 10 to 30 days of surgery: Have a pre-op exam (sometimes called an H&P, or History and Physical). This can be done at a clinic or pre-operative center.  ? If you re having a , you may not need this exam. Talk to your care team.    At your pre-op exam, talk to your care team about all medicines you take. If you need to stop any medicines before surgery, ask when to start taking them again.  ? We do this for your safety. Many medicines can make you bleed too much during surgery. Some change how well surgery (anesthesia) drugs work.    Call your insurance company to let them know you re having surgery. (If you don t have insurance, call [phone].)    Call your clinic if there s any change in your health. This includes signs of a cold or flu (sore throat, runny nose, cough, rash, fever). It also includes a scrape or scratch near the surgery site.    If you have questions on the day of surgery, call your hospital or surgery center.  COVID testing  You may need to be tested for COVID-19 before having  surgery. If so, we will give you instructions (or click here).  Eating and drinking guidelines  For your safety: Unless your surgeon tells you otherwise, follow the guidelines below.    Eat and drink as usual until 8 hours before you arrive for surgery. After that, no food or milk.    Drink clear liquids until 2 hours before you arrive. These are liquids you can see through, like water, Gatorade, and Propel Water. They also include plain black coffee and tea (no cream or milk), candy, and breath mints. You can spit out gum when you arrive.    If you drink alcohol: Stop drinking it the night before surgery.    If your care team tells you to take medicine on the morning of surgery, it s okay to take it with a sip of water.  Preventing infection    Shower or bathe the night before and morning of your surgery. Follow the instructions your clinic gave you. (If no instructions, use regular soap.)    Don t shave or clip hair near your surgery site. We ll remove the hair if needed.    Don t smoke or vape the morning of surgery. You may chew nicotine gum up to 2 hours before surgery. A nicotine patch is okay.  ? Note: Some surgeries require you to completely quit smoking and nicotine. Check with your surgeon.    Your care team will make every effort to keep you safe from infection. We will:  ? Clean our hands often with soap and water (or an alcohol-based hand rub).  ? Clean the skin at your surgery site with a special soap that kills germs.  ? Give you a special gown to keep you warm. (Cold raises the risk of infection.)  ? Wear special hair covers, masks, gowns and gloves during surgery.  ? Give antibiotic medicine, if prescribed. Not all surgeries need antibiotics.  What to bring on the day of surgery    Photo ID and insurance card    Copy of your health care directive, if you have one    Glasses and hearing aids (bring cases)  ? You can t wear contacts during surgery    Inhaler and eye drops, if you use them (tell us  about these when you arrive)    CPAP machine or breathing device, if you use them    A few personal items, if spending the night    If you have . . .  ? A pacemaker, ICD (cardiac defibrillator) or other implant: Bring the ID card.  ? An implanted stimulator: Bring the remote control.  ? A legal guardian: Bring a copy of the certified (court-stamped) guardianship papers.  Please remove any jewelry, including body piercings. Leave jewelry and other valuables at home.  If you re going home the day of surgery    You must have a responsible adult drive you home. They should stay with you overnight as well.    If you don t have someone to stay with you, and you aren t safe to go home alone, we may keep you overnight. Insurance often won t pay for this.  After surgery  If it s hard to control your pain or you need more pain medicine, please call your surgeon s office.  Questions?   If you have any questions for your care team, list them here:   ____________________________________________________________________________________________________________________________________________________________________________________________________________________________________________________________________  For informational purposes only. Not to replace the advice of your health care provider. Copyright   2003, 2019 [employer]. All rights reserved. Clinically reviewed by Trisha Galdamez MD. tabulate [id number] - REV 10/22.

## 2022-11-22 ENCOUNTER — TRANSFERRED RECORDS (OUTPATIENT)
Dept: HEALTH INFORMATION MANAGEMENT | Facility: CLINIC | Age: 82
End: 2022-11-22

## 2022-12-09 ENCOUNTER — LAB REQUISITION (OUTPATIENT)
Dept: LAB | Facility: CLINIC | Age: 82
End: 2022-12-09
Payer: MEDICARE

## 2022-12-09 PROCEDURE — 88305 TISSUE EXAM BY PATHOLOGIST: CPT | Mod: TC,ORL | Performed by: OTOLARYNGOLOGY

## 2022-12-12 LAB
PATH REPORT.COMMENTS IMP SPEC: NORMAL
PATH REPORT.COMMENTS IMP SPEC: NORMAL
PATH REPORT.FINAL DX SPEC: NORMAL
PATH REPORT.GROSS SPEC: NORMAL
PATH REPORT.MICROSCOPIC SPEC OTHER STN: NORMAL
PATH REPORT.RELEVANT HX SPEC: NORMAL
PHOTO IMAGE: NORMAL

## 2022-12-12 PROCEDURE — 88305 TISSUE EXAM BY PATHOLOGIST: CPT | Mod: 26 | Performed by: STUDENT IN AN ORGANIZED HEALTH CARE EDUCATION/TRAINING PROGRAM

## 2022-12-14 ENCOUNTER — TRANSFERRED RECORDS (OUTPATIENT)
Dept: FAMILY MEDICINE | Facility: CLINIC | Age: 82
End: 2022-12-14

## 2022-12-15 ENCOUNTER — TELEPHONE (OUTPATIENT)
Dept: SURGERY | Facility: CLINIC | Age: 82
End: 2022-12-15

## 2022-12-15 NOTE — TELEPHONE ENCOUNTER
Type of surgery: robotic assisted bilateral inguinal hernia repair with mesh  Location of surgery: [facility]  Date and time of surgery: 1/24/23 1:10pm  Surgeon: Dr Strange  Pre-Op Appt Date: pt to schedule  Post-Op Appt Date: pt to schedule   Packet sent out: Yes  Pre-cert/Authorization completed:  Not Applicable  Date: 12/15/22

## 2023-01-17 ENCOUNTER — TELEPHONE (OUTPATIENT)
Dept: FAMILY MEDICINE | Facility: CLINIC | Age: 83
End: 2023-01-17

## 2023-01-17 ENCOUNTER — OFFICE VISIT (OUTPATIENT)
Dept: FAMILY MEDICINE | Facility: CLINIC | Age: 83
End: 2023-01-17
Payer: MEDICARE

## 2023-01-17 VITALS
DIASTOLIC BLOOD PRESSURE: 82 MMHG | WEIGHT: 130 LBS | HEART RATE: 67 BPM | SYSTOLIC BLOOD PRESSURE: 124 MMHG | BODY MASS INDEX: 23.92 KG/M2 | HEIGHT: 62 IN | OXYGEN SATURATION: 98 %

## 2023-01-17 DIAGNOSIS — E87.6 HYPOKALEMIA: Primary | ICD-10-CM

## 2023-01-17 DIAGNOSIS — I73.9 PERIPHERAL VASCULAR DISEASE, UNSPECIFIED (H): ICD-10-CM

## 2023-01-17 DIAGNOSIS — E78.5 HYPERLIPIDEMIA LDL GOAL <130: ICD-10-CM

## 2023-01-17 DIAGNOSIS — Z01.818 PREOP GENERAL PHYSICAL EXAM: Primary | ICD-10-CM

## 2023-01-17 LAB
ANION GAP SERPL CALCULATED.3IONS-SCNC: 14 MMOL/L (ref 7–15)
BUN SERPL-MCNC: 11.1 MG/DL (ref 8–23)
CALCIUM SERPL-MCNC: 9.4 MG/DL (ref 8.8–10.2)
CHLORIDE SERPL-SCNC: 101 MMOL/L (ref 98–107)
CHOLEST SERPL-MCNC: 191 MG/DL
CREAT SERPL-MCNC: 0.7 MG/DL (ref 0.51–0.95)
DEPRECATED HCO3 PLAS-SCNC: 26 MMOL/L (ref 22–29)
GFR SERPL CREATININE-BSD FRML MDRD: 86 ML/MIN/1.73M2
GLUCOSE SERPL-MCNC: 126 MG/DL (ref 70–99)
HDLC SERPL-MCNC: 53 MG/DL
LDLC SERPL CALC-MCNC: 99 MG/DL
NONHDLC SERPL-MCNC: 138 MG/DL
POTASSIUM SERPL-SCNC: 2.8 MMOL/L (ref 3.4–5.3)
SODIUM SERPL-SCNC: 141 MMOL/L (ref 136–145)
TRIGL SERPL-MCNC: 197 MG/DL

## 2023-01-17 PROCEDURE — 80048 BASIC METABOLIC PNL TOTAL CA: CPT | Performed by: FAMILY MEDICINE

## 2023-01-17 PROCEDURE — 36415 COLL VENOUS BLD VENIPUNCTURE: CPT | Performed by: FAMILY MEDICINE

## 2023-01-17 PROCEDURE — 93000 ELECTROCARDIOGRAM COMPLETE: CPT | Performed by: FAMILY MEDICINE

## 2023-01-17 PROCEDURE — 80061 LIPID PANEL: CPT | Performed by: FAMILY MEDICINE

## 2023-01-17 PROCEDURE — 99214 OFFICE O/P EST MOD 30 MIN: CPT | Performed by: FAMILY MEDICINE

## 2023-01-17 SDOH — ECONOMIC STABILITY: FOOD INSECURITY: WITHIN THE PAST 12 MONTHS, THE FOOD YOU BOUGHT JUST DIDN'T LAST AND YOU DIDN'T HAVE MONEY TO GET MORE.: NEVER TRUE

## 2023-01-17 SDOH — HEALTH STABILITY: PHYSICAL HEALTH: ON AVERAGE, HOW MANY DAYS PER WEEK DO YOU ENGAGE IN MODERATE TO STRENUOUS EXERCISE (LIKE A BRISK WALK)?: 3 DAYS

## 2023-01-17 SDOH — HEALTH STABILITY: PHYSICAL HEALTH: ON AVERAGE, HOW MANY MINUTES DO YOU ENGAGE IN EXERCISE AT THIS LEVEL?: 30 MIN

## 2023-01-17 SDOH — ECONOMIC STABILITY: FOOD INSECURITY: WITHIN THE PAST 12 MONTHS, YOU WORRIED THAT YOUR FOOD WOULD RUN OUT BEFORE YOU GOT MONEY TO BUY MORE.: NEVER TRUE

## 2023-01-17 SDOH — ECONOMIC STABILITY: INCOME INSECURITY: HOW HARD IS IT FOR YOU TO PAY FOR THE VERY BASICS LIKE FOOD, HOUSING, MEDICAL CARE, AND HEATING?: NOT HARD AT ALL

## 2023-01-17 SDOH — ECONOMIC STABILITY: INCOME INSECURITY: IN THE LAST 12 MONTHS, WAS THERE A TIME WHEN YOU WERE NOT ABLE TO PAY THE MORTGAGE OR RENT ON TIME?: NO

## 2023-01-17 ASSESSMENT — SOCIAL DETERMINANTS OF HEALTH (SDOH)
DO YOU BELONG TO ANY CLUBS OR ORGANIZATIONS SUCH AS CHURCH GROUPS UNIONS, FRATERNAL OR ATHLETIC GROUPS, OR SCHOOL GROUPS?: NO
HOW OFTEN DO YOU GET TOGETHER WITH FRIENDS OR RELATIVES?: THREE TIMES A WEEK
HOW OFTEN DO YOU ATTEND CHURCH OR RELIGIOUS SERVICES?: NEVER

## 2023-01-17 ASSESSMENT — LIFESTYLE VARIABLES
SKIP TO QUESTIONS 9-10: 1
AUDIT-C TOTAL SCORE: 3
HOW OFTEN DO YOU HAVE SIX OR MORE DRINKS ON ONE OCCASION: NEVER
HOW MANY STANDARD DRINKS CONTAINING ALCOHOL DO YOU HAVE ON A TYPICAL DAY: 1 OR 2
HOW OFTEN DO YOU HAVE A DRINK CONTAINING ALCOHOL: 2-3 TIMES A WEEK

## 2023-01-17 NOTE — PATIENT INSTRUCTIONS
For informational purposes only. Not to replace the advice of your health care provider. Copyright   2003,  [employer] [facility]. All rights reserved. Clinically reviewed by Trisha Galdamez MD. [employer] [id number] - REV .  Preparing for Your Surgery  Getting started  A nurse will call you to review your health history and instructions. They will give you an arrival time based on your scheduled surgery time. Please be ready to share:    Your doctor's clinic name and phone number    Your medical, surgical, and anesthesia history    A list of allergies and sensitivities    A list of medicines, including herbal treatments and over-the-counter drugs    Whether the patient has a legal guardian (ask how to send us the papers in advance)  Please tell us if you're pregnant--or if there's any chance you might be pregnant. Some surgeries may injure a fetus (unborn baby), so they require a pregnancy test. Surgeries that are safe for a fetus don't always need a test, and you can choose whether to have one.   If you have a child who's having surgery, please ask for a copy of Preparing for Your Child's Surgery.    Preparing for surgery    Within 10 to 30 days of surgery: Have a pre-op exam (sometimes called an H&P, or History and Physical). This can be done at a clinic or pre-operative center.  ? If you're having a , you may not need this exam. Talk to your care team.    At your pre-op exam, talk to your care team about all medicines you take. If you need to stop any medicines before surgery, ask when to start taking them again.  ? We do this for your safety. Many medicines can make you bleed too much during surgery. Some change how well surgery (anesthesia) drugs work.    Call your insurance company to let them know you're having surgery. (If you don't have insurance, call [phone].)    Call your clinic if there's any change in your health. This includes signs of a cold or flu (sore throat, runny nose,  cough, rash, fever). It also includes a scrape or scratch near the surgery site.    If you have questions on the day of surgery, call your hospital or surgery center.  Eating and drinking guidelines  For your safety: Unless your surgeon tells you otherwise, follow the guidelines below.    Eat and drink as usual until 8 hours before you arrive for surgery. After that, no food or milk.    Drink clear liquids until 2 hours before you arrive. These are liquids you can see through, like water, Gatorade, and Propel Water. They also include plain black coffee and tea (no cream or milk), candy, and breath mints. You can spit out gum when you arrive.    If you drink alcohol: Stop drinking it the night before surgery.    If your care team tells you to take medicine on the morning of surgery, it's okay to take it with a sip of water.  Preventing infection    Shower or bathe the night before and morning of your surgery. Follow the instructions your clinic gave you. (If no instructions, use regular soap.)    Don't shave or clip hair near your surgery site. We'll remove the hair if needed.    Don't smoke or vape the morning of surgery. You may chew nicotine gum up to 2 hours before surgery. A nicotine patch is okay.  ? Note: Some surgeries require you to completely quit smoking and nicotine. Check with your surgeon.    Your care team will make every effort to keep you safe from infection. We will:  ? Clean our hands often with soap and water (or an alcohol-based hand rub).  ? Clean the skin at your surgery site with a special soap that kills germs.  ? Give you a special gown to keep you warm. (Cold raises the risk of infection.)  ? Wear special hair covers, masks, gowns and gloves during surgery.  ? Give antibiotic medicine, if prescribed. Not all surgeries need antibiotics.  What to bring on the day of surgery    Photo ID and insurance card    Copy of your health care directive, if you have one    Glasses and hearing aids (bring  cases)  ? You can't wear contacts during surgery    Inhaler and eye drops, if you use them (tell us about these when you arrive)    CPAP machine or breathing device, if you use them    A few personal items, if spending the night    If you have . . .  ? A pacemaker, ICD (cardiac defibrillator) or other implant: Bring the ID card.  ? An implanted stimulator: Bring the remote control.  ? A legal guardian: Bring a copy of the certified (court-stamped) guardianship papers.  Please remove any jewelry, including body piercings. Leave jewelry and other valuables at home.  If you're going home the day of surgery    You must have a responsible adult drive you home. They should stay with you overnight as well.    If you don't have someone to stay with you, and you aren't safe to go home alone, we may keep you overnight. Insurance often won't pay for this.  After surgery  If it's hard to control your pain or you need more pain medicine, please call your surgeon's office.  Questions?   If you have any questions for your care team, list them here: _________________________________________________________________________________________________________________________________________________________________________ ____________________________________ ____________________________________ ____________________________________

## 2023-01-17 NOTE — PROGRESS NOTES
[facility]  [id number] [place] [id number]  Phone: [phone]  Primary Provider: Luis Feliciano  Pre-op Performing Provider: LUIS FELICIANO      PREOPERATIVE EVALUATION:  Today's date: 1/17/2023    Francois Ly is a 82 year old female who presents for a preoperative evaluation.    Surgical Information:  Surgery/Procedure: Robot-assisted bilateral inguinal hernia repair with mesh  Surgery Location: [facility]  Surgeon: Lalito Strange MD  Surgery Date: 01/24/23  Time of Surgery: 1:10  Where patient plans to recover: At home with family  Fax number for surgical facility: Note does not need to be faxed, will be available electronically in Epic.    Type of Anesthesia Anticipated: General    Assessment & Plan     The proposed surgical procedure is considered INTERMEDIATE risk.    Peripheral vascular disease, unspecified (H)  Asymptomatic, noticed on US in 2019.    Preop general physical exam  Check below for recommendations.   - EKG 12-lead complete w/read - Clinics  - Basic metabolic panel  (Ca, Cl, CO2, Creat, Gluc, K, Na, BUN); Future  - Basic metabolic panel  (Ca, Cl, CO2, Creat, Gluc, K, Na, BUN)    Hyperlipidemia LDL goal <130  Continue on same medications.  - Lipid panel reflex to direct LDL Fasting; Future  - Lipid panel reflex to direct LDL Fasting           Risks and Recommendations:  The patient has the following additional risks and recommendations for perioperative complications:   - No identified additional risk factors other than previously addressed    Medication Instructions:   - aspirin: Discontinue aspirin 7-10 days prior to procedure to reduce bleeding risk. It should be resumed postoperatively.    - Calcium Channel Blockers: May be continued on the day of surgery.   - Diuretics: HOLD on the day of surgery.   - Statins: Continue taking on the day of surgery.     RECOMMENDATION:  APPROVAL GIVEN to proceed with proposed procedure, without further  diagnostic evaluation.            Subjective     HPI related to upcoming procedure: Robot-assisted bilateral inguinal hernia repair with mesh    Preop Questions 1/17/2023   1. Have you ever had a heart attack or stroke? YES - in 2021.   2. Have you ever had surgery on your heart or blood vessels, such as a stent placement, a coronary artery bypass, or surgery on an artery in your head, neck, heart, or legs? No   3. Do you have chest pain with activity? No   4. Do you have a history of  heart failure? No   5. Do you currently have a cold, bronchitis or symptoms of other infection? No   6. Do you have a cough, shortness of breath, or wheezing? No   7. Do you or anyone in your family have previous history of blood clots? No   8. Do you or does anyone in your family have a serious bleeding problem such as prolonged bleeding following surgeries or cuts? No   9. Have you ever had problems with anemia or been told to take iron pills? No   10. Have you had any abnormal blood loss such as black, tarry or bloody stools, or abnormal vaginal bleeding? No   11. Have you ever had a blood transfusion? YES -    11a. Have you ever had a transfusion reaction? No   12. Are you willing to have a blood transfusion if it is medically needed before, during, or after your surgery? Yes   13. Have you or any of your relatives ever had problems with anesthesia? No   14. Do you have sleep apnea, excessive snoring or daytime drowsiness? No   15. Do you have any artifical heart valves or other implanted medical devices like a pacemaker, defibrillator, or continuous glucose monitor? No   16. Do you have artificial joints? No   17. Are you allergic to latex? No       Health Care Directive:  Patient does not have a Health Care Directive or Living Will: Discussed advance care planning with patient; information given to patient to review.    Preoperative Review of :   reviewed - no record of controlled substances prescribed.      Status of  Chronic Conditions:  See problem list for active medical problems.  Problems all longstanding and stable, except as noted/documented.  See ROS for pertinent symptoms related to these conditions.    HYPERLIPIDEMIA - Patient has a long history of significant Hyperlipidemia requiring medication for treatment with recent good control. Patient reports no problems or side effects with the medication.     HYPERTENSION - Patient has longstanding history of HTN , currently denies any symptoms referable to elevated blood pressure. Specifically denies chest pain, palpitations, dyspnea, orthopnea, PND or peripheral edema. Blood pressure readings have been in normal range. Current medication regimen is as listed below. Patient denies any side effects of medication.       Review of Systems  CONSTITUTIONAL: NEGATIVE for fever, chills, change in weight  INTEGUMENTARY/SKIN: NEGATIVE for worrisome rashes, moles or lesions  EYES: NEGATIVE for vision changes or irritation  ENT/MOUTH: NEGATIVE for ear, mouth and throat problems  RESP: NEGATIVE for significant cough or SOB  CV: NEGATIVE for chest pain, palpitations or peripheral edema  GI: NEGATIVE for nausea, abdominal pain, heartburn, or change in bowel habits  : NEGATIVE for frequency, dysuria, or hematuria  MUSCULOSKELETAL: NEGATIVE for significant arthralgias or myalgia  NEURO: NEGATIVE for weakness, dizziness or paresthesias  ENDOCRINE: NEGATIVE for temperature intolerance, skin/hair changes  HEME: NEGATIVE for bleeding problems  PSYCHIATRIC: NEGATIVE for changes in mood or affect    Patient Active Problem List    Diagnosis Date Noted     Bilateral inguinal hernia without obstruction or gangrene, recurrence not specified 09/30/2022     Priority: Medium     Small bowel obstruction (H) 11/22/2021     Priority: Medium     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 (H) 08/27/2021     Priority: Medium     Small multiple infarct of the Rt  parietal and frontal (watershed distribution).       Pancreatic cyst 08/11/2021     Priority: Medium     Cyst of right kidney 08/11/2021     Priority: Medium     Diverticulosis of large intestine without hemorrhage 08/11/2021     Priority: Medium     Degenerative arthritis of thumb, right 02/14/2020     Priority: Medium     Post-nasal drip 12/17/2019     Priority: Medium     Hyperlipidemia LDL goal <130 01/08/2019     Priority: Medium     Status post lumbar spinal fusion 08/13/2018     Priority: Medium     Bilateral low back pain without sciatica, unspecified chronicity 12/19/2017     Priority: Medium     PAD (peripheral artery disease) (H) 06/19/2017     Priority: Medium     SBO (small bowel obstruction) (H) 04/12/2017     Priority: Medium     Benign essential hypertension 10/12/2016     Priority: Medium     Pancreatic mass 08/17/2016     Priority: Medium     Benign appearing, last CT scan 7/2019 was negative for any pancreatic lesion.  Repeat CT in 2021 confirm benign lesion.        Past Medical History:   Diagnosis Date     Benign essential hypertension 10/12/2016     Bilateral low back pain without sciatica, unspecified chronicity 12/19/2017     Blunt trauma of rib, initial encounter 11/25/2016     Dyslipidemia      Gallstones      Hypertension      PAD (peripheral artery disease) (H) 6/19/2017     Pancreatic mass 8/17/2016     SBO (small bowel obstruction) (H)      Slipped intervertebral disc      Past Surgical History:   Procedure Laterality Date     GYN SURGERY      hysterectomy     ORTHOPEDIC SURGERY      Slipped disc with chronic back pain     Current Outpatient Medications   Medication Sig Dispense Refill     amLODIPine (NORVASC) 5 MG tablet Take 1 tablet (5 mg) by mouth daily 90 tablet 3     aspirin (ASA) 81 MG chewable tablet Take 2 tablets (162 mg) by mouth daily       atorvastatin (LIPITOR) 40 MG tablet TAKE 1 TABLET (40 MG) BY MOUTH DAILY 90 tablet 0     hydrochlorothiazide (HYDRODIURIL) 25 MG tablet  "Take 1 tablet (25 mg) by mouth daily 90 tablet 3     KRILL OIL PO Take 1 capsule by mouth daily (OTC: Unsure of strength)       lifitegrast (XIIDRA) 5 % opthalmic solution Place 1 drop into both eyes 2 times daily       UNABLE TO FIND MEDICATION NAME: Pellila seed oil       vitamin D3 (CHOLECALCIFEROL) 50 mcg (2000 units) tablet Take 1 tablet by mouth daily         Allergies   Allergen Reactions     Ace Inhibitors      Sorbitan Trioleate      Sulfonylureas      Vancomycin Hives        Social History     Tobacco Use     Smoking status: Former     Smokeless tobacco: Former     Tobacco comments:     Former \"social\" smoker, quit in 1978.   Substance Use Topics     Alcohol use: Yes     Alcohol/week: 0.0 standard drinks     Comment: occ wine, twice a week       History   Drug Use No         Objective     /82 (BP Location: Right arm, Patient Position: Sitting, Cuff Size: Adult Regular)   Pulse 67   Ht 1.575 m (5' 2\")   Wt 59 kg (130 lb)   LMP  (LMP Unknown)   SpO2 98%   BMI 23.78 kg/m      Physical Exam    GENERAL APPEARANCE: healthy, alert and no distress     EYES: EOMI,  PERRL     HENT: ear canals and TM's normal and nose and mouth without ulcers or lesions     NECK: no adenopathy, no asymmetry, masses, or scars and thyroid normal to palpation     RESP: lungs clear to auscultation - no rales, rhonchi or wheezes     CV: regular rates and rhythm, normal S1 S2, no S3 or S4 and no murmur, click or rub     ABDOMEN:  soft, nontender, no HSM or masses and bowel sounds normal     MS: DJD joints in the hands.      SKIN: no suspicious lesions or rashes     NEURO: Normal strength and tone, sensory exam grossly normal, mentation intact and speech normal     PSYCH: mentation appears normal. and affect normal/bright     LYMPHATICS: No cervical adenopathy    Recent Labs   Lab Test 11/15/22  1155 10/29/22  2036 03/10/22  0920 12/09/21  0635 08/29/21  0901 08/27/21  0755 08/26/21  2320   HGB  --  15.0  --  12.0   < > 11.1* " 12.7   PLT  --  332  --  278   < > 235 270   INR  --   --   --   --   --   --  0.90    135   < > 141   < > 141 136   POTASSIUM 3.0* 3.0*   < > 3.9   < > 3.7 3.3*   CR 0.70 1.01   < > 0.70   < > 0.70 0.76   A1C  --   --   --   --   --  6.2*  --     < > = values in this interval not displayed.        Diagnostics:  Labs pending at this time.  Results will be reviewed when available.   EKG: appears normal, NSR, q waves in the anterior leads, with associated mild ST elevation in V2 and V3, that has not changed from previous EKG's, pt had Lexiscan done in 2020 that was negative for ischemia or infarct.     Revised Cardiac Risk Index (RCRI):  The patient has the following serious cardiovascular risks for perioperative complications:   - Cerebrovascular Disease (TIA or CVA) = 1 point     RCRI Interpretation: 1 point: Class II (low risk - 0.9% complication rate)           Signed Electronically by: Luis Feliciano MD  Copy of this evaluation report is provided to requesting physician.

## 2023-01-18 ENCOUNTER — DOCUMENTATION ONLY (OUTPATIENT)
Dept: LAB | Facility: CLINIC | Age: 83
End: 2023-01-18
Payer: MEDICARE

## 2023-01-18 DIAGNOSIS — E87.6 HYPOKALEMIA: Primary | ICD-10-CM

## 2023-01-18 RX ORDER — POTASSIUM CHLORIDE 1500 MG/1
20 TABLET, EXTENDED RELEASE ORAL DAILY
Qty: 7 TABLET | Refills: 0 | Status: SHIPPED | OUTPATIENT
Start: 2023-01-18 | End: 2023-02-03

## 2023-01-18 NOTE — TELEPHONE ENCOUNTER
Message left to return call to this RN PAL. Please transfer if available.    Direct number left for RN PAL on message for return call along with direct number for this RN PAL.    Mary ROCHE RN, BSN, PHN, PAL (patient advocate liaison)   [facility]  [phone]

## 2023-01-18 NOTE — PROGRESS NOTES
Pt has a lab only coming up and currently no labs. Please review and place future orders.   Questions or concerns, please have your care team contact pt directly.    Thank You  Jammie CHAKRABORTY

## 2023-01-18 NOTE — TELEPHONE ENCOUNTER
Potassium is low, I strongly recommend potassium pills along with recheck labs on Friday to make sure it is getting higher.  Please let me know

## 2023-01-18 NOTE — TELEPHONE ENCOUNTER
RN ARUN spoke to patient and  Sahil     Will  K+ today, and f/u with lab appt on 1/20/23     Tonia Juan, Registered Nurse, PAL (Patient Advocate Liaison)   [facility]   [phone]

## 2023-01-18 NOTE — TELEPHONE ENCOUNTER
I sent for 7 days. Let's check on Friday as her surgery on Tuesday, and I want to make sure it is better before surgery.  She can still do Monday, but make sure it is stat lab.

## 2023-01-18 NOTE — TELEPHONE ENCOUNTER
Dr. Feliciano   Patient agrees to take K+ please send   RN scheduled lab on Monday instead of Friday to give medication time to bring up level   Please route back so RN can notify patient so they go  meds     RN ARUN spoke to patient's  Sahil who discussed the message below with patient     Explained low potassium and the symptoms that this could cause   Patient is having horrible leg cramps, agrees to take the potassium     Lab appt scheduled for 1/23/23 to give time to allow K+ to increase levels     Tonia Juan, Registered Nurse, PAL (Patient Advocate Liaison)   [facility]   [phone]

## 2023-01-20 ENCOUNTER — LAB (OUTPATIENT)
Dept: LAB | Facility: CLINIC | Age: 83
End: 2023-01-20
Payer: MEDICARE

## 2023-01-20 DIAGNOSIS — E87.6 HYPOKALEMIA: ICD-10-CM

## 2023-01-20 LAB
ANION GAP SERPL CALCULATED.3IONS-SCNC: 13 MMOL/L (ref 7–15)
BUN SERPL-MCNC: 10.1 MG/DL (ref 8–23)
CALCIUM SERPL-MCNC: 9.1 MG/DL (ref 8.8–10.2)
CHLORIDE SERPL-SCNC: 103 MMOL/L (ref 98–107)
CREAT SERPL-MCNC: 0.65 MG/DL (ref 0.51–0.95)
DEPRECATED HCO3 PLAS-SCNC: 26 MMOL/L (ref 22–29)
GFR SERPL CREATININE-BSD FRML MDRD: 87 ML/MIN/1.73M2
GLUCOSE SERPL-MCNC: 203 MG/DL (ref 70–99)
POTASSIUM SERPL-SCNC: 3.6 MMOL/L (ref 3.4–5.3)
SODIUM SERPL-SCNC: 142 MMOL/L (ref 136–145)

## 2023-01-20 PROCEDURE — 36415 COLL VENOUS BLD VENIPUNCTURE: CPT

## 2023-01-20 PROCEDURE — 80048 BASIC METABOLIC PNL TOTAL CA: CPT

## 2023-01-21 ENCOUNTER — TELEPHONE (OUTPATIENT)
Dept: FAMILY MEDICINE | Facility: CLINIC | Age: 83
End: 2023-01-21
Payer: MEDICARE

## 2023-01-21 DIAGNOSIS — R73.9 ELEVATED BLOOD SUGAR: Primary | ICD-10-CM

## 2023-01-21 NOTE — TELEPHONE ENCOUNTER
Potassium is better, continue on potassium supplements until it's done, meanwhile, her blood sugar is very high? What did she eat before the test?  Again, repeat labs after surgery in week or so, fasting please.  Ordered).

## 2023-01-23 NOTE — TELEPHONE ENCOUNTER
Dr. Braden SANTIZO   Patient does not remember eating anything prior to the lab visit   Will repeat labs as directed after surgery     RN ARUN spoke to patient's  Sahil     Patient states she did not eat anything prior to blood draw that day that she can remember     Advised lab f/u visit needed,  Sahil will call back to schedule after surgery to ensure patient able to attend lab visit     Tonia Juan Registered Nurse, PAL (Patient Advocate Liaison)   [facility]   [phone]

## 2023-01-24 ENCOUNTER — HOSPITAL ENCOUNTER (OUTPATIENT)
Facility: CLINIC | Age: 83
Discharge: HOME | End: 2023-01-24
Attending: SURGERY | Admitting: SURGERY
Payer: MEDICARE

## 2023-01-24 ENCOUNTER — ANESTHESIA EVENT (OUTPATIENT)
Dept: SURGERY | Facility: CLINIC | Age: 83
End: 2023-01-24
Payer: MEDICARE

## 2023-01-24 ENCOUNTER — ANESTHESIA (OUTPATIENT)
Dept: SURGERY | Facility: CLINIC | Age: 83
End: 2023-01-24
Payer: MEDICARE

## 2023-01-24 ENCOUNTER — APPOINTMENT (OUTPATIENT)
Dept: SURGERY | Facility: PHYSICIAN GROUP | Age: 83
End: 2023-01-24
Payer: MEDICARE

## 2023-01-24 VITALS
BODY MASS INDEX: 23.85 KG/M2 | HEART RATE: 87 BPM | WEIGHT: 129.6 LBS | OXYGEN SATURATION: 93 % | TEMPERATURE: 97.4 F | DIASTOLIC BLOOD PRESSURE: 72 MMHG | RESPIRATION RATE: 16 BRPM | HEIGHT: 62 IN | SYSTOLIC BLOOD PRESSURE: 151 MMHG

## 2023-01-24 DIAGNOSIS — G89.18 ACUTE POST-OPERATIVE PAIN: Primary | ICD-10-CM

## 2023-01-24 DIAGNOSIS — K40.20 BILATERAL INGUINAL HERNIA WITHOUT OBSTRUCTION OR GANGRENE, RECURRENCE NOT SPECIFIED: ICD-10-CM

## 2023-01-24 PROCEDURE — 49650 LAP ING HERNIA REPAIR INIT: CPT | Mod: LT | Performed by: SURGERY

## 2023-01-24 PROCEDURE — 49650 LAP ING HERNIA REPAIR INIT: CPT | Mod: LT | Performed by: PHYSICIAN ASSISTANT

## 2023-01-24 PROCEDURE — 250N000009 HC RX 250: Performed by: NURSE ANESTHETIST, CERTIFIED REGISTERED

## 2023-01-24 PROCEDURE — 250N000025 HC SEVOFLURANE, PER MIN: Performed by: SURGERY

## 2023-01-24 PROCEDURE — 250N000011 HC RX IP 250 OP 636: Performed by: SURGERY

## 2023-01-24 PROCEDURE — S2900 ROBOTIC SURGICAL SYSTEM: HCPCS | Performed by: SURGERY

## 2023-01-24 PROCEDURE — 710N000009 HC RECOVERY PHASE 1, LEVEL 1, PER MIN: Performed by: SURGERY

## 2023-01-24 PROCEDURE — 272N000001 HC OR GENERAL SUPPLY STERILE: Performed by: SURGERY

## 2023-01-24 PROCEDURE — 250N000009 HC RX 250: Performed by: SURGERY

## 2023-01-24 PROCEDURE — 999N000141 HC STATISTIC PRE-PROCEDURE NURSING ASSESSMENT: Performed by: SURGERY

## 2023-01-24 PROCEDURE — 258N000003 HC RX IP 258 OP 636: Performed by: NURSE ANESTHETIST, CERTIFIED REGISTERED

## 2023-01-24 PROCEDURE — 710N000012 HC RECOVERY PHASE 2, PER MINUTE: Performed by: SURGERY

## 2023-01-24 PROCEDURE — C1781 MESH (IMPLANTABLE): HCPCS | Performed by: SURGERY

## 2023-01-24 PROCEDURE — 250N000011 HC RX IP 250 OP 636: Performed by: ANESTHESIOLOGY

## 2023-01-24 PROCEDURE — 370N000017 HC ANESTHESIA TECHNICAL FEE, PER MIN: Performed by: SURGERY

## 2023-01-24 PROCEDURE — 360N000080 HC SURGERY LEVEL 7, PER MIN: Performed by: SURGERY

## 2023-01-24 PROCEDURE — 250N000011 HC RX IP 250 OP 636: Performed by: NURSE ANESTHETIST, CERTIFIED REGISTERED

## 2023-01-24 DEVICE — MESH PROGRIP LAPAROSCOPIC 5.9X3.9" PARIETEX SELF-FIX LPG1510: Type: IMPLANTABLE DEVICE | Site: INGUINAL | Status: FUNCTIONAL

## 2023-01-24 RX ORDER — HYDROMORPHONE HYDROCHLORIDE 1 MG/ML
INJECTION, SOLUTION INTRAMUSCULAR; INTRAVENOUS; SUBCUTANEOUS PRN
Status: DISCONTINUED | OUTPATIENT
Start: 2023-01-24 | End: 2023-01-24

## 2023-01-24 RX ORDER — CEFAZOLIN SODIUM/WATER 2 G/20 ML
2 SYRINGE (ML) INTRAVENOUS SEE ADMIN INSTRUCTIONS
Status: DISCONTINUED | OUTPATIENT
Start: 2023-01-24 | End: 2023-01-24 | Stop reason: HOSPADM

## 2023-01-24 RX ORDER — TRAMADOL HYDROCHLORIDE 50 MG/1
25-50 TABLET ORAL EVERY 6 HOURS PRN
Qty: 10 TABLET | Refills: 0 | Status: SHIPPED | OUTPATIENT
Start: 2023-01-24 | End: 2023-01-27

## 2023-01-24 RX ORDER — FENTANYL CITRATE 50 UG/ML
INJECTION, SOLUTION INTRAMUSCULAR; INTRAVENOUS PRN
Status: DISCONTINUED | OUTPATIENT
Start: 2023-01-24 | End: 2023-01-24

## 2023-01-24 RX ORDER — FENTANYL CITRATE 50 UG/ML
25 INJECTION, SOLUTION INTRAMUSCULAR; INTRAVENOUS EVERY 5 MIN PRN
Status: DISCONTINUED | OUTPATIENT
Start: 2023-01-24 | End: 2023-01-24 | Stop reason: HOSPADM

## 2023-01-24 RX ORDER — SODIUM CHLORIDE, SODIUM LACTATE, POTASSIUM CHLORIDE, CALCIUM CHLORIDE 600; 310; 30; 20 MG/100ML; MG/100ML; MG/100ML; MG/100ML
INJECTION, SOLUTION INTRAVENOUS CONTINUOUS
Status: DISCONTINUED | OUTPATIENT
Start: 2023-01-24 | End: 2023-01-24 | Stop reason: HOSPADM

## 2023-01-24 RX ORDER — CEFAZOLIN SODIUM/WATER 2 G/20 ML
2 SYRINGE (ML) INTRAVENOUS
Status: COMPLETED | OUTPATIENT
Start: 2023-01-24 | End: 2023-01-24

## 2023-01-24 RX ORDER — SODIUM CHLORIDE, SODIUM LACTATE, POTASSIUM CHLORIDE, CALCIUM CHLORIDE 600; 310; 30; 20 MG/100ML; MG/100ML; MG/100ML; MG/100ML
INJECTION, SOLUTION INTRAVENOUS CONTINUOUS PRN
Status: DISCONTINUED | OUTPATIENT
Start: 2023-01-24 | End: 2023-01-24

## 2023-01-24 RX ORDER — MAGNESIUM HYDROXIDE 1200 MG/15ML
LIQUID ORAL PRN
Status: DISCONTINUED | OUTPATIENT
Start: 2023-01-24 | End: 2023-01-24 | Stop reason: HOSPADM

## 2023-01-24 RX ORDER — BUPIVACAINE HYDROCHLORIDE AND EPINEPHRINE 5; 5 MG/ML; UG/ML
INJECTION, SOLUTION PERINEURAL PRN
Status: DISCONTINUED | OUTPATIENT
Start: 2023-01-24 | End: 2023-01-24 | Stop reason: HOSPADM

## 2023-01-24 RX ORDER — PROPOFOL 10 MG/ML
INJECTION, EMULSION INTRAVENOUS CONTINUOUS PRN
Status: DISCONTINUED | OUTPATIENT
Start: 2023-01-24 | End: 2023-01-24

## 2023-01-24 RX ORDER — ACETAMINOPHEN 325 MG/1
650 TABLET ORAL
Status: DISCONTINUED | OUTPATIENT
Start: 2023-01-24 | End: 2023-01-24 | Stop reason: HOSPADM

## 2023-01-24 RX ORDER — HYDROMORPHONE HCL IN WATER/PF 6 MG/30 ML
0.2 PATIENT CONTROLLED ANALGESIA SYRINGE INTRAVENOUS EVERY 5 MIN PRN
Status: DISCONTINUED | OUTPATIENT
Start: 2023-01-24 | End: 2023-01-24 | Stop reason: HOSPADM

## 2023-01-24 RX ORDER — ONDANSETRON 2 MG/ML
4 INJECTION INTRAMUSCULAR; INTRAVENOUS ONCE
Status: COMPLETED | OUTPATIENT
Start: 2023-01-24 | End: 2023-01-24

## 2023-01-24 RX ORDER — EPHEDRINE SULFATE 50 MG/ML
INJECTION, SOLUTION INTRAMUSCULAR; INTRAVENOUS; SUBCUTANEOUS PRN
Status: DISCONTINUED | OUTPATIENT
Start: 2023-01-24 | End: 2023-01-24

## 2023-01-24 RX ORDER — ONDANSETRON 2 MG/ML
4 INJECTION INTRAMUSCULAR; INTRAVENOUS EVERY 30 MIN PRN
Status: DISCONTINUED | OUTPATIENT
Start: 2023-01-24 | End: 2023-01-24 | Stop reason: HOSPADM

## 2023-01-24 RX ORDER — LIDOCAINE HYDROCHLORIDE 20 MG/ML
INJECTION, SOLUTION INFILTRATION; PERINEURAL PRN
Status: DISCONTINUED | OUTPATIENT
Start: 2023-01-24 | End: 2023-01-24

## 2023-01-24 RX ORDER — ONDANSETRON 4 MG/1
4 TABLET, ORALLY DISINTEGRATING ORAL EVERY 30 MIN PRN
Status: DISCONTINUED | OUTPATIENT
Start: 2023-01-24 | End: 2023-01-24 | Stop reason: HOSPADM

## 2023-01-24 RX ORDER — AMOXICILLIN 250 MG
1-2 CAPSULE ORAL 2 TIMES DAILY
Qty: 15 TABLET | Refills: 0 | Status: SHIPPED | OUTPATIENT
Start: 2023-01-24 | End: 2023-03-14

## 2023-01-24 RX ORDER — ONDANSETRON 2 MG/ML
INJECTION INTRAMUSCULAR; INTRAVENOUS PRN
Status: DISCONTINUED | OUTPATIENT
Start: 2023-01-24 | End: 2023-01-24

## 2023-01-24 RX ORDER — HYDROMORPHONE HCL IN WATER/PF 6 MG/30 ML
0.4 PATIENT CONTROLLED ANALGESIA SYRINGE INTRAVENOUS EVERY 5 MIN PRN
Status: DISCONTINUED | OUTPATIENT
Start: 2023-01-24 | End: 2023-01-24 | Stop reason: HOSPADM

## 2023-01-24 RX ORDER — PROPOFOL 10 MG/ML
INJECTION, EMULSION INTRAVENOUS PRN
Status: DISCONTINUED | OUTPATIENT
Start: 2023-01-24 | End: 2023-01-24

## 2023-01-24 RX ORDER — FENTANYL CITRATE 50 UG/ML
50 INJECTION, SOLUTION INTRAMUSCULAR; INTRAVENOUS EVERY 5 MIN PRN
Status: DISCONTINUED | OUTPATIENT
Start: 2023-01-24 | End: 2023-01-24 | Stop reason: HOSPADM

## 2023-01-24 RX ORDER — DEXAMETHASONE SODIUM PHOSPHATE 4 MG/ML
INJECTION, SOLUTION INTRA-ARTICULAR; INTRALESIONAL; INTRAMUSCULAR; INTRAVENOUS; SOFT TISSUE PRN
Status: DISCONTINUED | OUTPATIENT
Start: 2023-01-24 | End: 2023-01-24

## 2023-01-24 RX ADMIN — PHENYLEPHRINE HYDROCHLORIDE 0.5 MCG/KG/MIN: 10 INJECTION INTRAVENOUS at 13:50

## 2023-01-24 RX ADMIN — DEXAMETHASONE SODIUM PHOSPHATE 4 MG: 4 INJECTION, SOLUTION INTRA-ARTICULAR; INTRALESIONAL; INTRAMUSCULAR; INTRAVENOUS; SOFT TISSUE at 13:47

## 2023-01-24 RX ADMIN — HYDROMORPHONE HYDROCHLORIDE 0.5 MG: 1 INJECTION, SOLUTION INTRAMUSCULAR; INTRAVENOUS; SUBCUTANEOUS at 14:01

## 2023-01-24 RX ADMIN — FENTANYL CITRATE 100 MCG: 50 INJECTION, SOLUTION INTRAMUSCULAR; INTRAVENOUS at 13:43

## 2023-01-24 RX ADMIN — SUGAMMADEX 200 MG: 100 INJECTION, SOLUTION INTRAVENOUS at 15:29

## 2023-01-24 RX ADMIN — PROPOFOL 30 MCG/KG/MIN: 10 INJECTION, EMULSION INTRAVENOUS at 13:48

## 2023-01-24 RX ADMIN — LIDOCAINE HYDROCHLORIDE 100 MG: 20 INJECTION, SOLUTION INFILTRATION; PERINEURAL at 13:43

## 2023-01-24 RX ADMIN — SODIUM CHLORIDE, POTASSIUM CHLORIDE, SODIUM LACTATE AND CALCIUM CHLORIDE: 600; 310; 30; 20 INJECTION, SOLUTION INTRAVENOUS at 13:35

## 2023-01-24 RX ADMIN — ROCURONIUM BROMIDE 50 MG: 50 INJECTION, SOLUTION INTRAVENOUS at 13:43

## 2023-01-24 RX ADMIN — ONDANSETRON 4 MG: 2 INJECTION INTRAMUSCULAR; INTRAVENOUS at 13:48

## 2023-01-24 RX ADMIN — ONDANSETRON 4 MG: 2 INJECTION INTRAMUSCULAR; INTRAVENOUS at 17:26

## 2023-01-24 RX ADMIN — ROCURONIUM BROMIDE 10 MG: 50 INJECTION, SOLUTION INTRAVENOUS at 14:48

## 2023-01-24 RX ADMIN — Medication 2 G: at 13:37

## 2023-01-24 RX ADMIN — PROPOFOL 150 MG: 10 INJECTION, EMULSION INTRAVENOUS at 13:43

## 2023-01-24 RX ADMIN — Medication 10 MG: at 13:55

## 2023-01-24 RX ADMIN — SODIUM CHLORIDE, POTASSIUM CHLORIDE, SODIUM LACTATE AND CALCIUM CHLORIDE: 600; 310; 30; 20 INJECTION, SOLUTION INTRAVENOUS at 14:57

## 2023-01-24 RX ADMIN — PROPOFOL 50 MG: 10 INJECTION, EMULSION INTRAVENOUS at 14:02

## 2023-01-24 ASSESSMENT — ACTIVITIES OF DAILY LIVING (ADL)
ADLS_ACUITY_SCORE: 37

## 2023-01-24 ASSESSMENT — LIFESTYLE VARIABLES: TOBACCO_USE: 1

## 2023-01-24 NOTE — BRIEF OP NOTE
[facility]  General Surgery Brief Operative Note    Pre-operative diagnosis: Bilateral inguinal hernia without obstruction or gangrene, recurrence not specified [K40.20]   Post-operative diagnosis Left inguinal hernia and adhesions   Procedure:  Procedure(s):  Robot-assisted left inguinal hernia repair with mesh and lysis of adhesions    Surgeon(s), Assistant(s): Surgeon(s) and Role:     * Lalito Strange MD - Primary     * Lakisha Hoyos PA-C - Assisting     * Eric Briceño PA-C - Assisting   Estimated blood loss: Minimal <10mL   Drains: None   Specimens: * No specimens in log *   Findings: Dense adhesions of small bowel at midline incision. .   Complications:  Condition: None  Stable   Comments:      Lakisha Hoyos PA-C  [facility]         See dictated operative report for full details

## 2023-01-24 NOTE — ANESTHESIA POSTPROCEDURE EVALUATION
Patient: Francois Ly    Procedure: Procedure(s):  Robot-assisted left inguinal hernia repair with mesh  Davinci Lysis of Adhesions       Anesthesia Type:  General    Note:  Disposition: Outpatient   Postop Pain Control: Uneventful            Sign Out: Well controlled pain   PONV: No   Neuro/Psych: Uneventful            Sign Out: Acceptable/Baseline neuro status   Airway/Respiratory: Uneventful            Sign Out: Acceptable/Baseline resp. status   CV/Hemodynamics: Uneventful            Sign Out: Acceptable CV status   Other NRE:    DID A NON-ROUTINE EVENT OCCUR? No           Last vitals:  Vitals Value Taken Time   /87 01/24/23 1645   Temp     Pulse 87 01/24/23 1656   Resp 0 01/24/23 1656   SpO2 93 % 01/24/23 1709   Vitals shown include unvalidated device data.    Electronically Signed By: Maricruz Ramirez  January 24, 2023  5:18 PM

## 2023-01-24 NOTE — ANESTHESIA PREPROCEDURE EVALUATION
Prior to Admission medications    Medication Sig Start Date End Date Taking? Authorizing Provider   amLODIPine (NORVASC) 5 MG tablet Take 1 tablet (5 mg) by mouth daily 3/2/22  Yes Luis Feliciano MD   aspirin (ASA) 81 MG chewable tablet Take 2 tablets (162 mg) by mouth daily 21  Yes Luis Feliciano MD   atorvastatin (LIPITOR) 40 MG tablet TAKE 1 TABLET (40 MG) BY MOUTH DAILY 10/21/22  Yes Luis Feliciano MD   hydrochlorothiazide (HYDRODIURIL) 25 MG tablet Take 1 tablet (25 mg) by mouth daily 3/2/22  Yes Luis Feliciano MD   KRILL OIL PO Take 1 capsule by mouth daily (OTC: Unsure of strength)   Yes Unknown, Entered By History   lifitegrast (XIIDRA) 5 % opthalmic solution Place 1 drop into both eyes 2 times daily   Yes Unknown, Entered By History   potassium chloride ER (KLOR-CON M) 20 MEQ CR tablet Take 1 tablet (20 mEq) by mouth daily 23  Yes Luis Feliciano MD   UNABLE TO FIND MEDICATION NAME: Pellila seed oil   Yes Reported, Patient   vitamin D3 (CHOLECALCIFEROL) 50 mcg (2000 units) tablet Take 1 tablet by mouth daily   Yes Unknown, Entered By History     Current Facility-Administered Medications Ordered in Epic   Medication Dose Route Frequency Last Rate Last Admin     ceFAZolin Sodium (ANCEF) injection 2 g  2 g Intravenous Pre-Op/Pre-procedure x 1 dose         ceFAZolin Sodium (ANCEF) injection 2 g  2 g Intravenous See Admin Instructions         No current [facility]-ordered outpatient medications on file.       No results for input(s): ABO, RH in the last 79408 hours.  No results for input(s): HCG in the last 95329 hours.  No results found for this or any previous visit (from the past 744 hour(s)).Anesthesia Pre-Procedure Evaluation    Patient: Francois Ly   MRN: [id number] : 1940        Procedure : Procedure(s):  Robot-assisted bilateral inguinal hernia repair with mesh          Past Medical History:   Diagnosis Date     Benign essential hypertension 10/12/2016     Bilateral low back pain without sciatica,  "unspecified chronicity 12/19/2017     Blunt trauma of rib, initial encounter 11/25/2016     Dyslipidemia      Gallstones      Hypertension      PAD (peripheral artery disease) (H) 6/19/2017     Pancreatic mass 8/17/2016     SBO (small bowel obstruction) (H)      Slipped intervertebral disc       Past Surgical History:   Procedure Laterality Date     GYN SURGERY      hysterectomy     ORTHOPEDIC SURGERY      Slipped disc with chronic back pain      Allergies   Allergen Reactions     Ace Inhibitors      Sorbitan Trioleate      Sulfonylureas      Vancomycin Hives      Social History     Tobacco Use     Smoking status: Former     Smokeless tobacco: Former     Tobacco comments:     Former \"social\" smoker, quit in 1978.   Substance Use Topics     Alcohol use: Yes     Alcohol/week: 0.0 standard drinks     Comment: occ wine, twice a week      Wt Readings from Last 1 Encounters:   01/24/23 58.8 kg (129 lb 9.6 oz)        Anesthesia Evaluation   Pt has had prior anesthetic.     No history of anesthetic complications       ROS/MED HX  ENT/Pulmonary:     (+) tobacco use, Past use,  (-) sleep apnea   Neurologic:     (+) CVA, without deficits,  TIA: 2 years ago.   Cardiovascular:     (+) hypertension-----    METS/Exercise Tolerance:     Hematologic:       Musculoskeletal:       GI/Hepatic:    (-) GERD   Renal/Genitourinary:       Endo:       Psychiatric/Substance Use:       Infectious Disease:       Malignancy:       Other:            Physical Exam    Airway        Mallampati: II    Neck ROM: full     Respiratory Devices and Support         Dental     Comment: Implant posterior    (+) Multiple crowns, permanant bridges      Cardiovascular   cardiovascular exam normal        (+) murmur       Pulmonary   pulmonary exam normal                OUTSIDE LABS:  CBC:   Lab Results   Component Value Date    WBC 14.7 (H) 10/29/2022    WBC 5.3 12/09/2021    HGB 15.0 10/29/2022    HGB 12.0 12/09/2021    HCT 43.5 10/29/2022    HCT 36.6 " 12/09/2021     10/29/2022     12/09/2021     BMP:   Lab Results   Component Value Date     01/20/2023     01/17/2023    POTASSIUM 3.6 01/20/2023    POTASSIUM 2.8 (L) 01/17/2023    CHLORIDE 103 01/20/2023    CHLORIDE 101 01/17/2023    CO2 26 01/20/2023    CO2 26 01/17/2023    BUN 10.1 01/20/2023    BUN 11.1 01/17/2023    CR 0.65 01/20/2023    CR 0.70 01/17/2023     (H) 01/20/2023     (H) 01/17/2023     COAGS:   Lab Results   Component Value Date    PTT 36 08/26/2021    INR 0.90 08/26/2021     POC: No results found for: BGM, HCG, HCGS  HEPATIC:   Lab Results   Component Value Date    ALBUMIN 4.5 10/29/2022    PROTTOTAL 9.1 (H) 10/29/2022    ALT 32 10/29/2022    AST 21 10/29/2022    ALKPHOS 110 10/29/2022    BILITOTAL 0.8 10/29/2022     OTHER:   Lab Results   Component Value Date    LACT 1.1 12/07/2021    A1C 6.2 (H) 08/27/2021    ARIN 9.1 01/20/2023    LIPASE 88 10/29/2022    TSH 2.51 12/28/2018    CRP <2.9 01/01/2020       Anesthesia Plan    ASA Status:  2   NPO Status:  NPO Appropriate    Anesthesia Type: General.     - Airway: ETT   Induction: Intravenous.   Maintenance: Balanced.   Techniques and Equipment:     - Airway: Video-Laryngoscope         Consents    Anesthesia Plan(s) and associated risks, benefits, and realistic alternatives discussed. Questions answered and patient/representative(s) expressed understanding.    - Discussed:     - Discussed with:  Patient, Spouse         Postoperative Care    Pain management: IV analgesics.   PONV prophylaxis: Ondansetron (or other 5HT-3)     Comments:                Titi Do MD

## 2023-01-24 NOTE — OR NURSING
Up to BR voids light yellow X1- [id number] sats >92% RA- Good PO intake, nausea resolved- pain control adequate 2/10 - Pt and responsible adult verbalize understanding of discharge instructions, denies questions. Up in W/C - transported to door for discharge to home.

## 2023-01-24 NOTE — DISCHARGE INSTRUCTIONS
[facility]  Discharge Instructions: Post-Operative Inguinal Hernia Repair    ACTIVITY  Take frequent, short walks and increase your activity gradually.    Avoid strenuous physical activity or heavy lifting greater than 15 lbs. for 3-4 weeks.  You may climb stairs.  You may drive without restrictions when you are not using any prescription pain medication and feel comfortable in a car.  You may return to work/school when you are comfortable without any prescription pain medication.    WOUND CARE  You may remove your outer dressing or Band-Aids and shower 48 hours after the surgery.  Pat your incisions dry and leave them open to air.  Re-apply dressing (Band-Aids or gauze/tape) as needed for comfort or drainage.  You may have steri-strips (looks like white tape) on your incision.  You may peel off the steri-strips 2 weeks after your surgery if they have not peeled off on their own.  If you have skin glue, it will peel up and fall off on its own.  Do not soak your incisions in a tub or pool for 2 weeks.   Do not apply any lotions, creams, or ointments to your incisions.  A ridge under your incisions is normal and will gradually resolve.    DIET  Start with liquids, then gradually resume your regular diet as tolerated.   Drink plenty of fluids to stay hydrated.    PAIN  Expect some tenderness and discomfort at the incision site(s).  Use the prescribed pain medication at your discretion.  Expect gradual resolution of your pain over several days.  You may take ibuprofen with food (unless you have been told not to) or acetaminophen/Tylenol instead of or in addition to your prescribed pain medication.  However, if you are taking Norco or Percocet, do not take any additional acetaminophen/Tylenol.  Do not drink alcohol or drive while you are taking pain medications.  You may apply ice to your incisions in 20 minute intervals as needed for the next 48 hours.  After that time, consider switching  to heat if you prefer.    EXPECTATIONS  Pain medications can cause constipation.  Limit use when possible.  Take an over the counter or prescribed stool softener/stimulant, such as Colace or Senna, 1-2 times a day with plenty of water.  You may take a mild over the counter laxative, such as Miralax or a suppository, as needed.  You may discontinue these medications once you are having regular bowel movements and/or are no longer taking your narcotic pain medication.   You may have shoulder or upper back discomfort due to the gas used in surgery.  This is temporary and should resolve in 48-72 hours.  Short, frequent walks may help with this.  If you are unable to urinate for 8 hours or feel as though you are not emptying your bladder adequately, we recommend you seek care at an ER or Urgent Care facility for possible catheter placement.     FOLLOW UP  Our office will contact you in approximately 2-3 weeks to check on your progress and answer any questions you may have.  If you are doing well, you will not need to return for a follow up appointment.  If any concerns are identified over the phone, we will help you make an appointment to see a provider.   If you have not received a phone call, have any questions or concerns, or would like to be seen, please call us at [phone] and ask to speak with our nurse.  We are located at [address] [place] [address].    CALL OUR OFFICE -[phone] IF YOU HAVE:   Chills or fever above 101 F.  Increased redness, warmth, or drainage at your incisions.  Significant bleeding.  Pain not relieved by your pain medication or rest.  Increasing pain after the first 48 hours.  Any other concerns or questions.                       Same Day Surgery Discharge Instructions for  Sedation and General Anesthesia     It's not unusual to feel dizzy, light-headed or faint for up to 24 hours after surgery or while taking pain medication.  If you have these symptoms: sit  for a few minutes before standing and have someone assist you when you get up to walk or use the bathroom.    You should rest and relax for the next 24 hours. We recommend you make arrangements to have an adult stay with you for at least 24 hours after your discharge.  Avoid hazardous and strenuous activity.    DO NOT DRIVE any vehicle or operate mechanical equipment for 24 hours following the end of your surgery.  Even though you may feel normal, your reactions may be affected by the medication you have received.    Do not drink alcoholic beverages for 24 hours following surgery.     Slowly progress to your regular diet as you feel able. It's not unusual to feel nauseated and/or vomit after receiving anesthesia.  If you develop these symptoms, drink clear liquids (apple juice, ginger ale, broth, 7-up, etc. ) until you feel better.  If your nausea and vomiting persists for 24 hours, please notify your surgeon.      All narcotic pain medications, along with inactivity and anesthesia, can cause constipation. Drinking plenty of liquids and increasing fiber intake will help.    For any questions of a medical nature, call your surgeon.    Do not make important decisions for 24 hours.    If you had general anesthesia, you may have a sore throat for a couple of days related to the breathing tube used during surgery.  You may use Cepacol lozenges to help with this discomfort.  If it worsens or if you develop a fever, contact your surgeon.     If you feel your pain is not well managed with the pain medications prescribed by your surgeon, please contact your surgeon's office to let them know so they can address your concerns.            **If you have concerns or questions about your procedure,    please contact Dr Strange at  [phone]**

## 2023-01-24 NOTE — ANESTHESIA PROCEDURE NOTES
Airway       Patient location during procedure: OR       Procedure Start/Stop Times: 1/24/2023 1:46 PM  Staff -        Anesthesiologist:  Titi Do MD       CRNA: Shasha Stover APRN CRNA       Performed By: CRNA  Consent for Airway        Urgency: elective  Indications and Patient Condition       Indications for airway management: luis-procedural       Induction type:intravenous       Mask difficulty assessment: 1 - vent by mask    Final Airway Details       Final airway type: endotracheal airway       Successful airway: ETT - single  Endotracheal Airway Details        ETT size (mm): 7.0       Cuffed: yes       Successful intubation technique: video laryngoscopy       VL Blade Size: Glidescope 3       Grade View of Cords: 1       Adjucts: stylet       Position: Right       Measured from: lips       Secured at (cm): 21       Bite block used: None    Post intubation assessment        Placement verified by: capnometry, equal breath sounds and chest rise        Number of attempts at approach: 1       Number of other approaches attempted: 0       Secured with: pink tape       Ease of procedure: easy       Dentition: Intact and Unchanged    Medication(s) Administered   Medication Administration Time: 1/24/2023 1:46 PM

## 2023-01-24 NOTE — ANESTHESIA CARE TRANSFER NOTE
Patient: Francois Ly    Procedure: Procedure(s):  Robot-assisted left inguinal hernia repair with mesh  Davinci Lysis of Adhesions       Diagnosis: Bilateral inguinal hernia without obstruction or gangrene, recurrence not specified [K40.20]  Diagnosis Additional Information: No value filed.    Anesthesia Type:   General     Note:    Oropharynx: oropharynx clear of all foreign objects  Level of Consciousness: awake  Oxygen Supplementation: face mask  Level of Supplemental Oxygen (L/min / FiO2): 6    Dentition: dentition unchanged      Patient transferred to: PACU    Handoff Report: Identifed the Patient, Identified the Reponsible Provider, Reviewed the pertinent medical history, Discussed the surgical course, Reviewed Intra-OP anesthesia mangement and issues during anesthesia, Set expectations for post-procedure period and Allowed opportunity for questions and acknowledgement of understanding      Vitals:  Vitals Value Taken Time   BP     Temp     Pulse 90 01/24/23 1557   Resp 11 01/24/23 1557   SpO2 98 % 01/24/23 1557   Vitals shown include unvalidated device data.    Electronically Signed By: CHERELLE Gibson CRNA  January 24, 2023  3:58 PM

## 2023-01-25 DIAGNOSIS — I63.321 CEREBROVASCULAR ACCIDENT (CVA) DUE TO THROMBOSIS OF RIGHT ANTERIOR CEREBRAL ARTERY (H): ICD-10-CM

## 2023-01-25 RX ORDER — ATORVASTATIN CALCIUM 40 MG/1
TABLET, FILM COATED ORAL
Qty: 90 TABLET | Refills: 0 | Status: SHIPPED | OUTPATIENT
Start: 2023-01-25 | End: 2023-05-05

## 2023-01-25 NOTE — OP NOTE
General Surgery Operative Note    PREOPERATIVE DIAGNOSIS: Left inguinal hernia, extensive intra-abdominal adhesions    POSTOPERATIVE DIAGNOSIS: Same    PROCEDURE:   Procedure(s):  Robot-assisted left inguinal hernia repair with mesh  Davinci Lysis of Adhesions    ANESTHESIA:  General.    PREOPERATIVE MEDICATIONS: Ancef    SURGEON:  Lalito Strange MD    ASSISTANT:  Lakisha Hoyos PA-C.  Assistant was required owing to challenging exposure and need for retraction.    INDICATIONS: Patient presented to clinic with a palpable, clinically apparent left inguinal hernia.  Some question of right inguinal hernia.  She now presents for robot-assisted left inguinal hernia repair    PROCEDURE:  The patient was taken to the operating suite and uneventfully endotracheally intubated.  The abdomen was prepped and draped in a sterile fashion.  Surgeon initiated timeout was acknowledged.  We entered the abdomen in the left upper quadrant using a 5 mm Visiport technique.  Two 8 mm reusable trochars were placed across the upper abdominal midline.  The 5 mm trocar was swapped out for an 8 mm reusable.  The robot was docked and targeted toward the symphysis pubis.  A grasper and monopolar scissors were inserted into the abdomen and the abdomen was insufflated.  We immediately encountered significant intra-abdominal adhesions.  There was a single loop of bowel that is densely adherent to the anterior abdominal wall along her previous lower midline scar.  This took a significant amount of time to carefully  from the anterior abdominal wall, leaving some portion of peritoneum and muscle on the small bowel loop.  There were also extensive omental adhesions in the right lower quadrant which were taken down with sharp and blunt dissection.  We inspected the loop of bowel that had been taken down from the adhesions and felt there may have been a possibility of serosal injury.  We elected to oversew this with several interrupted 3-0 Vicryl  sutures.  Once we felt comfortable with the appearance of the bowel, we proceeded with the hernia repair.  We inspected the right and did not see an obvious right inguinal hernia.  We then proceeded with the left inguinal hernia repair.  We identified an area at the midpoint between the symphysis pubis and the umbilicus and began creating a plane in the preperitoneal space.  This was taken in continuity with the posterior rectus medially out toward the anterior superior iliac crest.  Care was taken not to injure the inferior epigastric vessels.  We continued this dissection down toward the symphysis pubis and exposed Federico's ligament.  We encountered the hernia sac entering the hernia defect and were able to get around this.  We carefully began reducing the hernia sac and  it from the round ligament, which was divided and reduced..  Once this was reduced we continued opening up the space until we had appropriate room for our mesh.      We then set about placing our mesh.  A 10 x 15 cm piece of progrip mesh was inserted into our preperitoneal space.  This was deployed from top to bottom such that the medial corner overlapped Federico's ligament and crossed over midline.  This was specifically placed so as to cover the direct, indirect, and femoral hernia sites.  The mesh laid flat and we made sure that peritoneal edge was not close to the inferior edge of the mesh.  We then set about closing our preperitoneal flap.  This was accomplished with a running 2-0 Stratafix suture.  Once the flap was reapproximated, we used a 3-0 Vicryl to close any defects which were noted along the postoperative right that looks acute immediately think if that were down body of the no flap.    At the completion of the mesh positioning and flap closure, robotic instruments were removed and the trochars were removed from the abdomen under laparoscopic visualization.  The robot was undocked.  The skin edges were reapproximated with  4-0 Vicryl and Steri-Strips.  The patient was uneventfully extubated, awakened and taken to the PACU in stable condition.  At the conclusion of the case, all lap and needle counts were correct.      ESTIMATED BLOOD LOSS: 5 mL    INTRAOPERATIVE FINDINGS:  Extensive intra-abdominal adhesions.  Left inguinal hernia.    Lalito Strange MD, MD       abdomen soft, non-tender, and non-distended. Bowel sounds present. abdomen soft, and non-distended. Bowel sounds present. TTP LLQ with mild rebound.

## 2023-01-25 NOTE — TELEPHONE ENCOUNTER
Prescription approved per [facility] Refill Protocol.    Ofe Phillips RN   PAL (Patient Advocate Liason)  [facility]

## 2023-01-31 ENCOUNTER — PATIENT OUTREACH (OUTPATIENT)
Dept: FAMILY MEDICINE | Facility: CLINIC | Age: 83
End: 2023-01-31
Payer: MEDICARE

## 2023-01-31 NOTE — TELEPHONE ENCOUNTER
RN PAL received call from patient's  Sahil     Patient needs to schedule lab only appt to recheck K+   Had to take 7 days of K+ pills prior to surgery - they were very hard for her to take     RN advised that after lab results, if they determine the need to continue can discuss using the packets instead with Dr. Feliciano     Scheduled for lab only 2/2 1:30    Tonia Juan, Registered Nurse, PAL (Patient Advocate Liaison)   [facility]   [phone]

## 2023-02-02 ENCOUNTER — LAB (OUTPATIENT)
Dept: LAB | Facility: CLINIC | Age: 83
End: 2023-02-02
Payer: MEDICARE

## 2023-02-02 DIAGNOSIS — R73.9 ELEVATED BLOOD SUGAR: ICD-10-CM

## 2023-02-02 LAB
ANION GAP SERPL CALCULATED.3IONS-SCNC: 12 MMOL/L (ref 7–15)
BUN SERPL-MCNC: 10.3 MG/DL (ref 8–23)
CALCIUM SERPL-MCNC: 9.4 MG/DL (ref 8.8–10.2)
CHLORIDE SERPL-SCNC: 98 MMOL/L (ref 98–107)
CREAT SERPL-MCNC: 0.66 MG/DL (ref 0.51–0.95)
DEPRECATED HCO3 PLAS-SCNC: 29 MMOL/L (ref 22–29)
GFR SERPL CREATININE-BSD FRML MDRD: 87 ML/MIN/1.73M2
GLUCOSE SERPL-MCNC: 129 MG/DL (ref 70–99)
HBA1C MFR BLD: 6.8 % (ref 0–5.6)
POTASSIUM SERPL-SCNC: 3 MMOL/L (ref 3.4–5.3)
SODIUM SERPL-SCNC: 139 MMOL/L (ref 136–145)

## 2023-02-02 PROCEDURE — 80048 BASIC METABOLIC PNL TOTAL CA: CPT

## 2023-02-02 PROCEDURE — 36415 COLL VENOUS BLD VENIPUNCTURE: CPT

## 2023-02-02 PROCEDURE — 83036 HEMOGLOBIN GLYCOSYLATED A1C: CPT

## 2023-02-03 ENCOUNTER — TELEPHONE (OUTPATIENT)
Dept: FAMILY MEDICINE | Facility: CLINIC | Age: 83
End: 2023-02-03
Payer: MEDICARE

## 2023-02-03 DIAGNOSIS — E87.6 HYPOKALEMIA: ICD-10-CM

## 2023-02-03 RX ORDER — POTASSIUM CHLORIDE 1.5 G/1.58G
20 POWDER, FOR SOLUTION ORAL DAILY
Qty: 100 PACKET | Refills: 3 | Status: SHIPPED | OUTPATIENT
Start: 2023-02-03 | End: 2023-07-06

## 2023-02-03 NOTE — TELEPHONE ENCOUNTER
RN PAL spoke to patient's  Sahil     Discussed below message from Dr. Feliciano     - will  K+ packets   - f/u appt scheduled for DM 3/14/2023 @ 4:10    Tonia Juan Registered Nurse, PAL (Patient Advocate Liaison)   [facility]   [phone]

## 2023-02-03 NOTE — TELEPHONE ENCOUNTER
Dr. Feliciano   Before RN calls patient back - we were following up on K+   Potassium   Date Value Ref Range Status   02/02/2023 3.0 (L) 3.4 - 5.3 mmol/L Final   10/29/2022 3.0 (L) 3.4 - 5.3 mmol/L Final   09/15/2020 3.4 3.4 - 5.3 mmol/L Final     Does patient need to continue with potassium supplements?   If so she needs the packets ordered as unable to swallow the tablets     Tonia Juan, Registered Nurse, PAL (Patient Advocate Liaison)   [facility]   [phone]

## 2023-02-08 ENCOUNTER — TELEPHONE (OUTPATIENT)
Dept: SURGERY | Facility: CLINIC | Age: 83
End: 2023-02-08
Payer: MEDICARE

## 2023-02-08 NOTE — TELEPHONE ENCOUNTER
[facility]  Post op call note - No Answer    Francois Ly was called for an update regarding her recovery.  There was no answer and a message was left instructing the patient to call the office with any questions or concerns.     Eric Briceño PA-C

## 2023-02-10 NOTE — TELEPHONE ENCOUNTER
Patient spouse, Sahil, returning call from PA yesterday.  They would like to discuss when patient is able to do more activities? Stomach exercises?    Please call to advise.    [phone]  OK to leave VM

## 2023-02-10 NOTE — TELEPHONE ENCOUNTER
"Procedure: robot-assisted left inguinal hernia repair with mesh; Davinci lysis of adhesions    Date: 01/24/2023    Surgeon: Alexandr    Patient's  returning PA follow up call    He reports that overall she is doing well. She is still having some \"stomach\" pain with certain foods, so she is maintaining a more bland diet.    Urinating and having bowel movements    Some soreness in the area still.    Wondering when she can begin resuming abdominal exercises.    Advised her to wait until four weeks post operatively prior to resuming abdominal exercises, especially because she is still experiencing some post operative discomfort    After four weeks post op, okay to gradually increase and resume all activities. Soreness expected. But, if patient experiences pain, she should stop.    Okay to remove steri strips at this time. Okay to submerge incisions.    Patient's  verbalized understanding of conversation. All questions answered    They will call CHERISE Izaguirre RN-BSN    "

## 2023-03-13 ENCOUNTER — PATIENT OUTREACH (OUTPATIENT)
Dept: FAMILY MEDICINE | Facility: CLINIC | Age: 83
End: 2023-03-13
Payer: MEDICARE

## 2023-03-13 NOTE — TELEPHONE ENCOUNTER
RN received call from patient's  Sahil (C2C on file)     Patient scheduled for visit on 3/14/2023 for diabetic discussion with pcp wondering if patient needs to be fasting?   Lipid panel was just completed in January 2023, visit is later in the afternoon RN advised no need to fast     Tonia Juan, Registered Nurse, PAL (Patient Advocate Liaison)   [facility]   [phone]

## 2023-03-14 ENCOUNTER — OFFICE VISIT (OUTPATIENT)
Dept: FAMILY MEDICINE | Facility: CLINIC | Age: 83
End: 2023-03-14
Payer: MEDICARE

## 2023-03-14 VITALS
HEART RATE: 73 BPM | TEMPERATURE: 97.8 F | WEIGHT: 130.38 LBS | HEIGHT: 62 IN | DIASTOLIC BLOOD PRESSURE: 62 MMHG | OXYGEN SATURATION: 98 % | SYSTOLIC BLOOD PRESSURE: 118 MMHG | RESPIRATION RATE: 20 BRPM | BODY MASS INDEX: 23.99 KG/M2

## 2023-03-14 DIAGNOSIS — E11.9 TYPE 2 DIABETES MELLITUS WITHOUT COMPLICATION, WITHOUT LONG-TERM CURRENT USE OF INSULIN (H): Primary | ICD-10-CM

## 2023-03-14 PROCEDURE — 82043 UR ALBUMIN QUANTITATIVE: CPT | Performed by: FAMILY MEDICINE

## 2023-03-14 PROCEDURE — 82570 ASSAY OF URINE CREATININE: CPT | Performed by: FAMILY MEDICINE

## 2023-03-14 PROCEDURE — 99213 OFFICE O/P EST LOW 20 MIN: CPT | Performed by: FAMILY MEDICINE

## 2023-03-14 PROCEDURE — 99207 PR FOOT EXAM NO CHARGE: CPT | Performed by: FAMILY MEDICINE

## 2023-03-14 NOTE — PROGRESS NOTES
Assessment & Plan     Type 2 diabetes mellitus without complication, without long-term current use of insulin (H)  New onset, discussed options for treatment, pt opted to do diet and exercise only, and recheck in 3 months,   - Albumin Random Urine Quantitative with Creat Ratio; Future  - FOOT EXAM  - Albumin Random Urine Quantitative with Creat Ratio             Follow up in 3 months.       Luis Feliciano MD  [facility] NINA Parrish is a 82 year old accompanied by her spouse, presenting for the following health issues:  Diabetes      History of Present Illness       Reason for visit:  Consontation    She eats 4 or more servings of fruits and vegetables daily.She consumes 1 sweetened beverage(s) daily.She exercises with enough effort to increase her heart rate 20 to 29 minutes per day.  She exercises with enough effort to increase her heart rate 3 or less days per week.   She is taking medications regularly.       Diabetes evaluation     Diabetes Follow-up      How often are you checking your blood sugar? Not at all    What concerns do you have today about your diabetes? Pt has been eating lot sof sweets lately.      Do you have any of these symptoms? (Select all that apply)  No numbness or tingling in feet.  No redness, sores or blisters on feet.  No complaints of excessive thirst.  No reports of blurry vision.  No significant changes to weight.    Have you had a diabetic eye exam in the last 12 months? No        BP Readings from Last 2 Encounters:   03/14/23 118/62   01/24/23 (!) 151/72     Hemoglobin A1C (%)   Date Value   02/02/2023 6.8 (H)   08/27/2021 6.2 (H)   08/09/2009 6.0     LDL Cholesterol Calculated (mg/dL)   Date Value   01/17/2023 99   08/27/2021 83   09/29/2020 126 (H)   09/20/2019 154 (H)           Review of Systems         Objective    /62 (BP Location: Right arm, Patient Position: Sitting, Cuff Size: Adult Small)   Pulse 73   Temp 97.8  F (36.6  C) (Oral)   " Resp 20   Ht 1.575 m (5' 2\")   Wt 59.1 kg (130 lb 6 oz)   LMP  (LMP Unknown)   SpO2 98%   BMI 23.85 kg/m    Body mass index is 23.85 kg/m .  Physical Exam   GENERAL: healthy, alert and no distress  RESP: lungs clear to auscultation - no rales, rhonchi or wheezes  CV: regular rate and rhythm, normal S1 S2, no S3 or S4, no murmur, click or rub, no peripheral edema and peripheral pulses strong  Diabetic foot exam: normal DP and PT pulses (on doppler), no trophic changes or ulcerative lesions and normal sensory exam                    "

## 2023-03-15 LAB
CREAT UR-MCNC: 58.8 MG/DL
MICROALBUMIN UR-MCNC: 82 MG/L
MICROALBUMIN/CREAT UR: 139.46 MG/G CR (ref 0–25)

## 2023-03-16 ENCOUNTER — TELEPHONE (OUTPATIENT)
Dept: FAMILY MEDICINE | Facility: CLINIC | Age: 83
End: 2023-03-16
Payer: MEDICARE

## 2023-03-16 DIAGNOSIS — I10 BENIGN ESSENTIAL HYPERTENSION: Primary | ICD-10-CM

## 2023-03-17 NOTE — TELEPHONE ENCOUNTER
RN PAL spoke to patient's  Sahil AMEZQUITA on file     Reviewed message below, patient has an appt on 6/14/2023 with pcp for f/u and will repeat lab at that time     Tonia Juan, Registered Nurse, PAL (Patient Advocate Liaison)   [facility]   [phone]

## 2023-03-20 DIAGNOSIS — I10 BENIGN ESSENTIAL HYPERTENSION: ICD-10-CM

## 2023-03-22 RX ORDER — AMLODIPINE BESYLATE 5 MG/1
5 TABLET ORAL DAILY
Qty: 90 TABLET | Refills: 1 | Status: SHIPPED | OUTPATIENT
Start: 2023-03-22 | End: 2023-07-06

## 2023-03-22 RX ORDER — HYDROCHLOROTHIAZIDE 25 MG/1
25 TABLET ORAL DAILY
Qty: 90 TABLET | Refills: 1 | Status: SHIPPED | OUTPATIENT
Start: 2023-03-22 | End: 2023-07-06

## 2023-03-22 NOTE — TELEPHONE ENCOUNTER
Routing refill request to provider for review/approval because:  Normal serum potassium on file in past 12 months    Potassium   Date Value Ref Range Status   02/02/2023 3.0 (L) 3.4 - 5.3 mmol/L Final   10/29/2022 3.0 (L) 3.4 - 5.3 mmol/L Final   09/15/2020 3.4 3.4 - 5.3 mmol/L Final     Tonia Juan Registered Nurse  [facility]

## 2023-04-07 ENCOUNTER — TRANSFERRED RECORDS (OUTPATIENT)
Dept: HEALTH INFORMATION MANAGEMENT | Facility: CLINIC | Age: 83
End: 2023-04-07

## 2023-04-15 ENCOUNTER — HEALTH MAINTENANCE LETTER (OUTPATIENT)
Age: 83
End: 2023-04-15

## 2023-05-04 DIAGNOSIS — I63.321 CEREBROVASCULAR ACCIDENT (CVA) DUE TO THROMBOSIS OF RIGHT ANTERIOR CEREBRAL ARTERY (H): ICD-10-CM

## 2023-05-05 RX ORDER — ATORVASTATIN CALCIUM 40 MG/1
TABLET, FILM COATED ORAL
Qty: 90 TABLET | Refills: 2 | Status: SHIPPED | OUTPATIENT
Start: 2023-05-05 | End: 2024-07-08

## 2023-05-31 ENCOUNTER — TELEPHONE (OUTPATIENT)
Dept: FAMILY MEDICINE | Facility: CLINIC | Age: 83
End: 2023-05-31
Payer: MEDICARE

## 2023-05-31 DIAGNOSIS — E87.5 HYPERKALEMIA: Primary | ICD-10-CM

## 2023-05-31 NOTE — TELEPHONE ENCOUNTER
Order/Referral Request    Who is requesting: patient's      Orders being requested:patient has low potassium - they are leaving the country on 6/11/2023 for at least 2 weeks and would like it checked before they leave     Reason service is needed/diagnosis: taking potassium chloride orally for low pottasium    When are orders needed by: before June 11th    Has this been discussed with Provider: Yes    Does patient have a preference on a Group/Provider/Facility? No     Does patient have an appointment scheduled?: Yes: annual physical on 7/6/2023    Where to send orders: Place orders within Epic    Could we send this information to you in [facility] or would you prefer to receive a phone call?:   Patient would prefer a phone call   Okay to leave a detailed message?: Yes at Cell number on file:    Telephone Information:   Mobile [phone]

## 2023-05-31 NOTE — TELEPHONE ENCOUNTER
Dr. Feliciano   Patient leaving out of country and would like potassium checked prior to leaving   Would you like to order?     Thank you,   Tonia Juan, Registered Nurse, PAL (Patient Advocate Liaison)   [facility]   [phone]

## 2023-05-31 NOTE — TELEPHONE ENCOUNTER
RN ARUN spoke to  Sahil     Advised lab was ordered, assisted with scheduling lab only appt 6/1/2023    Patient will be going to [facility] for her highschool and College reunions     Tonia Juan, Registered Nurse, PAL (Patient Advocate Liaison)   [facility]   [phone]

## 2023-06-01 ENCOUNTER — LAB (OUTPATIENT)
Dept: LAB | Facility: CLINIC | Age: 83
End: 2023-06-01
Payer: MEDICARE

## 2023-06-01 DIAGNOSIS — I10 BENIGN ESSENTIAL HYPERTENSION: ICD-10-CM

## 2023-06-01 DIAGNOSIS — E87.5 HYPERKALEMIA: ICD-10-CM

## 2023-06-01 DIAGNOSIS — E11.9 DIABETES MELLITUS, TYPE 2 (H): Primary | ICD-10-CM

## 2023-06-01 LAB
ANION GAP SERPL CALCULATED.3IONS-SCNC: 11 MMOL/L (ref 7–15)
BUN SERPL-MCNC: 16.7 MG/DL (ref 8–23)
CALCIUM SERPL-MCNC: 9.2 MG/DL (ref 8.8–10.2)
CHLORIDE SERPL-SCNC: 103 MMOL/L (ref 98–107)
CREAT SERPL-MCNC: 0.75 MG/DL (ref 0.51–0.95)
CREAT UR-MCNC: 101 MG/DL
DEPRECATED HCO3 PLAS-SCNC: 26 MMOL/L (ref 22–29)
GFR SERPL CREATININE-BSD FRML MDRD: 79 ML/MIN/1.73M2
GLUCOSE SERPL-MCNC: 117 MG/DL (ref 70–99)
HBA1C MFR BLD: 6.4 % (ref 0–5.6)
MICROALBUMIN UR-MCNC: 127 MG/L
MICROALBUMIN/CREAT UR: 125.74 MG/G CR (ref 0–25)
POTASSIUM SERPL-SCNC: 4.2 MMOL/L (ref 3.4–5.3)
SODIUM SERPL-SCNC: 140 MMOL/L (ref 136–145)

## 2023-06-01 PROCEDURE — 36415 COLL VENOUS BLD VENIPUNCTURE: CPT

## 2023-06-01 PROCEDURE — 82043 UR ALBUMIN QUANTITATIVE: CPT

## 2023-06-01 PROCEDURE — 80048 BASIC METABOLIC PNL TOTAL CA: CPT

## 2023-06-01 PROCEDURE — 83036 HEMOGLOBIN GLYCOSYLATED A1C: CPT

## 2023-06-01 PROCEDURE — 82570 ASSAY OF URINE CREATININE: CPT

## 2023-06-21 ENCOUNTER — VIRTUAL VISIT (OUTPATIENT)
Dept: FAMILY MEDICINE | Facility: CLINIC | Age: 83
End: 2023-06-21
Payer: MEDICARE

## 2023-06-21 ENCOUNTER — NURSE TRIAGE (OUTPATIENT)
Dept: FAMILY MEDICINE | Facility: CLINIC | Age: 83
End: 2023-06-21

## 2023-06-21 DIAGNOSIS — U07.1 INFECTION DUE TO 2019 NOVEL CORONAVIRUS: Primary | ICD-10-CM

## 2023-06-21 DIAGNOSIS — E11.9 TYPE 2 DIABETES MELLITUS WITHOUT COMPLICATION, WITHOUT LONG-TERM CURRENT USE OF INSULIN (H): ICD-10-CM

## 2023-06-21 PROCEDURE — 99213 OFFICE O/P EST LOW 20 MIN: CPT | Mod: 95 | Performed by: PHYSICIAN ASSISTANT

## 2023-06-21 NOTE — PATIENT INSTRUCTIONS
"For duration of Paxlovid treatment:  Take amlodipine 2.5 mg daily (cut 5 mg tablets in half)  Stop atorvastatin for duration of treatment and for 3 days after treatment of Paxlovid ends.    Based on your risks, I have prescribed an antiviral medication that may help to reduce your risk of hospitalization due to COVID-19.  This medication is called \"Paxlovid\" and is being prescribed to:    [facility] [phone] [address] [facility], [address] [address]    Hours:  Mon-Fri: 8:00a - 6:00p  Sat-Sun: 8:00a - 4:00p    Drive-thru services available..     Please call the pharmacy before going to verify that they have the medication on hand.  If they do not, they can tell you which pharmacy you may go to.      There are multiple potential drug interactions with Paxlovid. Your other medications may need to be adjusted or held in order to safely take Paxlovid. You can request to talk with the pharmacist about other potential drug interactions. Please have an updated list of medications you are taking.    "

## 2023-06-21 NOTE — PROGRESS NOTES
"Francois is a 83 year old who is being evaluated via a billable telephone visit.      What phone number would you like to be contacted at? [phone]  How would you like to obtain your AVS? Deidra    Distant Location (provider location):  On-site    Assessment & Plan     Infection due to 2019 novel coronavirus  Discussed risks and benefits including side effects. She does meet requirements for treatment with Paxlovid as noted below.  - nirmatrelvir and ritonavir (PAXLOVID) 300 mg/100 mg therapy pack; Take 3 tablets by mouth 2 times daily for 5 days (Take 2 Nirmatrelvir tablets and 1 Ritonavir tablet twice daily for 5 days)    Type 2 diabetes mellitus without complication, without long-term current use of insulin (H)        Prescription drug management  20 minutes spent by me on the date of the encounter doing chart review, history and exam, documentation and further activities per the note       COVID-19 positive patient.  Encounter for consideration of medication intervention. Patient does qualify for a prescription. Full discussion with patient including medication options, risks and benefits. Potential drug interactions reviewed with patient.     Treatment Planned Paxlovid, Rx sent to [facility]  [facility] [phone]    [address] [place] [address], [address] [address]    Hours:  Mon-Fri: 8:00a - 6:00p  Sat-Sun: 8:00a - 4:00p    Drive-thru services available.  Temporary change to home medications: take 2.5 mg of amlodipine daily instead of 5 mg and stop atorvastatin for duration of treatment as well as 3 days after.    Estimated body mass index is 23.85 kg/m  as calculated from the following:    Height as of 3/14/23: 1.575 m (5' 2\").    Weight as of 3/14/23: 59.1 kg (130 lb 6 oz).  GFR Estimate   Date Value Ref Range Status   06/01/2023 79 >60 mL/min/1.73m2 Final     Comment:     eGFR calculated using 2021 CKD-EPI equation.   09/15/2020 69 >60 mL/min/[1.73_m2] Final     Comment:     Non "  GFR Calc  Starting 12/18/2018, serum creatinine based estimated GFR (eGFR) will be   calculated using the Chronic Kidney Disease Epidemiology Collaboration   (CKD-EPI) equation.       Lab Results   Component Value Date    [id number] Negative 12/07/2021       MARICRUZ [facility] NINA Parrish is a 83 year old, presenting for the following health issues:  Covid Positive    HPI       COVID-19 Symptom Review  How many days ago did these symptoms start? Friday the 16th, Positive COVID testing same day  Are any of the following symptoms significant for you?    New or worsening difficulty breathing? No    Worsening cough? Yes, it's a dry cough.    Fever or chills? No    Headache: No    Sore throat: No    Chest pain: No    Diarrhea: No    Body aches? No    What treatments has patient tried? Has not tried anything   Does patient live in a nursing home, group home, or shelter? No  Does patient have a way to get food/medications during quarantined? Yes, I have a friend or family member who can help me.            Review of Systems   GENERAL:  No fevers        Objective           Vitals:  No vitals were obtained today due to virtual visit.    Physical Exam   healthy, alert and no distress  PSYCH: Alert and oriented times; coherent speech, normal   rate and volume, able to articulate logical thoughts, able   to abstract reason, no tangential thoughts, no hallucinations   or delusions  Her affect is normal  RESP: No cough, no audible wheezing, able to talk in full sentences  Remainder of exam unable to be completed due to telephone visits          Phone call duration: 15 minutes

## 2023-06-21 NOTE — TELEPHONE ENCOUNTER
RN unable to treat per protocol, scheduled visit with YASH Crenshaw  6/21/2023 today at 12:40 to discuss treatment     RN COVID TREATMENT VISIT  06/21/23    The patient has been triaged and does not require a higher level of care.    Francois Ly  83 year old  Current weight? 120lb     Has the patient been seen by a primary care provider at an [facility] or [facility] within the past two years? Yes.   Have you been in close proximity to/do you have a known exposure to a person with a confirmed case of influenza? No.     General treatment eligibility:  Date of positive COVID test (PCR or at home)?  6/16/23 and 5/21/23     Are you or have you been hospitalized for this COVID-19 infection? No.   Have you received monoclonal antibodies or antiviral treatment for COVID-19 since this positive test? No.   Do you have any of the following conditions that place you at risk of being very sick from COVID-19?   - Age 50 years or older  - Cerebrovascular disease, any history of stroke or transient ischemic attack (TIA)   - Diabetes mellitus, type 1 and type 2  - Heart conditions such as cardiomyopathies, congenital heart defects, coronary artery disease, heart arrhythmias, heart failure, hypertension, valve disorders   - Current or former smoker  Yes, patient has at least one high risk condition as noted above.     Current COVID symptoms:   - cough  - fatigue  Yes. Patient has at least one symptom as selected.     How many days since symptoms started? 5 days or less. Established patient, 12 years or older weighing at least 88.2 lbs, who has symptoms that started in the past 5 days, has not been hospitalized nor received treatment already, and is at risk for being very sick from COVID-19.     Treatment eligibility by RN:    Are you currently pregnant or nursing? No    Do you have a clinically significant hypersensitivity to nirmatrelvir or ritonavir, or toxic epidermal necrolysis (TEN) or  Velazquez-Felix Syndrome? No    Do you have a history of hepatitis, any hepatic impairment on the Problem List (such as Child-Muller Class C, cirrhosis, fatty liver disease, alcoholic liver disease), or was the last liver lab (hepatic panel, ALT, AST, ALK Phos, bilirubin) elevated in the past 6 months? No    Do you have any history of severe renal impairment (eGFR < 30mL/min)? No     Is patient eligible to continue?   Yes, patient meets all eligibility requirements for the RN COVID treatment (as denoted by all no responses above).     Current Outpatient Medications   Medication Sig Dispense Refill     amLODIPine (NORVASC) 5 MG tablet TAKE 1 TABLET (5 MG) BY MOUTH DAILY 90 tablet 1     hydrochlorothiazide (HYDRODIURIL) 25 MG tablet TAKE 1 TABLET (25 MG) BY MOUTH DAILY 90 tablet 1     aspirin (ASA) 81 MG chewable tablet Take 2 tablets (162 mg) by mouth daily       atorvastatin (LIPITOR) 40 MG tablet TAKE ONE TABLET BY MOUTH ONCE DAILY 90 tablet 2     KRILL OIL PO Take 1 capsule by mouth daily (OTC: Unsure of strength)       lifitegrast (XIIDRA) 5 % opthalmic solution Place 1 drop into both eyes 2 times daily       potassium chloride (KLOR-CON) 20 MEQ packet Take 20 mEq by mouth daily 100 packet 3     UNABLE TO FIND MEDICATION NAME: Pellila seed oil       vitamin D3 (CHOLECALCIFEROL) 50 mcg (2000 units) tablet Take 1 tablet by mouth daily         Medications from List 1 of the standing order (on medications that exclude the use of Paxlovid) that patient is taking: NONE. Is patient taking Lily's Wort? No  Is patient taking Palatka's Wort or any meds from List 1? No.   Medications from List 2 of the standing order (on meds that provider needs to adjust) that patient is taking: amlodipine (Norvasc), explained a provider visit is necessary to discuss medication adjustments while taking Paxlovid. Is patient on any of the meds from List 2? Yes. Patient will be scheduled or transferred to a  at the end of this  call.   Tonia Juan RN     Reason for Disposition    [1] HIGH RISK for severe COVID complications (e.g., weak immune system, age > 64 years, obesity with BMI of 30 or higher, pregnant, chronic lung disease or other chronic medical condition) AND [2] COVID symptoms (e.g., cough, fever)  (Exceptions: Already seen by PCP and no new or worsening symptoms.)    Additional Information    Negative: SEVERE difficulty breathing (e.g., struggling for each breath, speaks in single words)    Negative: Difficult to awaken or acting confused (e.g., disoriented, slurred speech)    Negative: Bluish (or gray) lips or face now    Negative: Shock suspected (e.g., cold/pale/clammy skin, too weak to stand, low BP, rapid pulse)    Negative: Sounds like a life-threatening emergency to the triager    Negative: [1] Diagnosed or suspected COVID-19 AND [2] symptoms lasting 3 or more weeks    Negative: [1] COVID-19 exposure AND [2] no symptoms    Negative: COVID-19 vaccine reaction suspected (e.g., fever, headache, muscle aches) occurring 1 to 3 days after getting vaccine    Negative: COVID-19 vaccine, questions about    Negative: [1] Lives with someone known to have influenza (flu test positive) AND [2] flu-like symptoms (e.g., cough, runny nose, sore throat, SOB; with or without fever)    Negative: [1] Adult with possible COVID-19 symptoms AND [2] triager concerned about severity of symptoms or other causes    Negative: COVID-19 and breastfeeding, questions about    Negative: SEVERE or constant chest pain or pressure  (Exception: Mild central chest pain, present only when coughing.)    Negative: MODERATE difficulty breathing (e.g., speaks in phrases, SOB even at rest, pulse 100-120)    Negative: Headache and stiff neck (can't touch chin to chest)    Negative: Oxygen level (e.g., pulse oximetry) 90 percent or lower    Negative: Chest pain or pressure  (Exception: MILD central chest pain, present only when coughing)    Negative: Patient  "sounds very sick or weak to the triager    Negative: [1] HIGH RISK patient AND [2] influenza is widespread in the community AND [3] ONE OR MORE respiratory symptoms: cough, sore throat, runny or stuffy nose    Negative: [1] HIGH RISK patient AND [2] influenza exposure within the last 7 days AND [3] ONE OR MORE respiratory symptoms: cough, sore throat, runny or stuffy nose    Negative: Oxygen level (e.g., pulse oximetry) 91 to 94 percent    Negative: MILD difficulty breathing (e.g., minimal/no SOB at rest, SOB with walking, pulse <100)    Negative: Fever > 103 F (39.4 C)    Negative: [1] Fever > 101 F (38.3 C) AND [2] over 60 years of age    Negative: [1] Fever > 100.0 F (37.8 C) AND [2] bedridden (e.g., nursing home patient, CVA, chronic illness, recovering from surgery)    Answer Assessment - Initial Assessment Questions  1. COVID-19 DIAGNOSIS: \"Who made your COVID-19 diagnosis?\" \"Was it confirmed by a positive lab test or self-test?\" If not diagnosed by a doctor (or NP/PA), ask \"Are there lots of cases (community spread) where you live?\" Note: See public health department website, if unsure.      @ home 6/16 - 6/21 symptoms started on 6/16/2023   2. COVID-19 EXPOSURE: \"Was there any known exposure to COVID before the symptoms began?\" CDC Definition of close contact: within 6 feet (2 meters) for a total of 15 minutes or more over a 24-hour period.       No   3. ONSET: \"When did the COVID-19 symptoms start?\"       6/16/2023   4. WORST SYMPTOM: \"What is your worst symptom?\" (e.g., cough, fever, shortness of breath, muscle aches)      Cough   5. COUGH: \"Do you have a cough?\" If Yes, ask: \"How bad is the cough?\"        Mild cough, non productive   6. FEVER: \"Do you have a fever?\" If Yes, ask: \"What is your temperature, how was it measured, and when did it start?\"        7. RESPIRATORY STATUS: \"Describe your breathing?\" (e.g., shortness of breath, wheezing, unable to speak)       No   8. BETTER-SAME-WORSE: \"Are you " "getting better, staying the same or getting worse compared to yesterday?\"  If getting worse, ask, \"In what way?\"      Better   9. HIGH RISK DISEASE: \"Do you have any chronic medical problems?\" (e.g., asthma, heart or lung disease, weak immune system, obesity, etc.)      No   10. VACCINE: \"Have you had the COVID-19 vaccine?\" If Yes, ask: \"Which one, how many shots, when did you get it?\"        Has had 6 vaccines Pfizer   11. BOOSTER: \"Have you received your COVID-19 booster?\" If Yes, ask: \"Which one and when did you get it?\"        Yes   12. PREGNANCY: \"Is there any chance you are pregnant?\" \"When was your last menstrual period?\"        No   13. OTHER SYMPTOMS: \"Do you have any other symptoms?\"  (e.g., chills, fatigue, headache, loss of smell or taste, muscle pain, sore throat)        Fatigue   14. O2 SATURATION MONITOR:  \"Do you use an oxygen saturation monitor (pulse oximeter) at home?\" If Yes, ask \"What is your reading (oxygen level) today?\" \"What is your usual oxygen saturation reading?\" (e.g., 95%)        No    Protocols used: CORONAVIRUS (COVID-19) DIAGNOSED OR [id number]      Tonia Juan, Registered Nurse, PAL (Patient Advocate Liaison)   [facility]   [phone]       "

## 2023-07-06 ENCOUNTER — OFFICE VISIT (OUTPATIENT)
Dept: FAMILY MEDICINE | Facility: CLINIC | Age: 83
End: 2023-07-06
Payer: MEDICARE

## 2023-07-06 VITALS
DIASTOLIC BLOOD PRESSURE: 68 MMHG | SYSTOLIC BLOOD PRESSURE: 130 MMHG | HEART RATE: 60 BPM | RESPIRATION RATE: 18 BRPM | OXYGEN SATURATION: 99 % | WEIGHT: 126 LBS | BODY MASS INDEX: 23.19 KG/M2 | TEMPERATURE: 97.9 F | HEIGHT: 62 IN

## 2023-07-06 DIAGNOSIS — I10 BENIGN ESSENTIAL HYPERTENSION: ICD-10-CM

## 2023-07-06 DIAGNOSIS — N18.2 CKD (CHRONIC KIDNEY DISEASE) STAGE 2, GFR 60-89 ML/MIN: ICD-10-CM

## 2023-07-06 DIAGNOSIS — N18.2 TYPE 2 DIABETES MELLITUS WITH STAGE 2 CHRONIC KIDNEY DISEASE, WITHOUT LONG-TERM CURRENT USE OF INSULIN (H): ICD-10-CM

## 2023-07-06 DIAGNOSIS — Z00.00 ENCOUNTER FOR MEDICARE ANNUAL WELLNESS EXAM: Primary | ICD-10-CM

## 2023-07-06 DIAGNOSIS — E11.22 TYPE 2 DIABETES MELLITUS WITH STAGE 2 CHRONIC KIDNEY DISEASE, WITHOUT LONG-TERM CURRENT USE OF INSULIN (H): ICD-10-CM

## 2023-07-06 PROCEDURE — G0439 PPPS, SUBSEQ VISIT: HCPCS | Performed by: FAMILY MEDICINE

## 2023-07-06 PROCEDURE — 99214 OFFICE O/P EST MOD 30 MIN: CPT | Mod: 25 | Performed by: FAMILY MEDICINE

## 2023-07-06 RX ORDER — VALSARTAN 80 MG/1
80 TABLET ORAL DAILY
Qty: 90 TABLET | Refills: 0 | Status: SHIPPED | OUTPATIENT
Start: 2023-07-06 | End: 2023-08-17

## 2023-07-06 RX ORDER — AMLODIPINE BESYLATE 5 MG/1
5 TABLET ORAL DAILY
Qty: 90 TABLET | Refills: 3 | Status: SHIPPED | OUTPATIENT
Start: 2023-07-06 | End: 2023-08-15

## 2023-07-06 ASSESSMENT — ENCOUNTER SYMPTOMS
SORE THROAT: 0
MYALGIAS: 1
FEVER: 0
HEMATURIA: 0
NERVOUS/ANXIOUS: 0
EYE PAIN: 0
WEAKNESS: 0
PALPITATIONS: 0
JOINT SWELLING: 0
SHORTNESS OF BREATH: 0
ABDOMINAL PAIN: 0
FREQUENCY: 0
HEADACHES: 0
ARTHRALGIAS: 1
DYSURIA: 0
PARESTHESIAS: 0
HEARTBURN: 0
CHILLS: 0
NAUSEA: 0
DIZZINESS: 0
CONSTIPATION: 0
HEMATOCHEZIA: 0
COUGH: 0
DIARRHEA: 0

## 2023-07-06 ASSESSMENT — ACTIVITIES OF DAILY LIVING (ADL): CURRENT_FUNCTION: NO ASSISTANCE NEEDED

## 2023-07-06 NOTE — PROGRESS NOTES
"SUBJECTIVE:   Francois is a 83 year old who presents for Preventive Visit.      7/6/2023     8:52 AM   Additional Questions   Roomed by Emma SEWELL CMA   Accompanied by -Sahil     Are you in the first 12 months of your Medicare coverage?  No    Healthy Habits:     In general, how would you rate your overall health?  Good    Frequency of exercise:  2-3 days/week    Duration of exercise:  30-45 minutes    Do you usually eat at least 4 servings of fruit and vegetables a day, include whole grains    & fiber and avoid regularly eating high fat or \"junk\" foods?  Yes    Taking medications regularly:  Yes    Medication side effects:  None    Ability to successfully perform activities of daily living:  No assistance needed    Home Safety:  No safety concerns identified    Hearing Impairment:  No hearing concerns    In the past 6 months, have you been bothered by leaking of urine?  No    In general, how would you rate your overall mental or emotional health?  Excellent    Additional concerns today:  No        Have you ever done Advance Care Planning? (For example, a Health Directive, POLST, or a discussion with a medical provider or your loved ones about your wishes): Yes, advance care planning is on file.       Fall risk  Fallen 2 or more times in the past year?: No  Any fall with injury in the past year?: No    Cognitive Screening   1) Repeat 3 items (Leader, Season, Table)    2) Clock draw: NORMAL  3) 3 item recall: Recalls 3 objects  Results: NORMAL clock, 1-2 items recalled: COGNITIVE IMPAIRMENT LESS LIKELY    Mini-CogTM Copyright SELENE Conroy. Licensed by the author for use in [facility]; reprinted with permission (socharlie@.[facility]). All rights reserved.      Do you have sleep apnea, excessive snoring or daytime drowsiness?: no    Reviewed and updated as needed this visit by clinical staff   Tobacco  Allergies  Meds              Reviewed and updated as needed this visit by Provider                 Social " "History     Tobacco Use     Smoking status: Former     Smokeless tobacco: Former     Tobacco comments:     Former \"social\" smoker, quit in 1978.   Substance Use Topics     Alcohol use: Yes     Alcohol/week: 0.0 standard drinks of alcohol     Comment: occ wine, twice a week             7/6/2023     8:51 AM   Alcohol Use   Prescreen: >3 drinks/day or >7 drinks/week? No     Do you have a current opioid prescription? No  Do you use any other controlled substances or medications that are not prescribed by a provider? None          Diabetes Follow-up      How often are you checking your blood sugar? Not at all    What concerns do you have today about your diabetes? None     Do you have any of these symptoms? (Select all that apply)  No numbness or tingling in feet.  No redness, sores or blisters on feet.  No complaints of excessive thirst.  No reports of blurry vision.  No significant changes to weight.    Have you had a diabetic eye exam in the last 12 months? Yes- Date of last eye exam: [facility]  Location: [place].        BP Readings from Last 2 Encounters:   07/06/23 (!) 146/65   03/14/23 118/62     Hemoglobin A1C (%)   Date Value   06/01/2023 6.4 (H)   02/02/2023 6.8 (H)   08/09/2009 6.0     LDL Cholesterol Calculated (mg/dL)   Date Value   01/17/2023 99   08/27/2021 83   09/29/2020 126 (H)   09/20/2019 154 (H)         Hypertension Follow-up      Do you check your blood pressure regularly outside of the clinic? Yes     Are you following a low salt diet? No    Are your blood pressures ever more than 140 on the top number (systolic) OR more   than 90 on the bottom number (diastolic), for example 140/90? No    Cerebrovascular Follow-up      Patient history: ischemic cerebrovascular incident    Residual symptoms: None    Worsened or new symptoms since last visit: No    Daily aspirin use: Yes    Hypertension controlled: Yes      Current providers sharing in care for this patient include:   Patient " Care Team:  Luis Feliciano MD as PCP - General (Family Practice)  Luis Feliciano MD as Assigned PCP  Daniela Grimm MD as MD (Vascular Surgery)  Tonia Juan RN as Personal Advocate & Liaison (PAL) (Nurse)  Charli Mendoza MD as Assigned Heart and Vascular Provider  Keegan England MD as Assigned Neuroscience Provider  Lalito Strange MD as Assigned Surgical Provider    The following health maintenance items are reviewed in Epic and correct as of today:  Health Maintenance   Topic Date Due     EYE EXAM  Never done     MEDICARE ANNUAL WELLNESS VISIT  12/30/2022     A1C  09/01/2023     INFLUENZA VACCINE (1) 09/01/2023     ANNUAL REVIEW OF HM ORDERS  11/15/2023     LIPID  01/17/2024     DIABETIC FOOT EXAM  03/14/2024     BMP  06/01/2024     MICROALBUMIN  06/01/2024     FALL RISK ASSESSMENT  07/06/2024     ADVANCE CARE PLANNING  12/30/2026     DTAP/TDAP/TD IMMUNIZATION (2 - Td or Tdap) 06/19/2027     DEXA  07/27/2032     PHQ-2 (once per calendar year)  Completed     Pneumococcal Vaccine: 65+ Years  Completed     ZOSTER IMMUNIZATION  Completed     COVID-19 Vaccine  Completed     IPV IMMUNIZATION  Aged Out     MENINGITIS IMMUNIZATION  Aged Out     MAMMO SCREENING  Discontinued     BP Readings from Last 3 Encounters:   07/06/23 (!) 146/65   03/14/23 118/62   01/24/23 (!) 151/72    Wt Readings from Last 3 Encounters:   07/06/23 57.2 kg (126 lb)   03/14/23 59.1 kg (130 lb 6 oz)   01/24/23 58.8 kg (129 lb 9.6 oz)                  Patient Active Problem List   Diagnosis     Pancreatic mass     Benign essential hypertension     SBO (small bowel obstruction) (H)     PAD (peripheral artery disease) (H)     Bilateral low back pain without sciatica, unspecified chronicity     Hyperlipidemia LDL goal <130     Post-nasal drip     Degenerative arthritis of thumb, right     Status post lumbar spinal fusion     Pancreatic cyst     Cyst of right kidney     Diverticulosis of large intestine without hemorrhage  "    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 (H)     Small bowel obstruction (H)     Bilateral inguinal hernia without obstruction or gangrene, recurrence not specified     Diabetes mellitus, type 2 (H)     Past Surgical History:   Procedure Laterality Date     DAVINCI HERNIORRHAPHY INGUINAL Left 1/24/2023    Procedure: Robot-assisted left inguinal hernia repair with mesh;  Surgeon: Lalito Strange MD;  Location:  OR     DAVINCI LYSIS OF ADHESIONS N/A 1/24/2023    Procedure: Davinci Lysis of Adhesions;  Surgeon: Lalito Strange MD;  Location:  OR     GYN SURGERY      hysterectomy     ORTHOPEDIC SURGERY      Slipped disc with chronic back pain       Social History     Tobacco Use     Smoking status: Former     Smokeless tobacco: Former     Tobacco comments:     Former \"social\" smoker, quit in 1978.   Substance Use Topics     Alcohol use: Yes     Alcohol/week: 0.0 standard drinks of alcohol     Comment: occ wine, twice a week     Family History   Problem Relation Age of Onset     Family History Negative Other          Current Outpatient Medications   Medication Sig Dispense Refill     amLODIPine (NORVASC) 5 MG tablet Take 1 tablet (5 mg) by mouth daily 90 tablet 3     aspirin (ASA) 81 MG chewable tablet Take 2 tablets (162 mg) by mouth daily       atorvastatin (LIPITOR) 40 MG tablet TAKE ONE TABLET BY MOUTH ONCE DAILY 90 tablet 2     KRILL OIL PO Take 1 capsule by mouth daily (OTC: Unsure of strength)       UNABLE TO FIND MEDICATION NAME: Pellila seed oil       valsartan (DIOVAN) 80 MG tablet Take 1 tablet (80 mg) by mouth daily 90 tablet 0     vitamin D3 (CHOLECALCIFEROL) 50 mcg (2000 units) tablet Take 1 tablet by mouth daily           Mammogram Screening - Patient over age 75, has elected to discontinue screenings.  Pertinent mammograms are reviewed under the imaging tab.    Review of Systems   Constitutional: Negative for chills and fever.   HENT: " "Negative for congestion, ear pain, hearing loss and sore throat.    Eyes: Negative for pain and visual disturbance.   Respiratory: Negative for cough and shortness of breath.    Cardiovascular: Positive for peripheral edema. Negative for chest pain and palpitations.   Gastrointestinal: Negative for abdominal pain, constipation, diarrhea, heartburn, hematochezia and nausea.   Genitourinary: Negative for dysuria, frequency, genital sores, hematuria, pelvic pain, urgency, vaginal bleeding and vaginal discharge.   Musculoskeletal: Positive for arthralgias and myalgias. Negative for joint swelling.   Skin: Negative for rash.   Neurological: Negative for dizziness, weakness, headaches and paresthesias.   Psychiatric/Behavioral: Negative for mood changes. The patient is not nervous/anxious.          OBJECTIVE:   BP (!) 146/65 (BP Location: Right arm, Patient Position: Sitting, Cuff Size: Adult Regular)   Pulse 60   Temp 97.9  F (36.6  C) (Oral)   Resp 18   Ht 1.575 m (5' 2\")   Wt 57.2 kg (126 lb)   LMP  (LMP Unknown)   SpO2 99%   BMI 23.05 kg/m   Estimated body mass index is 23.05 kg/m  as calculated from the following:    Height as of this encounter: 1.575 m (5' 2\").    Weight as of this encounter: 57.2 kg (126 lb).  Physical Exam  GENERAL: healthy, alert and no distress  EYES: Eyes grossly normal to inspection, PERRL and conjunctivae and sclerae normal  HENT: ear canals and TM's normal, nose and mouth without ulcers or lesions  NECK: no adenopathy, no asymmetry, masses, or scars and thyroid normal to palpation  RESP: lungs clear to auscultation - no rales, rhonchi or wheezes  CV: regular rate and rhythm, normal S1 S2, no S3 or S4, no murmur, click or rub, no peripheral edema and peripheral pulses strong  ABDOMEN: soft, nontender, no hepatosplenomegaly, no masses and bowel sounds normal  MS: doformities of the fingers.  SKIN: no suspicious lesions or rashes  NEURO: Normal strength and tone, mentation intact and " speech normal  PSYCH: mentation appears normal, affect normal/bright        ASSESSMENT / PLAN:   (Z00.00) Encounter for Medicare annual wellness exam  (primary encounter diagnosis)  Comment: discussed diet and exercise.   Plan: PRIMARY CARE FOLLOW-UP SCHEDULING            (E11.9) Type 2 diabetes mellitus without complication, without long-term current use of insulin (H)  Comment: controlled on diet, recheck A1c in 3 months.   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     (I10) Benign essential hypertension  Comment: controlled now, but I recommend stopping hydrochlorothiazide and start on Valsartan 80 mg daily, and stop potassium, recheck BP and BMP in 1 month.   Plan: amLODIPine (NORVASC) 5 MG tablet, valsartan         (DIOVAN) 80 MG tablet                    COUNSELING:  Reviewed preventive health counseling, as reflected in patient instructions       Healthy diet/nutrition        She reports that she has quit smoking. She has quit using smokeless tobacco.      Appropriate preventive services were discussed with this patient, including applicable screening as appropriate for cardiovascular disease, diabetes, osteopenia/osteoporosis, and glaucoma.  As appropriate for age/gender, discussed screening for colorectal cancer, prostate cancer, breast cancer, and cervical cancer. Checklist reviewing preventive services available has been given to the patient.    Reviewed patients plan of care and provided an AVS. The Intermediate Care Plan ( asthma action plan, low back pain action plan, and migraine action plan) for Francois meets the Care Plan requirement. This Care Plan has been established and reviewed with the Patient.      Luis Feliciano MD  [facility]    Identified Health Risks:

## 2023-07-06 NOTE — PATIENT INSTRUCTIONS
Patient Education   Personalized Prevention Plan  You are due for the preventive services outlined below.  Your care team is available to assist you in scheduling these services.  If you have already completed any of these items, please share that information with your care team to update in your medical record.  Health Maintenance Due   Topic Date Due     Eye Exam  Never done     Annual Wellness Visit  12/30/2022

## 2023-07-07 ENCOUNTER — TELEPHONE (OUTPATIENT)
Dept: FAMILY MEDICINE | Facility: CLINIC | Age: 83
End: 2023-07-07
Payer: MEDICARE

## 2023-07-07 NOTE — TELEPHONE ENCOUNTER
Patient Quality Outreach    Patient is due for the following:   Diabetes -  Eye Exam    Next Steps:   No follow up needed at this time.    Type of outreach:    Chart review performed, no outreach needed. and Pt had a diabetic eye exam at [facility] on 4/7/23      Questions for provider review:    None           Madeline Valadez, CMA

## 2023-08-15 ENCOUNTER — OFFICE VISIT (OUTPATIENT)
Dept: FAMILY MEDICINE | Facility: CLINIC | Age: 83
End: 2023-08-15
Attending: FAMILY MEDICINE
Payer: MEDICARE

## 2023-08-15 VITALS
TEMPERATURE: 98 F | OXYGEN SATURATION: 97 % | BODY MASS INDEX: 23.45 KG/M2 | WEIGHT: 127.4 LBS | DIASTOLIC BLOOD PRESSURE: 66 MMHG | HEART RATE: 63 BPM | SYSTOLIC BLOOD PRESSURE: 150 MMHG | RESPIRATION RATE: 10 BRPM | HEIGHT: 62 IN

## 2023-08-15 DIAGNOSIS — I10 BENIGN ESSENTIAL HYPERTENSION: Primary | ICD-10-CM

## 2023-08-15 PROCEDURE — 99213 OFFICE O/P EST LOW 20 MIN: CPT | Performed by: FAMILY MEDICINE

## 2023-08-15 RX ORDER — POTASSIUM CHLORIDE 1.5 G/1.58G
20 POWDER, FOR SOLUTION ORAL 2 TIMES DAILY
Qty: 100 PACKET | Refills: 3 | Status: SHIPPED | OUTPATIENT
Start: 2023-08-15 | End: 2023-08-15

## 2023-08-15 RX ORDER — POTASSIUM CHLORIDE 1.5 G/1.58G
20 POWDER, FOR SOLUTION ORAL DAILY
Qty: 100 PACKET | Refills: 3 | Status: SHIPPED | OUTPATIENT
Start: 2023-08-15 | End: 2024-03-01

## 2023-08-15 RX ORDER — HYDROCHLOROTHIAZIDE 25 MG/1
25 TABLET ORAL DAILY
Qty: 90 TABLET | Refills: 3 | Status: SHIPPED | OUTPATIENT
Start: 2023-08-15 | End: 2023-10-26

## 2023-08-15 NOTE — PROGRESS NOTES
Assessment & Plan     Benign essential hypertension  Pt wishes to go back to the same medicine from before, so stop AMlodipine, and start back on   - hydrochlorothiazide (HYDRODIURIL) 25 MG tablet; Take 1 tablet (25 mg) by mouth daily  - potassium chloride (KLOR-CON) 20 MEQ packet; Take 20 mEq by mouth daily.    Recheck BP at home, follow up in 6 months, we can check BMP at that time again.   BMP was checked last month and was within normal.    Luis Feliciano MD  [facility] GUMARO Parrish is a 83 year old, presenting for the following health issues:  RECHECK        8/15/2023     8:19 AM   Additional Questions   Roomed by Gill BYRD           Hypertension Follow-up    Do you check your blood pressure regularly outside of the clinic? No     Are you following a low salt diet? Yes  Are your blood pressures ever more than 140 on the top number (systolic) OR more   than 90 on the bottom number (diastolic), for example 140/90? Yes    BP Readings from Last 2 Encounters:   08/15/23 (!) 163/66   07/06/23 130/68     Hemoglobin A1C (%)   Date Value   06/01/2023 6.4 (H)   02/02/2023 6.8 (H)   08/09/2009 6.0     LDL Cholesterol Calculated (mg/dL)   Date Value   01/17/2023 99   08/27/2021 83   09/29/2020 126 (H)   09/20/2019 154 (H)       BP Readings from Last 2 Encounters:   08/15/23 (!) 163/66   07/06/23 130/68     Hemoglobin A1C (%)   Date Value   06/01/2023 6.4 (H)   02/02/2023 6.8 (H)   08/09/2009 6.0     LDL Cholesterol Calculated (mg/dL)   Date Value   01/17/2023 99   08/27/2021 83   09/29/2020 126 (H)   09/20/2019 154 (H)         How many servings of fruits and vegetables do you eat daily?  4 or more  On average, how many sweetened beverages do you drink each day (Examples: soda, juice, sweet tea, etc.  Do NOT count diet or artificially sweetened beverages)?   0  How many days per week do you exercise enough to make your heart beat faster? 3 or less  How many minutes a day do you  "exercise enough to make your heart beat faster? 9 or less  How many days per week do you miss taking your medication? 0            Review of Systems         Objective    BP (!) 163/66 (BP Location: Right arm, Patient Position: Sitting, Cuff Size: Adult Regular)   Pulse 63   Temp 98  F (36.7  C) (Oral)   Resp 10   Ht 1.575 m (5' 2\")   Wt 57.8 kg (127 lb 6.4 oz)   LMP  (LMP Unknown)   SpO2 97%   BMI 23.30 kg/m    Body mass index is 23.3 kg/m .  Physical Exam   GENERAL: healthy, alert and no distress  CV: regular rate and rhythm, normal S1 S2, no S3 or S4, no murmur, click or rub, no peripheral edema and peripheral pulses strong                      "

## 2023-08-16 ENCOUNTER — TELEPHONE (OUTPATIENT)
Dept: FAMILY MEDICINE | Facility: CLINIC | Age: 83
End: 2023-08-16
Payer: MEDICARE

## 2023-08-16 DIAGNOSIS — I10 BENIGN ESSENTIAL HYPERTENSION: ICD-10-CM

## 2023-08-16 NOTE — TELEPHONE ENCOUNTER
Dr. Feliciano -    BP - 150/70  Pulse - 54     Tonia Juan, Registered Nurse, PAL (Patient Advocate Liaison)   [facility]   [phone]

## 2023-08-17 RX ORDER — AMLODIPINE BESYLATE 10 MG/1
10 TABLET ORAL DAILY
Qty: 90 TABLET | Refills: 1 | Status: SHIPPED | OUTPATIENT
Start: 2023-08-17 | End: 2023-11-14 | Stop reason: DRUGHIGH

## 2023-08-17 RX ORDER — VALSARTAN 160 MG/1
160 TABLET ORAL DAILY
Qty: 90 TABLET | Refills: 1 | Status: SHIPPED | OUTPATIENT
Start: 2023-08-17 | End: 2023-08-17 | Stop reason: ALTCHOICE

## 2023-08-17 NOTE — TELEPHONE ENCOUNTER
ARMAAN thayer with Dr. Feliciano   Plan below:     Stop   valsartan both doses (RN discontinued from med list)     Take   Amlodipine 10 mg (has 5 mg at home will double)   hydrochlorothiazide   potassium     Lab only and RN visit scheduled on 8/24/2023 (MA schedule full)     Tonia Juan Registered Nurse, PAL (Patient Advocate Liaison)   [facility]   [phone]

## 2023-08-17 NOTE — TELEPHONE ENCOUNTER
Dr. Feliciano     Please review     Patient has NOT been taking valsartan 60 mg (do you still want to double)     Patient has been taking amlodipine 5 mg, which is NOT on med list     RN advised  Sahil not to take either of these until RN calls back to advise of medication directions     Tonia Juan, Registered Nurse, PAL (Patient Advocate Liaison)   [facility]   [phone]

## 2023-08-17 NOTE — TELEPHONE ENCOUNTER
Dr. Feliciano   Patient's  assist with checking BP unclear why it was reported as normal   Recommendations on follow up plan please     Thank you   Tonia Juan, Registered Nurse, PAL (Patient Advocate Liaison)   [facility]   [phone]

## 2023-08-17 NOTE — TELEPHONE ENCOUNTER
If BP is still high, then I recommend increasing valsartan to 160 mg daily (double the pill of what they have at home).  Recheck BP in 1 week in the clinic along with labs (as she is taking potassium and Valsartan which increase her potassium).  Luis Feliciano MD  [facility].   [phone]

## 2023-08-24 ENCOUNTER — TELEPHONE (OUTPATIENT)
Dept: FAMILY MEDICINE | Facility: CLINIC | Age: 83
End: 2023-08-24

## 2023-08-24 ENCOUNTER — LAB (OUTPATIENT)
Dept: LAB | Facility: CLINIC | Age: 83
End: 2023-08-24
Payer: MEDICARE

## 2023-08-24 ENCOUNTER — ALLIED HEALTH/NURSE VISIT (OUTPATIENT)
Dept: FAMILY MEDICINE | Facility: CLINIC | Age: 83
End: 2023-08-24
Payer: MEDICARE

## 2023-08-24 VITALS — SYSTOLIC BLOOD PRESSURE: 162 MMHG | DIASTOLIC BLOOD PRESSURE: 72 MMHG

## 2023-08-24 DIAGNOSIS — I10 BENIGN ESSENTIAL HYPERTENSION: ICD-10-CM

## 2023-08-24 DIAGNOSIS — I10 BENIGN ESSENTIAL HYPERTENSION: Primary | ICD-10-CM

## 2023-08-24 LAB
ANION GAP SERPL CALCULATED.3IONS-SCNC: 12 MMOL/L (ref 7–15)
BUN SERPL-MCNC: 12 MG/DL (ref 8–23)
CALCIUM SERPL-MCNC: 9.4 MG/DL (ref 8.8–10.2)
CHLORIDE SERPL-SCNC: 102 MMOL/L (ref 98–107)
CREAT SERPL-MCNC: 0.69 MG/DL (ref 0.51–0.95)
DEPRECATED HCO3 PLAS-SCNC: 26 MMOL/L (ref 22–29)
GFR SERPL CREATININE-BSD FRML MDRD: 86 ML/MIN/1.73M2
GLUCOSE SERPL-MCNC: 109 MG/DL (ref 70–99)
POTASSIUM SERPL-SCNC: 3.8 MMOL/L (ref 3.4–5.3)
SODIUM SERPL-SCNC: 140 MMOL/L (ref 136–145)

## 2023-08-24 PROCEDURE — 99207 PR NO CHARGE NURSE ONLY: CPT

## 2023-08-24 PROCEDURE — 80048 BASIC METABOLIC PNL TOTAL CA: CPT

## 2023-08-24 PROCEDURE — 36415 COLL VENOUS BLD VENIPUNCTURE: CPT

## 2023-08-24 NOTE — PROGRESS NOTES
Francois Ly is a 83 year old year old patient who comes in today for a Blood Pressure check because of ongoing blood pressure monitoring.  Patient is not taking medication as prescribed  Patient did not tolerate dose change.  Patient is monitoring Blood Pressure at home.  Average readings if yes are 135/67  Current complaints: none  Disposition:  computers down at time of visit.  Telephone encounter sent to provider when computer available to advise.       Uma Magaña RN

## 2023-08-24 NOTE — TELEPHONE ENCOUNTER
Please bring all the medicine that she is taking (only the one she is taking), and bring her for an office visit with me next week.  Keep the same dose of the current medicine, Amlodipine does not cause urinary frequency.

## 2023-08-24 NOTE — TELEPHONE ENCOUNTER
Dr. Feliciano- patient was here for bp check with RN.  Patient was to increase med but states she was up all night urinating and having cramps so went back to original dose.  She states she felt her home bp was fine so did not feel she needed to increase dose.  Please advise.  Update med list if you do not have her increase back up.  Uma Magaña RN    8/24/2023  [facility]     Benign essential hypertension  Dx Hypertension  Reason for Visit     Progress Notes  Uma Magaña, RN (Registered Nurse)  Francois Ly is a 83 year old year old patient who comes in today for a Blood Pressure check because of ongoing blood pressure monitoring.  Patient is not taking medication as prescribed  Patient did not tolerate dose change.  Patient is monitoring Blood Pressure at home.  Average readings if yes are 135/67  Current complaints: none  Disposition:  computers down at time of visit.  Telephone encounter sent to provider when computer available to advise.         Uma Magaña RN        [facility]/Nurse Visit  8/24/2023  [facility]  Encounter Vitals Flowsheet Audit [place] (all recorded)    Flow Time Flow Value User File Time Action   BP   08/24/23 1339 162/72 Abnormal  KF 08/24/23 1343 Current     150/90 Abnormal  KF 08/24/23 1339 Initial   08/24/23 1338 150/64 Abnormal  KF 08/24/23 1343 Current     158/80 Abnormal  KF 08/24/23 1339 Initial   BP Location   08/24/23 1339 Right arm KF 08/24/23 1339 Current   08/24/23 1338 Right arm KF 08/24/23 1339 Current   Patient Position   08/24/23 1339 Chair KF 08/24/23 1339 Current   08/24/23 1338 Chair KF 08/24/23 1339 Current   Cuff Size   08/24/23 1339 Adult Regular KF 08/24/23 1339 Current   08/24/23 1338 Adult Regular KF 08/24/23 1339 Current   User Key (r) = Recorded By, (t) = Taken By, (c) = Cosigned By   Initials Effective Dates Name Provider Type Discipline    11/03/21 - Uma Magaña, RN Registered Nurse Nursing        Tonia Juan RN     TR    8/17/23  3:34 PM  Note     ARMAAN thayer with Dr. Feliciano   Plan below:      Stop   valsartan both doses (RN discontinued from med list)      Take   Amlodipine 10 mg (has 5 mg at home will double)   hydrochlorothiazide   potassium      Lab only and RN visit scheduled on 8/24/2023 (MA schedule full)      Tonia Juan Registered Nurse, PAL (Patient Advocate Liaison)   [facility]   [phone]                     8/17/23  3:13 PM  Luis Feliciano MD routed this conversation to Cr Sb3/5 Silver Pal (Rn)  Luis Feliciano MD         8/17/23  3:13 PM  Note  I'm coming to huddle.                TR    8/17/23  1:45 PM  Tonia Juan RN routed this conversation to Luis Feliciano MD Robertson, Theresa, RN     TR    8/17/23  1:45 PM  Note  Dr. Feliciano      Please review      Patient has NOT been taking valsartan 60 mg (do you still want to double)      Patient has been taking amlodipine 5 mg, which is NOT on med list      RN advised  Taj not to take either of these until RN calls back to advise of medication directions      Tonia Juan Registered Nurse, PAL (Patient Advocate Liaison)   [place] [phone]              TR    8/17/23  1:20 PM  Tonia Juan RN contacted TAJ GARCÍA (Emergency Contact)        AA    8/17/23  9:20 AM  Luis Feliciano MD routed this conversation to Cr Sb3/5 Silver Pal (Rn)  Luis Feliciano MD         8/17/23  9:20 AM  Note  If BP is still high, then I recommend increasing valsartan to 160 mg daily (double the pill of what they have at home).  Recheck BP in 1 week in the clinic along with labs (as she is taking potassium and Valsartan which increase her potassium).  Luis Feliciano MD  [facility].   [phone]                TR    8/17/23  8:36 AM  Tonia Juan RN routed this conversation to Luis Feliciano MD Robertson, Theresa, RN     TR    8/17/23  8:35  AM  Note  Dr. Feliciano   Patient's  assist with checking BP unclear why it was reported as normal   Recommendations on follow up plan please      Thank you   Tonia Juan Registered Nurse, PAL (Patient Advocate Liaison)   [facility]   [phone]            August 16, 2023       AA    8/16/23  3:07 PM  Luis Feliciano MD routed this conversation to Cr Sb3/5 Silver Pal (Rn)  Luis Feliciano MD     AA    8/16/23  3:07 PM  Note  So she tell me that her blood pressure is normal at home!  So what's going on?                TR    8/16/23  1:15 PM  Tonia Juan RN routed this conversation to Luis Feliciano MD Robertson, Theresa, RN     TR    8/16/23  1:14 PM  Note  Dr. Feliciano -     BP - 150/70  Pulse - 54      Tonia Juan Registered Nurse, PAL (Patient Advocate Liaison)   [facility]   [phone]                 TR    8/16/23  1:11 PM  Tonia Juan RN contacted Francois Ly        AA    8/16/23  9:33 AM  Luis Feliciano MD routed this conversation to Cr Sb3/5 Silver Pal (Rn)  Luis Feliciano MD     AA    8/16/23  9:33 AM  Note  Can we check BP at home.

## 2023-08-25 NOTE — TELEPHONE ENCOUNTER
Spoke to  Sahil     Visit scheduled for 8/31/2023 will bring all medications, OTC, supplements to visit     At this time will continue to take amlodipine   Advised that this medication does not cause urinary frequency and at visit we may need to check UA to ensure no infection     Tonia Juan, Registered Nurse, PAL (Patient Advocate Liaison)   [facility]   [phone]

## 2023-08-31 ENCOUNTER — OFFICE VISIT (OUTPATIENT)
Dept: FAMILY MEDICINE | Facility: CLINIC | Age: 83
End: 2023-08-31
Payer: MEDICARE

## 2023-08-31 VITALS
TEMPERATURE: 98 F | HEIGHT: 62 IN | BODY MASS INDEX: 23.55 KG/M2 | DIASTOLIC BLOOD PRESSURE: 65 MMHG | RESPIRATION RATE: 12 BRPM | HEART RATE: 64 BPM | SYSTOLIC BLOOD PRESSURE: 136 MMHG | WEIGHT: 128 LBS | OXYGEN SATURATION: 98 %

## 2023-08-31 DIAGNOSIS — I10 PRIMARY HYPERTENSION: Primary | ICD-10-CM

## 2023-08-31 PROCEDURE — 99213 OFFICE O/P EST LOW 20 MIN: CPT | Performed by: FAMILY MEDICINE

## 2023-08-31 NOTE — PROGRESS NOTES
"  Assessment & Plan     Primary hypertension  Well controlled, continue on hydrochlorothiazide 25 mg, KCL 20 meq daily, amlodipine 10 mg daily.                 Luis Feliciano MD  [facility] NINA Parrish is a 83 year old, presenting for the following health issues:  Hypertension      8/31/2023     1:20 PM   Additional Questions   Roomed by Emma SEWELL CMA   Accompanied by  -Sahil       History of Present Illness       Reason for visit:   requested    She eats 4 or more servings of fruits and vegetables daily.She consumes 1 sweetened beverage(s) daily.She exercises with enough effort to increase her heart rate 10 to 19 minutes per day.  She exercises with enough effort to increase her heart rate 3 or less days per week.   She is taking medications regularly.       Hypertension Follow-up    Do you check your blood pressure regularly outside of the clinic? Yes   Are you following a low salt diet? Yes  Are your blood pressures ever more than 140 on the top number (systolic) OR more   than 90 on the bottom number (diastolic), for example 140/90?  No.        Review of Systems         Objective    BP (!) 141/65 (BP Location: Right arm, Patient Position: Sitting, Cuff Size: Adult Regular)   Pulse 64   Temp 98  F (36.7  C) (Oral)   Resp 12   Ht 1.575 m (5' 2\")   Wt 58.1 kg (128 lb)   LMP  (LMP Unknown)   SpO2 98%   BMI 23.41 kg/m    Body mass index is 23.41 kg/m .  Physical Exam   GENERAL: healthy, alert and no distress  RESP: lungs clear to auscultation - no rales, rhonchi or wheezes  CV: regular rate and rhythm, normal S1 S2, no S3 or S4, no murmur, click or rub, no peripheral edema and peripheral pulses strong  MS: no gross musculoskeletal defects noted, no edema                      "

## 2023-09-12 ENCOUNTER — PATIENT OUTREACH (OUTPATIENT)
Dept: FAMILY MEDICINE | Facility: CLINIC | Age: 83
End: 2023-09-12
Payer: MEDICARE

## 2023-09-12 NOTE — LETTER
September 12, 2023      Francois Ly  [address] [facility] [address]        Dear Francois,      Our records indicate you are due for an influenza vaccine     If you would like to schedule this please call [phone] to schedule a nurse only visit     If you would like to complete at the [facility] please call [phone]        Sincerely,    Tonia Juan, Registered Nurse, PAL (Patient Advocate Liaison)   [facility]   [phone]

## 2023-09-12 NOTE — TELEPHONE ENCOUNTER
Patient Quality Outreach Health Maintenance - PAL RN    Summary:    PAL RN contacted pt regarding overdue health maintenance    Patient is due/failing the following:       Topic Date Due    Flu Vaccine (1) 09/01/2023       Health Maintenance Due   Topic Date Due    INFLUENZA VACCINE (1) 09/01/2023       Type of outreach:    Sent letter.    - Advised patient if any questions or concerns comes up to call the PAL RN.   - Patient given opportunity to ask questions and at this time there is nothing further needed.     Questions for provider review:    None                                                                                     Chart routed to NA.    Tonia Juan Registered Nurse, PAL (Patient Advocate Liaison)   [facility]   [phone]

## 2023-10-25 DIAGNOSIS — I10 BENIGN ESSENTIAL HYPERTENSION: ICD-10-CM

## 2023-10-25 NOTE — TELEPHONE ENCOUNTER
Patient's spouse calling on her behalf requesting refills for hydrochlorothiazide 25 mg to be sent to [facility] [place] [address] [address].  Emma Oviedo CMA ([facility])

## 2023-10-26 RX ORDER — HYDROCHLOROTHIAZIDE 25 MG/1
25 TABLET ORAL DAILY
Qty: 90 TABLET | Refills: 3 | OUTPATIENT
Start: 2023-10-26

## 2023-10-26 RX ORDER — HYDROCHLOROTHIAZIDE 25 MG/1
25 TABLET ORAL DAILY
Qty: 90 TABLET | Refills: 2 | Status: SHIPPED | OUTPATIENT
Start: 2023-10-26 | End: 2024-07-08

## 2023-10-26 NOTE — TELEPHONE ENCOUNTER
calling.  [facility] pharmacy did not get RX sent.  Resent.  Uma Magaña RN

## 2023-11-14 ENCOUNTER — OFFICE VISIT (OUTPATIENT)
Dept: FAMILY MEDICINE | Facility: CLINIC | Age: 83
End: 2023-11-14
Payer: MEDICARE

## 2023-11-14 VITALS
RESPIRATION RATE: 16 BRPM | HEART RATE: 69 BPM | HEIGHT: 62 IN | BODY MASS INDEX: 23.15 KG/M2 | DIASTOLIC BLOOD PRESSURE: 60 MMHG | OXYGEN SATURATION: 98 % | WEIGHT: 125.8 LBS | TEMPERATURE: 98.5 F | SYSTOLIC BLOOD PRESSURE: 120 MMHG

## 2023-11-14 DIAGNOSIS — I10 BENIGN ESSENTIAL HYPERTENSION: ICD-10-CM

## 2023-11-14 DIAGNOSIS — H10.33 ACUTE BACTERIAL CONJUNCTIVITIS OF BOTH EYES: ICD-10-CM

## 2023-11-14 DIAGNOSIS — R05.1 ACUTE COUGH: Primary | ICD-10-CM

## 2023-11-14 PROCEDURE — 99213 OFFICE O/P EST LOW 20 MIN: CPT | Performed by: PHYSICIAN ASSISTANT

## 2023-11-14 RX ORDER — AMLODIPINE BESYLATE 5 MG/1
TABLET ORAL
COMMUNITY
Start: 2023-08-17 | End: 2024-03-01

## 2023-11-14 RX ORDER — AZITHROMYCIN 250 MG/1
TABLET, FILM COATED ORAL
Qty: 6 TABLET | Refills: 0 | Status: SHIPPED | OUTPATIENT
Start: 2023-11-14 | End: 2023-11-19

## 2023-11-14 RX ORDER — GUAIFENESIN AND DEXTROMETHORPHAN HYDROBROMIDE 600; 30 MG/1; MG/1
1 TABLET, EXTENDED RELEASE ORAL EVERY 12 HOURS PRN
Qty: 28 TABLET | Refills: 1 | Status: SHIPPED | OUTPATIENT
Start: 2023-11-14 | End: 2024-01-03

## 2023-11-14 RX ORDER — POLYMYXIN B SULFATE AND TRIMETHOPRIM 1; 10000 MG/ML; [USP'U]/ML
1-2 SOLUTION OPHTHALMIC 4 TIMES DAILY
Qty: 10 ML | Refills: 0 | Status: SHIPPED | OUTPATIENT
Start: 2023-11-14 | End: 2023-11-21

## 2023-11-14 RX ORDER — RESPIRATORY SYNCYTIAL VIRUS VACCINE 120MCG/0.5
0.5 KIT INTRAMUSCULAR ONCE
Qty: 1 EACH | Refills: 0 | Status: CANCELLED | OUTPATIENT
Start: 2023-11-14 | End: 2023-11-14

## 2023-11-14 RX ORDER — VALSARTAN 80 MG/1
80 TABLET ORAL DAILY
COMMUNITY
End: 2024-01-03

## 2023-11-14 ASSESSMENT — ENCOUNTER SYMPTOMS
COUGH: 1
SORE THROAT: 1
HEADACHES: 1
RHINORRHEA: 1

## 2023-11-14 ASSESSMENT — LIFESTYLE VARIABLES: SMOKING_STATUS: 0

## 2023-11-14 NOTE — PROGRESS NOTES
Assessment & Plan     Acute cough  Patient symptoms are worsening at this point and have been present for over a week. Treated as noted below for presumed bacterial bronchitis.  Symptomatic treatments reviewed.  - azithromycin (ZITHROMAX) 250 MG tablet; Take 2 tablets (500 mg) by mouth daily for 1 day, THEN 1 tablet (250 mg) daily for 4 days.  - dextromethorphan-guaiFENesin (MUCINEX DM)  MG 12 hr tablet; Take 1 tablet by mouth every 12 hours as needed for cough or congestion    Acute bacterial conjunctivitis of both eyes  Having watery discharge and crusting of both eyes. Could be viral but treatment provided.  - trimethoprim-polymyxin b (POLYTRIM) 97423-8.1 UNIT/ML-% ophthalmic solution; Place 1-2 drops into both eyes 4 times daily for 7 days    Benign essential hypertension  Medication list did not match patient's. She reports taking amlodipine 5 mg along with valsartan 80 mg and hydrochlorothiazide. She has had normal blood pressures at home on this. Today upon recheck returns to normal. Continue current dosing.    Review of external notes as documented elsewhere in note  Prescription drug management  24 minutes spent by me on the date of the encounter doing chart review, history and exam, documentation and further activities per the note      MARICRUZ Garcia [facility]    Jamir Parrish is a 83 year old, presenting for the following health issues:  Cough        11/14/2023     8:44 AM   Additional Questions   Roomed by Gill CRISTOBAL         11/14/2023     8:52 AM   Patient Reported Additional Medications   Patient reports taking the following new medications Valsartan 80mg       History of Present Illness       Reason for visit:  Possible cold     Acute Illness  Acute illness concerns: Cough  Onset/Duration: 1 week  Symptoms:  Fever: No  Chills/Sweats: No  Headache (location?): YES- frontal (minimal)  Sinus Pressure: No  Conjunctivitis:  YES  Ear Pain: no  Rhinorrhea:  "YES  Congestion: YES- at night  Sore Throat: YES- pt states her throat tickles and slight pain   Cough: YES-non-productive, productive of clear sputum, worsening over time (worse at night, better during the day)  Wheeze: No  Decreased Appetite: YES  Nausea: No  Vomiting: No  Diarrhea: No  Dysuria/Freq.: No  Dysuria or Hematuria: No  Fatigue/Achiness: YES- fatigue  Sick/Strep Exposure: YES- patient's  had bronchitis, pts granddaughter had a cold  Therapies tried and outcome:     Recent travel to [facility] (October 10th- 24th)     COVID testing at home is negative.      Review of Systems   HENT:  Positive for rhinorrhea and sore throat.    Respiratory:  Positive for cough.    Neurological:  Positive for headaches.          Objective    /60   Pulse 69   Temp 98.5  F (36.9  C) (Oral)   Resp 16   Ht 1.575 m (5' 2\")   Wt 57.1 kg (125 lb 12.8 oz)   LMP  (LMP Unknown)   SpO2 98%   BMI 23.01 kg/m    Body mass index is 23.01 kg/m .  Physical Exam   GENERAL: No acute distress  HEENT: Normocephalic, PERRL, Canals patent, right TM slightly erythematous, non bulging, Left TM non-erythematous and non-bulging. Turbinates normal in appearance bilaterally. Posterior oropharynx non-erythematous and without exudate.  NECK: No cervical or supraclavicular lymphadenopathy.  CARDIAC: Regular rate and rhythm. 2/6/holosystolic ejection murmur heart throughout.  PULMONARY: Lungs are clear to auscultation bilaterally. No wheezes, rhonchi or crackles.  NEURO: Alert and non-focal                  "

## 2024-01-03 ENCOUNTER — OFFICE VISIT (OUTPATIENT)
Dept: FAMILY MEDICINE | Facility: CLINIC | Age: 84
End: 2024-01-03
Payer: MEDICARE

## 2024-01-03 VITALS
SYSTOLIC BLOOD PRESSURE: 156 MMHG | HEIGHT: 63 IN | OXYGEN SATURATION: 96 % | BODY MASS INDEX: 22.86 KG/M2 | TEMPERATURE: 98 F | RESPIRATION RATE: 12 BRPM | DIASTOLIC BLOOD PRESSURE: 75 MMHG | WEIGHT: 129 LBS | HEART RATE: 69 BPM

## 2024-01-03 DIAGNOSIS — I73.9 PAD (PERIPHERAL ARTERY DISEASE) (H): ICD-10-CM

## 2024-01-03 DIAGNOSIS — S63.299S: ICD-10-CM

## 2024-01-03 DIAGNOSIS — N18.2 TYPE 2 DIABETES MELLITUS WITH STAGE 2 CHRONIC KIDNEY DISEASE, WITHOUT LONG-TERM CURRENT USE OF INSULIN (H): ICD-10-CM

## 2024-01-03 DIAGNOSIS — E11.22 TYPE 2 DIABETES MELLITUS WITH STAGE 2 CHRONIC KIDNEY DISEASE, WITHOUT LONG-TERM CURRENT USE OF INSULIN (H): ICD-10-CM

## 2024-01-03 DIAGNOSIS — Z01.818 PREOP GENERAL PHYSICAL EXAM: Primary | ICD-10-CM

## 2024-01-03 PROBLEM — E11.29 TYPE 2 DIABETES MELLITUS WITH KIDNEY COMPLICATION, WITHOUT LONG-TERM CURRENT USE OF INSULIN (H): Status: ACTIVE | Noted: 2023-03-14

## 2024-01-03 PROBLEM — E11.9 TYPE 2 DIABETES MELLITUS WITHOUT COMPLICATION, WITHOUT LONG-TERM CURRENT USE OF INSULIN (H): Status: ACTIVE | Noted: 2023-03-14

## 2024-01-03 LAB
ANION GAP SERPL CALCULATED.3IONS-SCNC: 11 MMOL/L (ref 7–15)
BUN SERPL-MCNC: 13.7 MG/DL (ref 8–23)
CALCIUM SERPL-MCNC: 9.4 MG/DL (ref 8.8–10.2)
CHLORIDE SERPL-SCNC: 101 MMOL/L (ref 98–107)
CHOLEST SERPL-MCNC: 190 MG/DL
CREAT SERPL-MCNC: 0.65 MG/DL (ref 0.51–0.95)
DEPRECATED HCO3 PLAS-SCNC: 28 MMOL/L (ref 22–29)
EGFRCR SERPLBLD CKD-EPI 2021: 87 ML/MIN/1.73M2
FASTING STATUS PATIENT QL REPORTED: ABNORMAL
GLUCOSE SERPL-MCNC: 123 MG/DL (ref 70–99)
HBA1C MFR BLD: 6.5 % (ref 0–5.6)
HDLC SERPL-MCNC: 56 MG/DL
LDLC SERPL CALC-MCNC: 81 MG/DL
NONHDLC SERPL-MCNC: 134 MG/DL
POTASSIUM SERPL-SCNC: 3.2 MMOL/L (ref 3.4–5.3)
SODIUM SERPL-SCNC: 140 MMOL/L (ref 135–145)
TRIGL SERPL-MCNC: 264 MG/DL

## 2024-01-03 PROCEDURE — 83036 HEMOGLOBIN GLYCOSYLATED A1C: CPT | Performed by: FAMILY MEDICINE

## 2024-01-03 PROCEDURE — 36415 COLL VENOUS BLD VENIPUNCTURE: CPT | Performed by: FAMILY MEDICINE

## 2024-01-03 PROCEDURE — 80061 LIPID PANEL: CPT | Performed by: FAMILY MEDICINE

## 2024-01-03 PROCEDURE — 80048 BASIC METABOLIC PNL TOTAL CA: CPT | Performed by: FAMILY MEDICINE

## 2024-01-03 PROCEDURE — 99214 OFFICE O/P EST MOD 30 MIN: CPT | Performed by: FAMILY MEDICINE

## 2024-01-03 RX ORDER — POLYMYXIN B SULFATE AND TRIMETHOPRIM 1; 10000 MG/ML; [USP'U]/ML
1-2 SOLUTION OPHTHALMIC EVERY 4 HOURS
COMMUNITY
Start: 2023-11-14 | End: 2024-07-08

## 2024-01-03 RX ORDER — RESPIRATORY SYNCYTIAL VIRUS VACCINE 120MCG/0.5
0.5 KIT INTRAMUSCULAR ONCE
Qty: 1 EACH | Refills: 0 | Status: CANCELLED | OUTPATIENT
Start: 2024-01-03 | End: 2024-01-03

## 2024-01-03 SDOH — HEALTH STABILITY: PHYSICAL HEALTH: ON AVERAGE, HOW MANY MINUTES DO YOU ENGAGE IN EXERCISE AT THIS LEVEL?: 20 MIN

## 2024-01-03 SDOH — HEALTH STABILITY: PHYSICAL HEALTH: ON AVERAGE, HOW MANY DAYS PER WEEK DO YOU ENGAGE IN MODERATE TO STRENUOUS EXERCISE (LIKE A BRISK WALK)?: 7 DAYS

## 2024-01-03 ASSESSMENT — LIFESTYLE VARIABLES
HOW OFTEN DO YOU HAVE SIX OR MORE DRINKS ON ONE OCCASION: NEVER
AUDIT-C TOTAL SCORE: 3
HOW MANY STANDARD DRINKS CONTAINING ALCOHOL DO YOU HAVE ON A TYPICAL DAY: 1 OR 2
HOW OFTEN DO YOU HAVE A DRINK CONTAINING ALCOHOL: 2-3 TIMES A WEEK
SKIP TO QUESTIONS 9-10: 1

## 2024-01-03 ASSESSMENT — SOCIAL DETERMINANTS OF HEALTH (SDOH)
DO YOU BELONG TO ANY CLUBS OR ORGANIZATIONS SUCH AS CHURCH GROUPS UNIONS, FRATERNAL OR ATHLETIC GROUPS, OR SCHOOL GROUPS?: YES
HOW OFTEN DO YOU ATTENT MEETINGS OF THE CLUB OR ORGANIZATION YOU BELONG TO?: MORE THAN 4 TIMES PER YEAR
IN A TYPICAL WEEK, HOW MANY TIMES DO YOU TALK ON THE PHONE WITH FAMILY, FRIENDS, OR NEIGHBORS?: MORE THAN THREE TIMES A WEEK

## 2024-01-03 NOTE — PATIENT INSTRUCTIONS
Preparing for Your Surgery  Getting started  A nurse will call you to review your health history and instructions. They will give you an arrival time based on your scheduled surgery time. Please be ready to share:  Your doctor's clinic name and phone number  Your medical, surgical, and anesthesia history  A list of allergies and sensitivities  A list of medicines, including herbal treatments and over-the-counter drugs  Whether the patient has a legal guardian (ask how to send us the papers in advance)  Please tell us if you're pregnant--or if there's any chance you might be pregnant. Some surgeries may injure a fetus (unborn baby), so they require a pregnancy test. Surgeries that are safe for a fetus don't always need a test, and you can choose whether to have one.   If you have a child who's having surgery, please ask for a copy of Preparing for Your Child's Surgery.    Preparing for surgery  Within 10 to 30 days of surgery: Have a pre-op exam (sometimes called an H&P, or History and Physical). This can be done at a clinic or pre-operative center.  If you're having a , you may not need this exam. Talk to your care team.  At your pre-op exam, talk to your care team about all medicines you take. If you need to stop any medicines before surgery, ask when to start taking them again.  We do this for your safety. Many medicines can make you bleed too much during surgery. Some change how well surgery (anesthesia) drugs work.  Call your insurance company to let them know you're having surgery. (If you don't have insurance, call [phone].)  Call your clinic if there's any change in your health. This includes signs of a cold or flu (sore throat, runny nose, cough, rash, fever). It also includes a scrape or scratch near the surgery site.  If you have questions on the day of surgery, call your hospital or surgery center.  Eating and drinking guidelines  For your safety: Unless your surgeon tells you otherwise,  follow the guidelines below.  Eat and drink as usual until 8 hours before you arrive for surgery. After that, no food or milk.  Drink clear liquids until 2 hours before you arrive. These are liquids you can see through, like water, Gatorade, and Propel Water. They also include plain black coffee and tea (no cream or milk), candy, and breath mints. You can spit out gum when you arrive.  If you drink alcohol: Stop drinking it the night before surgery.  If your care team tells you to take medicine on the morning of surgery, it's okay to take it with a sip of water.  Preventing infection  Shower or bathe the night before and morning of your surgery. Follow the instructions your clinic gave you. (If no instructions, use regular soap.)  Don't shave or clip hair near your surgery site. We'll remove the hair if needed.  Don't smoke or vape the morning of surgery. You may chew nicotine gum up to 2 hours before surgery. A nicotine patch is okay.  Note: Some surgeries require you to completely quit smoking and nicotine. Check with your surgeon.  Your care team will make every effort to keep you safe from infection. We will:  Clean our hands often with soap and water (or an alcohol-based hand rub).  Clean the skin at your surgery site with a special soap that kills germs.  Give you a special gown to keep you warm. (Cold raises the risk of infection.)  Wear special hair covers, masks, gowns and gloves during surgery.  Give antibiotic medicine, if prescribed. Not all surgeries need antibiotics.  What to bring on the day of surgery  Photo ID and insurance card  Copy of your health care directive, if you have one  Glasses and hearing aids (bring cases)  You can't wear contacts during surgery  Inhaler and eye drops, if you use them (tell us about these when you arrive)  CPAP machine or breathing device, if you use them  A few personal items, if spending the night  If you have . . .  A pacemaker, ICD (cardiac defibrillator) or other  implant: Bring the ID card.  An implanted stimulator: Bring the remote control.  A legal guardian: Bring a copy of the certified (court-stamped) guardianship papers.  Please remove any jewelry, including body piercings. Leave jewelry and other valuables at home.  If you're going home the day of surgery  You must have a responsible adult drive you home. They should stay with you overnight as well.  If you don't have someone to stay with you, and you aren't safe to go home alone, we may keep you overnight. Insurance often won't pay for this.  After surgery  If it's hard to control your pain or you need more pain medicine, please call your surgeon's office.  Questions?   If you have any questions for your care team, list them here: _________________________________________________________________________________________________________________________________________________________________________ ____________________________________ ____________________________________ ____________________________________  For informational purposes only. Not to replace the advice of your health care provider. Copyright   2003, 2019 [employer]. All rights reserved. Clinically reviewed by Trisha Galdamez MD. [employer] 101413 - REV 12/22.    How to Take Your Medication Before Surgery  - HOLD (do not take) your HYDROCHLOROTHIAZIDE on the morning of surgery.

## 2024-01-03 NOTE — PROGRESS NOTES
[facility]  [id number] [place] [address]  Phone: [phone]  Primary Provider: Nory Feliciano  Pre-op Performing Provider: NORY FELICIANO    \  PREOPERATIVE EVALUATION:  Today's date: 1/3/2024    Francois is a 83 year old, presenting for the following:  Pre-Op Exam (01- (R) hand surgery Dr. Hunt/[facility])        1/3/2024     1:02 PM   Additional Questions   Accompanied by Lory hunt       Surgical Information:  Surgery/Procedure: Right- hand surgery  [facility]  Surgeon: Dr. Hutn  Surgery Date: 01-  Time of Surgery: 6 am  Where patient plans to recover: At home with family  Fax number for surgical facility: Note does not need to be faxed, will be available electronically in Epic.    Assessment & Plan     The proposed surgical procedure is considered INTERMEDIATE risk.    PAD (peripheral artery disease) (H24)  Stable, continue on ASA and statins    Type 2 diabetes mellitus with stage 2 chronic kidney disease, without long-term current use of insulin (H)  Well controlled, continue on diet only and exercise.   - HEMOGLOBIN A1C; Future  - Lipid panel reflex to direct LDL Non-fasting; Future  - HEMOGLOBIN A1C  - Lipid panel reflex to direct LDL Non-fasting    Dislocation of distal interphalangeal joint of finger, sequela  Scheduled for surgery    Preop general physical exam  Check below labs.   - Basic metabolic panel  (Ca, Cl, CO2, Creat, Gluc, K, Na, BUN); Future  - Basic metabolic panel  (Ca, Cl, CO2, Creat, Gluc, K, Na, BUN)            - No identified additional risk factors other than previously addressed    Antiplatelet or Anticoagulation Medication Instructions:   - aspirin: Discontinue aspirin 7-10 days prior to procedure to reduce bleeding risk. It should be resumed postoperatively.     Additional Medication Instructions:   - Calcium Channel Blockers: May be continued on the day of surgery.   - Diuretics: HOLD on the day  of surgery.   - Statins: Continue taking on the day of surgery.    - Herbal medications and vitamins: HOLD 14 days prior to surgery.    RECOMMENDATION:  APPROVAL GIVEN to proceed with proposed procedure, without further diagnostic evaluation.            Subjective       HPI related to upcoming procedure: arthrodesis interphalangeal joint of thumb         1/3/2024    12:30 PM   Preop Questions   1. Have you ever had a heart attack or stroke? YES - 2022   2. Have you ever had surgery on your heart or blood vessels, such as a stent placement, a coronary artery bypass, or surgery on an artery in your head, neck, heart, or legs? No   3. Do you have chest pain with activity? No   4. Do you have a history of  heart failure? No   5. Do you currently have a cold, bronchitis or symptoms of other infection? No   6. Do you have a cough, shortness of breath, or wheezing? No   7. Do you or anyone in your family have previous history of blood clots? No   8. Do you or does anyone in your family have a serious bleeding problem such as prolonged bleeding following surgeries or cuts? No   9. Have you ever had problems with anemia or been told to take iron pills? No   10. Have you had any abnormal blood loss such as black, tarry or bloody stools, or abnormal vaginal bleeding? No   11. Have you ever had a blood transfusion? YES - 1994   11a. Have you ever had a transfusion reaction? No   12. Are you willing to have a blood transfusion if it is medically needed before, during, or after your surgery? Yes   13. Have you or any of your relatives ever had problems with anesthesia? No   14. Do you have sleep apnea, excessive snoring or daytime drowsiness? No   15. Do you have any artifical heart valves or other implanted medical devices like a pacemaker, defibrillator, or continuous glucose monitor? No   16. Do you have artificial joints? No   17. Are you allergic to latex? No       Health Care Directive:  Patient does not have a Health Care  Directive or Living Will: Discussed advance care planning with patient; information given to patient to review.    Preoperative Review of :   reviewed - no record of controlled substances prescribed.      Status of Chronic Conditions:  See problem list for active medical problems.  Problems all longstanding and stable, except as noted/documented.  See ROS for pertinent symptoms related to these conditions.    DIABETES - Patient has a longstanding history of DiabetesType Type II . Patient is being treated with diet and denies significant side effects. Control has been good. Complicating factors include but are not limited to: hypertension.     HYPERTENSION - Patient has longstanding history of HTN , currently denies any symptoms referable to elevated blood pressure. Specifically denies chest pain, palpitations, dyspnea, orthopnea, PND or peripheral edema. Blood pressure readings have been in normal range. Current medication regimen is as listed below. Patient denies any side effects of medication.     Review of Systems  CONSTITUTIONAL: NEGATIVE for fever, chills, change in weight  INTEGUMENTARY/SKIN: NEGATIVE for worrisome rashes, moles or lesions  EYES: NEGATIVE for vision changes or irritation  ENT/MOUTH: NEGATIVE for ear, mouth and throat problems  RESP: NEGATIVE for significant cough or SOB  CV: NEGATIVE for chest pain, palpitations or peripheral edema  GI: NEGATIVE for nausea, abdominal pain, heartburn, or change in bowel habits  : NEGATIVE for frequency, dysuria, or hematuria  MUSCULOSKELETAL:OA changes in the hands.  NEURO: NEGATIVE for weakness, dizziness or paresthesias  ENDOCRINE: NEGATIVE for temperature intolerance, skin/hair changes  HEME: NEGATIVE for bleeding problems  PSYCHIATRIC: NEGATIVE for changes in mood or affect    Patient Active Problem List    Diagnosis Date Noted    CKD (chronic kidney disease) stage 2, GFR 60-89 ml/min 07/06/2023     Priority: Medium    Diabetes mellitus, type 2 (H)  03/14/2023     Priority: Medium    Bilateral inguinal hernia without obstruction or gangrene, recurrence not specified 09/30/2022     Priority: Medium    Small bowel obstruction (H) 11/22/2021     Priority: Medium    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 (H) 08/27/2021     Priority: Medium     Small multiple infarct of the Rt parietal and frontal (watershed distribution).      Pancreatic cyst 08/11/2021     Priority: Medium    Cyst of right kidney 08/11/2021     Priority: Medium    Diverticulosis of large intestine without hemorrhage 08/11/2021     Priority: Medium    Degenerative arthritis of thumb, right 02/14/2020     Priority: Medium    Post-nasal drip 12/17/2019     Priority: Medium    Hyperlipidemia LDL goal <130 01/08/2019     Priority: Medium    Status post lumbar spinal fusion 08/13/2018     Priority: Medium    Bilateral low back pain without sciatica, unspecified chronicity 12/19/2017     Priority: Medium    PAD (peripheral artery disease) (H24) 06/19/2017     Priority: Medium    SBO (small bowel obstruction) (H) 04/12/2017     Priority: Medium    Benign essential hypertension 10/12/2016     Priority: Medium    Pancreatic mass 08/17/2016     Priority: Medium     Benign appearing, last CT scan 7/2019 was negative for any pancreatic lesion.  Repeat CT in 2021 confirm benign lesion.        Past Medical History:   Diagnosis Date    Benign essential hypertension 10/12/2016    Bilateral low back pain without sciatica, unspecified chronicity 12/19/2017    Blunt trauma of rib, initial encounter 11/25/2016    Dyslipidemia     Gallstones     Hypertension     PAD (peripheral artery disease) (H24) 6/19/2017    Pancreatic mass 8/17/2016    SBO (small bowel obstruction) (H)     Slipped intervertebral disc      Past Surgical History:   Procedure Laterality Date    DAVINCI HERNIORRHAPHY INGUINAL Left 1/24/2023    Procedure: Robot-assisted left inguinal hernia repair with mesh;   "Surgeon: Lalito Strange MD;  Location:  OR    DAVINCI LYSIS OF ADHESIONS N/A 1/24/2023    Procedure: Davinci Lysis of Adhesions;  Surgeon: Lalito Strange MD;  Location:  OR    GYN SURGERY      hysterectomy    ORTHOPEDIC SURGERY      Slipped disc with chronic back pain     Current Outpatient Medications   Medication Sig Dispense Refill    amLODIPine (NORVASC) 5 MG tablet       aspirin (ASA) 81 MG chewable tablet Take 2 tablets (162 mg) by mouth daily      atorvastatin (LIPITOR) 40 MG tablet TAKE ONE TABLET BY MOUTH ONCE DAILY 90 tablet 2    hydrochlorothiazide (HYDRODIURIL) 25 MG tablet Take 1 tablet (25 mg) by mouth daily 90 tablet 2    KRILL OIL PO Take 1 capsule by mouth daily (OTC: Unsure of strength)      polymixin b-trimethoprim (POLYTRIM) 41784-4.1 UNIT/ML-% ophthalmic solution Place 1-2 drops into both eyes every 4 hours      potassium chloride (KLOR-CON) 20 MEQ packet Take 20 mEq by mouth daily 100 packet 3    valsartan (DIOVAN) 80 MG tablet Take 80 mg by mouth daily      vitamin D3 (CHOLECALCIFEROL) 50 mcg (2000 units) tablet Take 1 tablet by mouth daily      dextromethorphan-guaiFENesin (MUCINEX DM)  MG 12 hr tablet Take 1 tablet by mouth every 12 hours as needed for cough or congestion 28 tablet 1    UNABLE TO FIND MEDICATION NAME: Pellila seed oil         Allergies   Allergen Reactions    Ace Inhibitors     Sorbitan Trioleate     Sulfonylureas     Vancomycin Hives        Social History     Tobacco Use    Smoking status: Former    Smokeless tobacco: Former    Tobacco comments:     Former \"social\" smoker, quit in 1978.   Substance Use Topics    Alcohol use: Yes     Alcohol/week: 0.0 standard drinks of alcohol     Comment: occ wine, twice a week       History   Drug Use No         Objective     BP (!) 164/76 (BP Location: Left arm, Patient Position: Sitting, Cuff Size: Adult Regular)   Pulse 69   Temp 98  F (36.7  C) (Oral)   Resp 12   Ht 1.6 m (5' 3\")   Wt 58.5 kg (129 lb)   " LMP  (LMP Unknown)   SpO2 96%   BMI 22.85 kg/m      Physical Exam    GENERAL APPEARANCE: healthy, alert and no distress     EYES: EOMI,  PERRL     HENT: ear canals and TM's normal and nose and mouth without ulcers or lesions     NECK: no adenopathy, no asymmetry, masses, or scars and thyroid normal to palpation     RESP: lungs clear to auscultation - no rales, rhonchi or wheezes     CV: regular rates and rhythm, normal S1 S2, no S3 or S4 and no murmur, click or rub     ABDOMEN:  soft, nontender, no HSM or masses and bowel sounds normal     MS: multiple deformities in the fingers.     SKIN: no suspicious lesions or rashes     NEURO: Normal strength and tone, sensory exam grossly normal, mentation intact and speech normal     PSYCH: mentation appears normal. and affect normal/bright     LYMPHATICS: No cervical adenopathy    Recent Labs   Lab Test 08/24/23  1003 06/01/23  1354 02/02/23  1324 11/15/22  1155 10/29/22  2036   HGB  --   --   --   --  15.0   PLT  --   --   --   --  332    140 139   < > 135   POTASSIUM 3.8 4.2 3.0*   < > 3.0*   CR 0.69 0.75 0.66   < > 1.01   A1C  --  6.4* 6.8*  --   --     < > = values in this interval not displayed.        Diagnostics:  Labs pending at this time.  Results will be reviewed when available.   No EKG required, no history of coronary heart disease, significant arrhythmia, peripheral arterial disease or other structural heart disease.    Revised Cardiac Risk Index (RCRI):  The patient has the following serious cardiovascular risks for perioperative complications:   - Cerebrovascular Disease (TIA or CVA) = 1 point     RCRI Interpretation: 1 point: Class II (low risk - 0.9% complication rate)         Signed Electronically by: Luis Feliciano MD  Copy of this evaluation report is provided to requesting physician.

## 2024-01-04 ENCOUNTER — MYC MEDICAL ADVICE (OUTPATIENT)
Dept: FAMILY MEDICINE | Facility: CLINIC | Age: 84
End: 2024-01-04
Payer: MEDICARE

## 2024-01-04 ENCOUNTER — TELEPHONE (OUTPATIENT)
Dept: FAMILY MEDICINE | Facility: CLINIC | Age: 84
End: 2024-01-04
Payer: MEDICARE

## 2024-01-04 NOTE — TELEPHONE ENCOUNTER
My chart message sent, will monitor for reply     Tonia Juan, Registered Nurse, PAL (Patient Advocate Liaison)   [facility]   [phone]

## 2024-01-04 NOTE — TELEPHONE ENCOUNTER
Dr. Feliciano   Please see my chart message, follow up on your request for blood pressure readings and medications     Thank you   Tonia Juan, Registered Nurse, PAL (Patient Advocate Liaison)   [facility]   [phone]

## 2024-01-04 NOTE — TELEPHONE ENCOUNTER
Can we call Francois, I want to know what blood pressure medicine is she taking right now, and is she taking her potassium?  Also what's the latest blood pressure readings.

## 2024-01-04 NOTE — TELEPHONE ENCOUNTER
Ok, double the potassium to 2 packets daily for 3 days, then back to once daily.  BP is well controlled.

## 2024-01-04 NOTE — TELEPHONE ENCOUNTER
Patient notified via my chart     Tonia Juan Registered Nurse, PAL (Patient Advocate Liaison)   [facility]   [phone]

## 2024-01-05 NOTE — TELEPHONE ENCOUNTER
Patient responded via my chart 1/4/2024 see that encounter for more information     Tonia Juan, Registered Nurse, PAL (Patient Advocate Liaison)   [facility]   [phone]

## 2024-01-16 ENCOUNTER — OFFICE VISIT (OUTPATIENT)
Dept: FAMILY MEDICINE | Facility: CLINIC | Age: 84
End: 2024-01-16
Attending: FAMILY MEDICINE
Payer: MEDICARE

## 2024-01-16 VITALS
HEIGHT: 62 IN | OXYGEN SATURATION: 98 % | BODY MASS INDEX: 24.14 KG/M2 | HEART RATE: 63 BPM | WEIGHT: 131.2 LBS | DIASTOLIC BLOOD PRESSURE: 69 MMHG | RESPIRATION RATE: 16 BRPM | TEMPERATURE: 97.7 F | SYSTOLIC BLOOD PRESSURE: 136 MMHG

## 2024-01-16 DIAGNOSIS — I10 BENIGN ESSENTIAL HYPERTENSION: Primary | ICD-10-CM

## 2024-01-16 LAB
ANION GAP SERPL CALCULATED.3IONS-SCNC: 10 MMOL/L (ref 7–15)
BUN SERPL-MCNC: 17.5 MG/DL (ref 8–23)
CALCIUM SERPL-MCNC: 9.4 MG/DL (ref 8.8–10.2)
CHLORIDE SERPL-SCNC: 99 MMOL/L (ref 98–107)
CREAT SERPL-MCNC: 0.69 MG/DL (ref 0.51–0.95)
DEPRECATED HCO3 PLAS-SCNC: 28 MMOL/L (ref 22–29)
EGFRCR SERPLBLD CKD-EPI 2021: 86 ML/MIN/1.73M2
GLUCOSE SERPL-MCNC: 114 MG/DL (ref 70–99)
POTASSIUM SERPL-SCNC: 3.3 MMOL/L (ref 3.4–5.3)
SODIUM SERPL-SCNC: 137 MMOL/L (ref 135–145)

## 2024-01-16 PROCEDURE — 36415 COLL VENOUS BLD VENIPUNCTURE: CPT | Performed by: FAMILY MEDICINE

## 2024-01-16 PROCEDURE — 80048 BASIC METABOLIC PNL TOTAL CA: CPT | Performed by: FAMILY MEDICINE

## 2024-01-16 PROCEDURE — 99213 OFFICE O/P EST LOW 20 MIN: CPT | Performed by: FAMILY MEDICINE

## 2024-01-16 NOTE — PROGRESS NOTES
"  Assessment & Plan     Benign essential hypertension  Controlled, continue on hydrochlorothiazide and amlodipine, and potassium supplements.  - Basic metabolic panel  (Ca, Cl, CO2, Creat, Gluc, K, Na, BUN); Future                 Luis Feliciano MD  [facility]    Jamir Parrish is a 83 year old, presenting for the following health issues:  Hypertension and RECHECK        1/16/2024    11:15 AM   Additional Questions   Roomed by Gill CRISTOBAL   Accompanied by        History of Present Illness       Hypertension: She presents for follow up of hypertension.  She does check blood pressure  regularly outside of the clinic. Outside blood pressures have been over 140/90. She follows a low salt diet.     She eats 2-3 servings of fruits and vegetables daily.She consumes 0 sweetened beverage(s) daily.She exercises with enough effort to increase her heart rate 9 or less minutes per day.  She exercises with enough effort to increase her heart rate 3 or less days per week.   She is taking medications regularly.         Hypertension Follow-up    Do you check your blood pressure regularly outside of the clinic? Yes   Are you following a low salt diet? Yes  Are your blood pressures ever more than 140 on the top number (systolic) OR more   than 90 on the bottom number (diastolic), for example 140/90? Yes        Review of Systems         Objective    /69 (BP Location: Right arm, Patient Position: Chair, Cuff Size: Adult Regular)   Pulse 63   Temp 97.7  F (36.5  C) (Oral)   Resp 16   Ht 1.575 m (5' 2\")   Wt 59.5 kg (131 lb 3.2 oz)   LMP  (LMP Unknown)   SpO2 98%   BMI 24.00 kg/m    Body mass index is 24 kg/m .  Physical Exam   GENERAL: healthy, alert and no distress                      "

## 2024-01-20 ENCOUNTER — TRANSFERRED RECORDS (OUTPATIENT)
Dept: MULTI SPECIALTY CLINIC | Facility: CLINIC | Age: 84
End: 2024-01-20

## 2024-01-20 LAB — RETINOPATHY: NORMAL

## 2024-02-11 NOTE — PROGRESS NOTES
Problem: Adult Inpatient Plan of Care  Goal: Absence of Hospital-Acquired Illness or Injury  2/11/2024 1303 by Mleanie Duffy RN  Outcome: Ongoing, Progressing  2/11/2024 1303 by Melanie Duffy RN  Outcome: Ongoing, Progressing  Goal: Optimal Comfort and Wellbeing  2/11/2024 1303 by Melanie Duffy RN  Outcome: Ongoing, Progressing  2/11/2024 1303 by Melanie Duffy RN  Outcome: Ongoing, Progressing  Goal: Readiness for Transition of Care  2/11/2024 1303 by Melanie Duffy RN  Outcome: Ongoing, Progressing  2/11/2024 1303 by Melanie Duffy RN  Outcome: Ongoing, Progressing     Problem: Skin Injury Risk Increased  Goal: Skin Health and Integrity  2/11/2024 1303 by Melanie Duffy RN  Outcome: Ongoing, Progressing  2/11/2024 1303 by Melanie Duffy RN  Outcome: Ongoing, Progressing     Problem: Infection  Goal: Absence of Infection Signs and Symptoms  2/11/2024 1303 by Melanie Duffy RN  Outcome: Ongoing, Progressing  2/11/2024 1303 by Melanie Duffy RN  Outcome: Ongoing, Progressing     Problem: Pain Acute  Goal: Acceptable Pain Control and Functional Ability  Outcome: Ongoing, Progressing      "Subjective     Francois Ly is a 80 year old female who is being evaluated via a billable telephone visit.      The patient has been notified of following:     \"This telephone visit will be conducted via a call between you and your physician/provider. We have found that certain health care needs can be provided without the need for a physical exam.  This service lets us provide the care you need with a short phone conversation.  If a prescription is necessary we can send it directly to your pharmacy.  If lab work is needed we can place an order for that and you can then stop by our lab to have the test done at a later time.    Telephone visits are billed at different rates depending on your insurance coverage. During this emergency period, for some insurers they may be billed the same as an in-person visit.  Please reach out to your insurance provider with any questions.    If during the course of the call the physician/provider feels a telephone visit is not appropriate, you will not be charged for this service.\"    Patient has given verbal consent for Telephone visit?  Yes    How would you like to obtain your AVS? E-Mail (inform patient AVS not encrypted)    Francois Ly complains of   Chief Complaint   Patient presents with     Hypertension       ALLERGIES  Lactose; Sorbitan trioleate; and Vancomycin    Hypertension Follow-up      Do you check your blood pressure regularly outside of the clinic? Yes     Are you following a low salt diet? Yes    Are your blood pressures ever more than 140 on the top number (systolic) OR more   than 90 on the bottom number (diastolic), for example 140/90? Yes   Most of the time, her blood pressure is high about 140's /80's.    Patient states that she has been getting tightening in her chest once in a while. Been happening once a day when she lays down to got to bed at night. Last for about five to ten mins, this pain is chronic, and has been there for many years, on and off, " "had stress test done in the past with negative results.    Patient states that she has been getting pain in her legs in the muscle in her thighs and down the side of her legs.                  Patient Active Problem List   Diagnosis     Back pain     Chest pain     Pancreatic mass     Hypertensive urgency     Benign essential hypertension     Blunt trauma of rib, initial encounter     SBO (small bowel obstruction) (H)     Small bowel obstruction (H)     PAD (peripheral artery disease) (H)     Bilateral low back pain without sciatica, unspecified chronicity     Hyperlipidemia LDL goal <130     Post-nasal drip     Degenerative arthritis of thumb, right     Past Surgical History:   Procedure Laterality Date     GYN SURGERY      hysterectomy     ORTHOPEDIC SURGERY      Slipped disc with chronic back pain       Social History     Tobacco Use     Smoking status: Former Smoker     Smokeless tobacco: Former User     Tobacco comment: Former \"social\" smoker, quit in 1978.   Substance Use Topics     Alcohol use: Yes     Alcohol/week: 0.0 standard drinks     Comment: occ wine     Family History   Problem Relation Age of Onset     Family History Negative Other          Current Outpatient Medications   Medication Sig Dispense Refill     aspirin (ASA) 81 MG chewable tablet Take 81 mg by mouth daily       calcium carbonate (TUMS) 500 MG chewable tablet Take 1 tablet (500 mg) by mouth 3 times daily as needed for heartburn 90 tablet 0     losartan-hydrochlorothiazide (HYZAAR) 100-25 MG tablet Take 1 tablet by mouth daily 90 tablet 1     Naproxen Sodium (ALEVE PO) Take 1 tablet by mouth daily as needed for moderate pain       Omega-3 Fatty Acids ([employer] FISH OIL) 1200 MG CAPS Take 1 capsule by mouth daily       UNKNOWN TO PATIENT Place onto the skin daily as needed (leg cramps) Cream from [place] for leg cramps       Allergies   Allergen Reactions     Lactose GI Disturbance     Reduced fat dairy- Diarrhea     Sorbitan Trioleate      " Vancomycin        Reviewed and updated as needed this visit by Provider         Review of Systems   ROS COMP: CONSTITUTIONAL: NEGATIVE for fever, chills, change in weight  RESP: NEGATIVE for significant cough or SOB  CV: NEGATIVE for chest pain, palpitations or peripheral edema       Objective   Reported vitals:  LMP  (LMP Unknown)    healthy, alert and no distress  Psych: Alert and oriented times 3; coherent speech, normal   rate and volume, able to articulate logical thoughts, able   to abstract reason, no tangential thoughts, no hallucinations   or delusions  Her affect is normal.             Assessment/Plan:  1. Benign essential hypertension  BP is in on the higher side, but I think it is reasonable give her age and lack of heart disease or CVA. Will discuss that with Domi JARAMILLO.  In regards to her chest pain, it is chronic, recurrent. With negative results in the past, mostly related to anxiety. Pt was advised to call 911 if chest pain continues or become sever.          Phone call duration:  12 minutes    Luis Feliciano MD

## 2024-02-27 ENCOUNTER — TELEPHONE (OUTPATIENT)
Dept: FAMILY MEDICINE | Facility: CLINIC | Age: 84
End: 2024-02-27
Payer: MEDICARE

## 2024-02-27 NOTE — TELEPHONE ENCOUNTER
Reason for Call:  Appointment Request    Patient requesting this type of appt: Chronic Diease Management/Medication/Follow-Up    Requested provider: Luis Feliciano    Reason patient unable to be scheduled: Needs to be scheduled by clinic    When does patient want to be seen/preferred time: 1-2 days    Comments: Med check follow-up regarding   amLODIPine (NORVASC) 5 MG tablet . Medication issues causing right leg swelling and frequent urinating     Could we send this information to you in [employer] or would you prefer to receive a phone call?:   Patient would prefer a phone call   Okay to leave a detailed message?: Yes at Home number on file [phone] (home)    Call taken on 2/27/2024 at 5:07 PM by KATHY BAEZA

## 2024-03-01 ENCOUNTER — OFFICE VISIT (OUTPATIENT)
Dept: FAMILY MEDICINE | Facility: CLINIC | Age: 84
End: 2024-03-01
Payer: MEDICARE

## 2024-03-01 VITALS
DIASTOLIC BLOOD PRESSURE: 62 MMHG | HEIGHT: 62 IN | RESPIRATION RATE: 12 BRPM | BODY MASS INDEX: 24.03 KG/M2 | OXYGEN SATURATION: 97 % | HEART RATE: 69 BPM | WEIGHT: 130.6 LBS | SYSTOLIC BLOOD PRESSURE: 124 MMHG | TEMPERATURE: 98.3 F

## 2024-03-01 DIAGNOSIS — M79.89 LEG SWELLING: ICD-10-CM

## 2024-03-01 DIAGNOSIS — I10 BENIGN ESSENTIAL HYPERTENSION: Primary | ICD-10-CM

## 2024-03-01 PROCEDURE — 99214 OFFICE O/P EST MOD 30 MIN: CPT | Performed by: FAMILY MEDICINE

## 2024-03-01 RX ORDER — SPIRONOLACTONE 25 MG/1
25 TABLET ORAL DAILY
Qty: 90 TABLET | Refills: 0 | Status: SHIPPED | OUTPATIENT
Start: 2024-03-01 | End: 2024-08-20

## 2024-03-01 NOTE — PROGRESS NOTES
"  Assessment & Plan     Benign essential hypertension  Given the side effects with amlodipine, will stop it, and start on spironolactone (pt preference), stop potassium ,and recheck BMP in 1 week.   - spironolactone (ALDACTONE) 25 MG tablet; Take 1 tablet (25 mg) by mouth daily  - Basic metabolic panel  (Ca, Cl, CO2, Creat, Gluc, K, Na, BUN); Future    Leg swelling  Mostly related to the use of amlodipine, stop amlodipine and recheck if symptoms persist.                   Subjective   Francois is a 83 year old, presenting for the following health issues:  Recheck Medication (Medication issue and leg swelling)      3/1/2024    10:05 AM   Additional Questions   Roomed by frank castellano   Accompanied by Sahil BYRD         Hypertension Follow-up    Do you check your blood pressure regularly outside of the clinic? Yes   Are you following a low salt diet? Yes  Are your blood pressures ever more than 140 on the top number (systolic) OR more   than 90 on the bottom number (diastolic), for example 140/90? No          Objective    BP (!) 164/73 (BP Location: Left arm, Patient Position: Sitting, Cuff Size: Adult Regular)   Pulse 69   Temp 98.3  F (36.8  C) (Oral)   Resp 12   Ht 1.575 m (5' 2\")   Wt 59.2 kg (130 lb 9.6 oz)   LMP  (LMP Unknown)   SpO2 97%   BMI 23.89 kg/m    Body mass index is 23.89 kg/m .  Physical Exam   GENERAL: alert and no distress  RESP: lungs clear to auscultation - no rales, rhonchi or wheezes  CV: regular rates and rhythm, normal S1 S2, no S3 or S4, and no murmur, click or rub  MS: mild swelling of the Rt lower leg, mainly around the ankle, normal calf size bilaterally.            Signed Electronically by: Luis Feliciano MD    "

## 2024-03-07 ENCOUNTER — OFFICE VISIT (OUTPATIENT)
Dept: FAMILY MEDICINE | Facility: CLINIC | Age: 84
End: 2024-03-07
Payer: MEDICARE

## 2024-03-07 VITALS
HEART RATE: 59 BPM | HEIGHT: 62 IN | BODY MASS INDEX: 23.66 KG/M2 | WEIGHT: 128.6 LBS | OXYGEN SATURATION: 97 % | DIASTOLIC BLOOD PRESSURE: 68 MMHG | RESPIRATION RATE: 10 BRPM | TEMPERATURE: 97.7 F | SYSTOLIC BLOOD PRESSURE: 133 MMHG

## 2024-03-07 DIAGNOSIS — K40.20 BILATERAL INGUINAL HERNIA WITHOUT OBSTRUCTION OR GANGRENE, RECURRENCE NOT SPECIFIED: ICD-10-CM

## 2024-03-07 DIAGNOSIS — I10 BENIGN ESSENTIAL HYPERTENSION: Primary | ICD-10-CM

## 2024-03-07 PROCEDURE — 99213 OFFICE O/P EST LOW 20 MIN: CPT | Performed by: FAMILY MEDICINE

## 2024-03-07 PROCEDURE — 80048 BASIC METABOLIC PNL TOTAL CA: CPT | Performed by: FAMILY MEDICINE

## 2024-03-07 PROCEDURE — 36415 COLL VENOUS BLD VENIPUNCTURE: CPT | Performed by: FAMILY MEDICINE

## 2024-03-07 NOTE — PROGRESS NOTES
"  Assessment & Plan     Benign essential hypertension  Well controlled, edema has resolved almost completely.   - Basic metabolic panel  (Ca, Cl, CO2, Creat, Gluc, K, Na, BUN)    Bilateral inguinal hernia without obstruction or gangrene, recurrence not specified  Pt is still having pain in the lower abdomen area, exam did not reveal any residual hernia.                  Subjective   Francois is a 83 year old, presenting for the following health issues:  Labs Only (Pt fasting) and Recheck Medication (Atorvastatin 40mg )        3/7/2024     9:44 AM   Additional Questions   Roomed by AT   Accompanied by zaynab  -      History of Present Illness       Reason for visit:  Leg swelling  Symptom onset:  Today  Symptom intensity:  Mild  Symptom progression:  Improving  Had these symptoms before:  Yes  Has tried/received treatment for these symptoms:  Yes  Previous treatment was successful:  No    She eats 2-3 servings of fruits and vegetables daily.She consumes 0 sweetened beverage(s) daily.She exercises with enough effort to increase her heart rate 9 or less minutes per day.  She exercises with enough effort to increase her heart rate 3 or less days per week.   She is taking medications regularly.             Medication Followup of atorvastatin 40mg   Taking Medication as prescribed: yes  Side Effects:  pt states some slight swelling and occasional pain in the R leg   Medication Helping Symptoms:  yes    Concern - lab work   Onset:   Description: pt in for lab work, as well as discuss atorvastatin and changes either made or start up, states some swelling ongoing in the R leg, \"not as bad as it was\" and slight \"spasm\" cramping feeling    Hypertension Follow-up    Do you check your blood pressure regularly outside of the clinic? Yes   Are you following a low salt diet? Yes  Are your blood pressures ever more than 140 on the top number (systolic) OR more   than 90 on the bottom number (diastolic), for example 140/90? " "No          Objective    BP (!) 168/73 (BP Location: Left arm, Patient Position: Chair, Cuff Size: Adult Regular)   Pulse 59   Temp 97.7  F (36.5  C) (Oral)   Resp 10   Ht 1.575 m (5' 2\")   Wt 58.3 kg (128 lb 9.6 oz)   LMP  (LMP Unknown)   SpO2 97%   BMI 23.52 kg/m    Body mass index is 23.52 kg/m .  Physical Exam   GENERAL: alert and no distress  ABDOMEN: soft, nontender, no hepatosplenomegaly, no masses and bowel sounds normal  MS: no gross musculoskeletal defects noted, no edema            Signed Electronically by: Luis Feliciano MD    "

## 2024-03-08 LAB
ANION GAP SERPL CALCULATED.3IONS-SCNC: 10 MMOL/L (ref 7–15)
BUN SERPL-MCNC: 15.3 MG/DL (ref 8–23)
CALCIUM SERPL-MCNC: 10 MG/DL (ref 8.8–10.2)
CHLORIDE SERPL-SCNC: 101 MMOL/L (ref 98–107)
CREAT SERPL-MCNC: 0.76 MG/DL (ref 0.51–0.95)
DEPRECATED HCO3 PLAS-SCNC: 28 MMOL/L (ref 22–29)
EGFRCR SERPLBLD CKD-EPI 2021: 77 ML/MIN/1.73M2
GLUCOSE SERPL-MCNC: 117 MG/DL (ref 70–99)
POTASSIUM SERPL-SCNC: 4.2 MMOL/L (ref 3.4–5.3)
SODIUM SERPL-SCNC: 139 MMOL/L (ref 135–145)

## 2024-06-13 ENCOUNTER — OFFICE VISIT (OUTPATIENT)
Dept: SURGERY | Facility: CLINIC | Age: 84
End: 2024-06-13
Payer: MEDICARE

## 2024-06-13 VITALS
WEIGHT: 135 LBS | HEART RATE: 54 BPM | BODY MASS INDEX: 24.84 KG/M2 | HEIGHT: 62 IN | SYSTOLIC BLOOD PRESSURE: 140 MMHG | DIASTOLIC BLOOD PRESSURE: 74 MMHG

## 2024-06-13 DIAGNOSIS — R10.84 ABDOMINAL PAIN, GENERALIZED: Primary | ICD-10-CM

## 2024-06-13 PROCEDURE — 99213 OFFICE O/P EST LOW 20 MIN: CPT | Performed by: SURGERY

## 2024-06-13 NOTE — PATIENT INSTRUCTIONS
Francois to follow up with Primary Care provider regarding elevated blood pressure.    Your CT abd/pelvis is scheduled for 6/20 4:00 [facility]

## 2024-06-13 NOTE — LETTER
"June 24, 2024          Luis Feliciano MD  [address] [place] [address]      RE:   Francois Ly 1940      Dear Colleague,    Thank you for referring your patient, Francois Ly, to [facility] at [facility]. Please see a copy of my visit note below.     Francois Ly is a 84 year old female who presented with abdominal wall bulging.  Patient has a history of previous inguinal hernia repair.  She has now been having some pain and discomfort on the right side, the opposite side of her previous repair.  She is here for further discussion.     PMH:  Francois Ly  has a past medical history of Benign essential hypertension (10/12/2016), Bilateral low back pain without sciatica, unspecified chronicity (12/19/2017), Blunt trauma of rib, initial encounter (11/25/2016), Dyslipidemia, Gallstones, Hypertension, PAD (peripheral artery disease) (H24) (6/19/2017), Pancreatic mass (8/17/2016), SBO (small bowel obstruction) (H), and Slipped intervertebral disc.  PSH:  Francois Ly  has a past surgical history that includes GYN surgery; orthopedic surgery; Davinci Herniorrhaphy Inguinal (Left, 1/24/2023); and Davinci Lysis of Adhesions (N/A, 1/24/2023).     Home medications and allergies reviewed.     Social History:  Francois Ly  reports that she has quit smoking. She has quit using smokeless tobacco. She reports current alcohol use. She reports that she does not use drugs.  Family History:  Francois Ly family history includes Family History Negative in an other family member.     ROS:  The 10 point Review of Systems is negative other than noted in the HPI.  No fevers or chills.  No obstructive symptoms.     Physical Exam:  Blood pressure (!) 140/74, pulse 54, height 1.575 m (5' 2\"), weight 61.2 kg (135 lb), not currently breastfeeding.  135 lbs 0 oz  Thin older female in no distress.  Patient has a pleasant affect and communicates well.   Pupils equal round and reactive to light.   No " cervical lymphadenopathy or thyromegaly.   Lung fields clear, breathing comfortably.   Heart normal sinus rhythm.  No murmurs rubs or gallops.  Abdomen soft, nontender, nondistended.  Scars consistent with surgical history.  Some asymmetric bulging along the right lower abdomen and upper abdomen.  No obvious hernia defects.  Skin warm, dry.  No obvious rashes or lesions.     All new lab and imaging data was reviewed.  CT scan ordered for follow-up did not reveal any obvious hernia defects.  This includes along the ventral abdominal wall and the right groin.      Assessment/plan: Pleasant older female with bulging along the abdominal wall.  I have explained that this does not appear to be consistent with hernia, given imaging and clinical evidence.  She may have pain related to chronic back issues or abdominal wall weakness.  She can certainly consider PT but I do not think surgery would be of much benefit.  Surgical co-morbities include hypertension, hyperlipidemia, type 2 diabetes.       Again, thank you for allowing me to participate in the care of your patient.      Sincerely,      Lalito Strange MD

## 2024-06-20 ENCOUNTER — ANCILLARY PROCEDURE (OUTPATIENT)
Dept: CT IMAGING | Facility: CLINIC | Age: 84
End: 2024-06-20
Attending: SURGERY
Payer: MEDICARE

## 2024-06-20 DIAGNOSIS — R10.84 ABDOMINAL PAIN, GENERALIZED: ICD-10-CM

## 2024-06-20 LAB
CREAT BLD-MCNC: 0.8 MG/DL (ref 0.5–1)
EGFRCR SERPLBLD CKD-EPI 2021: >60 ML/MIN/1.73M2

## 2024-06-20 PROCEDURE — 74177 CT ABD & PELVIS W/CONTRAST: CPT | Mod: MG

## 2024-06-20 PROCEDURE — G1010 CDSM STANSON: HCPCS

## 2024-06-20 PROCEDURE — 250N000009 HC RX 250: Performed by: SURGERY

## 2024-06-20 PROCEDURE — 250N000011 HC RX IP 250 OP 636: Performed by: SURGERY

## 2024-06-20 PROCEDURE — 82565 ASSAY OF CREATININE: CPT

## 2024-06-20 RX ORDER — IOPAMIDOL 755 MG/ML
90 INJECTION, SOLUTION INTRAVASCULAR ONCE
Status: COMPLETED | OUTPATIENT
Start: 2024-06-20 | End: 2024-06-20

## 2024-06-20 RX ADMIN — SODIUM CHLORIDE 90 ML: 9 INJECTION, SOLUTION INTRAVENOUS at 15:37

## 2024-06-20 RX ADMIN — IOPAMIDOL 90 ML: 755 INJECTION, SOLUTION INTRAVENOUS at 15:37

## 2024-06-21 NOTE — PROGRESS NOTES
"Surgery Consultation, [facility]         Lalito Strange MD, MD    Francois Ly MRN# [id number]   YOB: 1940 Age: 84 year old     PCP:  Luis Feliciano [phone]    Chief Complaint: Abdominal bulging    Pt was seen in consultation from Luis Feliciano.    History of Present Illness:  Francois Ly is a 84 year old female who presented with abdominal wall bulging.  Patient has a history of previous inguinal hernia repair.  She has now been having some pain and discomfort on the right side, the opposite side of her previous repair.  She is here for further discussion.    PMH:  Francois Ly  has a past medical history of Benign essential hypertension (10/12/2016), Bilateral low back pain without sciatica, unspecified chronicity (12/19/2017), Blunt trauma of rib, initial encounter (11/25/2016), Dyslipidemia, Gallstones, Hypertension, PAD (peripheral artery disease) (H24) (6/19/2017), Pancreatic mass (8/17/2016), SBO (small bowel obstruction) (H), and Slipped intervertebral disc.  PSH:  Francois Ly  has a past surgical history that includes GYN surgery; orthopedic surgery; Davinci Herniorrhaphy Inguinal (Left, 1/24/2023); and Davinci Lysis of Adhesions (N/A, 1/24/2023).    Home medications and allergies reviewed.    Social History:  Francois Ly  reports that she has quit smoking. She has quit using smokeless tobacco. She reports current alcohol use. She reports that she does not use drugs.  Family History:  Francois Ly family history includes Family History Negative in an other family member.    ROS:  The 10 point Review of Systems is negative other than noted in the HPI.  No fevers or chills.  No obstructive symptoms.    Physical Exam:  Blood pressure (!) 140/74, pulse 54, height 1.575 m (5' 2\"), weight 61.2 kg (135 lb), not currently breastfeeding.  135 lbs 0 oz  Thin older female in no distress.  Patient has a pleasant affect and communicates well.   Pupils equal round and " reactive to light.   No cervical lymphadenopathy or thyromegaly.   Lung fields clear, breathing comfortably.   Heart normal sinus rhythm.  No murmurs rubs or gallops.  Abdomen soft, nontender, nondistended.  Scars consistent with surgical history.  Some asymmetric bulging along the right lower abdomen and upper abdomen.  No obvious hernia defects.  Skin warm, dry.  No obvious rashes or lesions.    All new lab and imaging data was reviewed.  CT scan ordered for follow-up did not reveal any obvious hernia defects.  This includes along the ventral abdominal wall and the right groin.     Assessment/plan: Pleasant older female with bulging along the abdominal wall.  I have explained that this does not appear to be consistent with hernia, given imaging and clinical evidence.  She may have pain related to chronic back issues or abdominal wall weakness.  She can certainly consider PT but I do not think surgery would be of much benefit.  Surgical co-morbities include hypertension, hyperlipidemia, type 2 diabetes.    Jayant Strange M.D.  [facility]  [phone]    Please route or send letter to:  Primary Care Provider (PCP) and Referring Provider

## 2024-06-21 NOTE — RESULT ENCOUNTER NOTE
Could you please notify this patient of his/her results?  No evidence for recurrent hernia.  She can consider physical therapy for symptoms.  No surgery is recommended.  Thanks

## 2024-06-25 ENCOUNTER — TELEPHONE (OUTPATIENT)
Dept: SURGERY | Facility: CLINIC | Age: 84
End: 2024-06-25
Payer: MEDICARE

## 2024-06-25 DIAGNOSIS — R19.8 ABDOMINAL WEAKNESS: Primary | ICD-10-CM

## 2024-06-25 DIAGNOSIS — Z98.890 HX OF HERNIA REPAIR: ICD-10-CM

## 2024-06-25 DIAGNOSIS — Z87.19 HX OF HERNIA REPAIR: ICD-10-CM

## 2024-06-25 NOTE — TELEPHONE ENCOUNTER
Called and spoke with patient's     Will place internal referral. Will MyChart scheduling phone number    Encouraged him to call if they have problems scheduling/being seen soon. Can also fax to external facilities.    All questions answered    He agreed    Keila Izaguirre, RN-BSN

## 2024-06-25 NOTE — TELEPHONE ENCOUNTER
Name of caller: spouse, Sahil  Consent to communicate on file for Sahil.    Reason for Call:  patient requesting an order be placed for her to try PT.     She saw Dr. Strange last week and he said she could try PT if interested.  Would prefer somewhere in [facility] if possible.    Surgeon:  Lalito Strange MD    Recent Surgery:  No    If yes, when & what type:  N/A      Best phone number to reach pt at is: [phone]    Ok to leave a message with medical info? Yes.

## 2024-07-08 ENCOUNTER — OFFICE VISIT (OUTPATIENT)
Dept: FAMILY MEDICINE | Facility: CLINIC | Age: 84
End: 2024-07-08
Payer: MEDICARE

## 2024-07-08 VITALS
SYSTOLIC BLOOD PRESSURE: 130 MMHG | RESPIRATION RATE: 12 BRPM | HEART RATE: 57 BPM | DIASTOLIC BLOOD PRESSURE: 68 MMHG | WEIGHT: 129 LBS | HEIGHT: 60 IN | OXYGEN SATURATION: 97 % | BODY MASS INDEX: 25.32 KG/M2 | TEMPERATURE: 97.8 F

## 2024-07-08 DIAGNOSIS — N18.2 CKD (CHRONIC KIDNEY DISEASE) STAGE 2, GFR 60-89 ML/MIN: ICD-10-CM

## 2024-07-08 DIAGNOSIS — I63.321 CEREBROVASCULAR ACCIDENT (CVA) DUE TO THROMBOSIS OF RIGHT ANTERIOR CEREBRAL ARTERY (H): ICD-10-CM

## 2024-07-08 DIAGNOSIS — Z00.00 ENCOUNTER FOR MEDICARE ANNUAL WELLNESS EXAM: ICD-10-CM

## 2024-07-08 DIAGNOSIS — I10 BENIGN ESSENTIAL HYPERTENSION: ICD-10-CM

## 2024-07-08 DIAGNOSIS — E11.29 TYPE 2 DIABETES MELLITUS WITH DIABETIC MICROALBUMINURIA, WITHOUT LONG-TERM CURRENT USE OF INSULIN (H): Primary | ICD-10-CM

## 2024-07-08 DIAGNOSIS — K86.2 PANCREATIC CYST: ICD-10-CM

## 2024-07-08 DIAGNOSIS — K40.20 BILATERAL INGUINAL HERNIA WITHOUT OBSTRUCTION OR GANGRENE, RECURRENCE NOT SPECIFIED: ICD-10-CM

## 2024-07-08 DIAGNOSIS — R80.9 TYPE 2 DIABETES MELLITUS WITH DIABETIC MICROALBUMINURIA, WITHOUT LONG-TERM CURRENT USE OF INSULIN (H): Primary | ICD-10-CM

## 2024-07-08 PROBLEM — M67.432 GANGLION CYST OF WRIST, LEFT: Status: ACTIVE | Noted: 2024-05-02

## 2024-07-08 PROBLEM — K56.609 SMALL BOWEL OBSTRUCTION (H): Status: RESOLVED | Noted: 2021-11-22 | Resolved: 2024-07-08

## 2024-07-08 LAB
ANION GAP SERPL CALCULATED.3IONS-SCNC: 11 MMOL/L (ref 7–15)
BUN SERPL-MCNC: 14.7 MG/DL (ref 8–23)
CALCIUM SERPL-MCNC: 9.4 MG/DL (ref 8.8–10.2)
CHLORIDE SERPL-SCNC: 101 MMOL/L (ref 98–107)
CREAT SERPL-MCNC: 0.73 MG/DL (ref 0.51–0.95)
CREAT UR-MCNC: 57.5 MG/DL
DEPRECATED HCO3 PLAS-SCNC: 26 MMOL/L (ref 22–29)
EGFRCR SERPLBLD CKD-EPI 2021: 81 ML/MIN/1.73M2
GLUCOSE SERPL-MCNC: 123 MG/DL (ref 70–99)
HBA1C MFR BLD: 6.5 % (ref 0–5.6)
MICROALBUMIN UR-MCNC: 37.5 MG/L
MICROALBUMIN/CREAT UR: 65.22 MG/G CR (ref 0–25)
POTASSIUM SERPL-SCNC: 4 MMOL/L (ref 3.4–5.3)
SODIUM SERPL-SCNC: 138 MMOL/L (ref 135–145)

## 2024-07-08 PROCEDURE — G0439 PPPS, SUBSEQ VISIT: HCPCS | Performed by: FAMILY MEDICINE

## 2024-07-08 PROCEDURE — 99214 OFFICE O/P EST MOD 30 MIN: CPT | Mod: 25 | Performed by: FAMILY MEDICINE

## 2024-07-08 PROCEDURE — 36415 COLL VENOUS BLD VENIPUNCTURE: CPT | Performed by: FAMILY MEDICINE

## 2024-07-08 PROCEDURE — 80048 BASIC METABOLIC PNL TOTAL CA: CPT | Performed by: FAMILY MEDICINE

## 2024-07-08 PROCEDURE — 83036 HEMOGLOBIN GLYCOSYLATED A1C: CPT | Performed by: FAMILY MEDICINE

## 2024-07-08 PROCEDURE — 82043 UR ALBUMIN QUANTITATIVE: CPT | Performed by: FAMILY MEDICINE

## 2024-07-08 PROCEDURE — 99207 PR FOOT EXAM NO CHARGE: CPT | Performed by: FAMILY MEDICINE

## 2024-07-08 PROCEDURE — 82570 ASSAY OF URINE CREATININE: CPT | Performed by: FAMILY MEDICINE

## 2024-07-08 RX ORDER — HYDROCHLOROTHIAZIDE 25 MG/1
25 TABLET ORAL DAILY
Qty: 90 TABLET | Refills: 3 | Status: SHIPPED | OUTPATIENT
Start: 2024-07-08

## 2024-07-08 RX ORDER — ATORVASTATIN CALCIUM 40 MG/1
40 TABLET, FILM COATED ORAL DAILY
Qty: 90 TABLET | Refills: 3 | Status: SHIPPED | OUTPATIENT
Start: 2024-07-08

## 2024-07-08 SDOH — HEALTH STABILITY: PHYSICAL HEALTH: ON AVERAGE, HOW MANY DAYS PER WEEK DO YOU ENGAGE IN MODERATE TO STRENUOUS EXERCISE (LIKE A BRISK WALK)?: 3 DAYS

## 2024-07-08 ASSESSMENT — SOCIAL DETERMINANTS OF HEALTH (SDOH): HOW OFTEN DO YOU GET TOGETHER WITH FRIENDS OR RELATIVES?: THREE TIMES A WEEK

## 2024-07-08 NOTE — PATIENT INSTRUCTIONS
Patient Education   Preventive Care Advice   This is general advice given by our system to help you stay healthy. However, your care team may have specific advice just for you. Please talk to your care team about your preventive care needs.  Nutrition  Eat 5 or more servings of fruits and vegetables each day.  Try wheat bread, brown rice and whole grain pasta (instead of white bread, rice, and pasta).  Get enough calcium and vitamin D. Check the label on foods and aim for 100% of the RDA (recommended daily allowance).  Lifestyle  Exercise at least 150 minutes each week  (30 minutes a day, 5 days a week).  Do muscle strengthening activities 2 days a week. These help control your weight and prevent disease.  No smoking.  Wear sunscreen to prevent skin cancer.  Have a dental exam and cleaning every 6 months.  Yearly exams  See your health care team every year to talk about:  Any changes in your health.  Any medicines your care team has prescribed.  Preventive care, family planning, and ways to prevent chronic diseases.  Shots (vaccines)   HPV shots (up to age 26), if you've never had them before.  Hepatitis B shots (up to age 59), if you've never had them before.  COVID-19 shot: Get this shot when it's due.  Flu shot: Get a flu shot every year.  Tetanus shot: Get a tetanus shot every 10 years.  Pneumococcal, hepatitis A, and RSV shots: Ask your care team if you need these based on your risk.  Shingles shot (for age 50 and up)  General health tests  Diabetes screening:  Starting at age 35, Get screened for diabetes at least every 3 years.  If you are younger than age 35, ask your care team if you should be screened for diabetes.  Cholesterol test: At age 39, start having a cholesterol test every 5 years, or more often if advised.  Bone density scan (DEXA): At age 50, ask your care team if you should have this scan for osteoporosis (brittle bones).  Hepatitis C: Get tested at least once in your life.  STIs (sexually  transmitted infections)  Before age 24: Ask your care team if you should be screened for STIs.  After age 24: Get screened for STIs if you're at risk. You are at risk for STIs (including HIV) if:  You are sexually active with more than one person.  You don't use condoms every time.  You or a partner was diagnosed with a sexually transmitted infection.  If you are at risk for HIV, ask about PrEP medicine to prevent HIV.  Get tested for HIV at least once in your life, whether you are at risk for HIV or not.  Cancer screening tests  Cervical cancer screening: If you have a cervix, begin getting regular cervical cancer screening tests starting at age 21.  Breast cancer scan (mammogram): If you've ever had breasts, begin having regular mammograms starting at age 40. This is a scan to check for breast cancer.  Colon cancer screening: It is important to start screening for colon cancer at age 45.  Have a colonoscopy test every 10 years (or more often if you're at risk) Or, ask your provider about stool tests like a FIT test every year or Cologuard test every 3 years.  To learn more about your testing options, visit:   .  For help making a decision, visit:   https://bit.ly/ui14450.  Prostate cancer screening test: If you have a prostate, ask your care team if a prostate cancer screening test (PSA) at age 55 is right for you.  Lung cancer screening: If you are a current or former smoker ages 50 to 80, ask your care team if ongoing lung cancer screenings are right for you.  For informational purposes only. Not to replace the advice of your health care provider. Copyright   2023 [facility]. All rights reserved. Clinically reviewed by the [facility] Transitions Program. [employer] 116990 - REV 01/24.

## 2024-07-08 NOTE — ASSESSMENT & PLAN NOTE
Tried Ace or ARB but pt did not tolerate these medications.  Prefers to cotninue on same medicine (hydrochlorothiazide) and spironolactone.

## 2024-07-08 NOTE — ASSESSMENT & PLAN NOTE
Pt has been using spironolactone and hydrochlorothiazide and she said BP is controlled at home (130/60).

## 2024-07-08 NOTE — PROGRESS NOTES
Preventive Care Visit  [facility]  Luis Feliciano MD, Family Medicine  Jul 8, 2024      Assessment & Plan   Problem List Items Addressed This Visit       Benign essential hypertension     Pt has been using spironolactone and hydrochlorothiazide and she said BP is controlled at home (130/60).         Pancreatic cyst     Noticed on the last CT abdomen/pelvis, showed that it has changed in size to 15 mm long, and I recommend that we see GI specialist for possible IPMN         Relevant Orders    Adult GI  Referral - Consult Only    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 (H)    Relevant Medications    atorvastatin (LIPITOR) 40 MG tablet    Bilateral inguinal hernia without obstruction or gangrene, recurrence not specified     Pt is still struggling with pain in the left lower abdomen, and was been evaluated by surgery, who recommended PT for her abdominal pain/scar tissue pain.  Plan : follow up with PT (scheduled already).           Type 2 diabetes mellitus with diabetic microalbuminuria, without long-term current use of insulin (H) - Primary     Diabetes is controlled with diet only.  Continue current treatment regimen.  Diabetes will be reassessed in 6 months.         Relevant Medications    hydrochlorothiazide (HYDRODIURIL) 25 MG tablet    Other Relevant Orders    HEMOGLOBIN A1C (Completed)    FOOT EXAM (Completed)    Basic metabolic panel  (Ca, Cl, CO2, Creat, Gluc, K, Na, BUN)    CKD (chronic kidney disease) stage 2, GFR 60-89 ml/min     Tried Ace or ARB but pt did not tolerate these medications.  Prefers to cotninue on same medicine (hydrochlorothiazide) and spironolactone.          Relevant Orders    Albumin Random Urine Quantitative with Creat Ratio     Other Visit Diagnoses       Encounter for Medicare annual wellness exam                        Counseling  Appropriate preventive services were discussed with this patient, including  applicable screening as appropriate for fall prevention, nutrition, physical activity, Tobacco-use cessation, weight loss and cognition.  Checklist reviewing preventive services available has been given to the patient.  Reviewed patient's diet, addressing concerns and/or questions.   She is at risk for lack of exercise and has been provided with information to increase physical activity for the benefit of her well-being.   She is at risk for psychosocial distress and has been provided with information to reduce risk.           Jamir Parrish is a 84 year old, presenting for the following:  Wellness Visit        7/8/2024    10:23 AM   Additional Questions   Roomed by Antonietta VARGAS         Via the Health Maintenance questionnaire, the patient has reported the following services have been completed -Eye Exam: [facility] 2024-01-20, this information has been sent to the abstraction team.  Health Care Directive  Patient does not have a Health Care Directive or Living Will: Discussed advance care planning with patient; however, patient declined at this time.    HPI      Hypertension Follow-up    Do you check your blood pressure regularly outside of the clinic? Yes   Are you following a low salt diet? No  Are your blood pressures ever more than 140 on the top number (systolic) OR more   than 90 on the bottom number (diastolic), for example 140/90? No        7/8/2024   General Health   How would you rate your overall physical health? Good   Feel stress (tense, anxious, or unable to sleep) Only a little      (!) STRESS CONCERN      7/8/2024   Nutrition   Diet: Regular (no restrictions)            7/8/2024   Exercise   Days per week of moderate/strenous exercise 3 days            7/8/2024   Social Factors   Frequency of gathering with friends or relatives Three times a week   Worry food won't last until get money to buy more No   Food not last or not have enough money for food? No   Do you have housing?  "(Housing is defined as stable permanent housing and does not include staying ouside in a car, in a tent, in an abandoned building, in an overnight shelter, or couch-surfing.) Yes   Are you worried about losing your housing? No   Lack of transportation? No   Unable to get utilities (heat,electricity)? No            7/8/2024   Fall Risk   Fallen 2 or more times in the past year? No    No   Trouble with walking or balance? No    Yes       Multiple values from one day are sorted in reverse-chronological order          7/8/2024   Activities of Daily Living- Home Safety   Needs help with the following daily activites None of the above   Safety concerns in the home None of the above            7/8/2024   Dental   Dentist two times every year? Yes            7/8/2024   Hearing Screening   Hearing concerns? None of the above            7/8/2024   Driving Risk Screening   Patient/family members have concerns about driving No            7/8/2024   General Alertness/Fatigue Screening   Have you been more tired than usual lately? No            7/8/2024   Urinary Incontinence Screening   Bothered by leaking urine in past 6 months No            7/8/2024   TB Screening   Were you born outside of the [place]? Yes            Today's PHQ-2 Score:       7/8/2024    10:08 AM   PHQ-2 ( 1999 [employer])   Q1: Little interest or pleasure in doing things 0   Q2: Feeling down, depressed or hopeless 0   PHQ-2 Score 0   Q1: Little interest or pleasure in doing things Not at all   Q2: Feeling down, depressed or hopeless Not at all   PHQ-2 Score 0           7/8/2024   Substance Use   Alcohol more than 3/day or more than 7/wk No   Do you have a current opioid prescription? No   How severe/bad is pain from 1 to 10? 0/10 (No Pain)   Do you use any other substances recreationally? No        Social History     Tobacco Use    Smoking status: Former    Smokeless tobacco: Former    Tobacco comments:     Former \"social\" smoker, quit in 1978.   Vaping Use    " Vaping status: Never Used   Substance Use Topics    Alcohol use: Yes     Alcohol/week: 0.0 standard drinks of alcohol     Comment: occ wine, twice a week    Drug use: No                        Reviewed and updated as needed this visit by Provider                    Past Medical History:   Diagnosis Date    Benign essential hypertension 10/12/2016    Bilateral low back pain without sciatica, unspecified chronicity 12/19/2017    Blunt trauma of rib, initial encounter 11/25/2016    Dyslipidemia     Gallstones     Hypertension     PAD (peripheral artery disease) (H24) 6/19/2017    Pancreatic mass 8/17/2016    SBO (small bowel obstruction) (H)     Slipped intervertebral disc      Past Surgical History:   Procedure Laterality Date    DAVINCI HERNIORRHAPHY INGUINAL Left 1/24/2023    Procedure: Robot-assisted left inguinal hernia repair with mesh;  Surgeon: Lalito Strange MD;  Location: [facility] OR    DAVINCI LYSIS OF ADHESIONS N/A 1/24/2023    Procedure: Davinci Lysis of Adhesions;  Surgeon: Lalito Strange MD;  Location: [facility] OR    GYN SURGERY      hysterectomy    ORTHOPEDIC SURGERY      Slipped disc with chronic back pain     Current providers sharing in care for this patient include:  Patient Care Team:  Luis Feliciano MD as PCP - General (Family Practice)  Luis Feliciano MD as Assigned PCP  Daniela Grimm MD as MD (Vascular Surgery)  Jasen Marshall MD as MD (Dermatology)  Lalito Strange MD as Assigned Surgical Provider    The following health maintenance items are reviewed in Epic and correct as of today:  Health Maintenance   Topic Date Due    COVID-19 Vaccine (8 - 2023-24 season) 01/26/2024    DIABETIC FOOT EXAM  03/14/2024    EYE EXAM  04/07/2024    MICROALBUMIN  06/01/2024    A1C  07/03/2024    MEDICARE ANNUAL WELLNESS VISIT  07/06/2024    INFLUENZA VACCINE (1) 09/01/2024    LIPID  01/03/2025    ANNUAL REVIEW OF HM ORDERS  01/03/2025    BMP  03/07/2025    FALL RISK ASSESSMENT  07/08/2025    DTAP/TDAP/TD  "IMMUNIZATION (2 - Td or Tdap) 06/19/2027    ADVANCE CARE PLANNING  01/03/2029    DEXA  07/27/2032    PHQ-2 (once per calendar year)  Completed    Pneumococcal Vaccine: 65+ Years  Completed    URINALYSIS  Completed    ZOSTER IMMUNIZATION  Completed    RSV VACCINE (Pregnancy & 60+)  Completed    IPV IMMUNIZATION  Aged Out    HPV IMMUNIZATION  Aged Out    MENINGITIS IMMUNIZATION  Aged Out    RSV MONOCLONAL ANTIBODY  Aged Out    MAMMO SCREENING  Discontinued         Review of Systems  Constitutional, HEENT, cardiovascular, pulmonary, GI, , musculoskeletal, neuro, skin, endocrine and psych systems are negative, except as otherwise noted.     Objective    Exam  LMP  (LMP Unknown)    Estimated body mass index is 24.69 kg/m  as calculated from the following:    Height as of 6/13/24: 1.575 m (5' 2\").    Weight as of 6/13/24: 61.2 kg (135 lb).    Physical Exam  GENERAL: alert and no distress  NECK: no adenopathy, no asymmetry, masses, or scars  RESP: lungs clear to auscultation - no rales, rhonchi or wheezes  CV: regular rate and rhythm, normal S1 S2, no S3 or S4, no murmur, click or rub, no peripheral edema  ABDOMEN: soft, nontender, no hepatosplenomegaly, no masses and bowel sounds normal  MS: no gross musculoskeletal defects noted, no edema  Diabetic foot exam: no trophic changes or ulcerative lesions, normal sensory exam, DP absent bilateral, and PT absent bilateral (to palpation).         No data to display                       Signed Electronically by: Luis Feliciano MD    "

## 2024-07-08 NOTE — ASSESSMENT & PLAN NOTE
Noticed on the last CT abdomen/pelvis, showed that it has changed in size to 15 mm long, and I recommend that we see GI specialist for possible IPMN

## 2024-07-08 NOTE — ASSESSMENT & PLAN NOTE
Pt is still struggling with pain in the left lower abdomen, and was been evaluated by surgery, who recommended PT for her abdominal pain/scar tissue pain.  Plan : follow up with PT (scheduled already).

## 2024-07-08 NOTE — ASSESSMENT & PLAN NOTE
Diabetes is controlled with diet only.  Continue current treatment regimen.  Diabetes will be reassessed in 6 months.

## 2024-07-11 ENCOUNTER — TELEPHONE (OUTPATIENT)
Dept: GASTROENTEROLOGY | Facility: CLINIC | Age: 84
End: 2024-07-11
Payer: MEDICARE

## 2024-07-11 NOTE — TELEPHONE ENCOUNTER
[facility]     Referring provider: Luis Feliciano MD     Referred to: [facility]     Provider Requested: na     Referral Received: 07/11/24       Records received: Epic     Images received: PACS    Insurance Coverage: Medicare    Evaluation for: pancreatic cyst/IPMN     Clinical History (per RN review):     Per referring provider OVN 7-2-24    Pancreatic cyst        Noticed on the last CT abdomen/pelvis, showed that it has changed in size to 15 mm long, and I recommend that we see GI specialist for possible IPMN          CT A/p 6-20-24  INDICATION: rule out hernia, abdominal pain, generalized     IMPRESSION:   1.  No evidence of hernia or acute abdominopelvic abnormality.  2.  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 Guidelines below.      E Consult from Leonard Pineda 8-2021 recommending no further follow up:      Patient assessment and information reviewed:   1.6 cystic lesion uncinate process of pancreas - seen on CT 8/4/2021 and stable compared to MRI from 2016.     Small cystic lesion stable for 5 years. Given appearance, size, and stability over 5 years, and the fact that the patient is 81 years old no further monitoring is needed at this time. We typically monitor these for stability until the age of 75 and then stop surveillance.      Recommendations: As above. No further monitoring is needed. This is not contributing to patient's pain.     MD review date: 07/11/24  MD Decision for clinic consultation/Orders:            Referral updates/Patient contacted:

## 2024-07-16 NOTE — PROGRESS NOTES
"PHYSICAL THERAPY EVALUATION  Type of Visit: Evaluation       Fall Risk Screen:  Fall screen completed by: PT  Have you fallen 2 or more times in the past year?: No  Have you fallen and had an injury in the past year?: No  Is patient a fall risk?: No    Subjective       Presenting condition or subjective complaint: Scar tissue causes pain to stomach  Date of onset: 06/25/24 (Referral date)    Relevant medical history: Arthritis; High blood pressure; Implanted device; Stroke   Dates & types of surgery: Hysterectomy, back surgery, thumb, two shoulders, metal plate and 6 screws in back    Prior diagnostic imaging/testing results: MRI     Prior therapy history for the same diagnosis, illness or injury: No      Pt is a 84 year old female presenting with complaints of abdominal pain on the R, opposite side of hernia repair in Jan of 2023. Per referring provider's notes, there is no hernia defects noted, though does have bulging along the abdominal wall. Pt reports that there is a bulge on both sides of the abdomen. Pt reports that in 1968, a doctor placed birth control in her abdomen in [place] - it caused significant pain. Pt had surgery to remove it - it led to having multiple abdominal surgeries, latest being hernia surgery in January of 2023. Pt reports that she also has leg spasms - those will wake up up at night due to pain.     Pt describes the pain as \"breathtaking\"    Aggravating Factors: sitting, getting up off floor, twisting movements    Alleviating Factors: stretching    Prior treatments: not specifically for abdomen    Current level of function: has to adjust how she does exercises - unable to do it on the floor; cannot sit too long    Sleep Quality: very intermittently wakes pt up but not typically from abdomen     Red Flags: none    Pt goals: lie down and get up easier and with less pain; get in/out of car / sit in the car; turn with less pain    PMH: benign essential HTN, bilateral low back pain without " sciatica, blunt trauma of rib (2016), dyslipidemia, gallstones, HTN, PAD, pancreatic mass, SBO, slipped intervertebral disc   Surgical hx: GYN surgery, orthopedic surgery (surgery for spinal stenosis, per pt report), Davinci Herniorrhaphy inguinal (L-1/24/2023), Davinci Lysis of adhesions (1/24/2023)    CT abdomen/pelvis 6/20/24: IMPRESSION:   1.  No evidence of hernia or acute abdominopelvic abnormality.  2.  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 (See imaging for further information)    Living Environment  Social support: With a significant other or spouse   Type of home: House; Multi-level; Basement   Stairs to enter the home: No       Ramp: No   Stairs inside the home: Yes 21 Is there a railing: Yes     Help at home: Home management tasks (cooking, cleaning); Home and Yard maintenance tasks  Equipment owned: Straight Cane; Walker; Walker with wheels     Employment: No    Hobbies/Interests: gardening, cooking, getting together with group of friends    Patient goals for therapy: I would like to do stand exercise     Objective   LUMBAR:    Observation: pt does not appear to have significant scarring    Posture: decreased lumbar lordosis;     Gait: decreased step/stride length; decreased jyothi     Motor Control:   -Pelvic Tilting: achieves with TrA activation  -TA Activation: requires mod verbal, visual and tactile cueing    Functional Screen:   -Sit to stand: Slow to stand   -Squat: NT  -Bridge: cramping into hamstrings  -SLS: NT    ROM (* Denotes Pain):  -Flexion: hands to floor  -Extension: mod limitation  -L SB: WFL  -R SB: WFL  -L Thoracic ROT: WFL  -R ROT: WFL    Abdominal:  -Plank: 5 sec hold (on elbows and toes)    Special Tests:   L R   Slump     SLR -80 -80   Long Axis Traction      FADIR - -   MARCELLA     Scour - -   Hamstring 90/90     Piriformis Test     Posterior Capsule Compression       LE Relevant Findings:   -Strength: hip flex: 4/5 B   -ROM: hip flex  (90/90), IR and ER WFL    Palpation: no point tenderness along abdomen but increase in pain with small contraction of linea alba with PT OP - diastis recti noted (~1.5 finger widths)      Assessment & Plan   CLINICAL IMPRESSIONS  Medical Diagnosis: Abdominal weakness; history of hernia repair    Treatment Diagnosis: Abdominal weakness and pain; low back pain   Impression/Assessment: Patient is a 84 year old female with abdominal pain and weakness complaints.  The following significant findings have been identified: Pain, Decreased ROM/flexibility, Decreased joint mobility, Decreased strength, Impaired balance, Impaired gait, Impaired muscle performance, and Decreased activity tolerance. These impairments interfere with their ability to perform self care tasks, recreational activities, household chores, driving , household mobility, and community mobility as compared to previous level of function.     Clinical Decision Making (Complexity):  Clinical Presentation: Stable/Uncomplicated  Clinical Presentation Rationale: based on medical and personal factors listed in PT evaluation  Clinical Decision Making (Complexity): Low complexity    PLAN OF CARE  Treatment Interventions:  Modalities: Hot Pack  Interventions: Gait Training, Manual Therapy, Neuromuscular Re-education, Therapeutic Activity, Therapeutic Exercise, Self-Care/Home Management    Long Term Goals     PT Goal 1  Goal Identifier: Sitting  Goal Description: Pt will be able to sit in the car for at least 30 minutes being able to turn as needed without incraese in pain  Rationale: to maximize safety and independence within the community;to maximize safety and independence with transportation  Target Date: 09/11/24  PT Goal 2  Goal Identifier: Transitions  Goal Description: Pt will be able to get in/out of a car and perform sit to stands without an increase in pain in order to improve function  Rationale: to maximize safety and independence with self cares;to  maximize safety and independence with transportation  Target Date: 10/09/24  PT Goal 3  Goal Identifier: Core strength  Goal Description: Pt will be able to hold a low plank for at least 20 seconds wtih good form to demonstrate improvement in core strength  Rationale: to maximize safety and independence within the community;to maximize safety and independence within the home;to maximize safety and independence with performance of ADLs and functional tasks  Target Date: 10/09/24  PT Goal 4  Goal Identifier: Exercise  Goal Description: Pt will be able to participate in at least 20 minutes of standing exercise without being limited by pain in order to return to PLOF  Rationale: to maximize safety and independence with performance of ADLs and functional tasks;to maximize safety and independence with self cares  Target Date: 10/09/24      Frequency of Treatment: 1x/week, decreasing to every other as appropriate  Duration of Treatment: 10 weeks    Recommended Referrals to Other Professionals:  none  Education Assessment:   Learner/Method: Patient    Risks and benefits of evaluation/treatment have been explained.   Patient/Family/caregiver agrees with Plan of Care.     Evaluation Time:     PT Eval, Low Complexity Minutes (61003): 20     Signing Clinician: Harini Phelps PT        [facility]                                                                                   OUTPATIENT PHYSICAL THERAPY      PLAN OF TREATMENT FOR OUTPATIENT REHABILITATION   Patient's Last Name, First Name, SAMRAReubenURSULAReuben  Francois Ly  ALICIA YOB: 1940   Provider's Name   [facility]   Medical Record No.  [id number]     Onset Date: 06/25/24 (Referral date)  Start of Care Date: 07/17/24     Medical Diagnosis:  Abdominal weakness; history of hernia repair      PT Treatment Diagnosis:  Abdominal weakness and pain; low back pain Plan of Treatment  Frequency/Duration: 1x/week,  decreasing to every other as appropriate/ 10 weeks    Certification date from 07/17/24 to 09/25/24         See note for plan of treatment details and functional goals     Harini Phelps, PT                         I CERTIFY THE NEED FOR THESE SERVICES FURNISHED UNDER        THIS PLAN OF TREATMENT AND WHILE UNDER MY CARE     (Physician attestation of this document indicates review and certification of the therapy plan).              Referring Provider:  Lalito Strange    Initial Assessment  See Epic Evaluation- Start of Care Date: 07/17/24

## 2024-07-17 ENCOUNTER — THERAPY VISIT (OUTPATIENT)
Dept: PHYSICAL THERAPY | Facility: CLINIC | Age: 84
End: 2024-07-17
Attending: SURGERY
Payer: MEDICARE

## 2024-07-17 DIAGNOSIS — Z98.890 HX OF HERNIA REPAIR: ICD-10-CM

## 2024-07-17 DIAGNOSIS — R19.8 ABDOMINAL WEAKNESS: ICD-10-CM

## 2024-07-17 DIAGNOSIS — Z87.19 HX OF HERNIA REPAIR: ICD-10-CM

## 2024-07-17 PROCEDURE — 97112 NEUROMUSCULAR REEDUCATION: CPT | Mod: GP

## 2024-07-17 PROCEDURE — 97161 PT EVAL LOW COMPLEX 20 MIN: CPT | Mod: GP

## 2024-07-23 ENCOUNTER — THERAPY VISIT (OUTPATIENT)
Dept: PHYSICAL THERAPY | Facility: CLINIC | Age: 84
End: 2024-07-23
Payer: MEDICARE

## 2024-07-23 DIAGNOSIS — Z87.19 HX OF HERNIA REPAIR: ICD-10-CM

## 2024-07-23 DIAGNOSIS — R19.8 ABDOMINAL WEAKNESS: Primary | ICD-10-CM

## 2024-07-23 DIAGNOSIS — Z98.890 HX OF HERNIA REPAIR: ICD-10-CM

## 2024-07-23 PROCEDURE — 97530 THERAPEUTIC ACTIVITIES: CPT | Mod: GP | Performed by: PHYSICAL THERAPIST

## 2024-07-23 PROCEDURE — 97110 THERAPEUTIC EXERCISES: CPT | Mod: GP | Performed by: PHYSICAL THERAPIST

## 2024-08-01 ENCOUNTER — THERAPY VISIT (OUTPATIENT)
Dept: PHYSICAL THERAPY | Facility: CLINIC | Age: 84
End: 2024-08-01
Payer: MEDICARE

## 2024-08-01 DIAGNOSIS — Z87.19 HX OF HERNIA REPAIR: ICD-10-CM

## 2024-08-01 DIAGNOSIS — R19.8 ABDOMINAL WEAKNESS: Primary | ICD-10-CM

## 2024-08-01 DIAGNOSIS — Z98.890 HX OF HERNIA REPAIR: ICD-10-CM

## 2024-08-01 PROCEDURE — 97530 THERAPEUTIC ACTIVITIES: CPT | Mod: GP

## 2024-08-01 PROCEDURE — 97112 NEUROMUSCULAR REEDUCATION: CPT | Mod: GP

## 2024-08-01 PROCEDURE — 97110 THERAPEUTIC EXERCISES: CPT | Mod: GP

## 2024-08-06 ENCOUNTER — THERAPY VISIT (OUTPATIENT)
Dept: PHYSICAL THERAPY | Facility: CLINIC | Age: 84
End: 2024-08-06
Payer: MEDICARE

## 2024-08-06 DIAGNOSIS — Z98.890 HX OF HERNIA REPAIR: ICD-10-CM

## 2024-08-06 DIAGNOSIS — R19.8 ABDOMINAL WEAKNESS: Primary | ICD-10-CM

## 2024-08-06 DIAGNOSIS — Z87.19 HX OF HERNIA REPAIR: ICD-10-CM

## 2024-08-06 PROCEDURE — 97530 THERAPEUTIC ACTIVITIES: CPT | Mod: GP

## 2024-08-06 PROCEDURE — 97110 THERAPEUTIC EXERCISES: CPT | Mod: GP

## 2024-08-12 ENCOUNTER — THERAPY VISIT (OUTPATIENT)
Dept: PHYSICAL THERAPY | Facility: CLINIC | Age: 84
End: 2024-08-12
Payer: MEDICARE

## 2024-08-12 DIAGNOSIS — Z87.19 HX OF HERNIA REPAIR: ICD-10-CM

## 2024-08-12 DIAGNOSIS — R19.8 ABDOMINAL WEAKNESS: Primary | ICD-10-CM

## 2024-08-12 DIAGNOSIS — Z98.890 HX OF HERNIA REPAIR: ICD-10-CM

## 2024-08-12 PROCEDURE — 97110 THERAPEUTIC EXERCISES: CPT | Mod: GP

## 2024-08-12 PROCEDURE — 97112 NEUROMUSCULAR REEDUCATION: CPT | Mod: GP

## 2024-08-20 DIAGNOSIS — I10 BENIGN ESSENTIAL HYPERTENSION: ICD-10-CM

## 2024-08-20 RX ORDER — SPIRONOLACTONE 25 MG/1
25 TABLET ORAL DAILY
Qty: 90 TABLET | Refills: 1 | Status: SHIPPED | OUTPATIENT
Start: 2024-08-20

## 2024-08-23 ENCOUNTER — APPOINTMENT (OUTPATIENT)
Dept: CT IMAGING | Facility: CLINIC | Age: 84
End: 2024-08-23
Attending: EMERGENCY MEDICINE
Payer: MEDICARE

## 2024-08-23 ENCOUNTER — APPOINTMENT (OUTPATIENT)
Dept: MRI IMAGING | Facility: CLINIC | Age: 84
End: 2024-08-23
Attending: STUDENT IN AN ORGANIZED HEALTH CARE EDUCATION/TRAINING PROGRAM
Payer: MEDICARE

## 2024-08-23 ENCOUNTER — HOSPITAL ENCOUNTER (EMERGENCY)
Facility: CLINIC | Age: 84
Discharge: HOME | End: 2024-08-23
Attending: EMERGENCY MEDICINE | Admitting: EMERGENCY MEDICINE
Payer: MEDICARE

## 2024-08-23 VITALS
OXYGEN SATURATION: 98 % | SYSTOLIC BLOOD PRESSURE: 170 MMHG | DIASTOLIC BLOOD PRESSURE: 80 MMHG | HEIGHT: 62 IN | WEIGHT: 125 LBS | BODY MASS INDEX: 23 KG/M2 | HEART RATE: 63 BPM | TEMPERATURE: 98.1 F | RESPIRATION RATE: 20 BRPM

## 2024-08-23 DIAGNOSIS — R42 DIZZINESS: ICD-10-CM

## 2024-08-23 DIAGNOSIS — H53.2 DOUBLE VISION: ICD-10-CM

## 2024-08-23 LAB
ANION GAP SERPL CALCULATED.3IONS-SCNC: 12 MMOL/L (ref 7–15)
APTT PPP: 31 SECONDS (ref 22–38)
BASOPHILS # BLD AUTO: 0.1 10E3/UL (ref 0–0.2)
BASOPHILS NFR BLD AUTO: 1 %
BUN SERPL-MCNC: 16.2 MG/DL (ref 8–23)
CALCIUM SERPL-MCNC: 9.6 MG/DL (ref 8.8–10.4)
CHLORIDE SERPL-SCNC: 99 MMOL/L (ref 98–107)
CREAT SERPL-MCNC: 0.72 MG/DL (ref 0.51–0.95)
EGFRCR SERPLBLD CKD-EPI 2021: 82 ML/MIN/1.73M2
EOSINOPHIL # BLD AUTO: 0.1 10E3/UL (ref 0–0.7)
EOSINOPHIL NFR BLD AUTO: 2 %
ERYTHROCYTE [DISTWIDTH] IN BLOOD BY AUTOMATED COUNT: 12.5 % (ref 10–15)
GLUCOSE SERPL-MCNC: 106 MG/DL (ref 70–99)
HCO3 SERPL-SCNC: 23 MMOL/L (ref 22–29)
HCT VFR BLD AUTO: 41.2 % (ref 35–47)
HGB BLD-MCNC: 14 G/DL (ref 11.7–15.7)
IMM GRANULOCYTES # BLD: 0 10E3/UL
IMM GRANULOCYTES NFR BLD: 0 %
INR PPP: 0.98 (ref 0.85–1.15)
LYMPHOCYTES # BLD AUTO: 2.5 10E3/UL (ref 0.8–5.3)
LYMPHOCYTES NFR BLD AUTO: 35 %
MCH RBC QN AUTO: 31.2 PG (ref 26.5–33)
MCHC RBC AUTO-ENTMCNC: 34 G/DL (ref 31.5–36.5)
MCV RBC AUTO: 92 FL (ref 78–100)
MONOCYTES # BLD AUTO: 0.5 10E3/UL (ref 0–1.3)
MONOCYTES NFR BLD AUTO: 7 %
NEUTROPHILS # BLD AUTO: 3.9 10E3/UL (ref 1.6–8.3)
NEUTROPHILS NFR BLD AUTO: 55 %
NRBC # BLD AUTO: 0 10E3/UL
NRBC BLD AUTO-RTO: 0 /100
PLATELET # BLD AUTO: 269 10E3/UL (ref 150–450)
POTASSIUM SERPL-SCNC: 4.1 MMOL/L (ref 3.4–5.3)
RBC # BLD AUTO: 4.49 10E6/UL (ref 3.8–5.2)
SODIUM SERPL-SCNC: 134 MMOL/L (ref 135–145)
TROPONIN T SERPL HS-MCNC: 20 NG/L
TROPONIN T SERPL HS-MCNC: 20 NG/L
WBC # BLD AUTO: 7.1 10E3/UL (ref 4–11)

## 2024-08-23 PROCEDURE — 70450 CT HEAD/BRAIN W/O DYE: CPT | Mod: MA,XU

## 2024-08-23 PROCEDURE — 85610 PROTHROMBIN TIME: CPT | Mod: GZ | Performed by: EMERGENCY MEDICINE

## 2024-08-23 PROCEDURE — 85041 AUTOMATED RBC COUNT: CPT | Performed by: EMERGENCY MEDICINE

## 2024-08-23 PROCEDURE — 36415 COLL VENOUS BLD VENIPUNCTURE: CPT | Performed by: EMERGENCY MEDICINE

## 2024-08-23 PROCEDURE — 70496 CT ANGIOGRAPHY HEAD: CPT | Mod: MA

## 2024-08-23 PROCEDURE — 80048 BASIC METABOLIC PNL TOTAL CA: CPT | Performed by: EMERGENCY MEDICINE

## 2024-08-23 PROCEDURE — 99285 EMERGENCY DEPT VISIT HI MDM: CPT | Mod: 25

## 2024-08-23 PROCEDURE — 84484 ASSAY OF TROPONIN QUANT: CPT | Mod: 91 | Performed by: EMERGENCY MEDICINE

## 2024-08-23 PROCEDURE — 255N000002 HC RX 255 OP 636: Performed by: STUDENT IN AN ORGANIZED HEALTH CARE EDUCATION/TRAINING PROGRAM

## 2024-08-23 PROCEDURE — A9585 GADOBUTROL INJECTION: HCPCS | Performed by: STUDENT IN AN ORGANIZED HEALTH CARE EDUCATION/TRAINING PROGRAM

## 2024-08-23 PROCEDURE — 70553 MRI BRAIN STEM W/O & W/DYE: CPT | Mod: MG

## 2024-08-23 PROCEDURE — 250N000011 HC RX IP 250 OP 636: Performed by: EMERGENCY MEDICINE

## 2024-08-23 PROCEDURE — 93005 ELECTROCARDIOGRAM TRACING: CPT

## 2024-08-23 PROCEDURE — 250N000009 HC RX 250: Performed by: EMERGENCY MEDICINE

## 2024-08-23 PROCEDURE — 85730 THROMBOPLASTIN TIME PARTIAL: CPT | Performed by: EMERGENCY MEDICINE

## 2024-08-23 RX ORDER — MECLIZINE HYDROCHLORIDE 25 MG/1
25 TABLET ORAL 3 TIMES DAILY PRN
Qty: 20 TABLET | Refills: 0 | Status: SHIPPED | OUTPATIENT
Start: 2024-08-23

## 2024-08-23 RX ORDER — GADOBUTROL 604.72 MG/ML
6 INJECTION INTRAVENOUS ONCE
Status: COMPLETED | OUTPATIENT
Start: 2024-08-23 | End: 2024-08-23

## 2024-08-23 RX ORDER — IOPAMIDOL 755 MG/ML
67 INJECTION, SOLUTION INTRAVASCULAR ONCE
Status: COMPLETED | OUTPATIENT
Start: 2024-08-23 | End: 2024-08-23

## 2024-08-23 RX ADMIN — IOPAMIDOL 67 ML: 755 INJECTION, SOLUTION INTRAVENOUS at 15:58

## 2024-08-23 RX ADMIN — SODIUM CHLORIDE 100 ML: 9 INJECTION, SOLUTION INTRAVENOUS at 15:57

## 2024-08-23 RX ADMIN — GADOBUTROL 6 ML: 604.72 INJECTION INTRAVENOUS at 18:00

## 2024-08-23 ASSESSMENT — COLUMBIA-SUICIDE SEVERITY RATING SCALE - C-SSRS
6. HAVE YOU EVER DONE ANYTHING, STARTED TO DO ANYTHING, OR PREPARED TO DO ANYTHING TO END YOUR LIFE?: NO
2. HAVE YOU ACTUALLY HAD ANY THOUGHTS OF KILLING YOURSELF IN THE PAST MONTH?: NO
1. IN THE PAST MONTH, HAVE YOU WISHED YOU WERE DEAD OR WISHED YOU COULD GO TO SLEEP AND NOT WAKE UP?: NO

## 2024-08-23 ASSESSMENT — ACTIVITIES OF DAILY LIVING (ADL)
ADLS_ACUITY_SCORE: 39

## 2024-08-23 NOTE — CONSULTS
"[facility]     Stroke Code Note          History of Present Illness     Chief Complaint: Dizziness      Francois Ly is a 84 year old right-handed female who presented to the hospital because of acute onset of double vision, and lightheadedness.  Currently the patient is almost back to baseline with no clear symptom.  She has history of stroke in 2021 and she takes aspirin 162 mg daily and atorvastatin 40 mg daily         Past Medical History     Stroke risk factors: Previous stroke and hypertension    Preadmission antithrombotic regimen: Aspirin 162 mg daily    Modified Kelsey Score (Pre-morbid)    -  0                   Assessment and Plan       1.  Acute onset of double vision and lightheadedness, likely hypertensive crisis     Intravenous Thrombolysis  Not given due to:   - minor/isolated/quickly resolving symptoms     Endovascular Treatment  Not initiated due to absence of proximal vessel occlusion     Plan:  MRI of the brain with and without contrast with coronal DWI  After negative MRI, treat the blood pressure     ___________________________________________________________________    The Stroke Staff is Dr. Espitia.    Sherrell Winston MD  Vascular Neurology Fellow    To page me or covering stroke neurology team member, click here: [facility]  Choose \"On Call\" tab at top, then select \"NEUROLOGY/ALL SITES\" from middle drop-down box, press Enter, then look for \"stroke\" or \"telestroke\" for your site.  ___________________________________________________________________        Imaging/Labs   (personally reviewed )    CT head: No acute intracranial hemorrhage  CTA head/neck: No large vessel occlusion, diffuse atherosclerotic disease of intracranial vessel         Physical Examination     BP: (!) 205/82   Pulse: 67   Resp: 16   Temp: 98.1  F (36.7  C)       SpO2: 97 %       Weight: 56.7 kg (125 lb)    Wt Readings from Last 2 Encounters:   08/23/24 56.7 kg (125 lb)   07/08/24 58.5 kg (129 lb) "       Neurologic  Mental Status:  alert, oriented x 3, follows commands, speech clear and fluent, naming and repetition normal  Cranial Nerves:  visual fields intact, PERRL, EOMI with normal smooth pursuit, facial sensation intact and symmetric, facial movements symmetric, hearing not formally tested but intact to conversation, palate elevation symmetric and uvula midline, no dysarthria, shoulder shrug strong bilaterally, tongue protrusion midline  Motor:  normal muscle tone and bulk, no abnormal movements, able to move all limbs spontaneously, strength 5/5 throughout upper and lower extremities, no pronator drift  Reflexes:  toes down-going  Sensory:  light touch sensation intact and symmetric throughout upper and lower extremities, no extinction on double simultaneous stimulation   Coordination:  normal finger-to-nose and heel-to-shin bilaterally without dysmetria, rapid alternating movements symmetric  Station/Gait:  deferred        Stroke Scales       NIHSS  1a. Level of Consciousness 0-->Alert, keenly responsive   1b. LOC Questions 0-->Answers both questions correctly   1c. LOC Commands 0-->Performs both tasks correctly   2.   Best Gaze 0-->Normal   3.   Visual 0-->No visual loss   4.   Facial Palsy 0-->Normal symmetrical movements   5a. Motor Arm, Left 0-->No drift, limb holds 90 (or 45) degrees for full 10 secs   5b. Motor Arm, Right 0-->No drift, limb holds 90 (or 45) degrees for full 10 secs   6a. Motor Leg, Left 0-->No drift, leg holds 30 degree position for full 5 secs   6b. Motor Leg, right 0-->No drift, leg holds 30 degree position for full 5 secs   7.   Limb Ataxia 0-->Absent   8.   Sensory 0-->Normal, no sensory loss   9.   Best Language 0-->No aphasia, normal   10. Dysarthria 0-->Normal   11. Extinction and Inattention  0-->No abnormality   Total 0 (08/23/24 1617)            Labs     CBC  Lab Results   Component Value Date    HGB 15.0 10/29/2022    HCT 43.5 10/29/2022    WBC 14.7 (H) 10/29/2022      10/29/2022       BMP  Lab Results   Component Value Date     07/08/2024    POTASSIUM 4.0 07/08/2024    CHLORIDE 101 07/08/2024    CO2 26 07/08/2024    BUN 14.7 07/08/2024    CR 0.73 07/08/2024     (H) 07/08/2024    ARIN 9.4 07/08/2024       INR  INR   Date Value Ref Range Status   08/26/2021 0.90 0.85 - 1.15 Final     Comment:     Effective 7/11/2021, the reference range for this assay has changed.       Data   Stroke Code Data  (for stroke code without tele)  Stroke code activated 08/23/24  1548   First stroke provider response 08/23/24  1550   Last known normal 08/23/24  1400   Time of discovery (or onset of symptoms) 08/23/24  1400   Head CT read by Stroke Neuro Provider 08/23/24  1600   Was stroke code de-escalated? Yes  08/23/24  1615        Clinically Significant Risk Factors Present on Admission                # Drug Induced Platelet Defect: home medication list includes an antiplatelet medication   # Hypertension: Noted on problem list        # DMII: A1C = 6.5 % (Ref range: 0.0 - 5.6 %) within past 6 months

## 2024-08-23 NOTE — ED PROVIDER NOTES
Emergency Department Note      History of Present Illness     Chief Complaint   Dizziness      HPI   Francois Ly is a 84 year old female with history of CVA, hypertension, dyslipidemia, and type 2 diabetes mellitus who presents to the ED with her  for evaluation of dizziness. Francois reports she was typing at her computer at home around 1400 today when she noticed room-spinning dizziness, which worsened as she stood up. En route to the ED, she developed double vision which improved when closing one eye. The double vision has improved but she still has some mild dizziness, a headache, and unsteady gait. Patient notes she had a similar episode last night that lasted about 10 minutes. Additionally has history of CVA and had left hand weakness but no numbness at that time. She denies chest pain, abdominal pain, numbness, or weakness.  denies confusion, slurred speech, or facial droop. She takes 2 x 81 mg Aspirin daily.    Independent Historian    as detailed above.    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  Type 2 diabetes mellitus with diabetic microalbuminuria  CKD stage 2  Bilateral inguinal hernia  Ganglion cyst of wrist, left    Medications   Atorvastatin  Hydrochlorothiazide  Spironolactone  Aspirin 81 mg x2 daily    Surgical History   Left inguinal hernia repair  Lysis of adhesions  Hysterectomy  Orthopedic surgery    Physical Exam     Patient Vitals for the past 24 hrs:   BP Temp Pulse Resp SpO2 Height Weight   08/23/24 1619 -- -- 67 20 97 % -- --   08/23/24 1617 -- -- -- -- 97 % -- --   08/23/24 1606 (!) 181/67 -- 69 -- -- -- --   08/23/24 1537 (!) 205/82 -- -- -- -- -- --   08/23/24  "1533 -- 98.1  F (36.7  C) 67 16 97 % 1.575 m (5' 2\") 56.7 kg (125 lb)     Physical Exam  Constitutional: Well appearing.  HEENT: Atraumatic.  PERRL.  EOMI.  Moist mucous membranes.  Neck: Soft.  Supple.  No JVD.  Cardiac: Regular rate and rhythm.  No murmur or rub.  Respiratory: Clear to auscultation bilaterally.  No respiratory distress.  No wheezing, rhonchi, or rales.  Abdomen: Soft and nontender.  No guarding.  Nondistended.  Musculoskeletal: No edema.  Normal range of motion.  Neurologic: Alert and oriented x3.  Normal tone and bulk.  No facial drooping.  Normal speech.  Normal finger-nose.  Normal heel-to-shin.  5/5 strength in bilateral upper and lower extremities.  Sensation to light touch intact throughout.  Slightly ataxic and unsteady gait.  Skin: No rashes.  No edema.  Psych: Normal affect.  Normal behavior.      Diagnostics     Lab Results   Labs Ordered and Resulted from Time of ED Arrival to Time of ED Departure   BASIC METABOLIC PANEL - Abnormal       Result Value    Sodium 134 (*)     Potassium 4.1      Chloride 99      Carbon Dioxide (CO2) 23      Anion Gap 12      Urea Nitrogen 16.2      Creatinine 0.72      GFR Estimate 82      Calcium 9.6      Glucose 106 (*)    TROPONIN T, HIGH SENSITIVITY - Abnormal    Troponin T, High Sensitivity 20 (*)    INR - Normal    INR 0.98     PARTIAL THROMBOPLASTIN TIME - Normal    aPTT 31     CBC WITH PLATELETS AND DIFFERENTIAL    WBC Count 7.1      RBC Count 4.49      Hemoglobin 14.0      Hematocrit 41.2      MCV 92      MCH 31.2      MCHC 34.0      RDW 12.5      Platelet Count 269      % Neutrophils 55      % Lymphocytes 35      % Monocytes 7      % Eosinophils 2      % Basophils 1      % Immature Granulocytes 0      NRBCs per 100 WBC 0      Absolute Neutrophils 3.9      Absolute Lymphocytes 2.5      Absolute Monocytes 0.5      Absolute Eosinophils 0.1      Absolute Basophils 0.1      Absolute Immature Granulocytes 0.0      Absolute NRBCs 0.0     GLUCOSE MONITOR " NURSING POCT   TROPONIN T, HIGH SENSITIVITY       Imaging   CTA Head Neck with Contrast   Final Result   IMPRESSION:     1. Head CT demonstrates no acute intracranial pathology. Mild chronic   small vessel ischemic disease and generalized cerebral volume loss.    2. Head CTA: Stable findings compared to 2021.   a. Intracranial atherosclerosis with focal moderate stenosis in the   paraclinoid left ICA.    b. Moderate to severe stenosis in the V4 segment of the right   vertebral artery with poststenotic dilatation.    c. Moderate stenosis in the mid basilar artery.   d. Absent contrast flow in the V4 segment of the left vertebral   artery.   3. Neck CTA: Stable findings compared to 2021.   a. 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    b. Calcified atherosclerotic plaques in the remaining major cervical   arteries without high-grade stenosis.    4. 1.2 cm hypodense left thyroid nodule. This is new compared to 2021   and has suspicious features. Recommend biopsy.      Findings were discussed with Dr. Davis at 16:18 hours CDT.       PRASANTH MAIN MD            SYSTEM ID:  [id number]      CT Head w/o Contrast   Final Result   IMPRESSION:     1. Head CT demonstrates no acute intracranial pathology. Mild chronic   small vessel ischemic disease and generalized cerebral volume loss.    2. Head CTA: Stable findings compared to 2021.   a. Intracranial atherosclerosis with focal moderate stenosis in the   paraclinoid left ICA.    b. Moderate to severe stenosis in the V4 segment of the right   vertebral artery with poststenotic dilatation.    c. Moderate stenosis in the mid basilar artery.   d. Absent contrast flow in the V4 segment of the left vertebral   artery.   3. Neck CTA: Stable findings compared to 2021.   a. 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    b. Calcified atherosclerotic plaques in the remaining major cervical   arteries without high-grade stenosis.     4. 1.2 cm hypodense left thyroid nodule. This is new compared to 2021   and has suspicious features. Recommend biopsy.      Findings were discussed with Dr. Davis at 16:18 hours CDT.       PRASANTH MAIN MD            SYSTEM ID:  [id number]      MR Brain w/o & w Contrast    (Results Pending)       EKG   ECG taken at 1642, ECG read at 1655  Sinus rhythm with 1st degree AV block  Left axis deviation   Left ventricular hypertrophy with repolarization abnormality (Alto product)  Anteroseptal infarct, age undetermined  Abnormal ECG  No significant changes as compared to prior, dated 1/17/2023.  Rate 65 bpm. OK interval 226 ms. QRS duration 106 ms. QT/QTc 446/463 ms. P-R-T axes 52 -41 110.    Independent Interpretation   CT Head: No intracranial hemorrhage.    ED Course      Medications Administered   Medications   iopamidol (ISOVUE-370) solution 67 mL (67 mLs Intravenous $Given 8/23/24 1551)     And   sodium chloride 0.9 % bag for CT scan flush use (100 mLs As instructed $Given 8/23/24 1557)       Procedures   Procedures     Discussion of Management   Stroke neurology    ED Course   ED Course as of 08/23/24 1709   Fri Aug 23, 2024   1543 I obtained history and examined the patient as noted above.    1547 Tier 1 Code Stroke   1548 I spoke with Dr. Winston of stroke neurology regarding the patient's history and presentation today.   1613 I spoke with radiology regarding the patient's CT findings. Vertebral artery occlusion seen today as compared to old in 2021. No new large vessel occlusions. Will order MRI.       Additional Documentation  None    Medical Decision Making / Diagnosis     CMS Diagnoses: None    MIPS       None    MDM   Francois Ly is a 84 year old female who is afebrile and hemodynamically stable.  She is hypertensive.  She has vague dizziness but her double vision has resolved.  Her gait is unstable but no other focal deficits on exam.  Stroke initiated and CT head and CTA of the head without acute  abnormality.  Stroke service evaluated and her symptoms are all gone at this time she has no focal deficits.  MRI of the head was obtained and revealed no acute abnormality such as stroke.  Her lab workup was noted as above.  EKG without ischemic changes.  Troponin 20 and she has no chest pain and I will repeat a delta troponin as she was hypertensive, however, she states she is normally 130s to 200s pretty consistently and gets very anxious and believes she was having a stroke.  Spontaneously, her blood pressure is already coming down to 160 systolic and do not believe we need to give her any acute blood pressure lowering medications.  If her repeat delta troponin is not significantly changed, I think she is safe for discharge home.  I prescribed meclizine as needed should her dizziness come back.  I recommend she follow close with her primary care physician.  I have signed her over to my colleague, Dr. Pond, the routine end of my shift pending delta troponin and if negative, plan for discharge home.  Patient and spouse in agreement with this plan and their questions were answered.  She was in no distress at time discharge.    Disposition   Care of the patient was transferred to my colleague Dr. Rosa pending repeat troponin.     Diagnosis     ICD-10-CM    1. Dizziness  R42       2. Double vision  H53.2            Discharge Medications   New Prescriptions    MECLIZINE (ANTIVERT) 25 MG TABLET    Take 1 tablet (25 mg) by mouth 3 times daily as needed for dizziness.         Scribe Disclosure:  I, Glendy Génesis, am serving as a scribe at 3:58 PM on 8/23/2024 to document services personally performed by Elier Davis MD based on my observations and the provider's statements to me.        Elier Davis MD  08/23/24 1925

## 2024-08-23 NOTE — ED NOTES
Pt reports that dizziness, and double vision improved. Pt able to ambulate to bathroom, steady on feet, denies dizziness with ambulation.

## 2024-08-23 NOTE — ED TRIAGE NOTES
Pt with hx of CVA, on aspirin reports dizziness and double vision that started two hours ago while she was typing. Clear speech, +facial symmetry, equal and strong extremities. Denies headache. Pt reports same symptoms occurred last night but only lasted 10 minutes.     Triage Assessment (Adult)       Row Name 08/23/24 1535          Respiratory WDL    Respiratory WDL WDL        Cardiac WDL    Cardiac WDL WDL        Cognitive/Neuro/Behavioral WDL    Cognitive/Neuro/Behavioral WDL X  dizziness

## 2024-08-24 LAB
ATRIAL RATE - MUSE: 65 BPM
DIASTOLIC BLOOD PRESSURE - MUSE: NORMAL MMHG
INTERPRETATION ECG - MUSE: NORMAL
P AXIS - MUSE: 52 DEGREES
PR INTERVAL - MUSE: 226 MS
QRS DURATION - MUSE: 106 MS
QT - MUSE: 446 MS
QTC - MUSE: 463 MS
R AXIS - MUSE: -41 DEGREES
SYSTOLIC BLOOD PRESSURE - MUSE: NORMAL MMHG
T AXIS - MUSE: 110 DEGREES
VENTRICULAR RATE- MUSE: 65 BPM

## 2024-08-26 ENCOUNTER — PATIENT OUTREACH (OUTPATIENT)
Dept: FAMILY MEDICINE | Facility: CLINIC | Age: 84
End: 2024-08-26
Payer: MEDICARE

## 2024-08-26 NOTE — TELEPHONE ENCOUNTER
Hospital Follow Up   8/23/2024 (4 hours)  [facility]    Diagnosis   Dizziness  Double vision    Medications   Meclizine HCl 25 mg Oral 3 TIMES DAILY PRN     Follow Up instructions   PCP no time frame given, use clinical judgement     Routing to AV ARMAAN Juan, Registered Nurse  [facility]

## 2024-08-27 NOTE — TELEPHONE ENCOUNTER
ED / Discharge Outreach Protocol    Patient Contact    Attempt # 1    Was call answered?  No.  Left message on voicemail with information to call me back.    Tonia Juan, Registered Nurse  [facility]

## 2024-08-28 NOTE — TELEPHONE ENCOUNTER
ED / Discharge Outreach Protocol    Patient Contact    Attempt # 2    Was call answered?  No. Left message on voicemail with information to call back.    Tonia Juan, Registered Nurse  [facility]

## 2024-08-29 ENCOUNTER — THERAPY VISIT (OUTPATIENT)
Dept: PHYSICAL THERAPY | Facility: CLINIC | Age: 84
End: 2024-08-29
Payer: MEDICARE

## 2024-08-29 DIAGNOSIS — Z87.19 HX OF HERNIA REPAIR: ICD-10-CM

## 2024-08-29 DIAGNOSIS — Z98.890 HX OF HERNIA REPAIR: ICD-10-CM

## 2024-08-29 DIAGNOSIS — R19.8 ABDOMINAL WEAKNESS: Primary | ICD-10-CM

## 2024-08-29 PROCEDURE — 97110 THERAPEUTIC EXERCISES: CPT | Mod: GP

## 2024-08-29 PROCEDURE — 97112 NEUROMUSCULAR REEDUCATION: CPT | Mod: GP

## 2024-09-04 ENCOUNTER — THERAPY VISIT (OUTPATIENT)
Dept: PHYSICAL THERAPY | Facility: CLINIC | Age: 84
End: 2024-09-04
Payer: MEDICARE

## 2024-09-04 DIAGNOSIS — Z87.19 HX OF HERNIA REPAIR: ICD-10-CM

## 2024-09-04 DIAGNOSIS — R19.8 ABDOMINAL WEAKNESS: Primary | ICD-10-CM

## 2024-09-04 DIAGNOSIS — Z98.890 HX OF HERNIA REPAIR: ICD-10-CM

## 2024-09-04 PROCEDURE — 97530 THERAPEUTIC ACTIVITIES: CPT | Mod: GP

## 2024-09-04 PROCEDURE — 97112 NEUROMUSCULAR REEDUCATION: CPT | Mod: GP

## 2024-09-04 PROCEDURE — 97110 THERAPEUTIC EXERCISES: CPT | Mod: GP

## 2024-09-18 ENCOUNTER — THERAPY VISIT (OUTPATIENT)
Dept: PHYSICAL THERAPY | Facility: CLINIC | Age: 84
End: 2024-09-18
Payer: MEDICARE

## 2024-09-18 DIAGNOSIS — R19.8 ABDOMINAL WEAKNESS: Primary | ICD-10-CM

## 2024-09-18 DIAGNOSIS — Z98.890 HX OF HERNIA REPAIR: ICD-10-CM

## 2024-09-18 DIAGNOSIS — Z87.19 HX OF HERNIA REPAIR: ICD-10-CM

## 2024-09-18 PROCEDURE — 97112 NEUROMUSCULAR REEDUCATION: CPT | Mod: GP

## 2024-09-18 PROCEDURE — 97110 THERAPEUTIC EXERCISES: CPT | Mod: GP

## 2024-09-18 PROCEDURE — 97530 THERAPEUTIC ACTIVITIES: CPT | Mod: GP

## 2024-10-07 ENCOUNTER — IMMUNIZATION (OUTPATIENT)
Dept: FAMILY MEDICINE | Facility: CLINIC | Age: 84
End: 2024-10-07
Payer: MEDICARE

## 2024-10-07 DIAGNOSIS — Z23 HIGH PRIORITY FOR 2019-NCOV VACCINE: ICD-10-CM

## 2024-10-07 DIAGNOSIS — Z23 NEED FOR PROPHYLACTIC VACCINATION AND INOCULATION AGAINST INFLUENZA: Primary | ICD-10-CM

## 2024-10-07 PROCEDURE — 90662 IIV NO PRSV INCREASED AG IM: CPT

## 2024-10-07 PROCEDURE — 90480 ADMN SARSCOV2 VAC 1/ONLY CMP: CPT

## 2024-10-07 PROCEDURE — 91320 SARSCV2 VAC 30MCG TRS-SUC IM: CPT

## 2024-10-07 PROCEDURE — 99207 PR NO CHARGE NURSE ONLY: CPT

## 2024-10-07 PROCEDURE — 90471 IMMUNIZATION ADMIN: CPT

## 2024-10-09 ENCOUNTER — OFFICE VISIT (OUTPATIENT)
Dept: DERMATOLOGY | Facility: CLINIC | Age: 84
End: 2024-10-09
Payer: MEDICARE

## 2024-10-09 DIAGNOSIS — L81.4 LENTIGINES: ICD-10-CM

## 2024-10-09 DIAGNOSIS — L57.8 ACTINIC SKIN DAMAGE: ICD-10-CM

## 2024-10-09 DIAGNOSIS — D48.5 NEOPLASM OF UNCERTAIN BEHAVIOR OF SKIN: Primary | ICD-10-CM

## 2024-10-09 PROCEDURE — 99203 OFFICE O/P NEW LOW 30 MIN: CPT | Mod: 25 | Performed by: STUDENT IN AN ORGANIZED HEALTH CARE EDUCATION/TRAINING PROGRAM

## 2024-10-09 PROCEDURE — 88305 TISSUE EXAM BY PATHOLOGIST: CPT | Performed by: DERMATOLOGY

## 2024-10-09 PROCEDURE — 11311 SHAVE SKIN LESION 0.6-1.0 CM: CPT | Performed by: STUDENT IN AN ORGANIZED HEALTH CARE EDUCATION/TRAINING PROGRAM

## 2024-10-09 ASSESSMENT — PAIN SCALES - GENERAL: PAINLEVEL: NO PAIN (0)

## 2024-10-09 NOTE — PATIENT INSTRUCTIONS
Wound Care After a Biopsy    What is a skin biopsy?  A skin biopsy allows the doctor to examine a very small piece of tissue under the microscope to determine the diagnosis and the best treatment for the skin condition. A local anesthetic (numbing medicine) is injected with a very small needle into the skin area to be tested. A small piece of skin is taken from the area. Sometimes a suture (stitch) is used.     What are the risks of a skin biopsy?  I will experience scar, bleeding, swelling, pain, crusting and redness. I may experience incomplete removal or recurrence. Risks of this procedure are excessive bleeding, bruising, infection, nerve damage, numbness, thick (hypertrophic or keloidal) scar and non-diagnostic biopsy.    How should I care for my wound for the first 24 hours?  Keep the wound dry and covered for 24 hours  If it bleeds, hold direct pressure on the area for 15 minutes. If bleeding does not stop, call us or go to the emergency room  Avoid strenuous exercise the first 1-2 days or as your doctor instructs you    How should I care for the wound after 24 hours?  After 24 hours, remove the bandage  You may bathe or shower as normal  If you had a scalp biopsy, you can shampoo as usual and can use shower water to clean the biopsy site daily  Clean the wound once a day with gentle soap and water  Do not scrub, be gentle  Apply white petroleum/Vaseline after cleaning the wound with a cotton swab or a clean finger, and keep the site covered with a Bandaid /bandage. Bandages are not necessary with a scalp biopsy  If you are unable to cover the site with a Bandaid /bandage, re-apply ointment 2-3 times a day to keep the site moist. Moisture will help with healing  Avoid strenuous activity for first 1-2 days  Avoid lakes, rivers, pools, and oceans until the stitches are removed or the site is healed    How do I clean my wound?  Wash hands thoroughly with soap or use hand  before all wound care  Clean  the wound with gentle soap and water  Apply white petroleum/Vaseline  to wound after it is clean  Replace the Bandaid /bandage to keep the wound covered for the first few days or as instructed by your doctor  If you had a scalp biopsy, warm shower water to the area on a daily basis should suffice    What should I use to clean my wound?   Cotton-tipped applicators (Qtips )  White petroleum jelly (Vaseline ). Use a clean new container and use Q-tips to apply.  Bandaids  as needed  Gentle soap     How should I care for my wound long term?  Do not get your wound dirty  Keep up with wound care for one week or until the area is healed.  A small scab will form and fall off by itself when the area is completely healed. The area will be red and will become pink in color as it heals. Sun protection is very important for how your scar will turn out. Sunscreen with an SPF 30 or greater is recommended once the area is healed.  You should have some soreness but it should be mild and slowly go away over several days. Talk to your doctor about using tylenol for pain,    When should I call my doctor?  If you have increased:   Pain or swelling  Pus or drainage (clear or slightly yellow drainage is ok)  Temperature over 100F  Spreading redness or warmth around wound    When will I hear about my results?  The biopsy results can take 2 weeks to come back.  Your results will automatically release to [employer] before your provider has even reviewed them.  The clinic will call you with the results, send you a [employer] message, or have you schedule a follow-up clinic or phone time to discuss the results.  Contact our clinics if you do not hear from us in 2 weeks.    Who should I call with questions?  [facility]: [phone]  [facility]: [phone]  For urgent needs outside of business hours call the [facility] at [phone] and ask for the dermatology resident on call

## 2024-10-09 NOTE — PROGRESS NOTES
[facility] Dermatology Note    Encounter Date: Oct 9, 2024    Dermatology Problem List:  ______________________________________    Impression/Plan:  Francois was seen today for lesion.    Diagnoses and all orders for this visit:    Neoplasm of uncertain behavior of skin  -     Dermatological Path Order and Indications; Standing  -     Dermatological Path Order and Indications  -     SHAV SKIN LESION FACE/EARS 0.6-1.0 CM  - 6mm  -Painful, getting caught on clothing, occasionally bleeding  - Due to intolerable side effects as well as for definitive diagnosis, shave removal performed for palliation of symptoms  - See procedure note    Lentigines  Actinic skin damage  - Reviewed the compounding benefits of incremental changes to sun protective clothing behaviors including increased frequency of sunscreen and sun protective clothing like broad brimmed hats and longsleeved UPF containing clothing          - SHAVE Removal PROCEDURE NOTE: After written informed consent was obtained, a time out was taken to identify the patient and the correct site for removal. The lesion on the nose was cleansed with a 70% isopropyl alcohol wipe, and then injected with 1cc of lidocaine 1% with epinephrine 1:100,000. Once anesthesia was ensured, the visible surface of the lesion was shaved with intent to remove the entire lesion using a Jacob blade in standard technique. Hemostasis was obtained with pressure and aluminum chloride 20% solution. The wound was dressed with white petrolatum and an adhesive bandage. The patient tolerated the procedure well. Post-procedure instructions and recommendations were provided both verbally and in writing.    Follow-up in 1.       Staff Involved:  Staff Only    Jasen Marshall MD   of Dermatology  Department of Dermatology  [facility]      CC:   Chief Complaint   Patient presents with    Lesion       History of Present Illness:  Ms.  Francois Ly is a 84 year old female who presents as a new patient.    Pt presents for papule on nose that has been stable in size but is persistently irritating    Labs:      Physical exam:  Vitals: LMP  (LMP Unknown)   GEN: well developed, well-nourished, in no acute distress, in a pleasant mood.     SKIN: Ramsey phototype 2  - Focused examination of the face was performed.  - Flat brown macules and patches in a sun exposed areas on face and extremities  - skin colored papule on nose  - No other lesions of concern on areas examined.     Past Medical History:   Past Medical History:   Diagnosis Date    Benign essential hypertension 10/12/2016    Bilateral low back pain without sciatica, unspecified chronicity 12/19/2017    Blunt trauma of rib, initial encounter 11/25/2016    Dyslipidemia     Gallstones     Hypertension     PAD (peripheral artery disease) (H) 06/19/2017    Pancreatic mass 08/17/2016    SBO (small bowel obstruction) (H)     Slipped intervertebral disc      Past Surgical History:   Procedure Laterality Date    DAVINCI HERNIORRHAPHY INGUINAL Left 1/24/2023    Procedure: Robot-assisted left inguinal hernia repair with mesh;  Surgeon: Lalito Strange MD;  Location:  OR    DAVINCI LYSIS OF ADHESIONS N/A 1/24/2023    Procedure: Davinci Lysis of Adhesions;  Surgeon: Lalito Strange MD;  Location:  OR    GYN SURGERY      hysterectomy    ORTHOPEDIC SURGERY      Slipped disc with chronic back pain       Social History:   reports that she has quit smoking. She has quit using smokeless tobacco. She reports current alcohol use. She reports that she does not use drugs.    Family History:  Family History   Problem Relation Age of Onset    Family History Negative Other        Medications:  Current Outpatient Medications   Medication Sig Dispense Refill    aspirin (ASA) 81 MG chewable tablet Take 2 tablets (162 mg) by mouth daily      atorvastatin (LIPITOR) 40 MG tablet Take 1 tablet (40 mg)  by mouth daily 90 tablet 3    hydrochlorothiazide (HYDRODIURIL) 25 MG tablet Take 1 tablet (25 mg) by mouth daily 90 tablet 3    KRILL OIL PO Take 1 capsule by mouth daily (OTC: Unsure of strength)      MAGNESIUM GLYCINATE PLUS PO Take 275 mg by mouth      meclizine (ANTIVERT) 25 MG tablet Take 1 tablet (25 mg) by mouth 3 times daily as needed for dizziness. 20 tablet 0    spironolactone (ALDACTONE) 25 MG tablet Take 1 tablet (25 mg) by mouth daily 90 tablet 1    vitamin D3 (CHOLECALCIFEROL) 50 mcg (2000 units) tablet Take 1 tablet by mouth daily       Allergies   Allergen Reactions    Ace Inhibitors      Doesn't think she's allergic     Sorbitan Trioleate      Doesn't think she's allergic     Sulfonylureas      Doesn't think she's allergic     Vancomycin Hives

## 2024-10-09 NOTE — LETTER
10/9/2024      Francois Ly  [address]  [place] [address]      Dear Colleague,    Thank you for referring your patient, Francois Ly, to the [facility]. Please see a copy of my visit note below.    [facility] Dermatology Note    Encounter Date: Oct 9, 2024    Dermatology Problem List:  ______________________________________    Impression/Plan:  Francois was seen today for lesion.    Diagnoses and all orders for this visit:    Neoplasm of uncertain behavior of skin  -     Dermatological Path Order and Indications; Standing  -     Dermatological Path Order and Indications  -     SHAV SKIN LESION FACE/EARS 0.6-1.0 CM  - 6mm  -Painful, getting caught on clothing, occasionally bleeding  - Due to intolerable side effects as well as for definitive diagnosis, shave removal performed for palliation of symptoms  - See procedure note    Lentigines  Actinic skin damage  - Reviewed the compounding benefits of incremental changes to sun protective clothing behaviors including increased frequency of sunscreen and sun protective clothing like broad brimmed hats and longsleeved UPF containing clothing          - SHAVE Removal PROCEDURE NOTE: After written informed consent was obtained, a time out was taken to identify the patient and the correct site for removal. The lesion on the nose was cleansed with a 70% isopropyl alcohol wipe, and then injected with 1cc of lidocaine 1% with epinephrine 1:100,000. Once anesthesia was ensured, the visible surface of the lesion was shaved with intent to remove the entire lesion using a [place] blade in standard technique. Hemostasis was obtained with pressure and aluminum chloride 20% solution. The wound was dressed with white petrolatum and an adhesive bandage. The patient tolerated the procedure well. Post-procedure instructions and recommendations were provided both verbally and in writing.    Follow-up in 1.       Staff  Involved:  Staff Only    Jasen Marshall MD   of Dermatology  Department of Dermatology  [facility]      CC:   Chief Complaint   Patient presents with     Lesion       History of Present Illness:  Ms. Francois Ly is a 84 year old female who presents as a new patient.    Pt presents for papule on nose that has been stable in size but is persistently irritating    Labs:      Physical exam:  Vitals: LMP  (LMP Unknown)   GEN: well developed, well-nourished, in no acute distress, in a pleasant mood.     SKIN: Ramsey phototype 2  - Focused examination of the face was performed.  - Flat brown macules and patches in a sun exposed areas on face and extremities  - skin colored papule on nose  - No other lesions of concern on areas examined.     Past Medical History:   Past Medical History:   Diagnosis Date     Benign essential hypertension 10/12/2016     Bilateral low back pain without sciatica, unspecified chronicity 12/19/2017     Blunt trauma of rib, initial encounter 11/25/2016     Dyslipidemia      Gallstones      Hypertension      PAD (peripheral artery disease) (H) 06/19/2017     Pancreatic mass 08/17/2016     SBO (small bowel obstruction) (H)      Slipped intervertebral disc      Past Surgical History:   Procedure Laterality Date     DAVINCI HERNIORRHAPHY INGUINAL Left 1/24/2023    Procedure: Robot-assisted left inguinal hernia repair with mesh;  Surgeon: Lalito Strange MD;  Location:  OR     DAVINCI LYSIS OF ADHESIONS N/A 1/24/2023    Procedure: Davinci Lysis of Adhesions;  Surgeon: Lalito Strange MD;  Location:  OR     GYN SURGERY      hysterectomy     ORTHOPEDIC SURGERY      Slipped disc with chronic back pain       Social History:   reports that she has quit smoking. She has quit using smokeless tobacco. She reports current alcohol use. She reports that she does not use drugs.    Family History:  Family History   Problem Relation Age of  Onset     Family History Negative Other        Medications:  Current Outpatient Medications   Medication Sig Dispense Refill     aspirin (ASA) 81 MG chewable tablet Take 2 tablets (162 mg) by mouth daily       atorvastatin (LIPITOR) 40 MG tablet Take 1 tablet (40 mg) by mouth daily 90 tablet 3     hydrochlorothiazide (HYDRODIURIL) 25 MG tablet Take 1 tablet (25 mg) by mouth daily 90 tablet 3     KRILL OIL PO Take 1 capsule by mouth daily (OTC: Unsure of strength)       MAGNESIUM GLYCINATE PLUS PO Take 275 mg by mouth       meclizine (ANTIVERT) 25 MG tablet Take 1 tablet (25 mg) by mouth 3 times daily as needed for dizziness. 20 tablet 0     spironolactone (ALDACTONE) 25 MG tablet Take 1 tablet (25 mg) by mouth daily 90 tablet 1     vitamin D3 (CHOLECALCIFEROL) 50 mcg (2000 units) tablet Take 1 tablet by mouth daily       Allergies   Allergen Reactions     Ace Inhibitors      Doesn't think she's allergic      Sorbitan Trioleate      Doesn't think she's allergic      Sulfonylureas      Doesn't think she's allergic      Vancomycin Hives               Again, thank you for allowing me to participate in the care of your patient.        Sincerely,        Jasen Marshall MD

## 2024-10-11 LAB
PATH REPORT.COMMENTS IMP SPEC: NORMAL
PATH REPORT.COMMENTS IMP SPEC: NORMAL
PATH REPORT.FINAL DX SPEC: NORMAL
PATH REPORT.GROSS SPEC: NORMAL
PATH REPORT.MICROSCOPIC SPEC OTHER STN: NORMAL
PATH REPORT.RELEVANT HX SPEC: NORMAL

## 2024-10-16 ENCOUNTER — THERAPY VISIT (OUTPATIENT)
Dept: PHYSICAL THERAPY | Facility: CLINIC | Age: 84
End: 2024-10-16
Payer: MEDICARE

## 2024-10-16 DIAGNOSIS — R19.8 ABDOMINAL WEAKNESS: Primary | ICD-10-CM

## 2024-10-16 DIAGNOSIS — Z87.19 HX OF HERNIA REPAIR: ICD-10-CM

## 2024-10-16 DIAGNOSIS — Z98.890 HX OF HERNIA REPAIR: ICD-10-CM

## 2024-10-16 PROCEDURE — 97112 NEUROMUSCULAR REEDUCATION: CPT | Mod: GP

## 2024-10-16 PROCEDURE — 97110 THERAPEUTIC EXERCISES: CPT | Mod: GP

## 2024-10-16 NOTE — PROGRESS NOTES
[facility]                                                                                   OUTPATIENT PHYSICAL THERAPY    PLAN OF TREATMENT FOR OUTPATIENT REHABILITATION   Patient's Last Name, First Name, Francois Blankenship YOB: 1940   Provider's Name   [facility]   Medical Record No.  [id number]     Onset Date: 06/25/24 (Referral date)  Start of Care Date: 07/17/24     Medical Diagnosis:  Abdominal weakness; history of hernia repair      PT Treatment Diagnosis:  Abdominal weakness and pain; low back pain Plan of Treatment  Frequency/Duration: 1x/week, decreasing to every other as appropriate/ 10 weeks    Certification date from 09/26/24 to 01/08/25         See note for plan of treatment details and functional goals     Mahamed Howell PT                         I CERTIFY THE NEED FOR THESE SERVICES FURNISHED UNDER        THIS PLAN OF TREATMENT AND WHILE UNDER MY CARE     (Physician attestation of this document indicates review and certification of the therapy plan).              Referring Provider:  Lalito Strange    Initial Assessment  See Epic Evaluation- Start of Care Date: 07/17/24            PLAN  Continue therapy per current plan of care. Anticipate 2 more PT sessions over the next two months for continued abdominal and lower extremity strengthening. Is progressing well with mobility and functional activity tolerance.    Beginning/End Dates of Progress Note Reporting Period:  10/16/24 to 10/16/2024    Referring Provider:  Lalito Strange       10/16/24 0500   Appointment Info   Signing clinician's name / credentials Mahamed Howell PT, DPT   Total/Authorized Visits E&T   Visits Used 9   Medical Diagnosis Abdominal weakness; history of hernia repair   PT Tx Diagnosis Abdominal weakness and pain; low back pain   Other pertinent information Has a hx of lumbar surgery a plate and 6 screws; 2007/8   Progress  Note/Certification   Start of Care Date 07/17/24   Onset of illness/injury or Date of Surgery 06/25/24  (Referral date)   Therapy Frequency 1x/week, decreasing to every other as appropriate   Predicted Duration 10 weeks   Certification date from 09/26/24   Certification date to 01/08/25   Progress Note Completed Date 10/16/24   PT Goal 1   Goal Identifier Sitting   Goal Description Pt will be able to sit in the car for at least 30 minutes being able to turn as needed without incraese in pain   Rationale to maximize safety and independence within the community;to maximize safety and independence with transportation   Goal Progress no pain   Target Date 09/11/24   Date Met 08/12/24   PT Goal 2   Goal Identifier Transitions   Goal Description Pt will be able to get in/out of a car and perform sit to stands without an increase in pain in order to improve function   Rationale to maximize safety and independence with self cares;to maximize safety and independence with transportation   Goal Progress Progressing   Target Date 10/09/24   PT Goal 3   Goal Identifier Core strength   Goal Description Pt will be able to hold a low plank for at least 20 seconds wtih good form to demonstrate improvement in core strength   Rationale to maximize safety and independence within the community;to maximize safety and independence within the home;to maximize safety and independence with performance of ADLs and functional tasks   Goal Progress Progressing   Target Date 10/09/24   PT Goal 4   Goal Identifier Exercise   Goal Description Pt will be able to participate in at least 20 minutes of standing exercise without being limited by pain in order to return to PLOF   Rationale to maximize safety and independence with performance of ADLs and functional tasks;to maximize safety and independence with self cares   Goal Progress Progressing   Target Date 10/09/24   Subjective Report   Subjective Report Patient returns to PT today after left  wrist surgery where they removed a ganglion cyst last Monday. Overall she reports that PT has been helping since starting. Needs core exercises that don't involve hands.   Objective Measure 1   Objective Measure gait   Details forward flexed posture, improvement with cueing   Treatment Interventions (PT)   Interventions Therapeutic Procedure/Exercise;Neuromuscular Re-education   Therapeutic Procedure/Exercise   Therapeutic Procedures: strength, endurance, ROM, flexibility minutes (95261) 23   Ther Proc 1 Nustep   Ther Proc 1 - Details x5 min level 3 resistance without UE   PTRx Ther Proc 1 Standing Lumbar EXT   PTRx Ther Proc 1 - Details x10 reps   PTRx Ther Proc 2 Hip AROM Standing Extension   PTRx Ther Proc 2 - Details x10 reps bilateral   PTRx Ther Proc 3 Hip AROM Standing Abduction   PTRx Ther Proc 3 - Details x10 reps bilateral   PTRx Ther Proc 5 Bridge   PTRx Ther Proc 5 - Details x20 reps   PTRx Ther Proc 6 [place] Stretch   PTRx Ther Proc 6 - Details 1x10/side - verbal and visual cues for good form and stretch   Skilled Intervention cueing for dosing and technique   Patient Response/Progress patient tolerated well - fatigued after   Neuromuscular Re-education   Neuromuscular re-ed of mvmt, balance, coord, kinesthetic sense, posture, proprioception minutes (80923) 17   PTRx Neuro Re-ed 1 Supine Arm EXT with Core Bracing   PTRx Neuro Re-ed 1 - Details x10 reps overhead, x10 reps alternating, x 10 reps to a T   PTRx Neuro Re-ed 2 Supine Abdominal Leg EXT - added to HEP   PTRx Neuro Re-ed 2 - Details 2 x 10 reps total   PTRx Neuro Re-ed 3 Supine Abdominal Exercise #3 (Marching)   PTRx Neuro Re-ed 3 - Details 2 x 15 reps   Skilled Intervention progression of core strength - see cues above - extra time spent to ensure good TrA activation   Patient Response/Progress Patient tolerated well   Education   Learner/Method Patient   Plan   Home program ptrx printout   Plan for next session Core Strengthening    Comments   Comments Progress to 1x/month for the next 2 months   Total Session Time   Timed Code Treatment Minutes 40   Total Treatment Time (sum of timed and untimed services) 40

## 2024-12-08 ENCOUNTER — HOSPITAL ENCOUNTER (EMERGENCY)
Facility: CLINIC | Age: 84
Discharge: HOME | End: 2024-12-08
Attending: EMERGENCY MEDICINE | Admitting: EMERGENCY MEDICINE
Payer: MEDICARE

## 2024-12-08 ENCOUNTER — APPOINTMENT (OUTPATIENT)
Dept: GENERAL RADIOLOGY | Facility: CLINIC | Age: 84
End: 2024-12-08
Attending: EMERGENCY MEDICINE
Payer: MEDICARE

## 2024-12-08 VITALS
TEMPERATURE: 98 F | HEART RATE: 91 BPM | OXYGEN SATURATION: 97 % | SYSTOLIC BLOOD PRESSURE: 188 MMHG | DIASTOLIC BLOOD PRESSURE: 80 MMHG | RESPIRATION RATE: 12 BRPM

## 2024-12-08 DIAGNOSIS — J02.9 ACUTE PHARYNGITIS, UNSPECIFIED ETIOLOGY: ICD-10-CM

## 2024-12-08 DIAGNOSIS — J20.9 ACUTE BRONCHITIS, UNSPECIFIED ORGANISM: ICD-10-CM

## 2024-12-08 LAB
B PARAPERT DNA SPEC QL NAA+PROBE: NOT DETECTED
B PERT DNA SPEC QL NAA+PROBE: NOT DETECTED
FLUAV RNA SPEC QL NAA+PROBE: NEGATIVE
FLUBV RNA RESP QL NAA+PROBE: NEGATIVE
RSV RNA SPEC NAA+PROBE: NEGATIVE
SARS-COV-2 RNA RESP QL NAA+PROBE: NEGATIVE

## 2024-12-08 PROCEDURE — 87798 DETECT AGENT NOS DNA AMP: CPT | Performed by: EMERGENCY MEDICINE

## 2024-12-08 PROCEDURE — 99284 EMERGENCY DEPT VISIT MOD MDM: CPT | Mod: 25 | Performed by: EMERGENCY MEDICINE

## 2024-12-08 PROCEDURE — 71046 X-RAY EXAM CHEST 2 VIEWS: CPT

## 2024-12-08 PROCEDURE — 87637 SARSCOV2&INF A&B&RSV AMP PRB: CPT | Performed by: EMERGENCY MEDICINE

## 2024-12-08 RX ORDER — AZITHROMYCIN 250 MG/1
TABLET, FILM COATED ORAL
Qty: 6 TABLET | Refills: 0 | Status: SHIPPED | OUTPATIENT
Start: 2024-12-08 | End: 2024-12-13

## 2024-12-08 ASSESSMENT — COLUMBIA-SUICIDE SEVERITY RATING SCALE - C-SSRS
1. IN THE PAST MONTH, HAVE YOU WISHED YOU WERE DEAD OR WISHED YOU COULD GO TO SLEEP AND NOT WAKE UP?: NO
2. HAVE YOU ACTUALLY HAD ANY THOUGHTS OF KILLING YOURSELF IN THE PAST MONTH?: NO
6. HAVE YOU EVER DONE ANYTHING, STARTED TO DO ANYTHING, OR PREPARED TO DO ANYTHING TO END YOUR LIFE?: NO

## 2024-12-08 ASSESSMENT — ACTIVITIES OF DAILY LIVING (ADL)
ADLS_ACUITY_SCORE: 50

## 2024-12-08 NOTE — ED PROVIDER NOTES
Emergency Department Note      History of Present Illness     Chief Complaint   Pharyngitis    HPI   Francois Ly is a 84 year old female with a history including hypertension, type 2 diabetes mellitus, PAD, and CKD stage 2 who presents to the ED with  for evaluation of sore throat and cough. The patient reports coming back from [place] on 12/4 after spending 3 weeks there. She developed a sore throat just before she got onto the plane. She was seen at the [facility] on [facility], where she tested negative for Covid and strep. On 12/6, she developing a productive cough, prompting her to seek further evaluation today.     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  Type 2 diabetes mellitus with diabetic microalbuminuria  CKD stage 2  Bilateral inguinal hernia  Ganglion cyst of wrist, left    Medications   Atorvastatin  Hydrochlorothiazide  Spironolactone  Aspirin 81 mg x 2 daily    Surgical History   Left inguinal hernia repair  Lysis of adhesions  Hysterectomy  Orthopedic surgery    Physical Exam     Patient Vitals for the past 24 hrs:   BP Temp Temp src Pulse Resp SpO2   12/08/24 1208 -- -- -- 91 12 97 %   12/08/24 1010 (!) 188/80 98  F (36.7  C) Temporal 85 12 --     Physical Exam  General: Resting comfortably on the gurney  Head:  The scalp, face, and head appear normal  Eyes:  The pupils are equal, round, and reactive to light    There is no nystagmus    Extraocular muscles are intact    Conjunctivae and sclerae are normal  ENT:    The nose is normal    Pinnae are normal    The oropharynx is normal    Uvula is in the midline  Neck:  Normal range of motion    There is no rigidity  noted    There is no midline cervical spine pain/tenderness    Trachea is in the midline    No mass is detected  CV:  Regular rate and underlying rhythm     Normal S1/S2, no S3/S4    No pathological murmur detected  Resp:  Lungs are clear    There is no tachypnea    Non-labored    No rales    No wheezing   GI:  Abdomen is soft, there is no rigidity    No distension    No tympani    No rebound tenderness     Non-surgical without peritoneal features  MS:  Normal muscular tone    Symmetric motor strength    No major joint effusions    No asymmetric leg swelling, no calf tenderness  Skin:  No rash or acute skin lesions noted  Neuro:  Speech is normal and fluent  Psych: Awake. Alert.      Normal affect.  Appropriate interactions.  Lymph: No anterior cervical lymphadenopathy noted    Diagnostics     Lab Results   Labs Ordered and Resulted from Time of ED Arrival to Time of ED Departure   INFLUENZA A/B, RSV AND SARS-COV2 PCR - Normal       Result Value    Influenza A PCR Negative      Influenza B PCR Negative      RSV PCR Negative      SARS CoV2 PCR Negative     BORDETELLA PERTUSSIS PARAPERTUSSIS, PCR     Imaging   XR Chest 2 Views   Final Result   IMPRESSION: Negative chest.        Independent Interpretation   CXR: No acute pneumonia.    ED Course      Discussion of Management   None    ED Course   ED Course as of 12/08/24 1242   Sun Dec 08, 2024   1040 I obtained history and examined the patient as noted above.    1242 I rechecked the patient and explained findings.      Additional Documentation  None    Medical Decision Making / Diagnosis     IESHA Ly is a 84 year old female presents to the emergency department with a new onset pharyngitis and cough.  She just traveled internationally back from [place] as noted above.  The patient underwent testing for COVID-19, influenza, and RSV which were negative.  She was also tested for pertussis as there is currently an outbreak in this community.  Her chest x-ray is  nonacute and her clinical exam was not suggestive of lobar pneumonia.  The patient will be treated empirically for atypical pneumonitis which would include pertussis as well while we are waiting for her swab to result.  There is no evidence of definitive community-acquired pneumonia.  The patient's presentation is not consistent with PE.    Disposition   The patient was discharged.     Diagnosis     ICD-10-CM    1. Acute bronchitis, unspecified organism  J20.9       2. Acute pharyngitis, unspecified etiology  J02.9            Discharge Medications   New Prescriptions    No medications on file     Scribe Disclosure:  I, Roman Barba, am serving as a scribe at 12:18 PM on 12/8/2024 to document services personally performed by Lalito Mccord MD, based on my observations and the provider's statements to me.        Lalito Mccord MD  12/08/24 6149

## 2024-12-08 NOTE — ED TRIAGE NOTES
Pt has had sore throat since Wednesday and cough for 2 days     Triage Assessment (Adult)       Row Name 12/08/24 1012          Triage Assessment    Airway WDL WDL        Respiratory WDL    Respiratory WDL cough     Cough Frequency infrequent        Cardiac WDL    Cardiac WDL WDL        Peripheral/Neurovascular WDL    Peripheral Neurovascular WDL WDL        Cognitive/Neuro/Behavioral WDL    Cognitive/Neuro/Behavioral WDL WDL

## 2024-12-08 NOTE — DISCHARGE INSTRUCTIONS
Please take the antibiotic as it has been prescribed.  I have sent this over to the [facility] pharmacy on [address]

## 2024-12-09 ENCOUNTER — PATIENT OUTREACH (OUTPATIENT)
Dept: CARE COORDINATION | Facility: CLINIC | Age: 84
End: 2024-12-09
Payer: MEDICARE

## 2024-12-09 NOTE — PROGRESS NOTES
Clinic Care Coordination Contact  Community Health Worker Initial Outreach    CHW Initial Information Gathering:  Referral Source: ED Follow-Up  Current living arrangement:: Not Assessed  CHW Additional Questions  If ED/Hospital discharge, follow-up appointment scheduled as recommended?: N/A  Medication changes made following ED/Hospital discharge?: No  MyChart active?: Yes  Patient sent Social Drivers of Health questionnaire?: No    Patient accepts CC: No, Patient's  (Sahil) expressed no additional support is needed at this time. Patient will be sent Care Coordination introduction letter for future reference.     Elena Gregory  Community Health Worker    Connected Care Resources   [facility]     *Connected Care Resources Team does NOT   follow patient ongoing. Referrals are identified   based on internal discharge report and the outreach is to ensure   Patient has understanding of their discharge instructions.

## 2024-12-09 NOTE — LETTER
Francois Ly  [address] [facility] [address]    Dear Francois Ly,    I am a team member within the Connected Care Resource Center with [facility]. I recently contacted you to ensure you are doing well following a visit within our health system. I also wanted to take this chance to introduce Clinic Care Coordination should you have any interest in this program in the future.    Below is a description of Clinic Care Coordination and how this team can further assist you:       The Clinic Care Coordination team is made up of a Registered Nurse, , Financial Resource Worker, and a Community Health Worker who understand and can help navigate the health care system. The goal of clinic care coordination is to help you manage your health, improve access to care, and achieve optimal health outcomes. They work alongside your provider to assist you in determining your health and social needs, obtain health care and community resources, and provide you with necessary information and education. Clinic Care Coordination can work with you through any barriers and develop a care plan that helps coordinate and strengthen the relationship between you and your care team.    If you wish to connect with the Clinic Care Coordination Team, please let your [facility] Primary Care Provider or Clinic Care Team know and they can place a referral. The Clinic Care Coordination team will then reach out by phone to further support you.    We are focused on providing you with the highest-quality healthcare experience possible.    Sincerely,   Elena LOERA  Community Health Worker    Connected Care Resources   [facility] [address] [place] [address] ([phone]).

## 2025-01-08 ENCOUNTER — OFFICE VISIT (OUTPATIENT)
Dept: FAMILY MEDICINE | Facility: CLINIC | Age: 85
End: 2025-01-08
Payer: COMMERCIAL

## 2025-01-08 VITALS
BODY MASS INDEX: 24.7 KG/M2 | WEIGHT: 130.8 LBS | TEMPERATURE: 97.9 F | OXYGEN SATURATION: 98 % | SYSTOLIC BLOOD PRESSURE: 130 MMHG | HEART RATE: 60 BPM | HEIGHT: 61 IN | DIASTOLIC BLOOD PRESSURE: 80 MMHG | RESPIRATION RATE: 18 BRPM

## 2025-01-08 DIAGNOSIS — N18.2 CKD (CHRONIC KIDNEY DISEASE) STAGE 2, GFR 60-89 ML/MIN: ICD-10-CM

## 2025-01-08 DIAGNOSIS — E78.5 HYPERLIPIDEMIA LDL GOAL <70: ICD-10-CM

## 2025-01-08 DIAGNOSIS — D49.0 IPMN (INTRADUCTAL PAPILLARY MUCINOUS NEOPLASM): ICD-10-CM

## 2025-01-08 DIAGNOSIS — N18.2 TYPE 2 DIABETES MELLITUS WITH STAGE 2 CHRONIC KIDNEY DISEASE, WITHOUT LONG-TERM CURRENT USE OF INSULIN (H): Primary | ICD-10-CM

## 2025-01-08 DIAGNOSIS — I10 BENIGN ESSENTIAL HYPERTENSION: ICD-10-CM

## 2025-01-08 DIAGNOSIS — E11.22 TYPE 2 DIABETES MELLITUS WITH STAGE 2 CHRONIC KIDNEY DISEASE, WITHOUT LONG-TERM CURRENT USE OF INSULIN (H): Primary | ICD-10-CM

## 2025-01-08 PROBLEM — E11.29 TYPE 2 DIABETES MELLITUS WITH KIDNEY COMPLICATION, WITHOUT LONG-TERM CURRENT USE OF INSULIN (H): Status: ACTIVE | Noted: 2025-01-08

## 2025-01-08 PROBLEM — E11.9 TYPE 2 DIABETES MELLITUS WITHOUT COMPLICATION, WITHOUT LONG-TERM CURRENT USE OF INSULIN (H): Status: RESOLVED | Noted: 2023-03-14 | Resolved: 2025-01-08

## 2025-01-08 LAB
ANION GAP SERPL CALCULATED.3IONS-SCNC: 10 MMOL/L (ref 7–15)
BUN SERPL-MCNC: 16.1 MG/DL (ref 8–23)
CALCIUM SERPL-MCNC: 9.9 MG/DL (ref 8.8–10.4)
CHLORIDE SERPL-SCNC: 100 MMOL/L (ref 98–107)
CHOLEST SERPL-MCNC: 184 MG/DL
CREAT SERPL-MCNC: 0.8 MG/DL (ref 0.51–0.95)
EGFRCR SERPLBLD CKD-EPI 2021: 72 ML/MIN/1.73M2
EST. AVERAGE GLUCOSE BLD GHB EST-MCNC: 146 MG/DL
FASTING STATUS PATIENT QL REPORTED: YES
FASTING STATUS PATIENT QL REPORTED: YES
GLUCOSE SERPL-MCNC: 116 MG/DL (ref 70–99)
HBA1C MFR BLD: 6.7 % (ref 0–5.6)
HCO3 SERPL-SCNC: 28 MMOL/L (ref 22–29)
HDLC SERPL-MCNC: 59 MG/DL
LDLC SERPL CALC-MCNC: 85 MG/DL
NONHDLC SERPL-MCNC: 125 MG/DL
POTASSIUM SERPL-SCNC: 4.5 MMOL/L (ref 3.4–5.3)
SODIUM SERPL-SCNC: 138 MMOL/L (ref 135–145)
TRIGL SERPL-MCNC: 198 MG/DL

## 2025-01-08 PROCEDURE — 80061 LIPID PANEL: CPT | Performed by: FAMILY MEDICINE

## 2025-01-08 PROCEDURE — G2211 COMPLEX E/M VISIT ADD ON: HCPCS | Performed by: FAMILY MEDICINE

## 2025-01-08 PROCEDURE — 80048 BASIC METABOLIC PNL TOTAL CA: CPT | Performed by: FAMILY MEDICINE

## 2025-01-08 PROCEDURE — 99214 OFFICE O/P EST MOD 30 MIN: CPT | Performed by: FAMILY MEDICINE

## 2025-01-08 PROCEDURE — 36415 COLL VENOUS BLD VENIPUNCTURE: CPT | Performed by: FAMILY MEDICINE

## 2025-01-08 PROCEDURE — 83036 HEMOGLOBIN GLYCOSYLATED A1C: CPT | Performed by: FAMILY MEDICINE

## 2025-01-08 RX ORDER — SPIRONOLACTONE 25 MG/1
25 TABLET ORAL DAILY
Qty: 90 TABLET | Refills: 3 | Status: SHIPPED | OUTPATIENT
Start: 2025-01-08

## 2025-01-08 NOTE — ASSESSMENT & PLAN NOTE
Diabetes is well controlled with diet only.,   Continue current treatment regimen. Encourage diet, and she exercises regularly.  Diabetes will be reassessed in 6 months.

## 2025-01-08 NOTE — ASSESSMENT & PLAN NOTE
Noticed on the last CT abdomen/pelvis, showed that it has changed in size to 15 mm long, and I recommend that we see GI specialist for possible IPMN  Pt did not see GI yet, so will order CT abdomen and pelvis today, and follow up with results.

## 2025-01-08 NOTE — PROGRESS NOTES
Assessment & Plan   Problem List Items Addressed This Visit       Benign essential hypertension     Pt has been using spironolactone and hydrochlorothiazide and she said BP is controlled at home (130/60).         Relevant Medications    spironolactone (ALDACTONE) 25 MG tablet    Hyperlipidemia LDL goal <70     Continue on Atorvastatin 40 mg daily.         Pancreatic cyst     Noticed on the last CT abdomen/pelvis, showed that it has changed in size to 15 mm long, and I recommend that we see GI specialist for possible IPMN  Pt did not see GI yet, so will order CT abdomen and pelvis today, and follow up with results.           CKD (chronic kidney disease) stage 2, GFR 60-89 ml/min     Tried Ace or ARB but pt did not tolerate these medications.  Prefers to cotninue on same medicine (hydrochlorothiazide) and spironolactone.          Type 2 diabetes mellitus with kidney complication, without long-term current use of insulin (H) - Primary     Diabetes is well controlled with diet only.,   Continue current treatment regimen. Encourage diet, and she exercises regularly.  Diabetes will be reassessed in 6 months.         Relevant Orders    Lipid panel reflex to direct LDL Non-fasting    HEMOGLOBIN A1C    Basic metabolic panel  (Ca, Cl, CO2, Creat, Gluc, K, Na, BUN)    Adult Eye  Referral                   Subjective   Francois is a 84 year old, presenting for the following health issues:  Diabetes (Folllow up )    History of Present Illness       Hypertension: She presents for follow up of hypertension.  She does not check blood pressure  regularly outside of the clinic. Outside blood pressures have been over 140/90. She follows a low salt diet.     She eats 2-3 servings of fruits and vegetables daily.She consumes 0 sweetened beverage(s) daily.She exercises with enough effort to increase her heart rate 20 to 29 minutes per day.  She exercises with enough effort to increase her heart rate 3 or less days per week.   She  is taking medications regularly.         Diabetes Follow-up    How often are you checking your blood sugar? Not at all  What concerns do you have today about your diabetes? None   Do you have any of these symptoms? (Select all that apply)  No numbness or tingling in feet.  No redness, sores or blisters on feet.  No complaints of excessive thirst.  No reports of blurry vision.  No significant changes to weight.  Have you had a diabetic eye exam in the last 12 months? No        BP Readings from Last 2 Encounters:   01/08/25 130/80   12/08/24 (!) 188/80     Hemoglobin A1C (%)   Date Value   07/08/2024 6.5 (H)   01/03/2024 6.5 (H)   08/09/2009 6.0     LDL Cholesterol Calculated (mg/dL)   Date Value   01/03/2024 81   01/17/2023 99   09/29/2020 126 (H)   09/20/2019 154 (H)                 Review of Systems  Constitutional, HEENT, cardiovascular, pulmonary, gi and gu systems are negative, except as otherwise noted.      Objective    LMP  (LMP Unknown)   There is no height or weight on file to calculate BMI.  Physical Exam   GENERAL: alert and no distress  NECK: no adenopathy, no asymmetry, masses, or scars  RESP: lungs clear to auscultation - no rales, rhonchi or wheezes  CV: regular rate and rhythm, normal S1 S2, no S3 or S4, no murmur, click or rub, no peripheral edema  ABDOMEN: soft, nontender, no hepatosplenomegaly, no masses and bowel sounds normal  MS: OA changes in the thumbs, no edema            Signed Electronically by: Luis Feliciano MD

## 2025-03-09 ENCOUNTER — HOSPITAL ENCOUNTER (EMERGENCY)
Facility: CLINIC | Age: 85
Discharge: HOME | End: 2025-03-10
Attending: EMERGENCY MEDICINE
Payer: MEDICARE

## 2025-03-09 DIAGNOSIS — I77.819 ECTATIC AORTA: ICD-10-CM

## 2025-03-09 DIAGNOSIS — I31.39 PERICARDIAL EFFUSION: ICD-10-CM

## 2025-03-09 DIAGNOSIS — E04.1 THYROID NODULE: ICD-10-CM

## 2025-03-09 DIAGNOSIS — R07.9 CHEST PAIN, UNSPECIFIED TYPE: ICD-10-CM

## 2025-03-09 PROCEDURE — 99285 EMERGENCY DEPT VISIT HI MDM: CPT | Mod: 25

## 2025-03-09 PROCEDURE — 93005 ELECTROCARDIOGRAM TRACING: CPT

## 2025-03-09 ASSESSMENT — ACTIVITIES OF DAILY LIVING (ADL)
ADLS_ACUITY_SCORE: 50
ADLS_ACUITY_SCORE: 50

## 2025-03-10 ENCOUNTER — APPOINTMENT (OUTPATIENT)
Dept: CT IMAGING | Facility: CLINIC | Age: 85
End: 2025-03-10
Attending: EMERGENCY MEDICINE
Payer: MEDICARE

## 2025-03-10 ENCOUNTER — APPOINTMENT (OUTPATIENT)
Dept: GENERAL RADIOLOGY | Facility: CLINIC | Age: 85
End: 2025-03-10
Attending: EMERGENCY MEDICINE
Payer: MEDICARE

## 2025-03-10 VITALS
SYSTOLIC BLOOD PRESSURE: 173 MMHG | HEART RATE: 61 BPM | OXYGEN SATURATION: 95 % | BODY MASS INDEX: 23.37 KG/M2 | WEIGHT: 127 LBS | TEMPERATURE: 98.4 F | RESPIRATION RATE: 16 BRPM | DIASTOLIC BLOOD PRESSURE: 74 MMHG | HEIGHT: 62 IN

## 2025-03-10 LAB
ALBUMIN SERPL BCG-MCNC: 4.3 G/DL (ref 3.5–5.2)
ALP SERPL-CCNC: 85 U/L (ref 40–150)
ALT SERPL W P-5'-P-CCNC: 22 U/L (ref 0–50)
ANION GAP SERPL CALCULATED.3IONS-SCNC: 12 MMOL/L (ref 7–15)
AST SERPL W P-5'-P-CCNC: 27 U/L (ref 0–45)
ATRIAL RATE - MUSE: 67 BPM
BASOPHILS # BLD AUTO: 0.1 10E3/UL (ref 0–0.2)
BASOPHILS NFR BLD AUTO: 1 %
BILIRUB SERPL-MCNC: 0.3 MG/DL
BUN SERPL-MCNC: 10.6 MG/DL (ref 8–23)
CALCIUM SERPL-MCNC: 9.7 MG/DL (ref 8.8–10.4)
CHLORIDE SERPL-SCNC: 97 MMOL/L (ref 98–107)
CREAT SERPL-MCNC: 0.7 MG/DL (ref 0.51–0.95)
DIASTOLIC BLOOD PRESSURE - MUSE: NORMAL MMHG
EGFRCR SERPLBLD CKD-EPI 2021: 85 ML/MIN/1.73M2
EOSINOPHIL # BLD AUTO: 0.2 10E3/UL (ref 0–0.7)
EOSINOPHIL NFR BLD AUTO: 3 %
ERYTHROCYTE [DISTWIDTH] IN BLOOD BY AUTOMATED COUNT: 12.4 % (ref 10–15)
GLUCOSE SERPL-MCNC: 107 MG/DL (ref 70–99)
HCO3 SERPL-SCNC: 27 MMOL/L (ref 22–29)
HCT VFR BLD AUTO: 37 % (ref 35–47)
HGB BLD-MCNC: 12.9 G/DL (ref 11.7–15.7)
IMM GRANULOCYTES # BLD: 0 10E3/UL
IMM GRANULOCYTES NFR BLD: 0 %
INTERPRETATION ECG - MUSE: NORMAL
LYMPHOCYTES # BLD AUTO: 2.5 10E3/UL (ref 0.8–5.3)
LYMPHOCYTES NFR BLD AUTO: 36 %
MCH RBC QN AUTO: 31.3 PG (ref 26.5–33)
MCHC RBC AUTO-ENTMCNC: 34.9 G/DL (ref 31.5–36.5)
MCV RBC AUTO: 90 FL (ref 78–100)
MONOCYTES # BLD AUTO: 0.6 10E3/UL (ref 0–1.3)
MONOCYTES NFR BLD AUTO: 8 %
NEUTROPHILS # BLD AUTO: 3.6 10E3/UL (ref 1.6–8.3)
NEUTROPHILS NFR BLD AUTO: 52 %
NRBC # BLD AUTO: 0 10E3/UL
NRBC BLD AUTO-RTO: 0 /100
P AXIS - MUSE: 34 DEGREES
PLATELET # BLD AUTO: 275 10E3/UL (ref 150–450)
POTASSIUM SERPL-SCNC: 4 MMOL/L (ref 3.4–5.3)
PR INTERVAL - MUSE: 226 MS
PROT SERPL-MCNC: 7.8 G/DL (ref 6.4–8.3)
QRS DURATION - MUSE: 90 MS
QT - MUSE: 380 MS
QTC - MUSE: 401 MS
R AXIS - MUSE: -50 DEGREES
RBC # BLD AUTO: 4.12 10E6/UL (ref 3.8–5.2)
SODIUM SERPL-SCNC: 136 MMOL/L (ref 135–145)
SYSTOLIC BLOOD PRESSURE - MUSE: NORMAL MMHG
T AXIS - MUSE: 106 DEGREES
TROPONIN T SERPL HS-MCNC: 22 NG/L
TROPONIN T SERPL HS-MCNC: 22 NG/L
VENTRICULAR RATE- MUSE: 67 BPM
WBC # BLD AUTO: 6.9 10E3/UL (ref 4–11)

## 2025-03-10 PROCEDURE — 250N000011 HC RX IP 250 OP 636: Performed by: EMERGENCY MEDICINE

## 2025-03-10 PROCEDURE — 85004 AUTOMATED DIFF WBC COUNT: CPT | Performed by: EMERGENCY MEDICINE

## 2025-03-10 PROCEDURE — 82565 ASSAY OF CREATININE: CPT | Performed by: EMERGENCY MEDICINE

## 2025-03-10 PROCEDURE — 36415 COLL VENOUS BLD VENIPUNCTURE: CPT | Performed by: EMERGENCY MEDICINE

## 2025-03-10 PROCEDURE — 84484 ASSAY OF TROPONIN QUANT: CPT | Performed by: EMERGENCY MEDICINE

## 2025-03-10 PROCEDURE — 85018 HEMOGLOBIN: CPT | Performed by: EMERGENCY MEDICINE

## 2025-03-10 PROCEDURE — 71046 X-RAY EXAM CHEST 2 VIEWS: CPT

## 2025-03-10 PROCEDURE — 71260 CT THORAX DX C+: CPT

## 2025-03-10 PROCEDURE — 74177 CT ABD & PELVIS W/CONTRAST: CPT

## 2025-03-10 PROCEDURE — 250N000009 HC RX 250: Performed by: EMERGENCY MEDICINE

## 2025-03-10 RX ORDER — IOPAMIDOL 755 MG/ML
72 INJECTION, SOLUTION INTRAVASCULAR ONCE
Status: COMPLETED | OUTPATIENT
Start: 2025-03-10 | End: 2025-03-10

## 2025-03-10 RX ADMIN — IOPAMIDOL 72 ML: 755 INJECTION, SOLUTION INTRAVENOUS at 03:33

## 2025-03-10 RX ADMIN — SODIUM CHLORIDE 60 ML: 9 INJECTION, SOLUTION INTRAVENOUS at 03:33

## 2025-03-10 ASSESSMENT — ACTIVITIES OF DAILY LIVING (ADL)
ADLS_ACUITY_SCORE: 50

## 2025-03-10 NOTE — ED NOTES
Bed: ED06  Expected date: 3/9/25  Expected time: 9:45 PM  Means of arrival: Ambulance  Comments:  HEMS 435 84M Multiple issues

## 2025-03-10 NOTE — ED TRIAGE NOTES
Chesty pain that started around 1500.  States took 325 mg aspirin at that time.  Took an additional 325 mg aspirin at 2010. The pain stopped after the last aspirin.  States pain was left anterior chest into back.  Currently pain free just wanted to get heart checked out.      Triage Assessment (Adult)       Row Name 03/09/25 5065          Triage Assessment    Airway WDL WDL        Respiratory WDL    Respiratory WDL WDL        Skin Circulation/Temperature WDL    Skin Circulation/Temperature WDL WDL        Cardiac WDL    Cardiac WDL WDL        Peripheral/Neurovascular WDL    Peripheral Neurovascular WDL WDL        Cognitive/Neuro/Behavioral WDL    Cognitive/Neuro/Behavioral WDL WDL

## 2025-03-10 NOTE — ED PROVIDER NOTES
"  Emergency Department Note      History of Present Illness     Chief Complaint   Chest Pain      HPI   Francois Ly is a 84 year old female with a history of hypertension, peripheral artery disease, and diabetes mellitis type 2 presenting to the ED for chest pain. She reports mid-sternal chest pain occurring around 1500 today that lasted around 30 minutes. Following the onset, she took aspirin, which resolved the pain after 5-10 minutes. Around 2000, she felt a similar pain in her back near the previous location, prompting her to take aspirin once again and go to the ED. Since coming to the ED, her pain has resolved. She further endorses some gas recently, due to high fiber in her diet. She notes exercising 25 minutes on her treadmill today, stating that she does so every other day with no symptoms. Denies nausea, shortness of breath, or chills. She had a stress test 10 years ago.      Independent Historian   None    Review of External Notes   None    Past Medical History     Medical History and Problem List   HTN  PAD  Gallstones  SBO  Pancreatic mass  CKD stage 2  DM2   CVA   Degenerative arthritis of thumb, right  Slipped intervertebral disc   Dyslipidemia    Medications   Lipitor  Hydrodiuril  Aldactone    Surgical History   Davinci herniorrhaphy inguinal, left  Hysterectomy  Slipped disc surgery  Cataract, left  Bunionectomy, right    Physical Exam     Patient Vitals for the past 24 hrs:   BP Temp Temp src Pulse Resp SpO2 Height Weight   03/10/25 0210 (!) 173/74 -- -- 61 16 95 % -- --   03/10/25 0020 (!) 197/87 -- -- -- -- -- -- --   03/09/25 2159 -- 98.4  F (36.9  C) Temporal -- -- -- -- --   03/09/25 2155 (!) 229/85 -- -- 66 22 99 % 1.575 m (5' 2\") 57.6 kg (127 lb)     Physical Exam  General: Does not appear in acute distress  Head: No signs of trauma.   CV: Normal rate and regular rhythm.  2+ radial pulses bilaterally.   Resp: Effort normal and breath sounds normal. No respiratory distress.   GI: Soft. " There is no tenderness.  No rebound or guarding.  Normal bowel sounds.  No CVA tenderness.  MSK: Normal range of motion. no edema. No Calf tenderness.  Neuro: The patient is alert and oriented. Speech normal.  Skin: Skin is warm and dry. No rash noted.   Psych: normal mood and affect. behavior is normal.       Diagnostics     Lab Results   Labs Ordered and Resulted from Time of ED Arrival to Time of ED Departure   COMPREHENSIVE METABOLIC PANEL - Abnormal       Result Value    Sodium 136      Potassium 4.0      Carbon Dioxide (CO2) 27      Anion Gap 12      Urea Nitrogen 10.6      Creatinine 0.70      GFR Estimate 85      Calcium 9.7      Chloride 97 (*)     Glucose 107 (*)     Alkaline Phosphatase 85      AST 27      ALT 22      Protein Total 7.8      Albumin 4.3      Bilirubin Total 0.3     TROPONIN T, HIGH SENSITIVITY - Abnormal    Troponin T, High Sensitivity 22 (*)    TROPONIN T, HIGH SENSITIVITY - Abnormal    Troponin T, High Sensitivity 22 (*)    CBC WITH PLATELETS AND DIFFERENTIAL    WBC Count 6.9      RBC Count 4.12      Hemoglobin 12.9      Hematocrit 37.0      MCV 90      MCH 31.3      MCHC 34.9      RDW 12.4      Platelet Count 275      % Neutrophils 52      % Lymphocytes 36      % Monocytes 8      % Eosinophils 3      % Basophils 1      % Immature Granulocytes 0      NRBCs per 100 WBC 0      Absolute Neutrophils 3.6      Absolute Lymphocytes 2.5      Absolute Monocytes 0.6      Absolute Eosinophils 0.2      Absolute Basophils 0.1      Absolute Immature Granulocytes 0.0      Absolute NRBCs 0.0         Imaging   CT Aortic Survey w Contrast   Final Result   IMPRESSION:      1.  No acute vascular findings in the chest, abdomen and pelvis, including aortic wall hematoma or dissection.      2.  Interval development of focal ectasia in the infrarenal abdominal aorta measuring 2.7 cm. Recommend follow-up aortic sonogram in 3 years.      3.  A small nonspecific pericardial effusion is mildly increased compared to  prior study.       4.  Colonic diverticulosis and cholelithiasis. No acute findings in the abdomen and pelvis.      5.   Interval development of mild to moderate degenerative disc space narrowing at L2/3.      6.  Increased size of a 14 mm left thyroid nodule, previously 5 mm in 2016. Although likely benign given slow growth rate, consider more definitive evaluation with nonemergent thyroid sonogram, if not already investigated.         XR Chest 2 Views   Final Result   IMPRESSION: No focal airspace consolidation. No pleural effusion or pneumothorax.      Upper limits of normal heart size.      Instrumentation of the lumbar spine, incompletely imaged and evaluated.          EKG   ECG taken at 2142, ECG read at 2157  Sinus rhythm with 1st degree AV block   Left axis deviation  Left ventricular hypertrophy with repolarization abnormality (R in aVL)  Cannot rule out Septal infarct, age undetermined  Abnormal ECG   No significant changes as compared to prior, dated 8/23/2024.  Rate 67 bpm. UT interval 226 ms. QRS duration 90 ms. QT/QTc 380/401 ms. P-R-T axes 34 -50 106.    Independent Interpretation   On my independent interpretation of CXR there is no pneumothorax      ED Course      Medications Administered   Medications   iopamidol (ISOVUE-370) solution 72 mL (72 mLs Intravenous $Given 3/10/25 0333)   sodium chloride 0.9 % CT scan flush use (60 mLs Intravenous $Given 3/10/25 0333)       Procedures   Procedures     Discussion of Management   None    ED Course   ED Course as of 03/10/25 1153   Mon Mar 10, 2025   0030 I obtained history and examined the patient as noted above.     0051 I rechecked the patient and explained findings.    0459 I rechecked the patient.        Additional Documentation  None    Medical Decision Making / Diagnosis     CMS Diagnoses: None    MIPS       None    [facility]   Francois Ly is a 84 year old female presents with chest pain.  She reports that she developed chest pain a couple of times  today that has since resolved.  She is currently pain-free.  She has not had any shortness of breath, nausea, or diaphoresis.  She reports that she is able to exercise regularly and has not had any symptoms with this.  Patient's physical exam was reassuring.  Her vital signs were appropriate other than she was somewhat hypertensive initially.  EKG did not show any concerning ST segment changes.  Blood work was obtained including serial troponins which were stable and not significantly elevated.  Patient had brought up concerns for aortic dissection as she has a family history of this.  Ultimately given the patient's concern and given she did have some pain into the back as well, I did obtain a CT scan and this did not show any signs of concerning acute process.  There were some incidental findings which I did discuss with the patient.  Patient continued to be symptom-free and appeared well.  In this setting, I felt that she was appropriate for discharge home but I did recommend that she follow-up with her doctor in clinic.  She was given return precautions    Disposition   The patient was discharged.     Diagnosis     ICD-10-CM    1. Chest pain, unspecified type  R07.9       2. Thyroid nodule  E04.1       3. Ectatic aorta  I77.819       4. Pericardial effusion  I31.39            Discharge Medications   Discharge Medication List as of 3/10/2025  5:05 AM            Scribe Disclosure:  I, Regino Álvarez, am serving as a scribe at 12:24 AM on 3/10/2025 to document services personally performed by Fahad Olguin MD based on my observations and the provider's statements to me.        Fahad Olguin MD  03/10/25 1150

## 2025-03-19 ENCOUNTER — PATIENT OUTREACH (OUTPATIENT)
Dept: CARE COORDINATION | Facility: CLINIC | Age: 85
End: 2025-03-19
Payer: MEDICARE

## 2025-03-19 NOTE — PROGRESS NOTES
[facility]  Medicare ACO Reach Population - Proactive Outreach  Unable to Reach    Background: Patient outreach conducted proactively to support health maintenance initiatives within [facility]'s Medicare ACO Reach Population.     Outreach attempted x 1.  Left message on  's  voicemail (consent on file and number is preferred in chart) briefly explaining this is a proactive outreach from [facility].. Patient encouraged to call the [facility] scheduling team at [phone] with any scheduling needs or questions, or they can conveniently schedule via [employer].    Plan:  Care Coordinator will try to reach patient again in 1-2 business days.    Ruth Melton RN  [facility]

## 2025-03-20 NOTE — PROGRESS NOTES
[facility]  Medicare ACO Reach Population - Proactive Outreach  Unable to Reach    Background: Patient outreach conducted proactively to support health maintenance initiatives within [facility]'s Medicare ACO Reach Population.     Outreach attempted x 2.  Left message on patient's voicemail briefly explaining this is a proactive outreach from [facility].. Patient encouraged to call the [facility] scheduling team at [phone] with any scheduling needs or questions, or they can conveniently schedule via [employer].    Plan:  Care Coordinator will do no further outreaches at this time.    Ruth Melton, ARMAAN  [facility]

## 2025-04-23 ENCOUNTER — OFFICE VISIT (OUTPATIENT)
Dept: FAMILY MEDICINE | Facility: CLINIC | Age: 85
End: 2025-04-23
Payer: COMMERCIAL

## 2025-04-23 VITALS
HEIGHT: 62 IN | DIASTOLIC BLOOD PRESSURE: 77 MMHG | BODY MASS INDEX: 24.03 KG/M2 | SYSTOLIC BLOOD PRESSURE: 137 MMHG | OXYGEN SATURATION: 98 % | WEIGHT: 130.6 LBS | RESPIRATION RATE: 28 BRPM | TEMPERATURE: 97.7 F | HEART RATE: 65 BPM

## 2025-04-23 DIAGNOSIS — K29.80 INFLAMMATION PRESENT ON BIOPSY OF DUODENUM: Primary | ICD-10-CM

## 2025-04-23 PROCEDURE — 3075F SYST BP GE 130 - 139MM HG: CPT | Performed by: FAMILY MEDICINE

## 2025-04-23 PROCEDURE — 99212 OFFICE O/P EST SF 10 MIN: CPT | Performed by: FAMILY MEDICINE

## 2025-04-23 PROCEDURE — 3078F DIAST BP <80 MM HG: CPT | Performed by: FAMILY MEDICINE

## 2025-04-23 NOTE — PROGRESS NOTES
Assessment & Plan     Inflammation present on biopsy of duodenum:  - Inflammation of the duodenum confirmed. No ulcers present. H. pylori test results are unclear, but inflammation is the cause of pain.  - Continue current medication regimen as prescribed in [place]. Avoid spicy food, alcohol, ibuprofen, and Aleve. Follow-up appointment scheduled in 3 weeks to recheck blood pressure and review medication.  - Prescription: Continue aspirin for stroke prevention and atorvastatin for cholesterol management.    Consent was obtained from the patient to use an AI documentation tool in the creation of this note.        MED REC REQUIRED  Post Medication Reconciliation Status:  Discharge medications reconciled and changed, see notes/orders    Follow-up       Subjective   Francois is a 85 year old, presenting for the following health issues:  ER F/U (3/18/25 visit to [facility] room) and Hospital F/U (Hospital stay in [facility] 4/14-4/19 for chest/upper abdominal pain; possible kidney stone, duodenum inflammation)      4/23/2025     4:48 PM   Additional Questions   Roomed by Emma   Accompanied by Gilbert Ho     [facility] Follow-up Visit:    Hospital/Nursing Home/ Rehab Facility:  [facility]  Date of Admission: 4/14/25  Date of Discharge: 4/19/25  Reason(s) for Admission: Chest /upper abdominal pain  Was the patient in the ICU or did the patient experience delirium during hospitalization?  No  Do you have any other stressors you would like to discuss with your provider? No    Problems taking medications regularly:  None  Medication changes since discharge: None  Problems adhering to non-medication therapy:  None    Summary of hospitalization:  Discharge summary unavailable  Diagnostic Tests/Treatments reviewed.  Follow up needed: none  Other Healthcare Providers Involved in Patient s Care:         None  Update since discharge: History of Present Illness-  - Francois VARGAS Aliyah, 85-year-old female,  "recently returned from a trip to [facility].  - She experienced pain in the chest area before the trip, which recurred at the airport in [facility] on April 14, 2025. The pain worsened, leading to a visit to the airport clinic and subsequent transfer to a hospital in [facility].  - Underwent a CT scan and angiogram; both tests showed no abnormalities in the heart. An ultrasound revealed a small kidney stone, which was not believed to be the cause of the pain.  - Previously diagnosed with a gallstone, but it was not considered the cause of the current pain.  - Endoscopy indicated inflammation of the duodenum, not an ulcer. She was treated with medication and advised to avoid food for three days, followed by a clear liquid diet.  - No current pain or issues reported after returning home. Blood pressure reportedly good without medication.  - Continues to take aspirin for stroke prevention and atorvastatin for cholesterol management.  - Previously had a duodenal ulcer, but the current issue was inflammation, not an ulcer.  - No issues with eating soft, steamed foods like mashed potatoes and chicken.           Plan of care communicated with patient                   Review of Systems  Constitutional, HEENT, cardiovascular, pulmonary, gi and gu systems are negative, except as otherwise noted.      Objective    /77 (BP Location: Left arm, Patient Position: Sitting, Cuff Size: Adult Regular)   Pulse 65   Temp 97.7  F (36.5  C) (Oral)   Resp 28   Ht 1.575 m (5' 2\")   Wt 59.2 kg (130 lb 9.6 oz)   LMP  (LMP Unknown)   SpO2 98%   BMI 23.89 kg/m    Body mass index is 23.89 kg/m .  Physical Exam   GENERAL: alert and no distress  NECK: no adenopathy, no asymmetry, masses, or scars  RESP: lungs clear to auscultation - no rales, rhonchi or wheezes  CV: regular rate and rhythm, normal S1 S2, no S3 or S4, no murmur, click or rub, no peripheral edema  ABDOMEN: soft, nontender, no hepatosplenomegaly, no masses and bowel sounds " normal  MS: no gross musculoskeletal defects noted, no edema            Signed Electronically by: Luis Feliciano MD

## 2025-05-28 ENCOUNTER — TRANSFERRED RECORDS (OUTPATIENT)
Dept: MULTI SPECIALTY CLINIC | Facility: CLINIC | Age: 85
End: 2025-05-28
Payer: MEDICARE

## 2025-05-28 LAB — RETINOPATHY: NORMAL

## 2025-05-29 ENCOUNTER — TELEPHONE (OUTPATIENT)
Dept: FAMILY MEDICINE | Facility: CLINIC | Age: 85
End: 2025-05-29

## 2025-05-29 ENCOUNTER — OFFICE VISIT (OUTPATIENT)
Dept: FAMILY MEDICINE | Facility: CLINIC | Age: 85
End: 2025-05-29
Payer: MEDICARE

## 2025-05-29 VITALS
TEMPERATURE: 97.8 F | DIASTOLIC BLOOD PRESSURE: 65 MMHG | RESPIRATION RATE: 16 BRPM | HEART RATE: 69 BPM | HEIGHT: 62 IN | OXYGEN SATURATION: 98 % | SYSTOLIC BLOOD PRESSURE: 132 MMHG | BODY MASS INDEX: 23.74 KG/M2 | WEIGHT: 129 LBS

## 2025-05-29 DIAGNOSIS — I10 PRIMARY HYPERTENSION: Primary | ICD-10-CM

## 2025-05-29 RX ORDER — HYDROCHLOROTHIAZIDE 12.5 MG/1
25 TABLET ORAL DAILY
COMMUNITY

## 2025-05-29 RX ORDER — SPIRONOLACTONE 25 MG/1
25 TABLET ORAL DAILY
COMMUNITY

## 2025-05-29 NOTE — TELEPHONE ENCOUNTER
Dr. Feliciano   Medication list updated   You have prescribed the hydrochlorothiazide and spironolactone for patient that was not on her medication list per  Sahil   Per RN chart review both rx's  2025 which is why they fell off med list     Tonia Juan, Registered Nurse  [facility]

## 2025-05-29 NOTE — PROGRESS NOTES
"  Assessment & Plan     Primary hypertension:  - Blood pressure readings have been around 140/70-60, which is slightly high. The patient has been forgetting to take her medication, which may contribute to the elevated readings.  - Recommend taking all medications at night to improve adherence. If forgetting continues, consider switching to morning dosing. Nurse to call the patient to confirm the exact medication for refill.    Consent was obtained from the patient to use an AI documentation tool in the creation of this note.                Jamir Parrish is a 85 year old, presenting for the following health issues: pt is here for high blood pressure.  Hypertension        5/29/2025    12:49 PM   Additional Questions   Roomed by Keiry ESPINOZA   Accompanied by      Via the Health Maintenance questionnaire, the patient has reported the following services have been completed -Eye Exam: [facility] 2025-05-28, this information has been sent to the abstraction team.  History of Present Illness       Reason for visit:  Blood pressure check    She eats 2-3 servings of fruits and vegetables daily.She consumes 0 sweetened beverage(s) daily.She exercises with enough effort to increase her heart rate 30 to 60 minutes per day.  She exercises with enough effort to increase her heart rate 4 days per week.   She is taking medications regularly.            Hypertension Follow-up    Do you check your blood pressure regularly outside of the clinic? Yes   Are you following a low salt diet? Yes  Are your blood pressures ever more than 140 on the top number (systolic) OR more   than 90 on the bottom number (diastolic), for example 140/90? Yes    BP Readings from Last 2 Encounters:   05/29/25 (!) 150/72   04/23/25 137/77           Objective    BP (!) 150/72 (BP Location: Left arm, Patient Position: Chair, Cuff Size: Adult Regular)   Pulse 69   Temp 97.8  F (36.6  C) (Oral)   Resp 16   Ht 1.575 m (5' 2\")   Wt 58.5 kg (129 lb)   " LMP  (LMP Unknown)   SpO2 98%   BMI 23.59 kg/m    Body mass index is 23.59 kg/m .  Physical Exam   GENERAL: alert and no distress            Signed Electronically by: Luis Feliciano MD

## 2025-07-09 ENCOUNTER — OFFICE VISIT (OUTPATIENT)
Dept: FAMILY MEDICINE | Facility: CLINIC | Age: 85
End: 2025-07-09
Attending: FAMILY MEDICINE
Payer: MEDICARE

## 2025-07-09 VITALS
BODY MASS INDEX: 24.85 KG/M2 | DIASTOLIC BLOOD PRESSURE: 66 MMHG | WEIGHT: 126.6 LBS | HEART RATE: 53 BPM | HEIGHT: 60 IN | OXYGEN SATURATION: 95 % | SYSTOLIC BLOOD PRESSURE: 139 MMHG | TEMPERATURE: 97.9 F | RESPIRATION RATE: 12 BRPM

## 2025-07-09 DIAGNOSIS — K86.2 PANCREATIC CYST: ICD-10-CM

## 2025-07-09 DIAGNOSIS — N18.2 TYPE 2 DIABETES MELLITUS WITH STAGE 2 CHRONIC KIDNEY DISEASE, WITHOUT LONG-TERM CURRENT USE OF INSULIN (H): ICD-10-CM

## 2025-07-09 DIAGNOSIS — E11.22 TYPE 2 DIABETES MELLITUS WITH STAGE 2 CHRONIC KIDNEY DISEASE, WITHOUT LONG-TERM CURRENT USE OF INSULIN (H): ICD-10-CM

## 2025-07-09 DIAGNOSIS — I10 BENIGN ESSENTIAL HYPERTENSION: ICD-10-CM

## 2025-07-09 DIAGNOSIS — I63.321 CEREBROVASCULAR ACCIDENT (CVA) DUE TO THROMBOSIS OF RIGHT ANTERIOR CEREBRAL ARTERY (H): ICD-10-CM

## 2025-07-09 DIAGNOSIS — Z00.00 ENCOUNTER FOR MEDICARE ANNUAL WELLNESS EXAM: Primary | ICD-10-CM

## 2025-07-09 DIAGNOSIS — N18.2 CKD (CHRONIC KIDNEY DISEASE) STAGE 2, GFR 60-89 ML/MIN: ICD-10-CM

## 2025-07-09 PROBLEM — N28.1 CYST OF RIGHT KIDNEY: Status: RESOLVED | Noted: 2021-08-11 | Resolved: 2025-07-09

## 2025-07-09 PROBLEM — R19.8 ABDOMINAL WEAKNESS: Status: RESOLVED | Noted: 2024-07-17 | Resolved: 2025-07-09

## 2025-07-09 PROBLEM — K40.20 BILATERAL INGUINAL HERNIA WITHOUT OBSTRUCTION OR GANGRENE, RECURRENCE NOT SPECIFIED: Status: RESOLVED | Noted: 2022-09-30 | Resolved: 2025-07-09

## 2025-07-09 PROBLEM — Z98.1 STATUS POST LUMBAR SPINAL FUSION: Status: RESOLVED | Noted: 2018-08-13 | Resolved: 2025-07-09

## 2025-07-09 PROBLEM — K57.30 DIVERTICULOSIS OF LARGE INTESTINE WITHOUT HEMORRHAGE: Status: RESOLVED | Noted: 2021-08-11 | Resolved: 2025-07-09

## 2025-07-09 PROBLEM — M18.11 DEGENERATIVE ARTHRITIS OF THUMB, RIGHT: Status: RESOLVED | Noted: 2020-02-14 | Resolved: 2025-07-09

## 2025-07-09 PROBLEM — M67.432 GANGLION CYST OF WRIST, LEFT: Status: RESOLVED | Noted: 2024-05-02 | Resolved: 2025-07-09

## 2025-07-09 LAB
EST. AVERAGE GLUCOSE BLD GHB EST-MCNC: 134 MG/DL
HBA1C MFR BLD: 6.3 % (ref 0–5.6)

## 2025-07-09 PROCEDURE — 82570 ASSAY OF URINE CREATININE: CPT | Performed by: FAMILY MEDICINE

## 2025-07-09 PROCEDURE — G2211 COMPLEX E/M VISIT ADD ON: HCPCS | Performed by: FAMILY MEDICINE

## 2025-07-09 PROCEDURE — 1125F AMNT PAIN NOTED PAIN PRSNT: CPT | Performed by: FAMILY MEDICINE

## 2025-07-09 PROCEDURE — 36415 COLL VENOUS BLD VENIPUNCTURE: CPT | Performed by: FAMILY MEDICINE

## 2025-07-09 PROCEDURE — 99214 OFFICE O/P EST MOD 30 MIN: CPT | Mod: 25 | Performed by: FAMILY MEDICINE

## 2025-07-09 PROCEDURE — 82043 UR ALBUMIN QUANTITATIVE: CPT | Performed by: FAMILY MEDICINE

## 2025-07-09 PROCEDURE — G0439 PPPS, SUBSEQ VISIT: HCPCS | Performed by: FAMILY MEDICINE

## 2025-07-09 PROCEDURE — 3078F DIAST BP <80 MM HG: CPT | Performed by: FAMILY MEDICINE

## 2025-07-09 PROCEDURE — 83036 HEMOGLOBIN GLYCOSYLATED A1C: CPT | Performed by: FAMILY MEDICINE

## 2025-07-09 PROCEDURE — 3075F SYST BP GE 130 - 139MM HG: CPT | Performed by: FAMILY MEDICINE

## 2025-07-09 PROCEDURE — 3044F HG A1C LEVEL LT 7.0%: CPT | Performed by: FAMILY MEDICINE

## 2025-07-09 RX ORDER — SPIRONOLACTONE 25 MG/1
25 TABLET ORAL DAILY
Qty: 90 TABLET | Refills: 3 | Status: SHIPPED | OUTPATIENT
Start: 2025-07-09

## 2025-07-09 RX ORDER — HYDROCHLOROTHIAZIDE 25 MG/1
25 TABLET ORAL
COMMUNITY
Start: 2025-05-29 | End: 2025-07-09

## 2025-07-09 RX ORDER — ATORVASTATIN CALCIUM 40 MG/1
40 TABLET, FILM COATED ORAL DAILY
Qty: 90 TABLET | Refills: 3 | Status: SHIPPED | OUTPATIENT
Start: 2025-07-09

## 2025-07-09 RX ORDER — HYDROCHLOROTHIAZIDE 25 MG/1
25 TABLET ORAL DAILY
Qty: 90 TABLET | Refills: 3 | Status: SHIPPED | OUTPATIENT
Start: 2025-07-09

## 2025-07-09 SDOH — HEALTH STABILITY: PHYSICAL HEALTH: ON AVERAGE, HOW MANY DAYS PER WEEK DO YOU ENGAGE IN MODERATE TO STRENUOUS EXERCISE (LIKE A BRISK WALK)?: 2 DAYS

## 2025-07-09 ASSESSMENT — SOCIAL DETERMINANTS OF HEALTH (SDOH): HOW OFTEN DO YOU GET TOGETHER WITH FRIENDS OR RELATIVES?: ONCE A WEEK

## 2025-07-09 ASSESSMENT — PAIN SCALES - GENERAL: PAINLEVEL_OUTOF10: SEVERE PAIN (10)

## 2025-07-09 NOTE — PROGRESS NOTES
Preventive Care Visit  [facility]  Luis Feliciano MD, Family Medicine  Jul 9, 2025      Assessment & Plan     Type 2 diabetes mellitus with stage 2 chronic kidney disease:  - Blood sugar levels have been good, but if today's test shows high levels, a pill may be recommended to help with blood sugar and kidney function.  - Blood test ordered to check blood sugar levels.  - Risks and side effects: Previous medication caused side effects.    CKD stage 2:  - Chronic kidney disease stage 2 with protein in urine.  - Monitor kidney function and protein levels in urine.    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:  - No current symptoms related to CVA.    Benign essential hypertension:  - Hypertension noted, possibly related to difficulty sitting still.  - Blood pressure medications renewed. Blood pressure to be checked again after sitting quietly.    Pancreatic cyst:   Possible IPMN cyst in the pancreas, requires monitoring.  - MRI ordered to assess size and changes in the pancreatic cyst.    Balance issues:  - Balance has been off, possibly related to aging.    Scar tissue pain in the abdomen  - Pain from scar tissue noted.    Consent was obtained from the patient to use an AI documentation tool in the creation of this note.      Counseling  Appropriate preventive services were addressed with this patient via screening, questionnaire, or discussion as appropriate for fall prevention, nutrition, physical activity, Tobacco-use cessation, social engagement, weight loss and cognition.  Checklist reviewing preventive services available has been given to the patient.  Reviewed patient's diet, addressing concerns and/or questions.   She is at risk for lack of exercise and has been provided with information to increase physical activity for the benefit of her well-being.   Patient reported safety concerns were addressed today.Reviewed preventive health counseling,  "as reflected in patient instructions       Regular exercise    Follow-up    Follow-up Visit   Expected date:  Jul 16, 2026 (Approximate)      Follow Up Appointment Details:     Follow-up with whom?: PCP    Follow-Up for what?: Medicare Wellness    Welcome or Annual?: Annual Wellness    How?: In Person                 Subjective   Francois is a 85 year old, presenting for the following:  Wellness Visit (Annual visit - muscle leg cramps (tendons on the legs painful))        7/9/2025    10:52 AM   Additional Questions   Roomed by frank castellano   Accompanied by          HPI     History of Present Illness-  - Name: Francois Ly  - Age: 85 years  - Female  - Reports ongoing balance issues, describing that her balance is not as good as before. She recently tripped over a stone in her garden, resulting in a fall that caused her heel to become blackened and led to arm and leg pain. The pain in her arm and leg lasted for a while but has since resolved. She also reports that after digging weeds in the garden yesterday, she experienced pain in her arm in a \"funny way,\" but the pain resolved with movement.  - She does not check her blood sugar at home and is looking      Tendons and leg veins very sore and feels are electrolytes are low      Advance Care Planning    Discussed advance care planning with patient; informed AVS has link to [facility].        7/9/2025   General Health   How would you rate your overall physical health? Good   Feel stress (tense, anxious, or unable to sleep) Not at all         7/9/2025   Nutrition   Diet: Regular (no restrictions)         7/9/2025   Exercise   Days per week of moderate/strenous exercise 2 days   (!) EXERCISE CONCERN      7/9/2025   Social Factors   Frequency of gathering with friends or relatives Once a week   Worry food won't last until get money to buy more No   Food not last or not have enough money for food? No   Do you have housing? (Housing is defined as stable " "permanent housing and does not include staying outside in a car, in a tent, in an abandoned building, in an overnight shelter, or couch-surfing.) Yes   Are you worried about losing your housing? No   Lack of transportation? No   Unable to get utilities (heat,electricity)? No         7/9/2025   Fall Risk   Fallen 2 or more times in the past year? Yes   Trouble with walking or balance? Yes   Reason Gait Speed Test Not Completed --   Gait Speed Test Interpretation Less than or equal to 5.00 seconds - PASS           7/9/2025   Activities of Daily Living- Home Safety   Needs help with the following daily activites None of the above   Safety concerns in the home No grab bars in the bathroom         7/9/2025   Dental   Dentist two times every year? Yes         7/9/2025   Hearing Screening   Hearing concerns? None of the above         7/9/2025   Driving Risk Screening   Patient/family members have concerns about driving No         7/9/2025   General Alertness/Fatigue Screening   Have you been more tired than usual lately? No         7/9/2025   Urinary Incontinence Screening   Bothered by leaking urine in past 6 months No         Today's PHQ-2 Score:       7/9/2025    10:40 AM   PHQ-2 ( 1999 [employer])   Q1: Little interest or pleasure in doing things 0   Q2: Feeling down, depressed or hopeless 0   PHQ-2 Score 0    Q1: Little interest or pleasure in doing things Not at all   Q2: Feeling down, depressed or hopeless Not at all   PHQ-2 Score 0       Patient-reported           7/9/2025   Substance Use   Alcohol more than 3/day or more than 7/wk No   Do you have a current opioid prescription? No   How severe/bad is pain from 1 to 10? 0/10 (No Pain)   Do you use any other substances recreationally? No     Social History     Tobacco Use    Smoking status: Former    Smokeless tobacco: Former    Tobacco comments:     Former \"social\" smoker, quit in 1978.   Vaping Use    Vaping status: Never Used   Substance Use Topics    Alcohol use: " Yes     Alcohol/week: 0.0 standard drinks of alcohol     Comment: occ wine, twice a week    Drug use: No          Mammogram Screening - After age 74- determine frequency with patient based on health status, life expectancy and patient goals              Reviewed and updated as needed this visit by Provider                    Past Medical History:   Diagnosis Date    Benign essential hypertension 10/12/2016    Bilateral low back pain without sciatica, unspecified chronicity 12/19/2017    Blunt trauma of rib, initial encounter 11/25/2016    Cyst of right kidney 08/11/2021    Degenerative arthritis of thumb, right 02/14/2020    Diverticulosis of large intestine without hemorrhage 08/11/2021    Dyslipidemia     Gallstones     Ganglion cyst of wrist, left 05/02/2024    Hypertension     PAD (peripheral artery disease) 06/19/2017    Pancreatic mass 08/17/2016    SBO (small bowel obstruction) (H)     Slipped intervertebral disc     Status post lumbar spinal fusion 08/13/2018     Past Surgical History:   Procedure Laterality Date    DAVINCI HERNIORRHAPHY INGUINAL Left 1/24/2023    Procedure: Robot-assisted left inguinal hernia repair with mesh;  Surgeon: Lalito Strange MD;  Location: [facility] OR    DAVINCI LYSIS OF ADHESIONS N/A 1/24/2023    Procedure: Davinci Lysis of Adhesions;  Surgeon: Lalito Strange MD;  Location: [facility] OR    GYN SURGERY      hysterectomy    ORTHOPEDIC SURGERY      Slipped disc with chronic back pain     Current providers sharing in care for this patient include:  Patient Care Team:  Luis Feliciano MD as PCP - General (Family Practice)  Luis Feliciano MD as Assigned PCP  Daniela Grimm MD as MD (Vascular Surgery)  Jasen Marshall MD as MD (Dermatology)  Jasen Marshall MD as Assigned Dermatology Provider  Lalito Strange MD as Assigned Surgical Provider    The following health maintenance items are reviewed in Epic and correct as of today:  Health Maintenance   Topic Date Due    COVID-19 VACCINE (9  - 2024-25 season) 04/07/2025    A1C  07/08/2025    MICROALBUMIN  07/08/2025    DIABETIC FOOT EXAM  07/08/2025    MEDICARE ANNUAL WELLNESS VISIT  07/08/2025    ANNUAL REVIEW OF HM ORDERS  01/08/2026    INFLUENZA VACCINE (1) 09/01/2025    LIPID  01/08/2026    BMP  03/10/2026    EYE EXAM  05/28/2026    FALL RISK ASSESSMENT  07/09/2026    DTAP/TDAP/TD VACCINE (2 - Td or Tdap) 06/19/2027    ADVANCE CARE PLANNING  07/08/2029    DEXA  07/27/2032    PHQ-2 (once per calendar year)  Completed    PNEUMOCOCCAL VACCINE 50+ YEARS  Completed    URINALYSIS  Completed    ZOSTER VACCINE  Completed    RSV VACCINE  Completed    HPV VACCINE  Aged Out    MENINGITIS VACCINE  Aged Out    MAMMO SCREENING  Discontinued         Review of Systems  Constitutional, HEENT, cardiovascular, pulmonary, gi and gu systems are negative, except as otherwise noted.     Objective    Exam  BP (!) 185/74 (BP Location: Left arm, Patient Position: Sitting, Cuff Size: Adult Regular)   Pulse 58   Temp 97.9  F (36.6  C) (Oral)   Resp 12   Ht 1.524 m (5')   Wt 57.4 kg (126 lb 9.6 oz)   LMP  (LMP Unknown)   SpO2 95%   BMI 24.72 kg/m     Estimated body mass index is 24.72 kg/m  as calculated from the following:    Height as of this encounter: 1.524 m (5').    Weight as of this encounter: 57.4 kg (126 lb 9.6 oz).    Physical Exam  GENERAL: alert and no distress  NECK: no adenopathy, no asymmetry, masses, or scars  RESP: lungs clear to auscultation - no rales, rhonchi or wheezes  CV: regular rate and rhythm, normal S1 S2, no S3 or S4, no murmur, click or rub, no peripheral edema  ABDOMEN: soft, nontender, no hepatosplenomegaly, no masses and bowel sounds normal  MS: no gross musculoskeletal defects noted, no edema        7/9/2025   Mini Cog   Clock Draw Score 2 Normal   3 Item Recall 2 objects recalled   Mini Cog Total Score 4              Signed Electronically by: Luis Feliciano MD

## 2025-07-09 NOTE — ASSESSMENT & PLAN NOTE
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  Continue on aSa and atorvastatin 40 mg daily.

## 2025-07-09 NOTE — PATIENT INSTRUCTIONS
Patient Education   Preventive Care Advice   This is general advice given by our system to help you stay healthy. However, your care team may have specific advice just for you. Please talk to your care team about your preventive care needs.  Nutrition  Eat 5 or more servings of fruits and vegetables each day.  Try wheat bread, brown rice and whole grain pasta (instead of white bread, rice, and pasta).  Get enough calcium and vitamin D. Check the label on foods and aim for 100% of the RDA (recommended daily allowance).  Lifestyle  Exercise at least 150 minutes each week  (30 minutes a day, 5 days a week).  Do muscle strengthening activities 2 days a week. These help control your weight and prevent disease.  No smoking.  Wear sunscreen to prevent skin cancer.  Have a dental exam and cleaning every 6 months.  Yearly exams  See your health care team every year to talk about:  Any changes in your health.  Any medicines your care team has prescribed.  Preventive care, family planning, and ways to prevent chronic diseases.  Shots (vaccines)   HPV shots (up to age 26), if you've never had them before.  Hepatitis B shots (up to age 59), if you've never had them before.  COVID-19 shot: Get this shot when it's due.  Flu shot: Get a flu shot every year.  Tetanus shot: Get a tetanus shot every 10 years.  Pneumococcal, hepatitis A, and RSV shots: Ask your care team if you need these based on your risk.  Shingles shot (for age 50 and up)  General health tests  Diabetes screening:  Starting at age 35, Get screened for diabetes at least every 3 years.  If you are younger than age 35, ask your care team if you should be screened for diabetes.  Cholesterol test: At age 39, start having a cholesterol test every 5 years, or more often if advised.  Bone density scan (DEXA): At age 50, ask your care team if you should have this scan for osteoporosis (brittle bones).  Hepatitis C: Get tested at least once in your life.  STIs (sexually  transmitted infections)  Before age 24: Ask your care team if you should be screened for STIs.  After age 24: Get screened for STIs if you're at risk. You are at risk for STIs (including HIV) if:  You are sexually active with more than one person.  You don't use condoms every time.  You or a partner was diagnosed with a sexually transmitted infection.  If you are at risk for HIV, ask about PrEP medicine to prevent HIV.  Get tested for HIV at least once in your life, whether you are at risk for HIV or not.  Cancer screening tests  Cervical cancer screening: If you have a cervix, begin getting regular cervical cancer screening tests starting at age 21.  Breast cancer scan (mammogram): If you've ever had breasts, begin having regular mammograms starting at age 40. This is a scan to check for breast cancer.  Colon cancer screening: It is important to start screening for colon cancer at age 45.  Have a colonoscopy test every 10 years (or more often if you're at risk) Or, ask your provider about stool tests like a FIT test every year or Cologuard test every 3 years.  To learn more about your testing options, visit:   .  For help making a decision, visit:   https://bit.ly/oq71834.  Prostate cancer screening test: If you have a prostate, ask your care team if a prostate cancer screening test (PSA) at age 55 is right for you.  Lung cancer screening: If you are a current or former smoker ages 50 to 80, ask your care team if ongoing lung cancer screenings are right for you.  For informational purposes only. Not to replace the advice of your health care provider. Copyright   2023 [facility]. All rights reserved. Clinically reviewed by the [facility]. [employer] 538432 - REV 01/24.  Learning About Activities of Daily Living  What are activities of daily living?     Activities of daily living (ADLs) are the basic self-care tasks you do every day. These include eating, bathing, dressing,  and moving around.  As you age, and if you have health problems, you may find that it's harder to do some of these tasks. If so, your doctor can suggest ideas that may help.  To measure what kind of help you may need, your doctor will ask how well you are able to do ADLs. Let your doctor know if there are any tasks that you are having trouble doing. This is an important first step to getting help. And when you have the help you need, you can stay as independent as possible.  How will a doctor assess your ADLs?  Asking about ADLs is part of a routine health checkup your doctor will likely do as you age. Your health check might be done in a doctor's office, in your home, or at a hospital. The goal is to find out if you are having any problems that could make it hard to care for yourself or that make it unsafe for you to be on your own.  To measure your ADLs, your doctor will ask how hard it is for you to do routine tasks. Your doctor may also want to know if you have changed the way you do a task because of a health problem. Your doctor may watch how you:  Walk back and forth.  Keep your balance while you stand or walk.  Move from sitting to standing or from a bed to a chair.  Button or unbutton a shirt or sweater.  Remove and put on your shoes.  It's common to feel a little worried or anxious if you find you can't do all the things you used to be able to do. Talking with your doctor about ADLs is a way to make sure you're as safe as possible and able to care for yourself as well as you can. You may want to bring a caregiver, friend, or family member to your checkup. They can help you talk to your doctor.  Follow-up care is a key part of your treatment and safety. Be sure to make and go to all appointments, and call your doctor if you are having problems. It's also a good idea to know your test results and keep a list of the medicines you take.  Current as of: October 24, 2024  Content Version: 14.5    8898-4922  [employer].   Care instructions adapted under license by your healthcare professional. If you have questions about a medical condition or this instruction, always ask your healthcare professional. [employer] disclaims any warranty or liability for your use of this information.    Preventing Falls: Care Instructions  Injuries and health problems such as trouble walking or poor eyesight can increase your risk of falling. So can some medicines. But there are things you can do to help prevent falls. You can exercise to get stronger. You can also arrange your home to make it safer.    Talk to your doctor about the medicines you take. Ask if any of them increase the risk of falls and whether they can be changed or stopped.   Try to exercise regularly. It can help improve your strength and balance. This can help lower your risk of falling.         Practice fall safety and prevention.   Wear low-heeled shoes that fit well and give your feet good support. Talk to your doctor if you have foot problems that make this hard.  Carry a cellphone or wear a medical alert device that you can use to call for help.  Use stepladders instead of chairs to reach high objects. Don't climb if you're at risk for falls. Ask for help, if needed.  Wear the correct eyeglasses, if you need them.        Make your home safer.   Remove rugs, cords, clutter, and furniture from walkways.  Keep your house well lit. Use night-lights in hallways and bathrooms.  Install and use sturdy handrails on stairways.  Wear nonskid footwear, even inside. Don't walk barefoot or in socks without shoes.        Be safe outside.   Use handrails, curb cuts, and ramps whenever possible.  Keep your hands free by using a shoulder bag or backpack.  Try to walk in well-lit areas. Watch out for uneven ground, changes in pavement, and debris.  Be careful in the winter. Walk on the grass or gravel when sidewalks are slippery. Use de-icer on steps and  "walkways. Add non-slip devices to shoes.    Put grab bars and nonskid mats in your shower or tub and near the toilet. Try to use a shower chair or bath bench when bathing.   Get into a tub or shower by putting in your weaker leg first. Get out with your strong side first. Have a phone or medical alert device in the bathroom with you.   Where can you learn more?  Go to https://www.[facility].net/patiented  Enter G117 in the search box to learn more about \"Preventing Falls: Care Instructions.\"  Current as of: July 31, 2024  Content Version: 14.5 2024-2025 [employer].   Care instructions adapted under license by your healthcare professional. If you have questions about a medical condition or this instruction, always ask your healthcare professional. [employer] disclaims any warranty or liability for your use of this information.       "

## 2025-07-10 ENCOUNTER — RESULTS FOLLOW-UP (OUTPATIENT)
Dept: FAMILY MEDICINE | Facility: CLINIC | Age: 85
End: 2025-07-10
Payer: MEDICARE

## 2025-07-10 DIAGNOSIS — D49.0 IPMN (INTRADUCTAL PAPILLARY MUCINOUS NEOPLASM): Primary | ICD-10-CM

## 2025-07-10 DIAGNOSIS — K86.2 PANCREATIC CYST: ICD-10-CM

## 2025-07-10 LAB
CREAT UR-MCNC: 135 MG/DL
MICROALBUMIN UR-MCNC: 38.2 MG/L
MICROALBUMIN/CREAT UR: 28.3 MG/G CR (ref 0–25)

## 2025-07-10 PROCEDURE — 99207 E-CONSULT TO GASTROENTEROLOGY (ADULT OUTPT PROVIDER TO SPECIALIST WRITTEN QUESTION & RESPONSE): CPT | Performed by: FAMILY MEDICINE

## 2025-07-11 NOTE — TELEPHONE ENCOUNTER
Dr. Feliciano   Patient's  Sahil gives consent for Econsult     RN spoke to patient's  Sahil (C2C on file)   - reviewed result note regarding MRI   - discussed E- consult, approval given, states understanding that there could be a charge  - advised will receive another call with recommendations after E-consult completed     Tonia Juan, Registered Nurse  [facility]

## 2025-07-11 NOTE — TELEPHONE ENCOUNTER
Restuls are back, and MR is showing stable pancreatic lesion, no change.  Can we do E visit with GI to see their recommendations for future imaging?

## 2025-07-14 ENCOUNTER — E-CONSULT (OUTPATIENT)
Dept: GASTROENTEROLOGY | Facility: CLINIC | Age: 85
End: 2025-07-14
Payer: MEDICARE

## 2025-07-14 PROCEDURE — 99451 NTRPROF PH1/NTRNET/EHR 5/>: CPT | Performed by: INTERNAL MEDICINE

## 2025-07-14 NOTE — PROGRESS NOTES
7/14/2025     E-Consult has been accepted.    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    Patient assessment and information reviewed: imaging, serologies, notes    Recommendations: given the patient's age and co-morbidities, surveillance can be stopped.       The recommendations provided in this E-Consult are based on a review of clinical data pertinent to the clinical question presented, without a review of the patient's complete medical record or, the benefit of a comprehensive in-person or virtual patient evaluation. This consultation should not replace the clinical judgement and evaluation of the provider ordering this E-Consult. Any new clinical issues, or changes in patient status since the filing of this E-Consult will need to be taken into account when assessing these recommendations. Please contact me if you have further questions.    My total time spent reviewing clinical information and formulating assessment was 20 minutes.        Karen Pimentel MD
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
## 2025-07-15 NOTE — TELEPHONE ENCOUNTER
Good news, GI said that given that the lesion is stable, there is no recommendations to continue on screening any more.  Luis Feliciano MD  [facility].   [phone]

## (undated) DEVICE — DAVINCI HOT SHEARS TIP COVER  400180

## (undated) DEVICE — GLOVE BIOGEL PI MICRO SZ 7.5 48575

## (undated) DEVICE — SU STRATAFIX PDS PLUS 2-0 SPIRAL VIOLET SPXX1B415

## (undated) DEVICE — SU VICRYL 3-0 SH 27" J316H

## (undated) DEVICE — DAVINCI XI DRAPE ARM 470015

## (undated) DEVICE — ESU GROUND PAD UNIVERSAL W/O CORD

## (undated) DEVICE — EVAC SYSTEM CLEAR FLOW SC082500

## (undated) DEVICE — DRAPE SHEET REV FOLD 3/4 9349

## (undated) DEVICE — SU VICRYL 4-0 PS-2 18" UND J496H

## (undated) DEVICE — SU STRATAFIX PDS PLUS 2-0 SPIRAL SH 23CM SXPP1B433

## (undated) DEVICE — DAVINCI XI DRAPE COLUMN 470341

## (undated) DEVICE — DAVINCI XI SEAL UNIVERSAL 5-8MM 470361

## (undated) DEVICE — PACK LAP CHOLE SLC15LCFSD

## (undated) DEVICE — GOWN IMPERVIOUS SPECIALTY XLG/XLONG 32474

## (undated) DEVICE — BLADE CLIPPER 4406

## (undated) DEVICE — GLOVE BIOGEL PI MICRO INDICATOR UNDERGLOVE SZ 7.5 48975

## (undated) DEVICE — SYR 10ML FINGER CONTROL W/O NDL 309695

## (undated) DEVICE — SOL WATER IRRIG 1000ML BOTTLE 2F7114

## (undated) DEVICE — LIGHT HANDLE X2

## (undated) DEVICE — LUBRICANT INST ELECTROLUBE EL101

## (undated) DEVICE — DAVINCI XI OBTURATOR BLADELESS 8MM 470359

## (undated) DEVICE — SYSTEM LAPAROVUE VISIBILITY LAPVUE10

## (undated) DEVICE — PREP CHLORAPREP 26ML TINTED HI-LITE ORANGE 930815

## (undated) DEVICE — ENDO TROCAR FIRST ENTRY KII FIOS Z-THRD 05X100MM CTF03

## (undated) DEVICE — SU VICRYL 3-0 SH 27" UND J416H

## (undated) DEVICE — LINEN TOWEL PACK X5 5464

## (undated) RX ORDER — HYDROMORPHONE HYDROCHLORIDE 1 MG/ML
INJECTION, SOLUTION INTRAMUSCULAR; INTRAVENOUS; SUBCUTANEOUS
Status: DISPENSED
Start: 2023-01-24

## (undated) RX ORDER — ONDANSETRON 2 MG/ML
INJECTION INTRAMUSCULAR; INTRAVENOUS
Status: DISPENSED
Start: 2023-01-24

## (undated) RX ORDER — GLYCOPYRROLATE 0.2 MG/ML
INJECTION, SOLUTION INTRAMUSCULAR; INTRAVENOUS
Status: DISPENSED
Start: 2021-08-30

## (undated) RX ORDER — CEFAZOLIN SODIUM 1 G/3ML
INJECTION, POWDER, FOR SOLUTION INTRAMUSCULAR; INTRAVENOUS
Status: DISPENSED
Start: 2023-01-24

## (undated) RX ORDER — FENTANYL CITRATE 50 UG/ML
INJECTION, SOLUTION INTRAMUSCULAR; INTRAVENOUS
Status: DISPENSED
Start: 2021-08-30

## (undated) RX ORDER — FENTANYL CITRATE 50 UG/ML
INJECTION, SOLUTION INTRAMUSCULAR; INTRAVENOUS
Status: DISPENSED
Start: 2023-01-24

## (undated) RX ORDER — FENTANYL CITRATE 50 UG/ML
INJECTION, SOLUTION INTRAMUSCULAR; INTRAVENOUS
Status: DISPENSED
Start: 2017-10-31

## (undated) RX ORDER — LIDOCAINE HYDROCHLORIDE 40 MG/ML
SOLUTION TOPICAL
Status: DISPENSED
Start: 2021-08-30

## (undated) RX ORDER — EPHEDRINE SULFATE 50 MG/ML
INJECTION, SOLUTION INTRAMUSCULAR; INTRAVENOUS; SUBCUTANEOUS
Status: DISPENSED
Start: 2023-01-24

## (undated) RX ORDER — NALOXONE HYDROCHLORIDE 0.4 MG/ML
INJECTION, SOLUTION INTRAMUSCULAR; INTRAVENOUS; SUBCUTANEOUS
Status: DISPENSED
Start: 2021-08-30

## (undated) RX ORDER — REGADENOSON 0.08 MG/ML
INJECTION, SOLUTION INTRAVENOUS
Status: DISPENSED
Start: 2020-09-16

## (undated) RX ORDER — FLUMAZENIL 0.1 MG/ML
INJECTION, SOLUTION INTRAVENOUS
Status: DISPENSED
Start: 2021-08-30